# Patient Record
Sex: FEMALE | Race: WHITE | NOT HISPANIC OR LATINO | Employment: OTHER | URBAN - METROPOLITAN AREA
[De-identification: names, ages, dates, MRNs, and addresses within clinical notes are randomized per-mention and may not be internally consistent; named-entity substitution may affect disease eponyms.]

---

## 2017-05-02 DIAGNOSIS — I10 HTN (HYPERTENSION), BENIGN: ICD-10-CM

## 2017-05-02 NOTE — TELEPHONE ENCOUNTER
metoprolol (LOPRESSOR) 50 MG tablet 180 tablet 3 4/8/2016          Last Written Prescription Date: 04/08/2016  Last Fill Quantity: 180, # refills: 3    Last Office Visit with [facility], [facility] or [facility] prescribing provider:  08/17/2016   Future Office Visit:        BP Readings from Last 3 Encounters:   08/17/16 121/76   07/27/16 130/76   06/15/16 134/70

## 2017-05-03 RX ORDER — METOPROLOL TARTRATE 50 MG
TABLET ORAL
Qty: 180 TABLET | Refills: 0 | Status: SHIPPED | OUTPATIENT
Start: 2017-05-03 | End: 2017-11-09

## 2017-05-03 NOTE — TELEPHONE ENCOUNTER
Prescription approved per [facility] Refill Protocol.  Lidia GUEVARA RN

## 2017-11-09 ENCOUNTER — OFFICE VISIT (OUTPATIENT)
Dept: FAMILY MEDICINE | Facility: CLINIC | Age: 72
End: 2017-11-09
Payer: COMMERCIAL

## 2017-11-09 VITALS
WEIGHT: 139 LBS | HEART RATE: 63 BPM | OXYGEN SATURATION: 97 % | TEMPERATURE: 97.7 F | DIASTOLIC BLOOD PRESSURE: 68 MMHG | SYSTOLIC BLOOD PRESSURE: 113 MMHG | BODY MASS INDEX: 24.63 KG/M2 | HEIGHT: 63 IN

## 2017-11-09 DIAGNOSIS — I10 HTN (HYPERTENSION), BENIGN: ICD-10-CM

## 2017-11-09 DIAGNOSIS — R41.3 MEMORY LOSS: ICD-10-CM

## 2017-11-09 DIAGNOSIS — M79.10 MYALGIA: ICD-10-CM

## 2017-11-09 DIAGNOSIS — M15.9 GENERALIZED OA: ICD-10-CM

## 2017-11-09 DIAGNOSIS — R79.89 ELEVATED LFTS: ICD-10-CM

## 2017-11-09 DIAGNOSIS — R53.83 OTHER FATIGUE: ICD-10-CM

## 2017-11-09 DIAGNOSIS — Z12.11 COLON CANCER SCREENING: ICD-10-CM

## 2017-11-09 DIAGNOSIS — E78.5 HYPERLIPIDEMIA LDL GOAL <130: ICD-10-CM

## 2017-11-09 DIAGNOSIS — Z00.00 ENCOUNTER FOR ROUTINE ADULT HEALTH EXAMINATION WITHOUT ABNORMAL FINDINGS: Primary | ICD-10-CM

## 2017-11-09 LAB
CRP SERPL-MCNC: <2.9 MG/L (ref 0–8)
ERYTHROCYTE [DISTWIDTH] IN BLOOD BY AUTOMATED COUNT: 14.5 % (ref 10–15)
HCT VFR BLD AUTO: 43.4 % (ref 35–47)
HGB BLD-MCNC: 13.9 G/DL (ref 11.7–15.7)
MCH RBC QN AUTO: 31.2 PG (ref 26.5–33)
MCHC RBC AUTO-ENTMCNC: 32 G/DL (ref 31.5–36.5)
MCV RBC AUTO: 98 FL (ref 78–100)
PLATELET # BLD AUTO: 224 10E9/L (ref 150–450)
RBC # BLD AUTO: 4.45 10E12/L (ref 3.8–5.2)
WBC # BLD AUTO: 7.1 10E9/L (ref 4–11)

## 2017-11-09 PROCEDURE — 80061 LIPID PANEL: CPT | Performed by: PHYSICIAN ASSISTANT

## 2017-11-09 PROCEDURE — 82550 ASSAY OF CK (CPK): CPT | Performed by: PHYSICIAN ASSISTANT

## 2017-11-09 PROCEDURE — 86140 C-REACTIVE PROTEIN: CPT | Performed by: PHYSICIAN ASSISTANT

## 2017-11-09 PROCEDURE — 85027 COMPLETE CBC AUTOMATED: CPT | Performed by: PHYSICIAN ASSISTANT

## 2017-11-09 PROCEDURE — 99397 PER PM REEVAL EST PAT 65+ YR: CPT | Performed by: PHYSICIAN ASSISTANT

## 2017-11-09 PROCEDURE — 80053 COMPREHEN METABOLIC PANEL: CPT | Performed by: PHYSICIAN ASSISTANT

## 2017-11-09 PROCEDURE — 36415 COLL VENOUS BLD VENIPUNCTURE: CPT | Performed by: PHYSICIAN ASSISTANT

## 2017-11-09 RX ORDER — CHOLECALCIFEROL (VITAMIN D3) 50 MCG
2000 TABLET ORAL DAILY
Qty: 100 TABLET | Refills: 3 | COMMUNITY
Start: 2017-11-09 | End: 2017-11-12

## 2017-11-09 RX ORDER — METOPROLOL TARTRATE 50 MG
50 TABLET ORAL 2 TIMES DAILY
Qty: 180 TABLET | Refills: 3 | Status: ON HOLD | OUTPATIENT
Start: 2017-11-09 | End: 2017-11-15

## 2017-11-09 NOTE — MR AVS SNAPSHOT
After Visit Summary   11/9/2017    Elaina Winn    MRN: [id number]           Patient Information     Date Of Birth          1945        Visit Information        Provider Department      11/9/2017 11:30 AM Cady Marie PA-C [facility]        Today's Diagnoses     Encounter for routine adult health examination without abnormal findings    -  1    HTN (hypertension), benign        Memory loss        Myalgia        Other fatigue        Colon cancer screening        Generalized OA        Hyperlipidemia LDL goal <130          Care Instructions      Preventive Health Recommendations    Female Ages 65 +    Yearly exam:     See your health care provider every year in order to  o Review health changes.   o Discuss preventive care.    o Review your medicines if your doctor has prescribed any.      You no longer need a yearly Pap test unless you've had an abnormal Pap test in the past 10 years. If you have vaginal symptoms, such as bleeding or discharge, be sure to talk with your provider about a Pap test.      Every 1 to 2 years, have a mammogram.  If you are over 69, talk with your health care provider about whether or not you want to continue having screening mammograms.      Every 10 years, have a colonoscopy. Or, have a yearly FIT test (stool test). These exams will check for colon cancer.       Have a cholesterol test every 5 years, or more often if your doctor advises it.       Have a diabetes test (fasting glucose) every three years. If you are at risk for diabetes, you should have this test more often.       At age 65, have a bone density scan (DEXA) to check for osteoporosis (brittle bone disease).    Shots:    Get a flu shot each year.    Get a tetanus shot every 10 years.    Talk to your doctor about your pneumonia vaccines. There are now two you should receive - Pneumovax (PPSV 23) and Prevnar (PCV 13).    Talk to your doctor about the shingles vaccine.    Talk to your doctor  about the hepatitis B vaccine.    Nutrition:     Eat at least 5 servings of fruits and vegetables each day.      Eat whole-grain bread, whole-wheat pasta and brown rice instead of white grains and rice.      Talk to your provider about Calcium and Vitamin D.     Lifestyle    Exercise at least 150 minutes a week (30 minutes a day, 5 days a week). This will help you control your weight and prevent disease.      Limit alcohol to one drink per day.      No smoking.       Wear sunscreen to prevent skin cancer.       See your dentist twice a year for an exam and cleaning.      See your eye doctor every 1 to 2 years to screen for conditions such as glaucoma, macular degeneration and cataracts.    See Stella Delgado again regarding your memory.  Have your mammogram in [address]          Follow-ups after your visit        Future tests that were ordered for you today     Open Future Orders        Priority Expected Expires Ordered    Fecal colorectal cancer screen (FIT) Routine 11/30/2017 2/1/2018 11/9/2017            Who to contact     If you have questions or need follow up information about today's clinic visit or your schedule please contact [facility] directly at [phone].  Normal or non-critical lab and imaging results will be communicated to you by [employer], letter or phone within 4 business days after the clinic has received the results. If you do not hear from us within 7 days, please contact the clinic through Follicat or phone. If you have a critical or abnormal lab result, we will notify you by phone as soon as possible.  Submit refill requests through Nextpeer or call your pharmacy and they will forward the refill request to us. Please allow 3 business days for your refill to be completed.          Additional Information About Your Visit        Nextpeer Information     Nextpeer gives you secure access to your electronic health record. If you see a primary care provider, you can also send messages to your  "care team and make appointments. If you have questions, please call your primary care clinic.  If you do not have a primary care provider, please call [phone] and they will assist you.        Care EveryWhere ID     This is your Care EveryWhere ID. This could be used by other organizations to access your [facility] medical records  [id number]        Your Vitals Were     Pulse Temperature Height Pulse Oximetry BMI (Body Mass Index)       63 97.7  F (36.5  C) (Tympanic) 5' 3\" (1.6 m) 97% 24.62 kg/m2        Blood Pressure from Last 3 Encounters:   11/09/17 113/68   08/17/16 121/76   07/27/16 130/76    Weight from Last 3 Encounters:   11/09/17 139 lb (63 kg)   08/17/16 143 lb (64.9 kg)   07/27/16 144 lb (65.3 kg)              We Performed the Following     CBC with platelets     CK total     Comprehensive metabolic panel     CRP inflammation     Lipid Profile          Today's Medication Changes          These changes are accurate as of: 11/9/17 11:52 AM.  If you have any questions, ask your nurse or doctor.               These medicines have changed or have updated prescriptions.        Dose/Directions    metoprolol 50 MG tablet   Commonly known as:  LOPRESSOR   This may have changed:  See the new instructions.   Used for:  HTN (hypertension), benign   Changed by:  Cady Marie PA-C        Dose:  50 mg   Take 1 tablet (50 mg) by mouth 2 times daily   Quantity:  180 tablet   Refills:  3            Where to get your medicines      These medications were sent to [facility] [id number] [place] [address] [address] [facility] [address]  [address], [facility] [address]    Hours:  24-hours Phone:  [phone]     metoprolol 50 MG tablet                Primary Care Provider Office Phone # Fax #    Cady Marie PA-C [phone] [address]  [facility] [address]        Equal Access to Services     KAREN BABCOCK AH: randell Miller, " zoran lindsay ah. So [facility] [phone].    ATENCIÓN: Si kristi wallace, tiene a munoz disposición servicios gratuitos de asistencia lingüística. Casimiro al [phone].    We comply with applicable federal civil rights laws and [place] laws. We do not discriminate on the basis of race, color, national origin, age, disability, sex, sexual orientation, or gender identity.            Thank you!     Thank you for choosing [facility]  for your care. Our goal is always to provide you with excellent care. Hearing back from our patients is one way we can continue to improve our services. Please take a few minutes to complete the written survey that you may receive in the mail after your visit with us. Thank you!             Your Updated Medication List - Protect others around you: Learn how to safely use, store and throw away your medicines at www.disposemymeds.org.          This list is accurate as of: 11/9/17 11:52 AM.  Always use your most recent med list.                   Brand Name Dispense Instructions for use Diagnosis    ALPHAGAN P 0.1 % ophthalmic solution   Generic drug:  brimonidine      Place 1 drop into the right eye every 12 hours Both eyes        brimonidine-timolol 0.2-0.5 % ophthalmic solution    COMBIGAN     Place 1 drop Into the left eye 2 times daily        FISH OIL PO      Take  by mouth.        LUMIGAN 0.01 % Soln   Generic drug:  bimatoprost      Place 1 drop into the right eye At Bedtime Both eyes        metoprolol 50 MG tablet    LOPRESSOR    180 tablet    Take 1 tablet (50 mg) by mouth 2 times daily    HTN (hypertension), benign       Multi-vitamin Tabs tablet   Generic drug:  multivitamin, therapeutic with minerals      Take 1 tablet by mouth daily.        vitamin D 2000 UNITS tablet     100 tablet    Take 2,000 Units by mouth daily    Myalgia

## 2017-11-09 NOTE — NURSING NOTE
"Chief Complaint   Patient presents with     Wellness Visit       Initial /68 (BP Location: Right arm, Cuff Size: Adult Regular)  Pulse 63  Temp 97.7  F (36.5  C) (Tympanic)  Ht 5' 3\" (1.6 m)  Wt 139 lb (63 kg)  SpO2 97%  BMI 24.62 kg/m2 Estimated body mass index is 24.62 kg/(m^2) as calculated from the following:    Height as of this encounter: 5' 3\" (1.6 m).    Weight as of this encounter: 139 lb (63 kg).  Medication Reconciliation: complete     Allyssa Oreilly MA    "

## 2017-11-09 NOTE — PROGRESS NOTES
"  SUBJECTIVE:   Elaina Winn is a 72 year old female who presents for Preventive Visit.    She feels her memory is worse  She did see Dr. Delgado last year but hasn't f/u  She has pain in \"all of her muscles\" so feels tired/sleepy  BP has been normal when checked at home  She has cataracts and will have surgery coming up.  Already had flu shot at [facility]  She has 2 adopted children and son has mental health issues    Are you in the first 12 months of your Medicare Part B coverage?  No    Healthy Habits:    Do you get at least three servings of calcium containing foods daily (dairy, green leafy vegetables, etc.)? no, taking calcium and/or vitamin D supplement: yes - multivitaim    Amount of exercise or daily activities, outside of work: moderate    Problems taking medications regularly No    Medication side effects: No    Have you had an eye exam in the past two years? yes    Do you see a dentist twice per year? yes    Do you have sleep apnea, excessive snoring or daytime drowsiness?no    COGNITIVE SCREEN  1) Repeat 3 items (Banana, Sunrise, Chair)    2) Clock draw: NORMAL  3) 3 item recall: Recalls NO objects   Results: 0 items recalled: PROBABLE COGNITIVE IMPAIRMENT, **INFORM PROVIDER**    Mini-CogTM Copyright RENEE Perea. Licensed by the author for use in [facility]; reprinted with permission (funmi@.[facility]). All rights reserved.            Reviewed and updated as needed this visit by clinical staff  Tobacco  Allergies  Meds  Problems  Med Hx  Surg Hx  Fam Hx  Soc Hx          Reviewed and updated as needed this visit by Provider  Allergies        Social History   Substance Use Topics     Smoking status: Former Smoker     Quit date: 1/1/1990     Smokeless tobacco: Never Used      Comment: quit 1990     Alcohol use No       The patient does not drink >3 drinks per day nor >7 drinks per week.    Today's PHQ-2 Score:   PHQ-2 ( 1999 Pf[employer]) 11/9/2017 3/22/2016   Q1: Little interest or pleasure in " doing things 0 0   Q2: Feeling down, depressed or hopeless 0 0   PHQ-2 Score 0 0         Do you feel safe in your environment - Yes    Do you have a Health Care Directive?: No: Advance care planning reviewed with patient; information given to patient to review.      Current providers sharing in care for this patient include: Patient Care Team:  Cady Marie PA-C as PCP - General (Internal Medicine)      Hearing impairment: No    Ability to successfully perform activities of daily living: Yes, no assistance needed     Fall risk:  Fallen 2 or more times in the past year?: No  Any fall with injury in the past year?: No      Home safety:  none identified  Allyssa Oreilly MA      The following health maintenance items are reviewed in Epic and correct as of today:  Health Maintenance   Topic Date Due     FALL RISK ASSESSMENT  07/27/2017     FIT Q1 YR  08/18/2017     INFLUENZA VACCINE (SYSTEM ASSIGNED)  09/01/2017     ADVANCE DIRECTIVE PLANNING Q5 YRS  12/28/2017     MAMMO SCREEN Q2 YR (SYSTEM ASSIGNED)  03/22/2018     LIPID SCREEN Q5 YR FEMALE (SYSTEM ASSIGNED)  03/22/2021     TETANUS IMMUNIZATION (SYSTEM ASSIGNED)  02/08/2022     DEXA SCAN SCREENING (SYSTEM ASSIGNED)  Completed     PNEUMOCOCCAL  Completed     Past Medical History:   Diagnosis Date     Anxiety      Dry eyes, bilateral      Generalized OA      Glaucoma      HTN (hypertension), benign      Hyperlipidemia LDL goal < 130      Past Surgical History:   Procedure Laterality Date     DILATION AND CURETTAGE       HYSTERECTOMY      with USO, not for cancer     S/p partial vaginectomy       SEPTOPLASTY       TONSILLECTOMY & ADENOIDECTOMY       Current Outpatient Prescriptions   Medication Sig Dispense Refill     metoprolol (LOPRESSOR) 50 MG tablet Take 1 tablet (50 mg) by mouth 2 times daily 180 tablet 3     Cholecalciferol (VITAMIN D) 2000 UNITS tablet Take 2,000 Units by mouth daily 100 tablet 3     brimonidine (ALPHAGAN P) 0.1 % ophthalmic solution Place 1  "drop into the right eye every 12 hours Both eyes       Omega-3 Fatty Acids (FISH OIL PO) Take  by mouth.       Multiple Vitamin (MULTI-VITAMIN) per tablet Take 1 tablet by mouth daily.       [DISCONTINUED] metoprolol (LOPRESSOR) 50 MG tablet TAKE 1 TABLET TWICE DAILY 180 tablet 0     brimonidine-timolol (COMBIGAN) 0.2-0.5 % ophthalmic solution Place 1 drop Into the left eye 2 times daily       bimatoprost (LUMIGAN) 0.01 % SOLN Place 1 drop into the right eye At Bedtime Both eyes         ROS:  Constitutional, HEENT, cardiovascular, pulmonary, GI, , musculoskeletal, neuro, skin, endocrine and psych systems are negative, except as otherwise noted.      OBJECTIVE:   /68 (BP Location: Right arm, Cuff Size: Adult Regular)  Pulse 63  Temp 97.7  F (36.5  C) (Tympanic)  Ht 5' 3\" (1.6 m)  Wt 139 lb (63 kg)  SpO2 97%  BMI 24.62 kg/m2 Estimated body mass index is 24.62 kg/(m^2) as calculated from the following:    Height as of this encounter: 5' 3\" (1.6 m).    Weight as of this encounter: 139 lb (63 kg).  EXAM:   GENERAL APPEARANCE: healthy, alert and no distress  EYES: Eyes grossly normal to inspection, PERRL and conjunctivae and sclerae normal  HENT: ear canals and TM's normal, nose and mouth without ulcers or lesions, oropharynx clear and oral mucous membranes moist  NECK: no adenopathy, no asymmetry, masses, or scars and thyroid normal to palpation  RESP: lungs clear to auscultation - no rales, rhonchi or wheezes  BREAST: normal without masses, tenderness or nipple discharge and no palpable axillary masses or adenopathy  CV: regular rate and rhythm, normal S1 S2, no S3 or S4, no murmur, click or rub, no peripheral edema and peripheral pulses strong  ABDOMEN: soft, nontender, no hepatosplenomegaly, no masses and bowel sounds normal  MS: no musculoskeletal defects are noted and gait is age appropriate without ataxia  SKIN: no suspicious lesions or rashes  NEURO: Normal strength and tone, sensory exam grossly " "normal, mentation intact and speech normal  PSYCH: mentation appears normal and affect normal/bright    ASSESSMENT / PLAN:   Assessment and Plan:     (Z00.00) Encounter for routine adult health examination without abnormal findings  (primary encounter diagnosis)  Comment:   Plan: will check labs. Pt to go for her mammogram as this is overdue.  Immun up to date.    (I10) HTN (hypertension), benign  Comment: well controlled cont same  Plan: metoprolol (LOPRESSOR) 50 MG tablet,         Comprehensive metabolic panel            (R41.3) Memory loss  Comment:   Plan: f/u with Dr. Delgado in the memory clinic and bring  to that appt    (M79.1) Myalgia  Comment:   Plan: CK total, CRP inflammation, Cholecalciferol         (VITAMIN D) 2000 UNITS tablet            (R53.83) Other fatigue  Comment:   Plan: CBC with platelets            (M15.9) Generalized OA  Comment:   Plan: She may have generalized bone and muscle pain from OA. Will check labs.  Recd tylenol 1000mg tid.    (E78.5) Hyperlipidemia LDL goal <130  Comment:   Plan: Lipid Profile            (Z12.11) Colon cancer screening  Comment:   Plan: Fecal colorectal cancer screen (FIT)                  End of Life Planning:  Patient currently has an advanced directive: No.  I have verified the patient's ablity to prepare an advanced directive/make health care decisions.  Literature was provided to assist patient in preparing an advanced directive.    COUNSELING:  Reviewed preventive health counseling, as reflected in patient instructions        Estimated body mass index is 24.62 kg/(m^2) as calculated from the following:    Height as of this encounter: 5' 3\" (1.6 m).    Weight as of this encounter: 139 lb (63 kg).     reports that she quit smoking about 27 years ago. She has never used smokeless tobacco.        Appropriate preventive services were discussed with this patient, including applicable screening as appropriate for cardiovascular disease, diabetes, " osteopenia/osteoporosis, and glaucoma.  As appropriate for age/gender, discussed screening for colorectal cancer, prostate cancer, breast cancer, and cervical cancer. Checklist reviewing preventive services available has been given to the patient.    Reviewed patients plan of care and provided an AVS. The Basic Care Plan (routine screening as documented in Health Maintenance) for Elaina meets the Care Plan requirement. This Care Plan has been established and reviewed with the Patient.    Counseling Resources:  ATP IV Guidelines  Pooled Cohorts Equation Calculator  Breast Cancer Risk Calculator  FRAX Risk Assessment  ICSI Preventive Guidelines  Dietary Guidelines for Americans, 2010  USDA's MyPlate  ASA Prophylaxis  Lung CA Screening    Cady Marie PA-C  [facility]

## 2017-11-09 NOTE — PATIENT INSTRUCTIONS

## 2017-11-10 LAB
ALBUMIN SERPL-MCNC: 3.7 G/DL (ref 3.4–5)
ALP SERPL-CCNC: 228 U/L (ref 40–150)
ALT SERPL W P-5'-P-CCNC: 694 U/L (ref 0–50)
ANION GAP SERPL CALCULATED.3IONS-SCNC: 7 MMOL/L (ref 3–14)
AST SERPL W P-5'-P-CCNC: 591 U/L (ref 0–45)
BILIRUB SERPL-MCNC: 1 MG/DL (ref 0.2–1.3)
BUN SERPL-MCNC: 40 MG/DL (ref 7–30)
CALCIUM SERPL-MCNC: 9.8 MG/DL (ref 8.5–10.1)
CHLORIDE SERPL-SCNC: 106 MMOL/L (ref 94–109)
CHOLEST SERPL-MCNC: 252 MG/DL
CK SERPL-CCNC: 29 U/L (ref 30–225)
CO2 SERPL-SCNC: 27 MMOL/L (ref 20–32)
CREAT SERPL-MCNC: 1.39 MG/DL (ref 0.52–1.04)
GFR SERPL CREATININE-BSD FRML MDRD: 37 ML/MIN/1.7M2
GLUCOSE SERPL-MCNC: 101 MG/DL (ref 70–99)
HDLC SERPL-MCNC: 77 MG/DL
LDLC SERPL CALC-MCNC: 148 MG/DL
NONHDLC SERPL-MCNC: 175 MG/DL
POTASSIUM SERPL-SCNC: 5.1 MMOL/L (ref 3.4–5.3)
PROT SERPL-MCNC: 7.8 G/DL (ref 6.8–8.8)
SODIUM SERPL-SCNC: 140 MMOL/L (ref 133–144)
TRIGL SERPL-MCNC: 133 MG/DL

## 2017-11-10 NOTE — PROGRESS NOTES
Called and discussed results  Ordered liver us  She denies etoh or tylenol use  Not on a statin  F/u next week after us

## 2017-11-12 ENCOUNTER — APPOINTMENT (OUTPATIENT)
Dept: ULTRASOUND IMAGING | Facility: CLINIC | Age: 72
End: 2017-11-12
Attending: EMERGENCY MEDICINE
Payer: MEDICARE

## 2017-11-12 ENCOUNTER — HOSPITAL ENCOUNTER (OUTPATIENT)
Facility: CLINIC | Age: 72
Setting detail: OBSERVATION
Discharge: HOME | End: 2017-11-15
Attending: EMERGENCY MEDICINE | Admitting: INTERNAL MEDICINE
Payer: MEDICARE

## 2017-11-12 DIAGNOSIS — I10 HTN (HYPERTENSION), BENIGN: ICD-10-CM

## 2017-11-12 DIAGNOSIS — K75.9 HEPATITIS: ICD-10-CM

## 2017-11-12 DIAGNOSIS — I48.0 PAROXYSMAL ATRIAL FIBRILLATION (H): Primary | ICD-10-CM

## 2017-11-12 PROBLEM — R10.9 ABDOMINAL PAIN: Status: ACTIVE | Noted: 2017-11-12

## 2017-11-12 LAB
ALBUMIN SERPL-MCNC: 3.6 G/DL (ref 3.4–5)
ALP SERPL-CCNC: 194 U/L (ref 40–150)
ALT SERPL W P-5'-P-CCNC: 441 U/L (ref 0–50)
ANION GAP SERPL CALCULATED.3IONS-SCNC: 9 MMOL/L (ref 3–14)
AST SERPL W P-5'-P-CCNC: 281 U/L (ref 0–45)
BASOPHILS # BLD AUTO: 0 10E9/L (ref 0–0.2)
BASOPHILS NFR BLD AUTO: 0.5 %
BILIRUB SERPL-MCNC: 1.1 MG/DL (ref 0.2–1.3)
BUN SERPL-MCNC: 29 MG/DL (ref 7–30)
CALCIUM SERPL-MCNC: 9.9 MG/DL (ref 8.5–10.1)
CHLORIDE SERPL-SCNC: 103 MMOL/L (ref 94–109)
CO2 SERPL-SCNC: 26 MMOL/L (ref 20–32)
CREAT SERPL-MCNC: 1.11 MG/DL (ref 0.52–1.04)
DIFFERENTIAL METHOD BLD: NORMAL
EOSINOPHIL # BLD AUTO: 0.2 10E9/L (ref 0–0.7)
EOSINOPHIL NFR BLD AUTO: 2.5 %
ERYTHROCYTE [DISTWIDTH] IN BLOOD BY AUTOMATED COUNT: 14.1 % (ref 10–15)
GFR SERPL CREATININE-BSD FRML MDRD: 48 ML/MIN/1.7M2
GLUCOSE SERPL-MCNC: 105 MG/DL (ref 70–99)
HCT VFR BLD AUTO: 44.7 % (ref 35–47)
HGB BLD-MCNC: 14.9 G/DL (ref 11.7–15.7)
IMM GRANULOCYTES # BLD: 0 10E9/L (ref 0–0.4)
IMM GRANULOCYTES NFR BLD: 0.2 %
LIPASE SERPL-CCNC: 235 U/L (ref 73–393)
LYMPHOCYTES # BLD AUTO: 1.4 10E9/L (ref 0.8–5.3)
LYMPHOCYTES NFR BLD AUTO: 23.8 %
MCH RBC QN AUTO: 31.6 PG (ref 26.5–33)
MCHC RBC AUTO-ENTMCNC: 33.3 G/DL (ref 31.5–36.5)
MCV RBC AUTO: 95 FL (ref 78–100)
MONOCYTES # BLD AUTO: 0.7 10E9/L (ref 0–1.3)
MONOCYTES NFR BLD AUTO: 11.5 %
NEUTROPHILS # BLD AUTO: 3.6 10E9/L (ref 1.6–8.3)
NEUTROPHILS NFR BLD AUTO: 61.5 %
NRBC # BLD AUTO: 0 10*3/UL
NRBC BLD AUTO-RTO: 0 /100
PLATELET # BLD AUTO: 239 10E9/L (ref 150–450)
POTASSIUM SERPL-SCNC: 4.4 MMOL/L (ref 3.4–5.3)
PROT SERPL-MCNC: 8.2 G/DL (ref 6.8–8.8)
RBC # BLD AUTO: 4.72 10E12/L (ref 3.8–5.2)
SODIUM SERPL-SCNC: 138 MMOL/L (ref 133–144)
TSH SERPL DL<=0.005 MIU/L-ACNC: 1.59 MU/L (ref 0.4–4)
WBC # BLD AUTO: 5.9 10E9/L (ref 4–11)

## 2017-11-12 PROCEDURE — 99222 1ST HOSP IP/OBS MODERATE 55: CPT | Mod: AI | Performed by: HOSPITALIST

## 2017-11-12 PROCEDURE — 99285 EMERGENCY DEPT VISIT HI MDM: CPT | Mod: 25

## 2017-11-12 PROCEDURE — 86708 HEPATITIS A ANTIBODY: CPT | Performed by: EMERGENCY MEDICINE

## 2017-11-12 PROCEDURE — 99207 ZZC CDG-MDM COMPONENT: MEETS LOW - DOWN CODED: CPT | Performed by: HOSPITALIST

## 2017-11-12 PROCEDURE — 25000132 ZZH RX MED GY IP 250 OP 250 PS 637: Mod: GY | Performed by: HOSPITALIST

## 2017-11-12 PROCEDURE — 76700 US EXAM ABDOM COMPLETE: CPT

## 2017-11-12 PROCEDURE — 12000000 ZZH R&B MED SURG/OB

## 2017-11-12 PROCEDURE — 87340 HEPATITIS B SURFACE AG IA: CPT | Performed by: EMERGENCY MEDICINE

## 2017-11-12 PROCEDURE — 86803 HEPATITIS C AB TEST: CPT | Performed by: EMERGENCY MEDICINE

## 2017-11-12 PROCEDURE — 25800025 ZZH RX 258: Performed by: HOSPITALIST

## 2017-11-12 PROCEDURE — S5010 5% DEXTROSE AND 0.45% SALINE: HCPCS | Performed by: HOSPITALIST

## 2017-11-12 PROCEDURE — 84443 ASSAY THYROID STIM HORMONE: CPT | Performed by: EMERGENCY MEDICINE

## 2017-11-12 PROCEDURE — 80053 COMPREHEN METABOLIC PANEL: CPT | Performed by: EMERGENCY MEDICINE

## 2017-11-12 PROCEDURE — 85025 COMPLETE CBC W/AUTO DIFF WBC: CPT | Performed by: EMERGENCY MEDICINE

## 2017-11-12 PROCEDURE — 96361 HYDRATE IV INFUSION ADD-ON: CPT

## 2017-11-12 PROCEDURE — 25000128 H RX IP 250 OP 636: Performed by: EMERGENCY MEDICINE

## 2017-11-12 PROCEDURE — 83690 ASSAY OF LIPASE: CPT | Performed by: EMERGENCY MEDICINE

## 2017-11-12 PROCEDURE — 96374 THER/PROPH/DIAG INJ IV PUSH: CPT

## 2017-11-12 RX ORDER — AMOXICILLIN 250 MG
2 CAPSULE ORAL 2 TIMES DAILY PRN
Status: DISCONTINUED | OUTPATIENT
Start: 2017-11-12 | End: 2017-11-15 | Stop reason: HOSPADM

## 2017-11-12 RX ORDER — AMOXICILLIN 250 MG
1 CAPSULE ORAL 2 TIMES DAILY PRN
Status: DISCONTINUED | OUTPATIENT
Start: 2017-11-12 | End: 2017-11-15 | Stop reason: HOSPADM

## 2017-11-12 RX ORDER — BISACODYL 10 MG
10 SUPPOSITORY, RECTAL RECTAL DAILY PRN
Status: DISCONTINUED | OUTPATIENT
Start: 2017-11-12 | End: 2017-11-15 | Stop reason: HOSPADM

## 2017-11-12 RX ORDER — ACETAMINOPHEN 325 MG/1
650 TABLET ORAL EVERY 4 HOURS PRN
Status: DISCONTINUED | OUTPATIENT
Start: 2017-11-12 | End: 2017-11-12 | Stop reason: ALTCHOICE

## 2017-11-12 RX ORDER — POLYETHYLENE GLYCOL 3350 17 G/17G
17 POWDER, FOR SOLUTION ORAL DAILY PRN
Status: DISCONTINUED | OUTPATIENT
Start: 2017-11-12 | End: 2017-11-15 | Stop reason: HOSPADM

## 2017-11-12 RX ORDER — MULTIPLE VITAMINS W/ MINERALS TAB 9MG-400MCG
1 TAB ORAL AT BEDTIME
Status: DISCONTINUED | OUTPATIENT
Start: 2017-11-12 | End: 2017-11-15 | Stop reason: HOSPADM

## 2017-11-12 RX ORDER — HYDROMORPHONE HYDROCHLORIDE 1 MG/ML
0.2 INJECTION, SOLUTION INTRAMUSCULAR; INTRAVENOUS; SUBCUTANEOUS
Status: DISCONTINUED | OUTPATIENT
Start: 2017-11-12 | End: 2017-11-15 | Stop reason: HOSPADM

## 2017-11-12 RX ORDER — HYDROCODONE BITARTRATE AND ACETAMINOPHEN 5; 325 MG/1; MG/1
1-2 TABLET ORAL EVERY 4 HOURS PRN
Status: DISCONTINUED | OUTPATIENT
Start: 2017-11-12 | End: 2017-11-12 | Stop reason: ALTCHOICE

## 2017-11-12 RX ORDER — BRIMONIDINE TARTRATE AND TIMOLOL MALEATE 2; 5 MG/ML; MG/ML
1 SOLUTION OPHTHALMIC 2 TIMES DAILY
Status: DISCONTINUED | OUTPATIENT
Start: 2017-11-13 | End: 2017-11-15 | Stop reason: HOSPADM

## 2017-11-12 RX ORDER — OXYCODONE HYDROCHLORIDE 5 MG/1
5 TABLET ORAL EVERY 6 HOURS PRN
Status: DISCONTINUED | OUTPATIENT
Start: 2017-11-12 | End: 2017-11-15 | Stop reason: HOSPADM

## 2017-11-12 RX ORDER — CHOLECALCIFEROL (VITAMIN D3) 50 MCG
2000 TABLET ORAL DAILY
Status: DISCONTINUED | OUTPATIENT
Start: 2017-11-13 | End: 2017-11-12

## 2017-11-12 RX ORDER — ONDANSETRON 4 MG/1
4 TABLET, ORALLY DISINTEGRATING ORAL EVERY 6 HOURS PRN
Status: DISCONTINUED | OUTPATIENT
Start: 2017-11-12 | End: 2017-11-15 | Stop reason: HOSPADM

## 2017-11-12 RX ORDER — NALOXONE HYDROCHLORIDE 0.4 MG/ML
.1-.4 INJECTION, SOLUTION INTRAMUSCULAR; INTRAVENOUS; SUBCUTANEOUS
Status: DISCONTINUED | OUTPATIENT
Start: 2017-11-12 | End: 2017-11-15 | Stop reason: HOSPADM

## 2017-11-12 RX ORDER — BRIMONIDINE TARTRATE 1 MG/ML
1 SOLUTION/ DROPS OPHTHALMIC EVERY 12 HOURS
Status: DISCONTINUED | OUTPATIENT
Start: 2017-11-12 | End: 2017-11-12

## 2017-11-12 RX ORDER — ONDANSETRON 2 MG/ML
4 INJECTION INTRAMUSCULAR; INTRAVENOUS EVERY 6 HOURS PRN
Status: DISCONTINUED | OUTPATIENT
Start: 2017-11-12 | End: 2017-11-15 | Stop reason: HOSPADM

## 2017-11-12 RX ORDER — LIDOCAINE 40 MG/G
CREAM TOPICAL
Status: DISCONTINUED | OUTPATIENT
Start: 2017-11-12 | End: 2017-11-15 | Stop reason: HOSPADM

## 2017-11-12 RX ORDER — ONDANSETRON 2 MG/ML
4 INJECTION INTRAMUSCULAR; INTRAVENOUS ONCE
Status: COMPLETED | OUTPATIENT
Start: 2017-11-12 | End: 2017-11-12

## 2017-11-12 RX ORDER — ACETAMINOPHEN 650 MG/1
650 SUPPOSITORY RECTAL EVERY 4 HOURS PRN
Status: DISCONTINUED | OUTPATIENT
Start: 2017-11-12 | End: 2017-11-12 | Stop reason: ALTCHOICE

## 2017-11-12 RX ORDER — METOPROLOL TARTRATE 50 MG
50 TABLET ORAL 2 TIMES DAILY
Status: DISCONTINUED | OUTPATIENT
Start: 2017-11-12 | End: 2017-11-15

## 2017-11-12 RX ADMIN — ONDANSETRON 4 MG: 2 SOLUTION INTRAMUSCULAR; INTRAVENOUS at 17:59

## 2017-11-12 RX ADMIN — METOPROLOL TARTRATE 50 MG: 50 TABLET ORAL at 22:10

## 2017-11-12 RX ADMIN — DEXTROSE MONOHYDRATE AND SODIUM CHLORIDE: 5; .45 INJECTION, SOLUTION INTRAVENOUS at 22:18

## 2017-11-12 RX ADMIN — SODIUM CHLORIDE 1000 ML: 9 INJECTION, SOLUTION INTRAVENOUS at 17:55

## 2017-11-12 RX ADMIN — MULTIPLE VITAMINS W/ MINERALS TAB 1 TABLET: TAB at 22:10

## 2017-11-12 ASSESSMENT — ENCOUNTER SYMPTOMS
UNEXPECTED WEIGHT CHANGE: 1
DIARRHEA: 0
ABDOMINAL PAIN: 0
APPETITE CHANGE: 1
VOMITING: 0
CHILLS: 1
COUGH: 0
BACK PAIN: 0
NAUSEA: 1

## 2017-11-12 NOTE — LETTER
Transition Communication Hand-off for Care Transitions to Next Level of Care Provider    Name: Elaina Winn  MRN #: [id number]  Primary Care Provider: Cady Marie  Primary Care MD Name: Stella Delgado MD  Primary Clinic: [address]  [place] [address]  Primary Care Clinic Name: [facility]  Reason for Hospitalization:  Elevated LFT's CT negative new Afib requiring AC Confusion   Admit Date/Time: 11/12/2017  4:40 PM  Discharge Date: 11/15/2017    Plan of Care Goals/Milestone Events:   Patient Concerns: new medications for Afib   Patient Goals: continued work up for elevated LFT's patient is frustrated that providers have not been able to give her a cause of the LFT's    Reason for Communication Hand-off Referral: Difficulty understanding plan of care  Multiple providers/specialties    Discharge Plan:  -Follow up with cardiology clinic in 4 weeks Scheduled to see NP and Dr. Perez Holter Monitor 30 days  -Follow up with PCP in one week. Recommended lab: LFT, BMP, Hgb scheduled 11/20 FRANSISCA Delgado  -Follow up with [facility] in 2 weeks. Hepatology clinic   -CT chest in 12 months for follow up on lung nodule   *patient did have an episode of confusion this stay said she has early dementia earlier notes from 2016 have outpatient OT for cognitive eval not sure if patient would benefit from another evaluation in the outpatient setting.      Concern for non-adherence with plan of care:   Y/N n  Discharge Needs Assessment:  Needs       Most Recent Value    # of Referrals Placed by CTS External Care Coordination, Scheduled Follow-up appointments, [facility], Communication hand-offs to next level of Care Providers, Insurance Verification for medications        Follow-up specialty is recommended: Yes    Follow-up plan:  Future Appointments  Date Time Provider Department Center   11/22/2017 2:30 PM Stella Delgado,  [facility]    12/20/2017 11:00 AM Olga Andino PA-C [facility]       Any  outstanding tests or procedures:        Referrals     Future Labs/Procedures    Follow-Up with Cardiologist             Key Recommendations:      Shani Durbin    AVS/Discharge Summary is the source of truth; this is a helpful guide for improved communication of patient story

## 2017-11-12 NOTE — IP AVS SNAPSHOT
MRN:[id number]                      After Visit Summary   11/12/2017    Elaina Winn    MRN: [id number]           Thank you!     Thank you for choosing [facility] for your care. Our goal is always to provide you with excellent care. Hearing back from our patients is one way we can continue to improve our services. Please take a few minutes to complete the written survey that you may receive in the mail after you visit with us. Thank you!        Patient Information     Date Of Birth          1945        Designated Caregiver       Most Recent Value    Caregiver    Will someone help with your care after discharge? no      About your hospital stay     You were admitted on:  November 12, 2017 You last received care in the:  [place] [address] [facility]    You were discharged on:  November 15, 2017       Who to Call     For medical emergencies, please call 911.  For non-urgent questions about your medical care, please call your primary care provider or clinic, [phone]          Attending Provider     Provider Specialty    Christi Brady MD Emergency Medicine    Maribel, Antonio Gaines MD Internal Medicine    Clifton, Lauri Wallace, DO Internal Medicine    Dejah Montanez, DO Internal Medicine       Primary Care Provider Office Phone # Fax #    Cady Marie PA-C [phone]      After Care Instructions     Activity       Your activity upon discharge: activity as tolerated            Diet       Follow this diet upon discharge: Orders Placed This Encounter      Snacks/Supplements Adult: Other - Please comment; OK to order supplements as desired; With Meals     Low salt diet                  Follow-up Appointments     Follow-up and recommended labs and tests        You should receive a call from [facility] for a Hepatology follow up in the next two days.  If you do not hear from them please call  [phone]            Follow-up and recommended labs and tests        -Follow  up with cardiology clinic in 4 weeks  -Follow up with PCP in one week. Recommended lab: LFT, BMP, Hgb  -Follow up with [facility] in 2 weeks.  -CT chest in 12 months for follow up on lung nodule                  Your next 10 appointments already scheduled     Nov 22, 2017  2:30 PM CST   [facility]    [address] [facility] [address] [phone]           Bring a current list of meds and any records pertaining to this visit. For Physicals, please bring immunization records and any forms needing to be filled out. Please arrive 10 minutes early to complete paperwork.            Dec 20, 2017 11:00 AM CST   Return Discharge with CATY Marsh CNP   [facility] ([facility])    [address] [address] [phone]            Jan 17, 2018 11:45 AM CST   Return Visit with Juan C Perez MD   [facility] ([facility])    [address] [address] [phone]              Pending Results     Date and Time Order Name Status Description    11/14/2017 0425 Urine Culture Aerobic Bacterial Preliminary     11/14/2017 0300 EKG 12-lead, tracing only Preliminary     11/14/2017 0000 Tissue transglutaminase shruthi IgA and IgG In process             Statement of Approval     Ordered          11/15/17 1026  I have reviewed and agree with all the recommendations and orders detailed in this document.  EFFECTIVE NOW     Approved and electronically signed by:  Marko Cooley MD             Admission Information     Date & Time Provider Department Dept. Phone    11/12/2017 Dejah Montanez DO [place] [address] [facility] [phone]      Your Vitals Were     Blood Pressure Pulse Temperature Respirations Height Weight    164/74 (BP Location: Right arm) 110  "95.9  F (35.5  C) (Oral) 17 1.626 m (5' 4\") 59.9 kg (132 lb)    Pulse Oximetry BMI (Body Mass Index)                99% 22.66 kg/m2          24/7 Card Information     24/7 Card gives you secure access to your electronic health record. If you see a primary care provider, you can also send messages to your care team and make appointments. If you have questions, please call your primary care clinic.  If you do not have a primary care provider, please call [phone] and they will assist you.        Care EveryWhere ID     This is your Care EveryWhere ID. This could be used by other organizations to access your [facility] medical records  [id number]        Equal Access to Services     KAREN BABCOCK : Efrem Diamond, randell hartmann, ladarius argueta, zoran sanchez. So [facility] [phone].    ATENCIÓN: Si habla español, tiene a munoz disposición servicios gratuitos de asistencia lingüística. Llame al [phone].    We comply with applicable federal civil rights laws and [place] laws. We do not discriminate on the basis of race, color, national origin, age, disability, sex, sexual orientation, or gender identity.               Review of your medicines      START taking        Dose / Directions    apixaban ANTICOAGULANT 2.5 MG tablet   Commonly known as:  ELIQUIS   Used for:  Paroxysmal atrial fibrillation (H)        Dose:  2.5 mg   Take 1 tablet (2.5 mg) by mouth 2 times daily   Quantity:  60 tablet   Refills:  3         CONTINUE these medicines which may have CHANGED, or have new prescriptions. If we are uncertain of the size of tablets/capsules you have at home, strength may be listed as something that might have changed.        Dose / Directions    metoprolol 50 MG tablet   Commonly known as:  LOPRESSOR   This may have changed:  how much to take   Used for:  HTN (hypertension), benign        Dose:  75 mg   Take 1.5 tablets (75 mg) by mouth 2 times daily   Quantity:  180 " tablet   Refills:  3         CONTINUE these medicines which have NOT CHANGED        Dose / Directions    brimonidine-timolol 0.2-0.5 % ophthalmic solution   Commonly known as:  COMBIGAN        Dose:  1 drop   Place 1 drop into both eyes 2 times daily   Refills:  0       FISH OIL PO        Dose:  1000 mg   Take 1,000 mg by mouth daily   Refills:  0       Multi-vitamin Tabs tablet   Generic drug:  multivitamin, therapeutic with minerals        Dose:  1 tablet   Take 1 tablet by mouth daily.   Refills:  0         STOP taking     PREVAGEN PO                Where to get your medicines      These medications were sent to [facility] [address] [place] [address] - [address] AT [facility] [address]  [address], [facility] [address]    Hours:  24-hours Phone:  [phone]     apixaban ANTICOAGULANT 2.5 MG tablet    metoprolol 50 MG tablet                Protect others around you: Learn how to safely use, store and throw away your medicines at www.disposemymeds.org.             Medication List: This is a list of all your medications and when to take them. Check marks below indicate your daily home schedule. Keep this list as a reference.      Medications           Morning Afternoon Evening Bedtime As Needed    apixaban ANTICOAGULANT 2.5 MG tablet   Commonly known as:  ELIQUIS   Take 1 tablet (2.5 mg) by mouth 2 times daily   Next Dose Due:  11/15 joesph                                      brimonidine-timolol 0.2-0.5 % ophthalmic solution   Commonly known as:  COMBIGAN   Place 1 drop into both eyes 2 times daily   Last time this was given:  1 drop on 11/15/2017  8:30 AM   Next Dose Due:  11/15 joesph                                      FISH OIL PO   Take 1,000 mg by mouth daily                                metoprolol 50 MG tablet   Commonly known as:  LOPRESSOR   Take 1.5 tablets (75 mg) by mouth 2 times daily   Last time this was given:  25 mg on 11/15/2017  8:58 AM   Next Dose Due:  11/15 joesph                                       Multi-vitamin Tabs tablet   Take 1 tablet by mouth daily.   Generic drug:  multivitamin, therapeutic with minerals

## 2017-11-12 NOTE — IP AVS SNAPSHOT
[address], [address]    [facility] [address]    Phone:  [phone]                                       After Visit Summary   11/12/2017    Elaina Winn    MRN: [id number]           After Visit Summary Signature Page     I have received my discharge instructions, and my questions have been answered. I have discussed any challenges I see with this plan with the nurse or doctor.    ..........................................................................................................................................  Patient/Patient Representative Signature      ..........................................................................................................................................  Patient Representative Print Name and Relationship to Patient    ..................................................               ................................................  Date                                            Time    ..........................................................................................................................................  Reviewed by Signature/Title    ...................................................              ..............................................  Date                                                            Time

## 2017-11-12 NOTE — ED PROVIDER NOTES
History     Chief Complaint:  Nausea and headache    HPI   Elaina Winn is a 72 year old female with a history of hyperlipidemia, and HTN who presents to the emergency department with her  for evaluation of nausea and headache. For the past month, the patient reports the onset of hot and cold flashes, generalized body aches, and a general sense of not feeling well. She reports one episode of vomiting. She reports having a fly shot recently and was unsure if her symptoms were related. Then about a week ago, the patient reports increased fatigue and nausea. She reports a decreased appetite secondary to her extreme nausea and notes she has lost about 5 lbs in the past week. She states she tries to get up to do things but finds herself unable to do so because she lacks the energy and the motivation to follow through given how she has been feeling. She denies abdominal pain, recent vomiting, chest pain, shortness of breath, diarrhea, back pain. She denies a history of blood transfusion. She denies any medication changes recently.     Of note, the patient was seen in clinic on 11/9/17 and had blood work done, with the most notable results below:    Bilirubin total: 1.0 mg  Alkaline Phophatase 228 U/L   U/L   U/L    Allergies:  Zocor     Medications:    metoprolol   Cholecalciferol  brimonidine-timolol   bimatopros  brimonidine     Past Medical History:    Anxiety   Dry eyes   Generalized OA   Glaucoma   HTN    Hyperlipidemia   Memory loss    Past Surgical History:    Dilation and curettage  Hysterectomy  Partial vaginectomy  Septoplasty  Tonsillectomy   Adenoidectomy    Family History:    HTN  CHF  Alzheimer's Disease    Social History:  Former smoker: quit date 1/1/1990  Negative for alcohol use.  Marital Status:        Review of Systems   Constitutional: Positive for appetite change, chills and unexpected weight change.   Respiratory: Negative for cough.    Gastrointestinal: Positive for  "nausea. Negative for abdominal pain, diarrhea and vomiting.   Musculoskeletal: Negative for back pain.   All other systems reviewed and are negative.    Physical Exam   First Vitals:  BP: (!) 149/92  Pulse: 83  Temp: 97.7  F (36.5  C)  Resp: 16  Height: 162.6 cm (5' 4\")  Weight: 61.2 kg (135 lb)  SpO2: 97 %    Physical Exam  Nursing note and vitals reviewed.  Constitutional:  Appears well-developed and well-nourished.   HENT:   Head:    Atraumatic.   Mouth/Throat:   Oropharynx is clear and moist. No oropharyngeal exudate.   Eyes:    Pupils are equal, round, and reactive to light.   Neck:    Normal range of motion. Neck supple.      No tracheal deviation present. No thyromegaly present.   Cardiovascular:  Normal rate, regular rhythm, no murmur   Pulmonary/Chest: Breath sounds are clear and equal without wheezes or crackles.  Abdominal:   Soft. Bowel sounds are normal. Exhibits no distension and      no mass. There is no tenderness. No palpable liver and spleen enlargement.      There is no rebound and no guarding.   Musculoskeletal:  Exhibits no edema.   Lymphadenopathy:  No cervical adenopathy.   Neurological:   Alert and oriented to person, place, and time.   Skin:    Skin is warm and dry. No rash noted. No pallor. No Jaundice  Emergency Department Course   Imaging:  Radiographic findings were communicated with the patient who voiced understanding of the findings.    US Abdomen, complete:   Negative abdominal ultrasound. The pancreas is completely  obscured by gas.  As per radiology.     Laboratory:  Hepatitis A Antibody IgG: In process  Hepatitis B Surface antigen: In process  Hepatitis B surface shruthi immune: In Process  Hepatitis C antibody: In process  CBC: WBC: 5.9, HGB: 14.9, PLT: 239  CMP: Glucose 105 (H), Creatinine 1.11 (H), GFR 24 (L),  (H),  (H), ALkphose 194 (H), o/w WNL  Lipase: 235    Interventions:  1755 NS 1L IV  1759 Zofran, 4 mg, IV injection    Emergency Department " Course:  1700 Nursing notes and vitals reviewed.  I performed an exam of the patient as documented above.     IV inserted. Medicine administered as documented above. Blood drawn. This was sent to the lab for further testing, results above.    The patient was sent for a abdomen ultrasound while in the emergency department, findings above.     1950  I consulted with Dr. Lauri Lazo of the hospitalist services. They are in agreement to accept the patient for admission.    2000 I rechecked the patient and discussed the results of her workup thus far.     Findings and plan explained to the Patient and spouse who consents to admission. Discussed the patient with Dr. Lazo, who will admit the patient to a medical bed for further monitoring, evaluation, and treatment.  Impression & Plan    Medical Decision Making:  Elaina Winn is a 72 year old female who was found to have acute hepatitis which I feel is most likely due to infectious hepatitis considering the symptoms started with fevers and chill sensations. Abdominal ultrasound was performed and did not show any sign of cholelithiasis or biliary obstruction. She has a benign abdominal exam so I did not feel that CT imaging was indicated at this time. Her kidney function is abnormal here indicating significant dehydration so she was IV hydrated and admitted to the medical floor under the care of Dr. Lauri Lazo for further IV hydration and evaluation and treatment with GI consultation to evaluate the cause of her hepatitis.     Diagnosis:    ICD-10-CM    1. Hepatitis K75.9 TSH with free T4 reflex     TSH with free T4 reflex     CANCELED: TSH with free T4 reflex       Disposition:  discharged to home with Dr. Lazo    IStella, am serving as a scribe on 11/12/2017 at 4:54 PM to personally document services performed by Christi Brady MD based on my observations and the provider's statements to me.     Stella Guillaume  11/12/2017    EMERGENCY  DEPARTMENT       CorozalChristi valenzuela MD  11/13/17 0028

## 2017-11-13 ENCOUNTER — APPOINTMENT (OUTPATIENT)
Dept: CT IMAGING | Facility: CLINIC | Age: 72
End: 2017-11-13
Attending: INTERNAL MEDICINE
Payer: MEDICARE

## 2017-11-13 PROBLEM — R79.89 ABNORMAL LIVER FUNCTION TESTS: Status: ACTIVE | Noted: 2017-11-13

## 2017-11-13 LAB
ALBUMIN SERPL-MCNC: 3.1 G/DL (ref 3.4–5)
ALP SERPL-CCNC: 159 U/L (ref 40–150)
ALT SERPL W P-5'-P-CCNC: 377 U/L (ref 0–50)
ANION GAP SERPL CALCULATED.3IONS-SCNC: 5 MMOL/L (ref 3–14)
AST SERPL W P-5'-P-CCNC: 252 U/L (ref 0–45)
BILIRUB SERPL-MCNC: 0.8 MG/DL (ref 0.2–1.3)
BUN SERPL-MCNC: 19 MG/DL (ref 7–30)
CALCIUM SERPL-MCNC: 9.1 MG/DL (ref 8.5–10.1)
CHLORIDE SERPL-SCNC: 107 MMOL/L (ref 94–109)
CO2 SERPL-SCNC: 29 MMOL/L (ref 20–32)
CREAT SERPL-MCNC: 1.06 MG/DL (ref 0.52–1.04)
ERYTHROCYTE [DISTWIDTH] IN BLOOD BY AUTOMATED COUNT: 14.2 % (ref 10–15)
FERRITIN SERPL-MCNC: 259 NG/ML (ref 8–252)
GFR SERPL CREATININE-BSD FRML MDRD: 51 ML/MIN/1.7M2
GGT SERPL-CCNC: 182 U/L (ref 0–40)
GLUCOSE SERPL-MCNC: 101 MG/DL (ref 70–99)
HAV IGG SER QL IA: NONREACTIVE
HBV CORE IGM SERPL QL IA: NONREACTIVE
HBV SURFACE AG SERPL QL IA: NONREACTIVE
HCT VFR BLD AUTO: 40.7 % (ref 35–47)
HCV AB SERPL QL IA: NONREACTIVE
HGB BLD-MCNC: 13.4 G/DL (ref 11.7–15.7)
INR PPP: 1.01 (ref 0.86–1.14)
MCH RBC QN AUTO: 31.5 PG (ref 26.5–33)
MCHC RBC AUTO-ENTMCNC: 32.9 G/DL (ref 31.5–36.5)
MCV RBC AUTO: 96 FL (ref 78–100)
PLATELET # BLD AUTO: 203 10E9/L (ref 150–450)
POTASSIUM SERPL-SCNC: 4 MMOL/L (ref 3.4–5.3)
PROT SERPL-MCNC: 7.1 G/DL (ref 6.8–8.8)
RBC # BLD AUTO: 4.25 10E12/L (ref 3.8–5.2)
SODIUM SERPL-SCNC: 141 MMOL/L (ref 133–144)
WBC # BLD AUTO: 5.1 10E9/L (ref 4–11)

## 2017-11-13 PROCEDURE — 82085 ASSAY OF ALDOLASE: CPT | Performed by: HOSPITALIST

## 2017-11-13 PROCEDURE — 71260 CT THORAX DX C+: CPT

## 2017-11-13 PROCEDURE — 25000125 ZZHC RX 250: Performed by: INTERNAL MEDICINE

## 2017-11-13 PROCEDURE — 74177 CT ABD & PELVIS W/CONTRAST: CPT

## 2017-11-13 PROCEDURE — S5010 5% DEXTROSE AND 0.45% SALINE: HCPCS | Performed by: HOSPITALIST

## 2017-11-13 PROCEDURE — 25000128 H RX IP 250 OP 636: Performed by: INTERNAL MEDICINE

## 2017-11-13 PROCEDURE — 80053 COMPREHEN METABOLIC PANEL: CPT | Performed by: HOSPITALIST

## 2017-11-13 PROCEDURE — 82728 ASSAY OF FERRITIN: CPT | Performed by: HOSPITALIST

## 2017-11-13 PROCEDURE — G0378 HOSPITAL OBSERVATION PER HR: HCPCS

## 2017-11-13 PROCEDURE — 25800025 ZZH RX 258: Performed by: HOSPITALIST

## 2017-11-13 PROCEDURE — 25000132 ZZH RX MED GY IP 250 OP 250 PS 637: Mod: GY | Performed by: HOSPITALIST

## 2017-11-13 PROCEDURE — 83516 IMMUNOASSAY NONANTIBODY: CPT | Performed by: INTERNAL MEDICINE

## 2017-11-13 PROCEDURE — 36415 COLL VENOUS BLD VENIPUNCTURE: CPT | Performed by: INTERNAL MEDICINE

## 2017-11-13 PROCEDURE — 86705 HEP B CORE ANTIBODY IGM: CPT | Performed by: INTERNAL MEDICINE

## 2017-11-13 PROCEDURE — 99207 ZZC CDG-MDM COMPONENT: MEETS LOW - DOWN CODED: CPT | Performed by: INTERNAL MEDICINE

## 2017-11-13 PROCEDURE — 36415 COLL VENOUS BLD VENIPUNCTURE: CPT | Performed by: HOSPITALIST

## 2017-11-13 PROCEDURE — 85027 COMPLETE CBC AUTOMATED: CPT | Performed by: HOSPITALIST

## 2017-11-13 PROCEDURE — 25000132 ZZH RX MED GY IP 250 OP 250 PS 637: Mod: GY | Performed by: INTERNAL MEDICINE

## 2017-11-13 PROCEDURE — 99225 ZZC SUBSEQUENT OBSERVATION CARE,LEVEL II: CPT | Performed by: INTERNAL MEDICINE

## 2017-11-13 PROCEDURE — 82977 ASSAY OF GGT: CPT | Performed by: HOSPITALIST

## 2017-11-13 PROCEDURE — 85610 PROTHROMBIN TIME: CPT | Performed by: HOSPITALIST

## 2017-11-13 RX ORDER — IOPAMIDOL 755 MG/ML
68 INJECTION, SOLUTION INTRAVASCULAR ONCE
Status: COMPLETED | OUTPATIENT
Start: 2017-11-13 | End: 2017-11-13

## 2017-11-13 RX ADMIN — SODIUM CHLORIDE 60 ML: 9 INJECTION, SOLUTION INTRAVENOUS at 15:53

## 2017-11-13 RX ADMIN — MULTIPLE VITAMINS W/ MINERALS TAB 1 TABLET: TAB at 21:04

## 2017-11-13 RX ADMIN — DEXTROSE MONOHYDRATE AND SODIUM CHLORIDE: 5; .45 INJECTION, SOLUTION INTRAVENOUS at 12:47

## 2017-11-13 RX ADMIN — BRIMONIDINE TARTRATE AND TIMOLOL MALEATE 1 DROP: 2; 5 SOLUTION OPHTHALMIC at 20:45

## 2017-11-13 RX ADMIN — METOPROLOL TARTRATE 50 MG: 50 TABLET ORAL at 20:54

## 2017-11-13 RX ADMIN — METOPROLOL TARTRATE 50 MG: 50 TABLET ORAL at 10:08

## 2017-11-13 RX ADMIN — BRIMONIDINE TARTRATE AND TIMOLOL MALEATE 1 DROP: 2; 5 SOLUTION OPHTHALMIC at 14:09

## 2017-11-13 RX ADMIN — IOPAMIDOL 68 ML: 755 INJECTION, SOLUTION INTRAVENOUS at 15:53

## 2017-11-13 NOTE — PLAN OF CARE
Problem: Patient Care Overview  Goal: Plan of Care/Patient Progress Review  Outcome: No Change  A/Ox4. VSS on RA. Denies pain. Denies nausea. Clear liquid diet ordered, tolerating well. PIV infusing D5 + 1/2 NS at 75 mL/hr. Elevated alkaline phosphate, elevated LFT's. Up SBA, calls appropriately; not a falls risk. GI consult placed. No other complaints. Will continue to monitor.

## 2017-11-13 NOTE — PHARMACY-ADMISSION MEDICATION HISTORY
Admission medication history interview status for the 11/12/2017  admission is complete. See EPIC admission navigator for prior to admission medications     Medication history source reliability:Moderate    Actions taken by pharmacist (provider contacted, etc):Verified medication list with [facility].      Additional medication history information not noted on PTA med list :Patient couldn't remember if she is on Alphagan or Combigan eye drops- stated they order the eye drops through [facility]. Patients  will bring in eye drops tomorrow morning.     Meds Added: Prevagen     Medication reconciliation/reorder completed by provider prior to medication history? Yes    Time spent in this activity: 30 minutes    Prior to Admission medications    Medication Sig Last Dose Taking? Auth Provider   Apoaequorin (PREVAGEN PO) Take 1 tablet by mouth daily 11/12/2017 at AM Yes Unknown, Entered By History   metoprolol (LOPRESSOR) 50 MG tablet Take 1 tablet (50 mg) by mouth 2 times daily 11/12/2017 at AM Yes Cady Marie PA-C   Omega-3 Fatty Acids (FISH OIL PO) Take 1,000 mg by mouth daily  11/11/2017 at PM Yes Reported, Patient   Multiple Vitamin (MULTI-VITAMIN) per tablet Take 1 tablet by mouth daily. 11/11/2017 at PM Yes Reported, Patient   brimonidine-timolol (COMBIGAN) 0.2-0.5 % ophthalmic solution Place 1 drop Into the left eye 2 times daily   Reported, Patient   brimonidine (ALPHAGAN P) 0.1 % ophthalmic solution Place 1 drop into the right eye every 12 hours Both eyes   Reported, Patient

## 2017-11-13 NOTE — CONSULTS
GASTROENTEROLOGY CONSULTATION      Elaina Winn  [address] [address]  72 year old female     Admission Date/Time: 11/12/2017  Primary Care Provider: Cady Marie  Referring / Attending Physician:  Clifton     We were asked to see the patient in consultation by Dr. Lazo for evaluation of abnormal LFTs.    ASSESSMENT:    Hepatitis, mixed, but mostly hepatocellular, improving.  Weight loss  Anorexia  Fatigue    Reassured by normal INR, plts, and LFT trend, but etiology is uncertain (viral, toxin, paraneoplastic?).  No new medications, rare use of NSAIDs/tylenol, no ill contacts, but did have influenza vaccination about 2 wks ago, after which symptoms developed.     RECOMMENDATIONS:  ADAT  Follow LFTs, INR daily  Will order multiple liver serologies  Recommend UA and chest/abd CT with contrast given nausea, weight loss and anorexia    Jordan Sanz DO   [facility]  Cell [phone]  ________________________________________________________________________        CC: headache and nausea     HPI:  Elaina Winn is a 72 year old female who we are asked to see for elevated LFTs.  Currently, the patient is without GI complaints - no nausea, vomiting, diarrhea, bleeding etc.  She has, however, had a lack of appetite and weight loss over the last 1-2 months (lost about 10-12lbs).  Was found to have elevated LFTs on 11/9 after seeing PCP, which have since improved, but was admitted due to these tests.     Only drinks wine about 2-3 times a month.  Denies other new OTC or Rx meds, no supplements.  Denies any recent travel or ill contacts.  Liver US failed to demonstrate signs of cirrhosis or hepatosplenomegaly, CBD 0.3cm.       ROS: A comprehensive ten point review of systems was negative aside from those in mentioned in the HPI.      PAST MEDICAL HISTORY:  Patient Active Problem List    Diagnosis Date Noted     Abdominal pain 11/12/2017     Priority: Medium     Memory loss  2016     Priority: Medium     Elevated serum creatinine 2016     Priority: Medium     Anxiety      Priority: Medium     HTN (hypertension), benign      Priority: Medium     Hyperlipidemia with target LDL less than 130      Priority: Medium     Diagnosis updated by automated process. Provider to review and confirm.       Glaucoma      Priority: Medium     Generalized OA      Priority: Medium     Dry eyes, bilateral      Priority: Medium     Advanced directives, counseling/discussion 2012     Priority: Medium     Discussed advance care planning with patient; information given to patient to review. 2012          Heel pain 2012     Priority: Medium     SOCIAL HISTORY:  Social History   Substance Use Topics     Smoking status: Former Smoker     Quit date: 1990     Smokeless tobacco: Never Used      Comment: quit      Alcohol use No     FAMILY HISTORY:  Family History   Problem Relation Age of Onset     Hypertension Mother       age 95     Alzheimer Disease Father 75     also CHF     CANCER Brother       of lung ca age 69     Lipids Brother      Alzheimer Disease Paternal Uncle      Alzheimer Disease Paternal Uncle      ALLERGIES:   Allergies   Allergen Reactions     Zocor [Simvastatin - High Dose]      Elevated LFTs     MEDICATIONS:   Current Facility-Administered Medications   Medication     brimonidine-timolol (COMBIGAN) 0.2-0.5 % ophthalmic solution 1 drop     metoprolol (LOPRESSOR) tablet 50 mg     multivitamin, therapeutic with minerals (THERA-VIT-M) tablet 1 tablet     naloxone (NARCAN) injection 0.1-0.4 mg     lidocaine 1 % 1 mL     lidocaine (LMX4) cream     sodium chloride (PF) 0.9% PF flush 3 mL     sodium chloride (PF) 0.9% PF flush 3 mL     dextrose 5% and 0.45% NaCl 1,000 mL infusion     senna-docusate (SENOKOT-S;PERICOLACE) 8.6-50 MG per tablet 1 tablet    Or     senna-docusate (SENOKOT-S;PERICOLACE) 8.6-50 MG per tablet 2 tablet     polyethylene glycol  "(MIRALAX/GLYCOLAX) Packet 17 g     bisacodyl (DULCOLAX) Suppository 10 mg     ondansetron (ZOFRAN-ODT) ODT tab 4 mg    Or     ondansetron (ZOFRAN) injection 4 mg     HYDROmorphone (PF) (DILAUDID) injection 0.2 mg     oxyCODONE IR (ROXICODONE) tablet 5 mg     PHYSICAL EXAM:   /56 (BP Location: Right arm, Cuff Size: Adult Regular)  Pulse 60  Temp 97.5  F (36.4  C) (Oral)  Resp 16  Ht 1.626 m (5' 4\")  Wt 61.6 kg (135 lb 14.4 oz)  SpO2 97%  BMI 23.33 kg/m2   GEN: Alert, oriented x3, communicative and in NAD.  CALLUM: AT, anicteric, OP without erythema, exudate, or ulcers.    NECK: Supple.    LYMPH: No LAD noted.  HRT: RRR  LUNGS: CTA  ABD: ND, +BS, no guarding or pain to palpation, no rebound, no HSM.  SKIN: No rash, jaundice or spider angiomata  MSKL: LE free of edema, strength 5/5 all 4 extrems  NEURO: CN grossly intact, sensation intact to light touch, toes downgoing.       ADDITIONAL DATA:   I reviewed the patient's new clinical lab test results.   Recent Labs   Lab Test  11/13/17 0735 11/12/17 1702 11/09/17   1156   WBC  5.1  5.9  7.1   HGB  13.4  14.9  13.9   MCV  96  95  98   PLT  203  239  224   INR  1.01   --    --      Recent Labs   Lab Test  11/13/17 0735 11/12/17 1702 11/09/17   1156   POTASSIUM  4.0  4.4  5.1   CHLORIDE  107  103  106   CO2  29  26  27   BUN  19  29  40*   ANIONGAP  5  9  7     Recent Labs   Lab Test  11/13/17 0735 11/12/17 1702  11/09/17   1156  06/20/16   1029   ALBUMIN  3.1*  3.6  3.7   --    BILITOTAL  0.8  1.1  1.0   --    ALT  377*  441*  694*   --    AST  252*  281*  591*   --    PROTEIN   --    --    --   Negative   LIPASE   --   235   --    --         I reviewed the patient's new imaging results.               "

## 2017-11-13 NOTE — PLAN OF CARE
Problem: Patient Care Overview  Goal: Plan of Care/Patient Progress Review  Outcome: Improving  States she feels better today, tolerated full liquid diet, advanced to regular which can start after CT scan later this afternoon. Up independently, encouraged ambulation.  May d/c tomorrow if tolerates diet and stable.

## 2017-11-13 NOTE — UTILIZATION REVIEW
"Admission Status; Secondary Review Determination     Under the authority of the Utilization Management Committee, the utilization review process indicated a secondary review on the above patient.  The review outcome is based on review of the medical records, discussions with staff, and applying clinical experience noted on the date of the review.       (x) Observation Status Appropriate - This patient does not meet hospital inpatient criteria and is placed in observation status. If this patient's primary payer is Medicare and was admitted as an inpatient, Condition Code 44 should be used and patient status changed to \"observation\".     RATIONALE FOR DETERMINATION: 72 year old female with a history of hyperlipidemia, and HTN who presents to the emergency department with her  for evaluation of nausea and headache. For the past month, the patient reports the onset of hot and cold flashes, generalized body aches, and a general sense of not feeling well. She reports one episode of vomiting. Three days prior to admission was started have significant acute hepatitis. Laboratory assessment in ER reveals improved transaminases as well as stable creatinine level. Due to patients ongoing nausea and weight-loss,  patient appropriate for observation care to optimize diagnostic evaluation and ensure adequate oral intake.       The severity of illness, intensity of service provided, expected LOS and risk for adverse outcome make the care appropriate for further observation; however, doesn't meet criteria for hospital inpatient admission. This was discussed with attending physician who concurred with this determination.    The information on this document is developed by the utilization review team in order for the business office to ensure compliance.  This only denotes the appropriateness of proper admission status and does not reflect the quality of care rendered.         The definitions of Inpatient Status and " Observation Status used in making the determination above are those provided in the CMS Coverage Manual, Chapter 1 and Chapter 6, section 70.4.      Sincerely,     Freddy Vargas MD    Physician Advisor  Utilization Review/ Case Management  [facility].

## 2017-11-13 NOTE — PROGRESS NOTES
[facility]    Hospitalist Progress Note    Assessment & Plan   Elaina Winn is a 72 year old female with PMHx of hypertension, hyperlipidemia, glaucoma and anxiety who was admitted on 11/12/2017 for evaluation of generalized weakness and abnormal liver function tests.     Abnormal LFTs:  Abnormal labs were noted incidentally on 11/9 as part of evaluation for annual exam. Noted to have elevated transaminases and alk phos, t.bili nl. On 11/9, ,  and alk phos 228. PCP planned to initiate workup with US. Presented to ED on 11/12 with complaints of generalized weakness. LFTs still elevated but trending down from initial check on 11/9. Abd US in ED was unrevealing -- no signs of cirrhosis. Broad ddx. Denies EtOH use or excessive Tylenol use. Not on statin, no new meds. No known sick contacts. Additional workup initiated on admission.   -- no GI sx at present (denies nausea/vomting, changes in bowel habits/character)  -- viral hepatitis panel initially nonreactive though several hepatitis labs still pending  -- GI following, additional labs ordered  -- INR, platelets okay   -- will check CT chest/abd/pelvis given reports of constellation of sx including anorexia, fatigue and wt loss  -- LFTs otherwise appear to be trending down without specific intervention    Presumed stage III CKD:  Baseline Cr seems to be around 1.3 (per labs dating back to 4/2016). Cr 1.1 on admission, essentially at baseline.   No changes to treatment at this time. Monitor labs periodically    Hypertension:  Chronic and stable on metoprolol 50mg BID     Dyslipidemia:  Not on statin. FLP on 11/9 showed , HDL 77 and .    Glaucoma:  Chronic and stable on eye drops    FEN: will dc IVFs, lytes stable, advance to regular diet  DVT Prophylaxis: PCDs  Code Status: Full Code    Disposition: Anticipate discharge home tomorrow, pending findings on todays labs/CT and GI recs.    Dejah Romeo  History   Seen this afternoon. Says she's feeling well. Denies abdominal pain or nausea. Had some tomato soup for lunch which she says tasted good. No complaints today. Wondering how long she'll need to be here.     -Data reviewed today: I reviewed all new labs and imaging results over the last 24 hours. I personally reviewed no images or EKG's today.    Physical Exam   Temp: 97.8  F (36.6  C) Temp src: Oral BP: 118/57 Pulse: 70   Resp: 17 SpO2: 97 % O2 Device: None (Room air)    Vitals:    11/12/17 1639 11/13/17 0434   Weight: 61.2 kg (135 lb) 61.6 kg (135 lb 14.4 oz)     Vital Signs with Ranges  Temp:  [96.9  F (36.1  C)-97.8  F (36.6  C)] 97.8  F (36.6  C)  Pulse:  [60-83] 70  Resp:  [16-18] 17  BP: (118-167)/(56-92) 118/57  SpO2:  [96 %-99 %] 97 %       Constitutional: Resting comfortably, alert and conversing appropriately, NAD  Respiratory: CTAB, no wheeze/rales/rhonchi, no increased work of breathing  Cardiovascular: HRRR, no MGR, no LE edema  GI: S, NT, ND, +BS  Skin/Integumen: warm/dry, no jaundice  Other:      Medications     IV infusion builder WITH additives 75 mL/hr at 11/13/17 1247       brimonidine-timolol  1 drop Both Eyes BID     metoprolol  50 mg Oral BID     multivitamin, therapeutic with minerals  1 tablet Oral At Bedtime     sodium chloride (PF)  3 mL Intracatheter Q8H       Data     Recent Labs  Lab 11/13/17  0735 11/12/17  1702 11/09/17  1156   WBC 5.1 5.9 7.1   HGB 13.4 14.9 13.9   MCV 96 95 98    239 224   INR 1.01  --   --     138 140   POTASSIUM 4.0 4.4 5.1   CHLORIDE 107 103 106   CO2 29 26 27   BUN 19 29 40*   CR 1.06* 1.11* 1.39*   ANIONGAP 5 9 7   DANIELITO 9.1 9.9 9.8   * 105* 101*   ALBUMIN 3.1* 3.6 3.7   PROTTOTAL 7.1 8.2 7.8   BILITOTAL 0.8 1.1 1.0   ALKPHOS 159* 194* 228*   * 441* 694*   * 281* 591*   LIPASE  --  235  --        Recent Results (from the past 24 hour(s))   Abdomen US, complete    Narrative    US ABDOMEN COMPLETE 11/12/2017 6:20 PM      HISTORY: Check for hepatitis, biliary obstruction, cancer, pancreas  tumor, pancreatitis, elevated liver function tests, one month of not  feeling well, nausea.     TECHNIQUE: Abdominal ultrasound.    COMPARISON: Report from 08/15/2001.    FINDINGS: The gallbladder is normal in appearance. There is no  evidence for cholelithiasis or biliary obstruction. Common duct  measures 0.3 cm. The liver is normal in appearance measuring 14 cm in  length. Both kidneys are normal. The proximal aorta and inferior vena  cava are normal. The spleen is normal. The pancreas is completely  obscured by gas.      Impression    IMPRESSION: Negative abdominal ultrasound. The pancreas is completely  obscured by gas.     NENO BELLO MD

## 2017-11-13 NOTE — CONSULTS
"BRIEF NUTRITION ASSESSMENT      REASON FOR ASSESSMENT:  Nutrition Admission Screen - \"Reduced oral intake over the last month\"    NUTRITION HISTORY:  Visited with pt this morning  \"I am not a very good pt.  I feel fine and feel guilty for taking up a bed for a pt who is sicker than I am\"  Pt is usually a very good eater  Follows a regular diet  \"Have never had to follow a diet\"  Tolerates dairy foods  Dislikes Ensure-type drinks  Notes that over the past 3 weeks, she has had nausea and no appetite  Has been drinking 7-Up and water  Bites of food  \"Just no interest in eating and have had such weakness\"    CURRENT DIET AND INTAKE:  Diet:  Full liquids              Pt tells me that she is feeling hungry and just ordered some tomato soup  She might try a shake - \"don't want to get in the habit of eating high calorie foods\"    ANTHROPOMETRICS:  Height: 5'4\"  Weight: (11/13) 61.6 kg  BMI: 23.3 kg/m2  IBW:  54.5 k g  Weight Status: Normal BMI  %IBW: 113%  Weight History:   Wt Readings from Last 10 Encounters:   11/13/17 61.6 kg (135 lb 14.4 oz)   11/09/17 63 kg (139 lb)   08/17/16 64.9 kg (143 lb)   07/27/16 65.3 kg (144 lb)   06/15/16 66.2 kg (146 lb)   04/26/16 65.3 kg (144 lb)   03/22/16 67.6 kg (149 lb)   03/12/15 64.9 kg (143 lb)   01/27/14 65.9 kg (145 lb 4.8 oz)   01/15/13 63 kg (138 lb 14.4 oz)         LABS:  Labs noted    MALNUTRITION:  Patient does not meet two of the following criteria necessary for diagnosing malnutrition: significant weight loss, reduced intake, subcutaneous fat loss, muscle loss or fluid retention    NUTRITION INTERVENTION:  Nutrition Diagnosis:  No nutrition diagnosis at this time.    Implementation:  Nutrition Education ---> Per Provider order if indicated  Reviewed diet order, meal ordering process and available supplements    FOLLOW UP/MONITORING:   Will re-evaluate in 7 - 10 days, or sooner, if re-consulted.          "

## 2017-11-13 NOTE — PLAN OF CARE
Problem: Patient Care Overview  Goal: Plan of Care/Patient Progress Review  Outcome: Improving  A/O x4. Up SBA to BR voiding adequately. VSS on RA. BS active. Denies nausea and HA improved. PIV infusing. Awaiting GI consult. Progressing per POC. Will cont to monitor.

## 2017-11-13 NOTE — H&P
[facility]    History and Physical  Hospitalist       Date of Admission:  11/12/2017  Date of Service (when I saw the patient): 11/12/17    Assessment & Plan   Elaina Winn is a 72 year old female who presents with nausea and headache. Admitted for further evaluation and treatment.     Nausea, transaminitis, hepatocellular injury pattern: Differential is broad including viral hepatitis, autoimmune disorders, drugs and toxins, nonalcoholic fatty liver disease, vascular disease, and hereditary etiologies, among others.  Alkaline phosphatase is elevated at 194, ALT at 441, AST at 281, on admission.  Lipase is normal range at 235.  In the recent past the patient has had a normal CK level, as well as a normal CRP level.  Triglycerides are 133 in the preceding days.  An ultrasound of the abdomen was completed showing negative results, pancreas obscured by gas, normal gallbladder, no evidence for cholelithiasis or obstruction, liver reportedly normal in appearance.  - Gastroenterology consultation.  - Supportive management.  - IVF.   - Hepatitis serologies pending.  - Aldolase, GGT, TSH, INR, defer additional testing to GI.     Cardiac, hypertension, hyperlipidemia: Patient maintained on metoprolol prior to admission.  - PTA Metoprolol.     DVT Prophylaxis: Pneumatic Compression Devices  Code Status: Full Code    Disposition: Inpatient.    Dr. Lauri Lazo D.O.  [facility] Hospitalist  Pager [phone]    Primary Care Physician   Cady Marie    Chief Complaint   Nausea and headache.     History is obtained from the patient and medical records.     History of Present Illness   Elaina Winn is a 72 year old female who presents with nausea and headache. Admitted for further evaluation and treatment. Differential is broad including viral hepatitis, autoimmune disorders, drugs and toxins, nonalcoholic fatty liver disease, vascular disease, and hereditary etiologies, among others.  Alkaline  phosphatase is elevated at 194, ALT at 441, AST at 281, on admission.  Lipase is normal range at 235.  In the recent past the patient has had a normal CK level, as well as a normal CRP level.  Triglycerides are 133 in the preceding days.  An ultrasound of the abdomen was completed showing negative results, pancreas obscured by gas, normal gallbladder, no evidence for cholelithiasis or obstruction, liver reportedly normal in appearance. Patient reports poor memory, but denies chest pain, shortness of breath, vomiting, blood in stool or urine, recent sick contacts, family history of liver disease, recent medication changes. Endorses anorexia, fatigue.     Past Medical History    I have reviewed this patient's medical history and updated it with pertinent information if needed.   Past Medical History:   Diagnosis Date     Anxiety      Dry eyes, bilateral      Generalized OA      Glaucoma      HTN (hypertension), benign      Hyperlipidemia LDL goal < 130        Past Surgical History   I have reviewed this patient's surgical history and updated it with pertinent information if needed.  Past Surgical History:   Procedure Laterality Date     DILATION AND CURETTAGE       HYSTERECTOMY      with USO, not for cancer     S/p partial vaginectomy       SEPTOPLASTY       TONSILLECTOMY & ADENOIDECTOMY         Prior to Admission Medications   Prior to Admission Medications   Prescriptions Last Dose Informant Patient Reported? Taking?   Cholecalciferol (VITAMIN D) 2000 UNITS tablet   Yes No   Sig: Take 2,000 Units by mouth daily   Multiple Vitamin (MULTI-VITAMIN) per tablet   Yes No   Sig: Take 1 tablet by mouth daily.   Omega-3 Fatty Acids (FISH OIL PO)   Yes No   Sig: Take  by mouth.   bimatoprost (LUMIGAN) 0.01 % SOLN   Yes No   Sig: Place 1 drop into the right eye At Bedtime Both eyes   brimonidine (ALPHAGAN P) 0.1 % ophthalmic solution   Yes No   Sig: Place 1 drop into the right eye every 12 hours Both eyes   brimonidine-timolol  (COMBIGAN) 0.2-0.5 % ophthalmic solution   Yes No   Sig: Place 1 drop Into the left eye 2 times daily   metoprolol (LOPRESSOR) 50 MG tablet   No No   Sig: Take 1 tablet (50 mg) by mouth 2 times daily      Facility-Administered Medications: None     Allergies   Allergies   Allergen Reactions     Zocor [Simvastatin - High Dose]      Elevated LFTs       Social History   I have reviewed this patient's social history and updated it with pertinent information if needed. Elaina Winn  reports that she quit smoking about 27 years ago. She has never used smokeless tobacco. She reports that she does not drink alcohol or use illicit drugs.    Family History   I have reviewed this patient's family history and updated it with pertinent information if needed.   Family History   Problem Relation Age of Onset     Hypertension Mother       age 95     Alzheimer Disease Father 75     also CHF     CANCER Brother       of lung ca age 69     Lipids Brother      Alzheimer Disease Paternal Uncle      Alzheimer Disease Paternal Uncle        Review of Systems   The 10 point Review of Systems is negative other than noted in the HPI or here.     Physical Exam   Temp: 97.7  F (36.5  C) Temp src: Oral BP: 137/64 Pulse: 83   Resp: 16 SpO2: 96 % O2 Device: None (Room air)    Vital Signs with Ranges  Temp:  [97.7  F (36.5  C)] 97.7  F (36.5  C)  Pulse:  [83] 83  Resp:  [16] 16  BP: (137-149)/(64-92) 137/64  SpO2:  [96 %-97 %] 96 %  135 lbs 0 oz    GENERAL: Alert and oriented. NAD. Conversational, appropriate. Poor memory.   HEENT: Normocephalic. EOMI. No icterus or injection. Nares normal.   LUNGS: Clear to auscultation. No dyspnea at rest.   HEART: Regular rate. Extremities perfused.   ABDOMEN: Soft, nontender, and nondistended. Positive bowel sounds.   EXTREMITIES: No LE edema noted.   NEUROLOGIC: Moves extremities x4 on command. No acute focal neurologic abnormalities noted.     Data   Data reviewed today:  I personally reviewed both  laboratory and imaging data.     Recent Labs  Lab 11/12/17  1702 11/09/17  1156   WBC 5.9 7.1   HGB 14.9 13.9   MCV 95 98    224    140   POTASSIUM 4.4 5.1   CHLORIDE 103 106   CO2 26 27   BUN 29 40*   CR 1.11* 1.39*   ANIONGAP 9 7   DANIELITO 9.9 9.8   * 101*   ALBUMIN 3.6 3.7   PROTTOTAL 8.2 7.8   BILITOTAL 1.1 1.0   ALKPHOS 194* 228*   * 694*   * 591*   LIPASE 235  --        Recent Results (from the past 24 hour(s))   Abdomen US, complete    Narrative    US ABDOMEN COMPLETE 11/12/2017 6:20 PM     HISTORY: Check for hepatitis, biliary obstruction, cancer, pancreas  tumor, pancreatitis, elevated liver function tests, one month of not  feeling well, nausea.     TECHNIQUE: Abdominal ultrasound.    COMPARISON: Report from 08/15/2001.    FINDINGS: The gallbladder is normal in appearance. There is no  evidence for cholelithiasis or biliary obstruction. Common duct  measures 0.3 cm. The liver is normal in appearance measuring 14 cm in  length. Both kidneys are normal. The proximal aorta and inferior vena  cava are normal. The spleen is normal. The pancreas is completely  obscured by gas.      Impression    IMPRESSION: Negative abdominal ultrasound. The pancreas is completely  obscured by gas.     NENO BELLO MD

## 2017-11-13 NOTE — ED NOTES
[facility]  ED Nurse Handoff Report    ED Chief complaint: Nausea (Nausea without vomiting  Not eating. Transaminitis per hersband of levels >600) and Headache      ED Diagnosis:   Final diagnoses:   Hepatitis       Code Status: See H&P    Allergies:   Allergies   Allergen Reactions     Zocor [Simvastatin - High Dose]      Elevated LFTs       Activity level - Baseline/Home:  Independent    Activity Level - Current:   Independent     Needed?: No    Isolation: Yes  Infection: Not Applicable    Bariatric?: Yes    Vital Signs:   Vitals:    11/12/17 1845 11/12/17 1846 11/12/17 1915 11/12/17 1930   BP:    130/64   Pulse:       Resp:       Temp:       TempSrc:       SpO2: 96% 96% 98%    Weight:       Height:           Cardiac Rhythm: ,        Pain level: 0-10 Pain Scale: 4    Is this patient confused?: No    Patient Report: Initial Complaint: nausea, poor appetite  Focused Assessment: AOx3. AVSS. C/o of nausea and poor appetite, no abdominal pain. Voids without difficulty. NPO in ED  Tests Performed: labs, US  Abnormal Results: ALT/AST  Treatments provided: 1000cc bolus, IV zofran    Family Comments:  at bedside    OBS brochure/video discussed/provided to patient: Yes    ED Medications:   Medications   0.9% sodium chloride BOLUS (0 mLs Intravenous Stopped 11/12/17 1944)   ondansetron (ZOFRAN) injection 4 mg (4 mg Intravenous Given 11/12/17 1759)       Drips infusing?:  No      ED NURSE PHONE NUMBER: *[id number]

## 2017-11-14 ENCOUNTER — APPOINTMENT (OUTPATIENT)
Dept: CARDIOLOGY | Facility: CLINIC | Age: 72
End: 2017-11-14
Attending: INTERNAL MEDICINE
Payer: MEDICARE

## 2017-11-14 LAB
ALBUMIN SERPL-MCNC: 3.1 G/DL (ref 3.4–5)
ALBUMIN UR-MCNC: NEGATIVE MG/DL
ALDOLASE SERPL-CCNC: 9.4 U/L (ref 1.5–8.1)
ALP SERPL-CCNC: 168 U/L (ref 40–150)
ALT SERPL W P-5'-P-CCNC: 352 U/L (ref 0–50)
ANION GAP SERPL CALCULATED.3IONS-SCNC: 4 MMOL/L (ref 3–14)
APPEARANCE UR: CLEAR
AST SERPL W P-5'-P-CCNC: 247 U/L (ref 0–45)
BILIRUB SERPL-MCNC: 0.7 MG/DL (ref 0.2–1.3)
BILIRUB UR QL STRIP: NEGATIVE
BUN SERPL-MCNC: 24 MG/DL (ref 7–30)
CALCIUM SERPL-MCNC: 9.5 MG/DL (ref 8.5–10.1)
CHLORIDE SERPL-SCNC: 106 MMOL/L (ref 94–109)
CO2 SERPL-SCNC: 30 MMOL/L (ref 20–32)
COLOR UR AUTO: YELLOW
CREAT SERPL-MCNC: 1.37 MG/DL (ref 0.52–1.04)
GFR SERPL CREATININE-BSD FRML MDRD: 38 ML/MIN/1.7M2
GLUCOSE SERPL-MCNC: 99 MG/DL (ref 70–99)
GLUCOSE UR STRIP-MCNC: NEGATIVE MG/DL
HGB UR QL STRIP: NEGATIVE
KETONES UR STRIP-MCNC: NEGATIVE MG/DL
LEUKOCYTE ESTERASE UR QL STRIP: ABNORMAL
MUCOUS THREADS #/AREA URNS LPF: PRESENT /LPF
NITRATE UR QL: NEGATIVE
PH UR STRIP: 5.5 PH (ref 5–7)
POTASSIUM SERPL-SCNC: 4.7 MMOL/L (ref 3.4–5.3)
PROT SERPL-MCNC: 7.1 G/DL (ref 6.8–8.8)
RBC #/AREA URNS AUTO: 0 /HPF (ref 0–2)
SMA IGG SER-ACNC: 9 UNITS (ref 0–19)
SODIUM SERPL-SCNC: 140 MMOL/L (ref 133–144)
SOURCE: ABNORMAL
SP GR UR STRIP: 1.02 (ref 1–1.03)
SQUAMOUS #/AREA URNS AUTO: <1 /HPF (ref 0–1)
UROBILINOGEN UR STRIP-MCNC: NORMAL MG/DL (ref 0–2)
WBC #/AREA URNS AUTO: 2 /HPF (ref 0–2)
WBC CLUMPS #/AREA URNS HPF: PRESENT /HPF

## 2017-11-14 PROCEDURE — 83516 IMMUNOASSAY NONANTIBODY: CPT | Performed by: INTERNAL MEDICINE

## 2017-11-14 PROCEDURE — 40000275 ZZH STATISTIC RCP TIME EA 10 MIN

## 2017-11-14 PROCEDURE — 93005 ELECTROCARDIOGRAM TRACING: CPT

## 2017-11-14 PROCEDURE — 99226 ZZC SUBSEQUENT OBSERVATION CARE,LEVEL III: CPT | Performed by: INTERNAL MEDICINE

## 2017-11-14 PROCEDURE — 93010 ELECTROCARDIOGRAM REPORT: CPT | Performed by: INTERNAL MEDICINE

## 2017-11-14 PROCEDURE — 25000132 ZZH RX MED GY IP 250 OP 250 PS 637: Mod: GY | Performed by: HOSPITALIST

## 2017-11-14 PROCEDURE — 36415 COLL VENOUS BLD VENIPUNCTURE: CPT | Performed by: INTERNAL MEDICINE

## 2017-11-14 PROCEDURE — 83516 IMMUNOASSAY NONANTIBODY: CPT | Mod: 91 | Performed by: INTERNAL MEDICINE

## 2017-11-14 PROCEDURE — 87086 URINE CULTURE/COLONY COUNT: CPT | Performed by: INTERNAL MEDICINE

## 2017-11-14 PROCEDURE — A9270 NON-COVERED ITEM OR SERVICE: HCPCS | Mod: GY | Performed by: PHYSICIAN ASSISTANT

## 2017-11-14 PROCEDURE — 96375 TX/PRO/DX INJ NEW DRUG ADDON: CPT

## 2017-11-14 PROCEDURE — 99204 OFFICE O/P NEW MOD 45 MIN: CPT | Performed by: INTERNAL MEDICINE

## 2017-11-14 PROCEDURE — 81001 URINALYSIS AUTO W/SCOPE: CPT | Performed by: INTERNAL MEDICINE

## 2017-11-14 PROCEDURE — G0378 HOSPITAL OBSERVATION PER HR: HCPCS

## 2017-11-14 PROCEDURE — 93306 TTE W/DOPPLER COMPLETE: CPT

## 2017-11-14 PROCEDURE — 25000132 ZZH RX MED GY IP 250 OP 250 PS 637: Mod: GY | Performed by: PHYSICIAN ASSISTANT

## 2017-11-14 PROCEDURE — 80053 COMPREHEN METABOLIC PANEL: CPT | Performed by: INTERNAL MEDICINE

## 2017-11-14 PROCEDURE — 25000132 ZZH RX MED GY IP 250 OP 250 PS 637: Mod: GY | Performed by: INTERNAL MEDICINE

## 2017-11-14 PROCEDURE — 93306 TTE W/DOPPLER COMPLETE: CPT | Mod: 26 | Performed by: INTERNAL MEDICINE

## 2017-11-14 PROCEDURE — 25000125 ZZHC RX 250: Performed by: INTERNAL MEDICINE

## 2017-11-14 RX ORDER — METOPROLOL TARTRATE 1 MG/ML
5 INJECTION, SOLUTION INTRAVENOUS EVERY 6 HOURS PRN
Status: DISCONTINUED | OUTPATIENT
Start: 2017-11-14 | End: 2017-11-15 | Stop reason: HOSPADM

## 2017-11-14 RX ADMIN — METOPROLOL TARTRATE 50 MG: 50 TABLET ORAL at 21:27

## 2017-11-14 RX ADMIN — BRIMONIDINE TARTRATE AND TIMOLOL MALEATE 1 DROP: 2; 5 SOLUTION OPHTHALMIC at 09:27

## 2017-11-14 RX ADMIN — RIVAROXABAN 15 MG: 15 TABLET, FILM COATED ORAL at 18:10

## 2017-11-14 RX ADMIN — MULTIPLE VITAMINS W/ MINERALS TAB 1 TABLET: TAB at 21:27

## 2017-11-14 RX ADMIN — METOPROLOL TARTRATE 50 MG: 50 TABLET ORAL at 09:26

## 2017-11-14 RX ADMIN — BRIMONIDINE TARTRATE AND TIMOLOL MALEATE 1 DROP: 2; 5 SOLUTION OPHTHALMIC at 21:27

## 2017-11-14 RX ADMIN — METOPROLOL TARTRATE 5 MG: 5 INJECTION INTRAVENOUS at 04:22

## 2017-11-14 NOTE — PROVIDER NOTIFICATION
MD Notification    Notified Person:  MD    Notified Persons Name: Dr. Clark    Notification Date/Time: 0300 11/14    Notification Interaction:  Talked with Physician    Purpose of Notification: Acute onset of confusion and new onset irregular HR    Orders Received: tele, EKG, ammonia level in AM and collect UA    Comments:

## 2017-11-14 NOTE — PLAN OF CARE
Problem: Patient Care Overview  Goal: Plan of Care/Patient Progress Review  Outcome: Improving  Has been in NSR with occasional PAC since this AM. Mild forgetfulness noted today, patient remembers some things about her confusion last night. MD's further assessment found that patient has had some cognitive issues and will f/u OP. Tolerated regular diet well today. Awaiting  ECHO and cards consult, may d/c tomorrow.

## 2017-11-14 NOTE — PROGRESS NOTES
[facility]    Hospitalist Progress Note    Assessment & Plan   Elaina Winn is a 72 year old female with PMHx of hypertension, hyperlipidemia, glaucoma and anxiety who was admitted on 11/12/2017 for evaluation of generalized weakness and abnormal liver function tests.     Abnormal LFTs:  Abnormal labs were noted incidentally on 11/9 as part of evaluation for annual exam. Noted to have elevated transaminases and alk phos, t.bili nl. On 11/9, ,  and alk phos 228. PCP planned to initiate workup with US. Presented to ED on 11/12 with complaints of generalized weakness. LFTs still elevated but trending down from initial check on 11/9. Abd US in ED was unrevealing -- no signs of cirrhosis. Broad ddx. Denies EtOH use or excessive Tylenol use. Not on statin, no new meds. No known sick contacts. Additional workup initiated on admission.   -- no GI sx at present (denies nausea/vomting, changes in bowel habits/character) though appetite remains poor  -- viral hepatitis panel nonreactive   -- GI following, additional labs still pending (autoimmune hepatitis, celiac serology)  -- INR, platelets okay   -- CT chest/abd/pelvis obtained given constellation of sx included fatigue, anorexia and wt loss -- negative for any pathology that would explain abnl labs  -- LFTs stable/slightly improved -- no additional workup this stay per GI, will need to follow up in hepatology clinic    Paroxysmal Afib with RVR:  Noted to have acute onset confusion and irregular HR on 11/4. EKG confirmed afib with RVR. HRs 130-150s. No prior hx of arrhythmia. Given 5mg IV Lopressor. Rates improved. Converted back to NSR later that morning.   -- precipitating cause of afib not completely clear  -- cont telemetry  -- echocardiogram ordered  -- will increase metoprolol to 75mg BID  -- CHADS2 VASc score is at least 3 and would benefit from anticoagulation, consider warfarin given underlying CKD though  favors NOAC as he is  on one  -- cardiology consult --  anticipate discharge with outpatient cardiac monitor    Acute Toxic Encephalopathy vs Delirium: Improved.  Underlying cognitive impairment:  Patient assessed for memory issues back in 6/2016 and suspected to have some underlying cognitive impairment. OT testing at that time was c/w mild cognitive impairment. Patient endorsed an episode of confusion while driving to her dentist not too long ago, which she attributed to unfamiliar landmarks. That episode resolved after a short period of time and she has since remained at her baseline. A&Ox3 on admission. Became confused overnight (11/13 - 11/14). Was up wandering hallways, hallucinating. Thought she was walking around a construction site. Afebrile. UA obtained and was bland. Has not received any pain medications this stay. Mentation improved the following morning, patient said she knew she was confused and didn't recognize where she was.  -- precipitating cause of delirium unclear -- no s/sx of infection (afebrile, WBC nl, no O2 needs, UA bland), LFTs stable, TSH okay  -- ammonia ordered but never collected  -- ?related to afib, ?underlying cog impairment  -- mentation seems back to baseline today -- cont monitoring  -- should have outpatient follow up for further evaluation (PCP, ?OT, ?neuro)    Presumed stage III CKD:  Baseline Cr seems to be around 1.3 (per labs dating back to 4/2016). Cr 1.1 on admission  -- Cr trended up overnight but still at baseline   --no changes to treatment at this time, monitor labs periodically    Hypertension:  Chronic and stable on metoprolol -- dose increased from 50mg BID to 75mg BID this stay given episode of afib with RVR    Dyslipidemia:  Not on statin. FLP on 11/9 showed , HDL 77 and .    Glaucoma:  Chronic and stable on eye drops    FEN: off IVFs, lytes stable, regular diet  DVT Prophylaxis: PCDs  Code Status: Full Code    Disposition: Will remain hospitalized today to undergo  "cardiac evaluation and ensure she has no recurrent episodes of confusion. Anticipate discharge home tomorrow.    Dejah Montanez    Interval History   Seen this afternoon. Had noted episode of confusion overnight, was up wandering hallways and thinking she was at a construction site. Also went into afib with RVR overnight. Given dose of Lopressor and rates improved, ultimately converted back to NSR a few hours later. Patient presently appears comfortable. Reiterates that she kne she was confused overnight and relays an episode of confusion not too long ago when she drove to the dentists office. Appetite poor today but denies abd pain/n/v. No chest pain/palpitations. Endorses ongoing right \"arm pain\" but says this is chronic and with further questions localizes it to her shoulder joint.  at bedside and concerned about multiple tests, the results and who they will follow up with.     -Data reviewed today: I reviewed all new labs and imaging results over the last 24 hours. I personally reviewed no images or EKG's today.    Physical Exam   Temp: 97.3  F (36.3  C) Temp src: Oral BP: 112/63 Pulse: 110 Heart Rate: 71 Resp: 20 SpO2: 97 % O2 Device: None (Room air)    Vitals:    11/12/17 1639 11/13/17 0434 11/14/17 0534   Weight: 61.2 kg (135 lb) 61.6 kg (135 lb 14.4 oz) 59.9 kg (132 lb)     Vital Signs with Ranges  Temp:  [95.6  F (35.3  C)-97.3  F (36.3  C)] 97.3  F (36.3  C)  Pulse:  [] 110  Heart Rate:  [71-89] 71  Resp:  [16-20] 20  BP: ()/(55-87) 112/63  SpO2:  [95 %-97 %] 97 %  I/O last 3 completed shifts:  In: 780 [P.O.:780]  Out: -     Constitutional: Resting comfortably, alert and conversing appropriately, oriented x3, NAD  Respiratory: CTAB, no wheeze/rales/rhonchi, no increased work of breathing  Cardiovascular: HRRR, no MGR, no LE edema  GI: S, NT, ND, +BS  Skin/Integumen: warm/dry, no jaundice  Other:      Medications        brimonidine-timolol  1 drop Both Eyes BID     metoprolol  50 " mg Oral BID     multivitamin, therapeutic with minerals  1 tablet Oral At Bedtime     sodium chloride (PF)  3 mL Intracatheter Q8H       Data     Recent Labs  Lab 11/14/17  0731 11/13/17  0735 11/12/17  1702 11/09/17  1156   WBC  --  5.1 5.9 7.1   HGB  --  13.4 14.9 13.9   MCV  --  96 95 98   PLT  --  203 239 224   INR  --  1.01  --   --     141 138 140   POTASSIUM 4.7 4.0 4.4 5.1   CHLORIDE 106 107 103 106   CO2 30 29 26 27   BUN 24 19 29 40*   CR 1.37* 1.06* 1.11* 1.39*   ANIONGAP 4 5 9 7   DANIELITO 9.5 9.1 9.9 9.8   GLC 99 101* 105* 101*   ALBUMIN 3.1* 3.1* 3.6 3.7   PROTTOTAL 7.1 7.1 8.2 7.8   BILITOTAL 0.7 0.8 1.1 1.0   ALKPHOS 168* 159* 194* 228*   * 377* 441* 694*   * 252* 281* 591*   LIPASE  --   --  235  --        Recent Results (from the past 24 hour(s))   CT Chest/Abdomen/Pelvis w Contrast    Narrative    CT CHEST/ABDOMEN/PELVIS WITH CONTRAST  11/13/2017  3:54 PM    HISTORY: 72-year-old patient with elevated LFTs of unclear etiology.  Patient has reported weight loss, fatigue, and anorexia.    COMPARISON: None.    TECHNIQUE: Axial and coronal CT images obtained from the lung apices  through the chest, abdomen, and pelvis after the uneventful  administration of Isovue-370 intravenous contrast given for a total of  68 mL. Radiation dose for this scan was reduced using automated  exposure control, adjustment of the mA and/or kV according to patient  size, or iterative reconstruction technique.    The thyroid gland is unremarkable. No mediastinal lymphadenopathy.  Heart size is normal without pericardial effusion. No pleural  effusion. The central pulmonary arteries are patent. Thoracic aorta is  normal in size and appearance with minimal scattered atherosclerotic  plaque. No axillary lymphadenopathy. 4 mm nodule in the right lower  lobe, subpleural in location. No pleural effusion, pneumothorax, or  abnormal area of consolidation. Minimal bibasilar atelectasis.  Degenerative changes throughout  the lumbar spine, with intervertebral  disc space narrowing in the lower lumbar spine.    The liver, gallbladder, spleen, adrenal glands, and pancreas are  unremarkable. Both kidneys are normally perfused. No hydronephrosis.  Suspect patient has undergone hysterectomy. Small hypodensity noted in  the right hemipelvis measuring 8 Hounsfield units and up to 1.6 cm.  Presumably, this is a cystic ovarian collection, assuming patient  still has ovaries. No inguinal lymphadenopathy. The appendix is normal  in size and appearance. No dilated loops of bowel. Mild to moderate  atherosclerotic plaque in the abdominal aorta, though no aneurysmal  dilatation or stenosis. Small hiatal hernia.      Impression    IMPRESSION:  1. Cause for patient's elevated LFTs and weight loss not definitively  identified.  2. 4 mm nodule in the right lower pulmonary lobe, recommend follow-up  CT examination in 12 months.  3. Right pelvic hypodensity measures up to 1.6 cm, may be small  ovarian cystic collection; however, clinically correlate with surgical  history as it appears there has been hysterectomy, and therefore  correlate with remaining ovaries.    KAI PIMENTEL MD

## 2017-11-14 NOTE — PROVIDER NOTIFICATION
MD Notification    Notified Person:  MD    Notified Persons Name: PAOLA montanez    Notification Date/Time: 11/14/17 12:08     Notification Interaction:  Talked with Physician    Purpose of Notification: patients spouse wanting to talk to MD informed them that ECHO was going to be ordered and MD might want the results of the ECHO first.  Asking MD about ECHO order.     Orders Received: Dr. Montanez to order Echo informed patient/spouse that she will be speaking with them today.    Comments:

## 2017-11-14 NOTE — PLAN OF CARE
Problem: Patient Care Overview  Goal: Plan of Care/Patient Progress Review  Outcome: Improving  A/O x4. Up SBA to BR voiding adequately. VSS on RA. BS active. Denies nausea and HA improved. Regular diet, tolerating food well.  Awaiting GI consult. Progressing per POC. CAT scan this evening: results in after 1800, Md not notified since no significant findings. Will cont to monitor.

## 2017-11-14 NOTE — PROGRESS NOTES
Met with the patient and spouse regarding continued observation [place].  Answered all questions.  Patient is pending an ECHO and results with recommendations. Discussed follow up as recommended by [facility].  And Hepatology clinic.  Also PCP follow up.  Patient and spouse are agreeable to have CC call for [facility] information     Addendum:  Scheduled PCP f/up hand off submitted  Scheduled [facility] f/up with PA and Dr. Reynoso in [place]  [facility] to call back with Hepatology follow up   RX Liason did prescription check for xarelto however Spouse favors Italo Tapia for benefit check.

## 2017-11-14 NOTE — PROGRESS NOTES
"GASTROENTEROLOGY PROGRESS NOTE        SUBJECTIVE:  No abdominal pain, nausea, vomiting, diarrhea or itching. Afebrile.       OBJECTIVE:    /87 (BP Location: Right arm)  Pulse 110  Temp 97.1  F (36.2  C) (Oral)  Resp 20  Ht 1.626 m (5' 4\")  Wt 59.9 kg (132 lb)  SpO2 97%  BMI 22.66 kg/m2  Temp (24hrs), Av.9  F (36.1  C), Min:95.6  F (35.3  C), Max:97.8  F (36.6  C)    Patient Vitals for the past 72 hrs:   Weight   17 0534 59.9 kg (132 lb)   17 0434 61.6 kg (135 lb 14.4 oz)   17 1639 61.2 kg (135 lb)       Intake/Output Summary (Last 24 hours) at 17 0952  Last data filed at 17 0700   Gross per 24 hour   Intake              780 ml   Output              200 ml   Net              580 ml        PHYSICAL EXAM     Constitutional: NAD  Cardiovascular: RRR, normal S1, S2, no murmur appreciated   Respiratory: good transmission, CTAB  Abdomen: + BS, soft, NTTP  SKIN: No jaundice         Additional Comments:  ROS, FH, SH: See initial GI consult for details.     I have reviewed the patient's new clinical lab results:     Recent Labs   Lab Test  17   17017   1156   WBC  5.1  5.9  7.1   HGB  13.4  14.9  13.9   MCV  96  95  98   PLT  203  239  224   INR  1.01   --    --      Recent Labs   Lab Test  17   0735  17   1702   POTASSIUM  4.7  4.0  4.4   CHLORIDE  106  107  103   CO2  30  29  26   BUN  24  19  29   ANIONGAP  4  5  9     Recent Labs   Lab Test  1731  17   0425  17   0735  17   1702   16   1029   ALBUMIN  3.1*   --   3.1*  3.6   < >   --    BILITOTAL  0.7   --   0.8  1.1   < >   --    ALT  352*   --   377*  441*   < >   --    AST  247*   --   252*  281*   < >   --    PROTEIN   --   Negative   --    --    --   Negative   LIPASE   --    --    --   235   --    --     < > = values in this interval not displayed.     IMAGING (per radiology)     US ABDOMEN COMPLETE 2017 6:20 PM "      FINDINGS: The gallbladder is normal in appearance. There is no  evidence for cholelithiasis or biliary obstruction. Common duct  measures 0.3 cm. The liver is normal in appearance measuring 14 cm in  length. Both kidneys are normal. The proximal aorta and inferior vena  cava are normal. The spleen is normal. The pancreas is completely  obscured by gas.     IMPRESSION: Negative abdominal ultrasound. The pancreas is completely  obscured by gas.     CT CHEST/ABDOMEN/PELVIS WITH CONTRAST  11/13/2017  3:54 PM  IMPRESSION:  1. Cause for patient's elevated LFTs and weight loss not definitively  identified.  2. 4 mm nodule in the right lower pulmonary lobe, recommend follow-up  CT examination in 12 months.  3. Right pelvic hypodensity measures up to 1.6 cm, may be small  ovarian cystic collection; however, clinically correlate with surgical  history as it appears there has been hysterectomy, and therefore  correlate with remaining ovaries.     ASSESSMENT/ PLAN  Elaina Winn is a 72-year-old female with a medical history of hypertension and hyperlipidemia who presented at the prompting of her primary care provider for elevated LFTs and generalized weakness.    1. Elevated LFTs: Outpatient lab work indicating AST is 694, , alkaline phosphatase 228 by a normal total bilirubin on 11/9.  Workup has included an abdominal ultrasound without any evidence of cirrhosis, fatty liver, or biliary source.  CT of the abdomen and pelvis with normal-appearing liver, gallbladder, pancreas.  No reported small bowel or colonic findings.  No history of alcohol, Tylenol, new medications, or herbal remedies.    -- Viral hepatitis panel is negative ( hepatitis C, hepatitis BsAG, and hepatitis A IgG).   -- Awaiting autoimmune liver markers and celiac serology.   -- The patient's LFTs continued to improve.  Would suggest outpatient hepatology follow-up.        Discussed with Dr. Sanz.  MICHELE Epstein  Gastroenterology      GI ATTENDING:  Would like to see tomorrow's LFTs before signing off given the lack of a specific diagnosis.  Synthetic function intact so it seems this is not a primary liver process.     Jordan Sanz DO   [facility]  Cell [phone]

## 2017-11-14 NOTE — PROVIDER NOTIFICATION
MD Notification    Notified Person:  MD    Notified Persons Name: Dr. Clark    Notification Date/Time: 0345 11/14    Notification Interaction:  Talked with Physician    Purpose of Notification: EKG showing new onset Afib with RVR. HR in the 130's-150's    Orders Received: PRN IV Metoprolol for HR >120    Comments:

## 2017-11-14 NOTE — PLAN OF CARE
Problem: Patient Care Overview  Goal: Plan of Care/Patient Progress Review  Outcome: No Change  Patient disoriented x3-4 on shift. Writer saw patient walking in the hallway by self looking confused and not knowing where she was. Got patient back into bed and patient was very confused on who she was, where she was and why she was here. Had to re-orient patient to person, place, time and situation. MD was called on this and ordered ammonia level to be drawn in AM and to obtain a UA/UC which was collected on shift. Also, updated MD on irregular HR when auscultating. MD ordered EKG and telemetry. EKG showed Afib with RVR and tachycardia. MD paged and received new order for PRN IV Metoprolol if HR>120. Patient asymptomatic. VSS besides HR. IV metoprolol was given. HR did come down into the low 100's. Confusion has gotten a bit better t/o the shift. Patient able to state name, , where she is, but is still confused on why she is here. Patient does not recognize room. Will continue to monitor. Ambulated to bathroom on shift with SBA. Bed alarm in place d/t new onset of confusion. PIV SL in right hand.

## 2017-11-14 NOTE — CONSULTS
[facility]    Cardiology Consultation    Date of Admission:  11/12/2017    Assessment & Plan   Elaina Winn is a 72 year old female with a history of HTN, HLD,  who was admitted on 11/12/2017 with weakness and abnormal LFTs. I was asked to see the patient for newly diagnosed atrial fibrillation.    Paroxysmal atrial fibrillation. No prior history of atrial fibrillation. Confirmed on EKG with rates in the 140s. Now back in NSR. Echocardiogram shows normal left and right ventricular function without wall motion abnormalities.  There is only mild left atrial enlargement and normal right atrial size.  No hemodynamically significant valvular disease.    Recommendation  -Continue metoprolol 75 mg BID (on 50 mg BID prior to admission). Titrate as tolerated to 100 mg b.i.d.   -Her [id number] score is at least 3 (age, female, hx HTN) therefore anticoagulation would be indicated for CVA prophylaxis. She prefers to avoid Warfarin as she does not want to deal with the frequent monitoring. Her  is on Xarelto. Will plan to start this and will investigate cost tomorrow prior to discharge.  -Would recommend she discharge home with a 30 day event monitor to assess AF burden and rates. She should f/u in Cardiology clinic after to review results. We will arrange for this prior to discharge.     Abnormal LFTs. Noted incidentally. No GI symptoms. Work up thus far including labs, CT/US have overall been unrevealing. GI has been following. Autoimmune liver markers and celiac serology are still pending.   -LFTs continue to improve. Plan to discharge tomorrow if LFTs improving and f/u with Hepatology.     HTN. On metoprolol 50 mg BID prior to admission.   -Continue increased dose of metoprolol. BP has been fairly well controlled here.    Delirium. Brief episode of delirium and confusion night of 11/13. Per notes, suspected to have some underlying cognitive impairment and memory loss. Mentation currently at baseline.        Reason for Consult   Reason for consult: I was asked by Dr. Montanez to evaluate this patient for new atrial fibrillation.    Primary Care Physician   Cady Marie    Chief Complaint   weakness    History is obtained from the patient .    History of Present Illness   Elaina Winn is a 72 year old female with a history of HTN, HLD, OA who was admitted on 11/12 with nausea, malaise, and generalized weakness. She had recent outpatient laboratory work done as part of a routine physical on 11/9 which were notable for an alk phos of 228, ALT of 694, and AST of 591.  She is complaining of increasing fatigue and nausea and poor appetite and weight loss and weakness so she presented to the emergency room for further evaluation.  LFTs continued significantly elevated.  She was admitted for further work up and management. GI was consulted. Her laboratory work up and imaging including abdominal US and CT of the chest/abdomen/pelvis were unremarkable. Her LFts have been trending down. The results of the autoimmune liver markers and celiac serologies are still pending. She is hopefully planning to discharge tonight pending continued in her improvement of her LFTs tomorrow.    Last night, she had some confusion. She reports she was sleeping and the next thing she knew she in the hallway. She remembers being very scared and knowing she was in an unfamiliar place. Per notes, nursing staff found her wandering the halls looking confused. She tells me her confusion quickly resolved once she got back to bed. She was also noted at that time to be tachycardic with an irregular rhythm. An EKG was ordered which showed AF with RVR with rates in the 130s. She was asymptomatic with these rapid rates. She received a dose of IV metoprolol with improvement. She has since converted to normal sinus rhythm. Her metoprolol dose was increased from 50 mg BID to 75 mg BID. Cardiology is consulted for further recommendations regarding the atrial  fibrillation. She does not remember feeling any palpitations, chest pain or shortness of breath. She tells me she has had palpitations in the past and that is why she is on the metoprolol. She has never worn a holter monitor.  She has not been having orthopnea, no edema, syncope or near syncope, unusual dyspnea.  No history of coronary artery disease.    Chads VASC score is 3    She denies any history of bleeding. No  falls.    Past Medical History    Past Medical History:   Diagnosis Date     Anxiety      Dry eyes, bilateral      Generalized OA      Glaucoma      HTN (hypertension), benign      Hyperlipidemia LDL goal < 130        Past Surgical History   Past Surgical History:   Procedure Laterality Date     DILATION AND CURETTAGE       HYSTERECTOMY      with USO, not for cancer     S/p partial vaginectomy       SEPTOPLASTY       TONSILLECTOMY & ADENOIDECTOMY         Prior to Admission Medications   Prior to Admission Medications   Prescriptions Last Dose Informant Patient Reported? Taking?   Apoaequorin (PREVAGEN PO) 11/12/2017 at AM Self Yes Yes   Sig: Take 1 tablet by mouth daily   Multiple Vitamin (MULTI-VITAMIN) per tablet 11/11/2017 at PM Self Yes Yes   Sig: Take 1 tablet by mouth daily.   Omega-3 Fatty Acids (FISH OIL PO) 11/11/2017 at PM Self Yes Yes   Sig: Take 1,000 mg by mouth daily    brimonidine-timolol (COMBIGAN) 0.2-0.5 % ophthalmic solution   Yes Yes   Sig: Place 1 drop into both eyes 2 times daily    metoprolol (LOPRESSOR) 50 MG tablet 11/12/2017 at AM Self No Yes   Sig: Take 1 tablet (50 mg) by mouth 2 times daily      Facility-Administered Medications: None     Allergies   Allergies   Allergen Reactions     Zocor [Simvastatin - High Dose]      Elevated LFTs       Social History   . Lives with .   Social History   Substance Use Topics     Smoking status: Former Smoker     Quit date: 1/1/1990     Smokeless tobacco: Never Used      Comment: quit 1990     Alcohol use No     Family  History   Family History   Problem Relation Age of Onset     Hypertension Mother       age 95     Alzheimer Disease Father 75     also CHF     CANCER Brother       of lung ca age 69     Lipids Brother      Alzheimer Disease Paternal Uncle      Alzheimer Disease Paternal Uncle        Review of Systems   The 10 point Review of Systems is negative other than noted in the HPI or here.    Physical Exam   Temp: 97.3  F (36.3  C) Temp src: Oral BP: 112/63 Pulse: 110 Heart Rate: 71 Resp: 20 SpO2: 97 % O2 Device: None (Room air)    Vital Signs with Ranges  Temp:  [95.6  F (35.3  C)-97.3  F (36.3  C)] 97.3  F (36.3  C)  Pulse:  [] 110  Heart Rate:  [71-89] 71  Resp:  [16-20] 20  BP: ()/(55-87) 112/63  SpO2:  [95 %-97 %] 97 %  132 lbs 0 oz    Constitutional: Alert, no acute distress.  Eyes: sclera anicteric  Respiratory: No respiratory distress. Breath sounds are CTAB. No wheezes, rhonchi, or rales  Cardiovascular: Regular rate and rhythm. Normal S1, S2. No murmurs, rubs, or gallops.  GI: abdomen soft.  Skin: warm and dry.   Musculoskeletal: No LE edema bilaterally. Emy  Neurologic: alert and oriented x 3.  Psychiatric: affect appropriate.     Data   -Data reviewed today: All pertinent laboratory and imaging results from this encounter were reviewed. I personally reviewed the EKG tracing showing atrial fibrillation with RVR, rate 132.    Recent Labs  Lab 17  0731 17  0735 17  1702 17  1156   WBC  --  5.1 5.9 7.1   HGB  --  13.4 14.9 13.9   MCV  --  96 95 98   PLT  --  203 239 224   INR  --  1.01  --   --     141 138 140   POTASSIUM 4.7 4.0 4.4 5.1   CHLORIDE 106 107 103 106   CO2 30 29 26 27   BUN 24 19 29 40*   CR 1.37* 1.06* 1.11* 1.39*   ANIONGAP 4 5 9 7   DANIELITO 9.5 9.1 9.9 9.8   GLC 99 101* 105* 101*   ALBUMIN 3.1* 3.1* 3.6 3.7   PROTTOTAL 7.1 7.1 8.2 7.8   BILITOTAL 0.7 0.8 1.1 1.0   ALKPHOS 168* 159* 194* 228*   * 377* 441* 694*   * 252* 281* 591*   LIPASE   --   --  235  --        Echocardiogram 11/14:     Interpretation Summary     Left ventricular systolic function is normal.  The visual ejection fraction is estimated at 55-60%.  There is mild (1+) mitral regurgitation.  Sinus rhythm was noted.  The study was technically adequate. There is no comparison study available.  _____________________________________________________________________________  __        Left Ventricle  The left ventricle is normal in size. There is normal left ventricular wall  thickness. Left ventricular systolic function is normal. The visual ejection  fraction is estimated at 55-60%. E by E prime ratio is between 8 and 15, which  is indeterminate for assessment of left ventricular filling pressures. No  regional wall motion abnormalities noted.     Right Ventricle  The right ventricle is normal size. The right ventricular systolic function is  normal.     Atria  The left atrium is mildly dilated. Right atrial size is normal. There is no  color Doppler evidence of an atrial shunt.     Mitral Valve  There is mild (1+) mitral regurgitation.        Tricuspid Valve  There is mild (1+) tricuspid regurgitation. The right ventricular systolic  pressure is approximated at 24.0 mmHg plus the right atrial pressure. Normal  IVC (1.5-2.5cm) with >50% respiratory collapse; right atrial pressure is  estimated at 5-10mmHg.     Aortic Valve  The aortic valve is trileaflet. There is trace aortic regurgitation. No aortic  stenosis is present.     Pulmonic Valve  The pulmonic valve is not well visualized.     Vessels  The aortic root is normal size.     Pericardium  There is no pericardial effusion.        Rhythm  Sinus rhythm was noted.  _____________________________________________________________________________  __  MMode/2D Measurements & Calculations  IVSd: 1.1 cm     LVIDd: 3.9 cm  LVIDs: 2.7 cm  LVPWd: 0.84 cm  FS: 30.7 %  EDV(Teich): 64.1 ml  ESV(Teich): 26.3 ml  LV mass(C)d: 110.7 grams  LV mass(C)dI:  67.5 grams/m2  Ao root diam: 3.0 cm  LA dimension: 4.3 cm  asc Aorta Diam: 3.2 cm  LA/Ao: 1.4  LA Volume (BP): 56.8 ml  LA Volume Index (BP): 34.6 ml/m2  RWT: 0.44           Doppler Measurements & Calculations  MV E max mendoza: 88.4 cm/sec  MV A max mendoza: 58.6 cm/sec  MV E/A: 1.5  MV dec time: 0.18 sec  TR max mendoza: 245.1 cm/sec  TR max P.0 mmHg  E/E' av.3  Lateral E/e': 13.5  Medial E/e': 11.2           _____________________________________________________________________________  __           Report approved by: Varinder Richter 2017 04:02 PM    Telemetry NSR, episode of AF with RVR around 0300    Imaging:  Recent Results (from the past 24 hour(s))   CT Chest/Abdomen/Pelvis w Contrast    Narrative    CT CHEST/ABDOMEN/PELVIS WITH CONTRAST  2017  3:54 PM    HISTORY: 72-year-old patient with elevated LFTs of unclear etiology.  Patient has reported weight loss, fatigue, and anorexia.    COMPARISON: None.    TECHNIQUE: Axial and coronal CT images obtained from the lung apices  through the chest, abdomen, and pelvis after the uneventful  administration of Isovue-370 intravenous contrast given for a total of  68 mL. Radiation dose for this scan was reduced using automated  exposure control, adjustment of the mA and/or kV according to patient  size, or iterative reconstruction technique.    The thyroid gland is unremarkable. No mediastinal lymphadenopathy.  Heart size is normal without pericardial effusion. No pleural  effusion. The central pulmonary arteries are patent. Thoracic aorta is  normal in size and appearance with minimal scattered atherosclerotic  plaque. No axillary lymphadenopathy. 4 mm nodule in the right lower  lobe, subpleural in location. No pleural effusion, pneumothorax, or  abnormal area of consolidation. Minimal bibasilar atelectasis.  Degenerative changes throughout the lumbar spine, with intervertebral  disc space narrowing in the lower lumbar spine.    The liver, gallbladder,  spleen, adrenal glands, and pancreas are  unremarkable. Both kidneys are normally perfused. No hydronephrosis.  Suspect patient has undergone hysterectomy. Small hypodensity noted in  the right hemipelvis measuring 8 Hounsfield units and up to 1.6 cm.  Presumably, this is a cystic ovarian collection, assuming patient  still has ovaries. No inguinal lymphadenopathy. The appendix is normal  in size and appearance. No dilated loops of bowel. Mild to moderate  atherosclerotic plaque in the abdominal aorta, though no aneurysmal  dilatation or stenosis. Small hiatal hernia.      Impression    IMPRESSION:  1. Cause for patient's elevated LFTs and weight loss not definitively  identified.  2. 4 mm nodule in the right lower pulmonary lobe, recommend follow-up  CT examination in 12 months.  3. Right pelvic hypodensity measures up to 1.6 cm, may be small  ovarian cystic collection; however, clinically correlate with surgical  history as it appears there has been hysterectomy, and therefore  correlate with remaining ovaries.    KAI PIMENTEL MD

## 2017-11-15 VITALS
BODY MASS INDEX: 22.53 KG/M2 | OXYGEN SATURATION: 99 % | HEART RATE: 110 BPM | DIASTOLIC BLOOD PRESSURE: 74 MMHG | HEIGHT: 64 IN | TEMPERATURE: 95.9 F | SYSTOLIC BLOOD PRESSURE: 164 MMHG | WEIGHT: 132 LBS | RESPIRATION RATE: 17 BRPM

## 2017-11-15 LAB
ALBUMIN SERPL-MCNC: 3 G/DL (ref 3.4–5)
ALP SERPL-CCNC: 159 U/L (ref 40–150)
ALT SERPL W P-5'-P-CCNC: 294 U/L (ref 0–50)
AMMONIA PLAS-SCNC: <10 UMOL/L (ref 10–50)
ANION GAP SERPL CALCULATED.3IONS-SCNC: 2 MMOL/L (ref 3–14)
AST SERPL W P-5'-P-CCNC: 200 U/L (ref 0–45)
BACTERIA SPEC CULT: NORMAL
BILIRUB SERPL-MCNC: 0.8 MG/DL (ref 0.2–1.3)
BUN SERPL-MCNC: 30 MG/DL (ref 7–30)
CALCIUM SERPL-MCNC: 9.2 MG/DL (ref 8.5–10.1)
CHLORIDE SERPL-SCNC: 106 MMOL/L (ref 94–109)
CO2 SERPL-SCNC: 31 MMOL/L (ref 20–32)
CREAT SERPL-MCNC: 1.28 MG/DL (ref 0.52–1.04)
ERYTHROCYTE [DISTWIDTH] IN BLOOD BY AUTOMATED COUNT: 14 % (ref 10–15)
GFR SERPL CREATININE-BSD FRML MDRD: 41 ML/MIN/1.7M2
GLUCOSE SERPL-MCNC: 86 MG/DL (ref 70–99)
HCT VFR BLD AUTO: 39.4 % (ref 35–47)
HGB BLD-MCNC: 13.2 G/DL (ref 11.7–15.7)
Lab: NORMAL
MCH RBC QN AUTO: 31.6 PG (ref 26.5–33)
MCHC RBC AUTO-ENTMCNC: 33.5 G/DL (ref 31.5–36.5)
MCV RBC AUTO: 94 FL (ref 78–100)
PLATELET # BLD AUTO: 200 10E9/L (ref 150–450)
POTASSIUM SERPL-SCNC: 4.5 MMOL/L (ref 3.4–5.3)
PROT SERPL-MCNC: 6.8 G/DL (ref 6.8–8.8)
RBC # BLD AUTO: 4.18 10E12/L (ref 3.8–5.2)
SODIUM SERPL-SCNC: 139 MMOL/L (ref 133–144)
SPECIMEN SOURCE: NORMAL
TTG IGA SER-ACNC: 1 U/ML
TTG IGG SER-ACNC: <1 U/ML
WBC # BLD AUTO: 5.1 10E9/L (ref 4–11)

## 2017-11-15 PROCEDURE — 25000132 ZZH RX MED GY IP 250 OP 250 PS 637: Mod: GY | Performed by: INTERNAL MEDICINE

## 2017-11-15 PROCEDURE — 25000132 ZZH RX MED GY IP 250 OP 250 PS 637: Mod: GY | Performed by: PHYSICIAN ASSISTANT

## 2017-11-15 PROCEDURE — G0378 HOSPITAL OBSERVATION PER HR: HCPCS

## 2017-11-15 PROCEDURE — 85027 COMPLETE CBC AUTOMATED: CPT | Performed by: INTERNAL MEDICINE

## 2017-11-15 PROCEDURE — 36415 COLL VENOUS BLD VENIPUNCTURE: CPT | Performed by: INTERNAL MEDICINE

## 2017-11-15 PROCEDURE — 80053 COMPREHEN METABOLIC PANEL: CPT | Performed by: INTERNAL MEDICINE

## 2017-11-15 PROCEDURE — 93272 ECG/REVIEW INTERPRET ONLY: CPT | Performed by: INTERNAL MEDICINE

## 2017-11-15 PROCEDURE — A9270 NON-COVERED ITEM OR SERVICE: HCPCS | Mod: GY | Performed by: PHYSICIAN ASSISTANT

## 2017-11-15 PROCEDURE — 99217 ZZC OBSERVATION CARE DISCHARGE: CPT | Performed by: INTERNAL MEDICINE

## 2017-11-15 PROCEDURE — 82140 ASSAY OF AMMONIA: CPT | Performed by: INTERNAL MEDICINE

## 2017-11-15 PROCEDURE — 93270 REMOTE 30 DAY ECG REV/REPORT: CPT | Performed by: PHYSICIAN ASSISTANT

## 2017-11-15 RX ORDER — METOPROLOL TARTRATE 50 MG
75 TABLET ORAL 2 TIMES DAILY
Qty: 180 TABLET | Refills: 3 | Status: SHIPPED | OUTPATIENT
Start: 2017-11-15 | End: 2017-12-11 | Stop reason: DRUGHIGH

## 2017-11-15 RX ORDER — METOPROLOL TARTRATE 25 MG/1
25 TABLET, FILM COATED ORAL ONCE
Status: COMPLETED | OUTPATIENT
Start: 2017-11-15 | End: 2017-11-15

## 2017-11-15 RX ADMIN — BRIMONIDINE TARTRATE AND TIMOLOL MALEATE 1 DROP: 2; 5 SOLUTION OPHTHALMIC at 08:30

## 2017-11-15 RX ADMIN — METOPROLOL TARTRATE 25 MG: 25 TABLET ORAL at 08:58

## 2017-11-15 RX ADMIN — METOPROLOL TARTRATE 50 MG: 50 TABLET ORAL at 08:29

## 2017-11-15 NOTE — PROVIDER NOTIFICATION
Patient discharged at 11:20 am to home.  IV was discontinued. Pain at time of discharge was 0. Belongings returned to patient.  Discharge instructions and medications reviewed with patient and spouse.  Patient and spouse verbalized understanding and all questions were answered. At time of discharge, patient condition was stable and left the unit 88 escorted by w/c.

## 2017-11-15 NOTE — PROGRESS NOTES
[facility]  Hospitalist Progress Note  Marko Cooley MD    Assessment & Plan   Mrs. Elaina Winn is a 72 year old female with PMHx of hypertension, hyperlipidemia, glaucoma and anxiety who was admitted on 11/12/2017 for evaluation of generalized weakness and abnormal liver function tests.      Abnormal LFTs:  Abnormal labs were noted incidentally on 11/9 as part of evaluation for annual exam. Noted to have elevated transaminases and alk phos, t.bili nl. On 11/9, ,  and alk phos 228. PCP planned to initiate workup with US. Presented to ED on 11/12 with complaints of generalized weakness. LFTs still elevated but trending down from initial check on 11/9. Abd US in ED was unrevealing -- no signs of cirrhosis. Broad ddx. Denies EtOH use or excessive Tylenol use. Not on statin, no new meds. No known sick contacts. Additional workup initiated on admission.   Recent Labs   Lab Test  11/15/17   0726  11/14/17   0731  11/13/17   0735  11/12/17   1702  11/09/17   1156  04/21/16   1021   BILITOTAL  0.8  0.7  0.8  1.1  1.0  0.6   ALT  294*  352*  377*  441*  694*  48   AST  200*  247*  252*  281*  591*  30   ALKPHOS  159*  168*  159*  194*  228*  137     -- no GI sx at present (denies nausea/vomting, changes in bowel habits/character)  -- Hepatitis B surface antigen, Hepatitis B Core Ig M, Hepatitis A antibody, Hepatitis C antibody all non-reactive. Aldolase 9.4 and mildly elevated.  and elevated.Ferritin 259. F-Actin antibody IgG 9 and normal. Tissue transglutaminase IgA and IgG are pending.  -- CT chest/abdomen/pelvis showed a 4 mm nodule in the right lower pulmonary lobe (recommend follow up in 12 months), and right pelvic hypodensity measuring up to 1.6 cm, likely a small ovarian cystic collection.  -- GI following and recommended outpatient hepatology follow-up  -- Cont to monitor LFT    Paroxysmal Afib with RVR:  Noted to have acute onset confusion and irregular HR on 11/4. EKG  confirmed afib with RVR. HRs 130-150s. No prior hx of arrhythmia. Given 5mg IV Lopressor. Rates improved. Converted back to NSR later that morning. Echo showed normal LV systolic function with EF of 55-60%, mild mitral regurgitation, and mildly dilated left atrium. TSH 1.59.  -- precipitating cause of afib not completely clear  -- cont telemetry  -- increased metoprolol to 75mg BID from prior 50 mg po bid  -- CHADS2 VASc score is at least 3. Started on Xarelto 15 mg po daily.  -- cardiology consulted and plan for 30 day outpatient cardiac monitor     Acute Toxic Encephalopathy vs Delirium: Improved.  Underlying cognitive impairment:  Patient assessed for memory issues back in 6/2016 and suspected to have some underlying cognitive impairment. OT testing at that time was c/w mild cognitive impairment. Patient endorsed an episode of confusion while driving to her dentist not too long ago, which she attributed to unfamiliar landmarks. That episode resolved after a short period of time and she has since remained at her baseline. A&Ox3 on admission. Became confused overnight (11/13 - 11/14). Was up wandering hallways, hallucinating. Thought she was walking around a construction site. Afebrile. UA obtained and was bland. Has not received any pain medications this stay. Mentation improved the following morning, patient said she knew she was confused and didn't recognize where she was.  -- should have outpatient follow up with PCP     Presumed stage III CKD:  Baseline Cr seems to be around 1.3 (per labs dating back to 4/2016). Cr 1.1 on admission, essentially at baseline.   No changes to treatment at this time. Monitor labs periodically    Recent Labs  Lab 11/15/17  0726 11/14/17  0731 11/13/17  0735 11/12/17  1702 11/09/17  1156   CR 1.28* 1.37* 1.06* 1.11* 1.39*     Hypertension:  Chronic and stable on metoprolol 50mg BID      Dyslipidemia:  Not on statin. FLP on 11/9 showed , HDL 77 and TG  133.     Glaucoma:  Chronic and stable on eye drops     FEN: will dc IVFs, lytes stable, advance to regular diet  DVT Prophylaxis: PCDs  Code Status: Full Code     Disposition: Home today    D/W RN.     Interval History   Patient is doing well. NSR is maintained. Patient reports no complaint.    Data reviewed today: I reviewed all new labs and imaging over the last 24 hours. I personally reviewed the telemetry monitor showing NSR..    Physical Exam   Temp: 95.9  F (35.5  C) Temp src: Oral BP: 164/74   Heart Rate: 68 Resp: 17 SpO2: 99 % O2 Device: None (Room air)    Vitals:    11/12/17 1639 11/13/17 0434 11/14/17 0534   Weight: 61.2 kg (135 lb) 61.6 kg (135 lb 14.4 oz) 59.9 kg (132 lb)     Vital Signs with Ranges  Temp:  [95.9  F (35.5  C)-97.3  F (36.3  C)] 95.9  F (35.5  C)  Heart Rate:  [64-71] 68  Resp:  [14-17] 17  BP: (110-164)/(51-74) 164/74  SpO2:  [97 %-99 %] 99 %  I/O's Last 24 hours  I/O last 3 completed shifts:  In: 700 [P.O.:700]  Out: 250 [Urine:250]    Constitutional: Comfortable, no acute distress  HEENT: No conjunctival pallor.  Neurologic: Awake, alert  Neck: Supple, no elevated JVP  Respiratory: Clear to auscultation  Cardiovascular: Normal S1 and S2. Regular rhythm and rate  GI: Abdomen soft, not tender, not distended  Extremities: No calf tenderness, no edema  Skin/integument: No acute rash, no cyanosis    Medications   All medications were reviewed.       metoprolol  75 mg Oral BID     rivaroxaban ANTICOAGULANT  15 mg Oral Daily with supper     brimonidine-timolol  1 drop Both Eyes BID     multivitamin, therapeutic with minerals  1 tablet Oral At Bedtime     sodium chloride (PF)  3 mL Intracatheter Q8H        Data     Recent Labs  Lab 11/15/17  0726 11/14/17  0731 11/13/17  0735 11/12/17  1702   WBC 5.1  --  5.1 5.9   HGB 13.2  --  13.4 14.9   MCV 94  --  96 95     --  203 239   INR  --   --  1.01  --     140 141 138   POTASSIUM 4.5 4.7 4.0 4.4   CHLORIDE 106 106 107 103   CO2 31 30  29 26   BUN 30 24 19 29   CR 1.28* 1.37* 1.06* 1.11*   ANIONGAP 2* 4 5 9   DANIELITO 9.2 9.5 9.1 9.9   GLC 86 99 101* 105*   ALBUMIN 3.0* 3.1* 3.1* 3.6   PROTTOTAL 6.8 7.1 7.1 8.2   BILITOTAL 0.8 0.7 0.8 1.1   ALKPHOS 159* 168* 159* 194*   * 352* 377* 441*   * 247* 252* 281*   LIPASE  --   --   --  235       No results found for this or any previous visit (from the past 24 hour(s)).

## 2017-11-15 NOTE — DISCHARGE SUMMARY
[facility]  Discharge Summary        Elaina Winn MRN# [id number]   YOB: 1945 Age: 72 year old     Date of Admission:  11/12/2017  Date of Discharge:  11/15/2017  Admitting Physician:  Dejah Montanez,   Discharge Physician: Marko Cooley MD  Discharging Service: Hospitalist     Primary Provider:  Cady Marie PA-C  Primary Care Physician Phone Number: [phone]     Discharge Diagnoses:     Abnormal liver function test/acute hepatitis, etiology unclear, improving  Paroxysmal atrial fibrillation  Incidental RLL 4 mm pulmonary nodule  Cognitive impairment with delirium  CKD stage  HTN  DLP  Glaucoma  Anxiety    Problem Oriented Hospital Course   Mrs. Elaina Winn is a 72 year old female with PMHx of hypertension, hyperlipidemia, glaucoma, cognitive impairment, and anxiety who was admitted on 11/12/2017 for evaluation of generalized weakness and abnormal liver function tests.       Abnormal LFTs:  Abnormal labs were noted incidentally on 11/9 as part of evaluation for annual exam. Noted to have elevated transaminases and alk phos, t.bili nl. On 11/9, ,  and alk phos 228. PCP planned to initiate workup with US. Presented to ED on 11/12 with complaints of generalized weakness.  Abdominal US in ED was unrevealing. She denied EtOH use or excessive Tylenol use. Not on statin, no new meds. No known sick contacts. Hepatitis B surface antigen, Hepatitis B Core Ig M, Hepatitis A antibody, Hepatitis C antibody were all non-reactive. Aldolase 9.4 and mildly elevated.  and slightly elevated.Ferritin 259. F-Actin antibody IgG 9 and normal. Tissue transglutaminase IgA and IgG are pending. CT chest/abdomen/pelvis showed a 4 mm nodule in the right lower pulmonary lobe (recommend follow up in 12 months), and right pelvic hypodensity measuring up to 1.6 cm, likely a small ovarian cystic collection. LFTs are trending down from initial check on 11/9. She was  arranged to follow up with PCP and liver clinic for monitoring of LFT. Her prior to admission supplement for memory (Prevagen) was discontinued.      Recent Labs   Lab Test  11/15/17   0726  11/14/17   0731  11/13/17   0735  11/12/17   1702  11/09/17   1156  04/21/16   1021   BILITOTAL  0.8  0.7  0.8  1.1  1.0  0.6   ALT  294*  352*  377*  441*  694*  48   AST  200*  247*  252*  281*  591*  30   ALKPHOS  159*  168*  159*  194*  228*  137        Paroxysmal Afib with RVR:  Noted to have acute onset confusion and irregular HR on 11/4. EKG confirmed afib with RVR. HRs 130-150s. No prior hx of arrhythmia. Given 5mg IV Lopressor. Rates improved. Converted back to NSR later that morning. Echo showed normal LV systolic function with EF of 55-60%, mild mitral regurgitation, and mildly dilated left atrium. TSH 1.59. PTA metoprolol was increased to 75mg BID from prior 50 mg po bid and given CHADS2 VASc score of 3 started on Eliquis 2.5 mg po bid. She was arranged for 30 day outpatient cardiac monitor. Management was assisted by cardiology.    Delirium: Improved.  Underlying cognitive impairment:  Patient assessed for memory issues back in 6/2016 and suspected to have some underlying cognitive impairment. OT testing at that time was c/w mild cognitive impairment. Patient endorsed an episode of confusion while driving to her dentist not too long ago, which she attributed to unfamiliar landmarks. That episode resolved after a short period of time and she has since remained at her baseline. A&Ox3 on admission. Became confused overnight (11/13 - 11/14). Was up wandering hallways, hallucinating. Thought she was walking around a construction site. Afebrile. UA obtained and was bland. Has not received any pain medications this stay. Mentation improved the following morning. Patient said she knew she was confused and didn't recognize where she was. She is to follow up with PCP after discharge.      Presumed stage III CKD:  Baseline Cr  seems to be around 1.3 (per labs dating back to 4/2016). Cr 1.1 on admission, essentially at baseline.   No changes to treatment at this time. Monitor labs periodically     Recent Labs  Lab 11/15/17  0726 11/14/17  0731 11/13/17  0735 11/12/17  1702 11/09/17  1156   CR 1.28* 1.37* 1.06* 1.11* 1.39*      Hypertension:  Chronic and stable. PTA metoprolol increased to 75 mg BID as above.      Dyslipidemia:  Not on statin. FLP on 11/9 showed , HDL 77 and .      Glaucoma:  Chronic and stable on eye drops          Code Status:      Full Code        Important Results:      Cr 1.28, Hgb 13.2, WBC 5.1, PLT 200K, , , TBil 0.8, ALKP 159.        Pending Results:        Unresulted Labs Ordered in the Past 30 Days of this Admission     Date and Time Order Name Status Description    11/14/2017 0425 Urine Culture Aerobic Bacterial Preliminary     11/14/2017 0000 Tissue transglutaminase shruthi IgA and IgG In process               Discharge Instructions and Follow-Up:      Follow-up Appointments     Follow-up and recommended labs and tests        You should receive a call from [facility] for a Hepatology follow up in the   next two days.  If you do not hear from them please call  [phone]            Follow-up and recommended labs and tests        -Follow up with cardiology clinic in 4 weeks  -Follow up with PCP in one week. Recommended lab: LFT, BMP, Hgb  -Follow up with [facility] in 2 weeks.  -CT chest in 12 months for follow up on lung nodule                        Discharge Disposition:      Discharged to home        Discharge Medications:        Current Discharge Medication List      START taking these medications    Details   apixaban ANTICOAGULANT (ELIQUIS) 2.5 MG tablet Take 1 tablet (2.5 mg) by mouth 2 times daily  Qty: 60 tablet, Refills: 3    Associated Diagnoses: Paroxysmal atrial fibrillation (H)         CONTINUE these medications which have CHANGED    Details   metoprolol (LOPRESSOR) 50 MG  "tablet Take 1.5 tablets (75 mg) by mouth 2 times daily  Qty: 180 tablet, Refills: 3    Associated Diagnoses: HTN (hypertension), benign         CONTINUE these medications which have NOT CHANGED    Details   brimonidine-timolol (COMBIGAN) 0.2-0.5 % ophthalmic solution Place 1 drop into both eyes 2 times daily       Omega-3 Fatty Acids (FISH OIL PO) Take 1,000 mg by mouth daily       Multiple Vitamin (MULTI-VITAMIN) per tablet Take 1 tablet by mouth daily.         STOP taking these medications       Apoaequorin (PREVAGEN PO) Comments:   Reason for Stopping:                   Allergies:         Allergies   Allergen Reactions     Zocor [Simvastatin - High Dose]      Elevated LFTs           Consultations This Hospital Stay:      GI   Cardiology  Nutrition        Condition and Physical on Discharge:      Discharge condition: Stable   Vitals: Blood pressure 164/74, pulse 110, temperature 95.9  F (35.5  C), temperature source Oral, resp. rate 17, height 1.626 m (5' 4\"), weight 59.9 kg (132 lb), SpO2 99 %, not currently breastfeeding.           Diet and Activity:     After Care Instructions     Activity       Your activity upon discharge: activity as tolerated            Diet       Follow this diet upon discharge: Orders Placed This Encounter      Snacks/Supplements Adult: Other - Please comment; OK to order supplements as desired; With Meals     Low salt diet                            Discharge Time:      Greater than 30 minutes.        Image Results From This Hospital Stay (For Non-EPIC Providers):        Results for orders placed or performed during the hospital encounter of 11/12/17   Abdomen US, complete    Narrative    US ABDOMEN COMPLETE 11/12/2017 6:20 PM     HISTORY: Check for hepatitis, biliary obstruction, cancer, pancreas  tumor, pancreatitis, elevated liver function tests, one month of not  feeling well, nausea.     TECHNIQUE: Abdominal ultrasound.    COMPARISON: Report from 08/15/2001.    FINDINGS: The " gallbladder is normal in appearance. There is no  evidence for cholelithiasis or biliary obstruction. Common duct  measures 0.3 cm. The liver is normal in appearance measuring 14 cm in  length. Both kidneys are normal. The proximal aorta and inferior vena  cava are normal. The spleen is normal. The pancreas is completely  obscured by gas.      Impression    IMPRESSION: Negative abdominal ultrasound. The pancreas is completely  obscured by gas.     NENO BELLO MD   CT Chest/Abdomen/Pelvis w Contrast    Narrative    CT CHEST/ABDOMEN/PELVIS WITH CONTRAST  11/13/2017  3:54 PM    HISTORY: 72-year-old patient with elevated LFTs of unclear etiology.  Patient has reported weight loss, fatigue, and anorexia.    COMPARISON: None.    TECHNIQUE: Axial and coronal CT images obtained from the lung apices  through the chest, abdomen, and pelvis after the uneventful  administration of Isovue-370 intravenous contrast given for a total of  68 mL. Radiation dose for this scan was reduced using automated  exposure control, adjustment of the mA and/or kV according to patient  size, or iterative reconstruction technique.    The thyroid gland is unremarkable. No mediastinal lymphadenopathy.  Heart size is normal without pericardial effusion. No pleural  effusion. The central pulmonary arteries are patent. Thoracic aorta is  normal in size and appearance with minimal scattered atherosclerotic  plaque. No axillary lymphadenopathy. 4 mm nodule in the right lower  lobe, subpleural in location. No pleural effusion, pneumothorax, or  abnormal area of consolidation. Minimal bibasilar atelectasis.  Degenerative changes throughout the lumbar spine, with intervertebral  disc space narrowing in the lower lumbar spine.    The liver, gallbladder, spleen, adrenal glands, and pancreas are  unremarkable. Both kidneys are normally perfused. No hydronephrosis.  Suspect patient has undergone hysterectomy. Small hypodensity noted in  the right hemipelvis  measuring 8 Hounsfield units and up to 1.6 cm.  Presumably, this is a cystic ovarian collection, assuming patient  still has ovaries. No inguinal lymphadenopathy. The appendix is normal  in size and appearance. No dilated loops of bowel. Mild to moderate  atherosclerotic plaque in the abdominal aorta, though no aneurysmal  dilatation or stenosis. Small hiatal hernia.      Impression    IMPRESSION:  1. Cause for patient's elevated LFTs and weight loss not definitively  identified.  2. 4 mm nodule in the right lower pulmonary lobe, recommend follow-up  CT examination in 12 months.  3. Right pelvic hypodensity measures up to 1.6 cm, may be small  ovarian cystic collection; however, clinically correlate with surgical  history as it appears there has been hysterectomy, and therefore  correlate with remaining ovaries.    KAI PIMENTEL MD     Recent Results (from the past 4320 hour(s))   Echocardiogram Complete    Narrative    [id number]  [id number]  [id number]  [id number]^KATTY^ZAINA^[facility]  Echocardiography Laboratory  [address] [place] [address]        Name: ABDULLAHI BLACKWOOD  MRN: [id number]  : 1945  Study Date: 2017 03:12 PM  Age: 72 yrs  Gender: Female  Patient Location: [facility]  Reason For Study: Afib  Ordering Physician: ZAINA ALANIZ  Referring Physician: Cady Marie  Performed By: Santiago Monzon RDCS     BSA: 1.6 m2  Height: 64 in  Weight: 132 lb  HR: 65  BP: 112/63 mmHg  _____________________________________________________________________________  __        Procedure  Complete Portable Echo Adult.  _____________________________________________________________________________  __        Interpretation Summary     Left ventricular systolic function is normal.  The visual ejection fraction is estimated at 55-60%.  There is mild (1+) mitral regurgitation.  Sinus rhythm was noted.  The study was technically adequate. There is no comparison study  available.  _____________________________________________________________________________  __        Left Ventricle  The left ventricle is normal in size. There is normal left ventricular wall  thickness. Left ventricular systolic function is normal. The visual ejection  fraction is estimated at 55-60%. E by E prime ratio is between 8 and 15, which  is indeterminate for assessment of left ventricular filling pressures. No  regional wall motion abnormalities noted.     Right Ventricle  The right ventricle is normal size. The right ventricular systolic function is  normal.     Atria  The left atrium is mildly dilated. Right atrial size is normal. There is no  color Doppler evidence of an atrial shunt.     Mitral Valve  There is mild (1+) mitral regurgitation.        Tricuspid Valve  There is mild (1+) tricuspid regurgitation. The right ventricular systolic  pressure is approximated at 24.0 mmHg plus the right atrial pressure. Normal  IVC (1.5-2.5cm) with >50% respiratory collapse; right atrial pressure is  estimated at 5-10mmHg.     Aortic Valve  The aortic valve is trileaflet. There is trace aortic regurgitation. No aortic  stenosis is present.     Pulmonic Valve  The pulmonic valve is not well visualized.     Vessels  The aortic root is normal size.     Pericardium  There is no pericardial effusion.        Rhythm  Sinus rhythm was noted.  _____________________________________________________________________________  __  MMode/2D Measurements & Calculations  IVSd: 1.1 cm     LVIDd: 3.9 cm  LVIDs: 2.7 cm  LVPWd: 0.84 cm  FS: 30.7 %  EDV(Teich): 64.1 ml  ESV(Teich): 26.3 ml  LV mass(C)d: 110.7 grams  LV mass(C)dI: 67.5 grams/m2  Ao root diam: 3.0 cm  LA dimension: 4.3 cm  asc Aorta Diam: 3.2 cm  LA/Ao: 1.4  LA Volume (BP): 56.8 ml  LA Volume Index (BP): 34.6 ml/m2  RWT: 0.44           Doppler Measurements & Calculations  MV E max mendoza: 88.4 cm/sec  MV A max mendoza: 58.6 cm/sec  MV E/A: 1.5  MV dec time: 0.18 sec  TR max  mendoza: 245.1 cm/sec  TR max P.0 mmHg  E/E' av.3  Lateral E/e': 13.5  Medial E/e': 11.2           _____________________________________________________________________________  __           Report approved by: Varinder Richter 2017 04:02 PM              Most Recent Lab Results In EPIC (For Non-EPIC Providers):    Most Recent 3 CBC's:  Recent Labs   Lab Test  11/15/17   0726  11/13/17   0735  17   1702   WBC  5.1  5.1  5.9   HGB  13.2  13.4  14.9   MCV  94  96  95   PLT  200  203  239      Most Recent 3 BMP's:  Recent Labs   Lab Test  11/15/17   0726  11/14/17   0731  1735   NA  139  140  141   POTASSIUM  4.5  4.7  4.0   CHLORIDE  106  106  107   CO2  31  30  29   BUN  30  24  19   CR  1.28*  1.37*  1.06*   ANIONGAP  2*  4  5   DANIELITO  9.2  9.5  9.1   GLC  86  99  101*     Most Recent 3 Troponin's:  Recent Labs   Lab Test  16   1125   TROPI  <0.015  The 99th percentile for upper reference range is 0.045 ug/L.  Troponin values in   the range of 0.045 - 0.120 ug/L may be associated with risks of adverse   clinical events.       Most Recent 3 INR's:  Recent Labs   Lab Test  17   0735   INR  1.01     Most Recent 2 LFT's:  Recent Labs   Lab Test  11/15/17   0726  11/14/17   0731   AST  200*  247*   ALT  294*  352*   ALKPHOS  159*  168*   BILITOTAL  0.8  0.7     Most Recent Cholesterol Panel:  Recent Labs   Lab Test  17   1156   CHOL  252*   LDL  148*   HDL  77   TRIG  133     Most Recent 6 Bacteria Isolates From Any Culture (See EPIC Reports for Culture Details):  Recent Labs   Lab Test  17   0425   CULT  PENDING     Most Recent TSH, T4 and HgbA1c:   Recent Labs   Lab Test  17   1702   TSH  1.59

## 2017-11-15 NOTE — PLAN OF CARE
Problem: Patient Care Overview  Goal: Plan of Care/Patient Progress Review  Outcome: Improving  A and O X 4 this shift. No confusion noted.   Pt on telemetry: Normal sinus rhythm this shift. VSS.  Denies pain or nausea. Tolerated food well. Good urine output. BM x 1.   Ambulated to bathroom on shift with SBA. Bed alarm in place d/t new onset of confusion last shift. PIV SL in right hand. Will continue to monitor.

## 2017-11-15 NOTE — PLAN OF CARE
Problem: Patient Care Overview  Goal: Plan of Care/Patient Progress Review  Outcome: No Change  A&O x3 on shift, disoriented to time. Thought it was February. Otherwise orientation intact. VSS on RA. Denies pain. On tele, bradycardic at times, sinus rhythm. Voiding adequately per BR. PIV right hand SL. Up SBA, bed alarm on d/t confusion. Ambulated to bathroom on shift. Continue to monitor.

## 2017-11-15 NOTE — PROGRESS NOTES
[facility]  Cardiology Progress Note    Date of Service (when I saw the patient): 11/15/2017    Summary: Elaina Winn is a 72 year old female with history of HTN, HLD who was admitted on 11/12/2017 with weakness, abnormal LFTs.      Interval History   Elaina reports she feels well this morning. She had no further episodes of atrial fibrillation on telemetry. She has no complaints of palpitations, chest pain, or shortness of breath today. No dizziness or lightheadedness.  Assessment & Plan   Paroxysmal atrial fibrillation. No prior history of atrial fibrillation. Confirmed on EKG with rates in the 140s. Now back in NSR. Echocardiogram shows normal left and right ventricular function without wall motion abnormalities.  There is only mild left atrial enlargement and normal right atrial size.  No hemodynamically significant valvular disease.     Recommendation  -Continue metoprolol, currently on 50 mg BID which was her prior to arrival dose. Will increase to 75 mg BID today which she should go home on.   -Her [id number] score is at least 3 (age, female, hx HTN) therefore anticoagulation indicated for CVA prophylaxis. She prefers to avoid Warfarin as she does not want to deal with the frequent monitoring. Her  is on Xarelto. Discussed further with  and patient today. They would prefer Eliquis. Would send home on 2.5 mg BID due to renal function.   -She should discharge home with a 30 day event monitor to assess AF burden and rates. This has been ordered.   -Follow up has been arranged in one month on December 20th, 2017 with Olga Andino PA-C and she will follow up with Dr. Reynoso in 2 - 3 months.     Abnormal LFTs. Noted incidentally. No GI symptoms. Work up thus far including labs, CT/US have overall been unrevealing. GI has been following. Autoimmune liver markers and celiac serology are still pending.   -LFTs continue to improve today. Plan to discharge tomorrow if LFTs improving and  f/u with Hepatology.      HTN. On metoprolol 50 mg BID prior to admission. Continue increased dose at 75 mg BID.    Delirium. Brief episode of delirium and confusion night of 11/13. Per notes, suspected to have some underlying cognitive impairment and memory loss. Mentation currently at baseline.     Olga Andino PA-C      Patient Active Problem List   Diagnosis     Heel pain     Advanced directives, counseling/discussion     Hyperlipidemia with target LDL less than 130     Glaucoma     Generalized OA     Dry eyes, bilateral     HTN (hypertension), benign     Anxiety     Memory loss     Elevated serum creatinine     Abdominal pain     Abnormal liver function tests       Physical Exam   Temp: 95.9  F (35.5  C) Temp src: Oral BP: 164/74   Heart Rate: 68 Resp: 17 SpO2: 99 % O2 Device: None (Room air)    Vitals:    11/12/17 1639 11/13/17 0434 11/14/17 0534   Weight: 61.2 kg (135 lb) 61.6 kg (135 lb 14.4 oz) 59.9 kg (132 lb)     Vital Signs with Ranges  Temp:  [95.9  F (35.5  C)-97.3  F (36.3  C)] 95.9  F (35.5  C)  Heart Rate:  [64-71] 68  Resp:  [14-17] 17  BP: (110-164)/(51-74) 164/74  SpO2:  [97 %-99 %] 99 %  I/O last 3 completed shifts:  In: 700 [P.O.:700]  Out: 250 [Urine:250]    Constitutional: NAD.   Respiratory: CTAB. No wheezes, rhonchi  Cardiovascular: RRR, s1s2, no murmur  GI: soft, BS+  Skin: warm, no rashes  Musculoskeletal: Moving all extremities  Neurologic: Alert, oriented x 3.  Neuropsychiatric: Normal affect       Data     Recent Labs  Lab 11/15/17  0726 11/14/17  0731 11/13/17  0735 11/12/17  1702   WBC 5.1  --  5.1 5.9   HGB 13.2  --  13.4 14.9   MCV 94  --  96 95     --  203 239   INR  --   --  1.01  --     140 141 138   POTASSIUM 4.5 4.7 4.0 4.4   CHLORIDE 106 106 107 103   CO2 31 30 29 26   BUN 30 24 19 29   CR 1.28* 1.37* 1.06* 1.11*   ANIONGAP 2* 4 5 9   DANIELITO 9.2 9.5 9.1 9.9   GLC 86 99 101* 105*   ALBUMIN 3.0* 3.1* 3.1* 3.6   PROTTOTAL 6.8 7.1 7.1 8.2   BILITOTAL 0.8 0.7 0.8  1.1   ALKPHOS 159* 168* 159* 194*   * 352* 377* 441*   * 247* 252* 281*       Echocardiogram 11/14:  Interpretation Summary  Left ventricular systolic function is normal.  The visual ejection fraction is estimated at 55-60%.  There is mild (1+) mitral regurgitation.  Sinus rhythm was noted.  The study was technically adequate. There is no comparison study available     Tele NSR, no further atrial fibrillation overnight. HRs into the mid 50s during the night.    Medications        rivaroxaban ANTICOAGULANT  15 mg Oral Daily with supper     brimonidine-timolol  1 drop Both Eyes BID     metoprolol  50 mg Oral BID     multivitamin, therapeutic with minerals  1 tablet Oral At Bedtime     sodium chloride (PF)  3 mL Intracatheter Q8H

## 2017-11-15 NOTE — PROGRESS NOTES
GI Chart Check    Chart reviewed, events noted  Pt leaving the floor when I arrived  LFTs trending down  Our office will contact for follow up in our Hepatology Clinic  Please call with questions.    Anderson Tovar PA-C  [facility]  [phone] Cell ([phone])  [phone] Office (after 1300)

## 2017-11-16 ENCOUNTER — TELEPHONE (OUTPATIENT)
Dept: FAMILY MEDICINE | Facility: CLINIC | Age: 72
End: 2017-11-16

## 2017-11-16 ENCOUNTER — OFFICE VISIT (OUTPATIENT)
Dept: FAMILY MEDICINE | Facility: CLINIC | Age: 72
End: 2017-11-16
Payer: COMMERCIAL

## 2017-11-16 ENCOUNTER — CARE COORDINATION (OUTPATIENT)
Dept: CARE COORDINATION | Facility: CLINIC | Age: 72
End: 2017-11-16

## 2017-11-16 ENCOUNTER — CARE COORDINATION (OUTPATIENT)
Dept: CARDIOLOGY | Facility: CLINIC | Age: 72
End: 2017-11-16

## 2017-11-16 VITALS
HEIGHT: 64 IN | WEIGHT: 139 LBS | TEMPERATURE: 97.2 F | OXYGEN SATURATION: 100 % | DIASTOLIC BLOOD PRESSURE: 78 MMHG | BODY MASS INDEX: 23.73 KG/M2 | SYSTOLIC BLOOD PRESSURE: 134 MMHG | HEART RATE: 62 BPM

## 2017-11-16 DIAGNOSIS — R79.89 ELEVATED SERUM CREATININE: ICD-10-CM

## 2017-11-16 DIAGNOSIS — I48.0 PAF (PAROXYSMAL ATRIAL FIBRILLATION) (H): ICD-10-CM

## 2017-11-16 DIAGNOSIS — I10 HTN (HYPERTENSION), BENIGN: ICD-10-CM

## 2017-11-16 DIAGNOSIS — R41.3 MEMORY LOSS: ICD-10-CM

## 2017-11-16 DIAGNOSIS — R79.89 ELEVATED LFTS: Primary | ICD-10-CM

## 2017-11-16 LAB
HGB BLD-MCNC: 13.5 G/DL (ref 11.7–15.7)
INTERPRETATION ECG - MUSE: NORMAL

## 2017-11-16 PROCEDURE — 80076 HEPATIC FUNCTION PANEL: CPT | Performed by: PHYSICIAN ASSISTANT

## 2017-11-16 PROCEDURE — 80048 BASIC METABOLIC PNL TOTAL CA: CPT | Performed by: PHYSICIAN ASSISTANT

## 2017-11-16 PROCEDURE — 99495 TRANSJ CARE MGMT MOD F2F 14D: CPT | Performed by: PHYSICIAN ASSISTANT

## 2017-11-16 PROCEDURE — 85018 HEMOGLOBIN: CPT | Performed by: PHYSICIAN ASSISTANT

## 2017-11-16 PROCEDURE — 36415 COLL VENOUS BLD VENIPUNCTURE: CPT | Performed by: PHYSICIAN ASSISTANT

## 2017-11-16 NOTE — PROGRESS NOTES
Called patient's  Yo to discuss any post hospital d/c questions he may have, review medication changes, and confirm f/u appts. Patient was evaluated by cardiology for HTN and paroxysmal afib. Patient's  denied any questions regarding new medications or changes to some of patient's current medications that patient was taking prior to admission. Patient's  denied that patient had any SOB, chest pain, or light headedness. RN confirmed with patient's  that kosta has an apt scheduled on 12/20/17 with RONALDO Olga Andino. Patient's  advised to call clinic with any cardiac related questions or concerns prior to patient's apt on 12/20/17. Patient's  verbalized understanding and agreed with plan.

## 2017-11-16 NOTE — LETTER
Health Care Home - Access Care Plan    About Me  Patient Name:  Elaina Winn    YOB: 1945  Age:                            72 year old   [facility] MRN:         [id number] Telephone Information:     Home Phone [phone]   Mobile none       Address:    [address] [place] [address] Email address:  [email or web address]      Emergency Contact(s)  Name Relationship Lgl Grd Work Phone Home Phone Mobile Phone   TRAVIS LALA Spouse  none [phone] none             Health Maintenance:      My Access Plan  Medical Emergency 911   Questions or concerns during clinic hours Primary Clinic Line, I will call the clinic directly:  [phone]   24 Hour Appointment Line [phone] or  [phone]  (toll free)   24 Hour Nurse Line [phone] (toll free)   Questions or concerns outside clinic hours 24 Hour Appointment Line, I will call the after-hours on-call line:   [facility] [phone] or [phone]) (toll-free)   Preferred Urgent Care     Preferred Hospital     Preferred Pharmacy [facility] [address] [place] [address] [address] AT [facility] [address]     Behavioral Health Crisis Line The National Suicide Prevention Lifeline at 1-258.854.5818 or 913     My Care Team Members  Patient Care Team       Relationship Specialty Notifications Start End    Cady Marie PA-C PCP - General Internal Medicine  2/6/12     Phone: [phone] Fax: [phone] [address] [place] [address]    Izzy Lynn, RN Clinic Care Coordinator Internal Medicine Admissions 11/16/17     Comment:  PH: [phone]        My Medical and Care Information  Problem List   Patient Active Problem List   Diagnosis     Heel pain     Advanced directives, counseling/discussion     Hyperlipidemia with target LDL less than 130     Glaucoma     Generalized OA     Dry eyes, bilateral     HTN (hypertension), benign     Anxiety     Memory loss      Elevated serum creatinine     Abdominal pain     Abnormal liver function tests     PAF (paroxysmal atrial fibrillation) (H)      Current Medications and Allergies:  See printed Medication Report

## 2017-11-16 NOTE — TELEPHONE ENCOUNTER
Chief Complaint: Paroxysmal Atrial Fibrillation (H), Hepatitis,11/15/17,ED/IP 0/0  [phone] (home) none (work)

## 2017-11-16 NOTE — LETTER
[facility]  Complex Care Plan  About Me  Patient Name:  Elaina Winn    YOB: 1945  Age:   72 year old   [place] MRN: [id number] Telephone Information:     Home Phone [phone]   Mobile none       Address:    [address] [place] [address] Email address:  [email or web address]      Emergency Contact(s)  Name Relationship Lgl Grd Work Phone Home Phone Mobile Phone   TRAVIS LALA Spouse  none [phone] none           Primary language:  English     needed? No   [facility]:  [phone] op. 1  Other communication barriers: Yes, as documented (memory concerns)  Preferred Method of Communication:  Phone  Current living arrangement: I live in a private home with spouse  Mobility Status/ Medical Equipment: Independent  Other information to know about me:    Health Maintenance  Health Maintenance Reviewed: Not assessed    My Access Plan  Medical Emergency 911   Primary Clinic Line [facility]- [phone]   24 Hour Appointment Line [phone] or  [phone]) (toll-free)   24 Hour Nurse Line [phone] (toll-free)   Preferred Urgent Care [facility], [phone]   Preferred Hospital [facility]  [phone]   Preferred Pharmacy [facility] [address] - [facility] [address] AT [facility] [address]     Behavioral Health Crisis Line The National Suicide Prevention Lifeline at 1-646.243.7243 or 560     My Care Team Members  Patient Care Team       Relationship Specialty Notifications Start End    Cady Marie PA-C PCP - General Internal Medicine  2/6/12     Phone: [phone] Fax: [phone] [address] [facility] [address]    Izzy Lynn, RN Clinic Care Coordinator Internal Medicine Admissions 11/16/17     Comment:  PH: [phone]         My Care Plans  Self Management and Treatment Plan  Goals and (Comments)  Goal #1: To  regain my strength and energy      10% of goal reached    Goal #2:          of goal reached    Action Plans on File: None  Advance Care Plans/Directives Type:        My Medical and Care Information  Problem List   Patient Active Problem List   Diagnosis     Heel pain     Advanced directives, counseling/discussion     Hyperlipidemia with target LDL less than 130     Glaucoma     Generalized OA     Dry eyes, bilateral     HTN (hypertension), benign     Anxiety     Memory loss     Elevated serum creatinine     Abdominal pain     Abnormal liver function tests     PAF (paroxysmal atrial fibrillation) (H)      Current Medications and Allergies:  See printed Medication Report.    Care Coordination Start Date: 11/20/17   Frequency of Care Coordination: prn   Form Last Updated: 11/20/2017

## 2017-11-16 NOTE — NURSING NOTE
"Chief Complaint   Patient presents with     Hospital F/U       Initial /78 (BP Location: Right arm, Cuff Size: Adult Regular)  Pulse 62  Temp 97.2  F (36.2  C) (Tympanic)  Ht 5' 4\" (1.626 m)  Wt 139 lb (63 kg)  SpO2 100%  Breastfeeding? No  BMI 23.86 kg/m2 Estimated body mass index is 23.86 kg/(m^2) as calculated from the following:    Height as of this encounter: 5' 4\" (1.626 m).    Weight as of this encounter: 139 lb (63 kg).  Medication Reconciliation: complete  Lillie Sommer MA      "

## 2017-11-16 NOTE — PROGRESS NOTES
SUBJECTIVE:   Elaina Winn is a 72 year old female who presents to clinic today for the following health issues:    Pt went in with vague sxs of HA and feeling unwell  She had known elevated LFTs from routine labs done a few days prior for her px  She became confused during hospitalization and found to be in Afib with RVR  She was d/c'd on Eliquis and her dose of metoprolol was increased to from 50mg bid to 75mg bid  She is still tired.   Has never had any pain but today her R arm feels heavy/achy  Denies any fever but has had some chills.  She is wearing a heart monitor x 30 days.    Hospital Follow-up Visit:    Hospital/Nursing Home/IP Rehab Facility: [facility]  Date of Admission: 11/12/2017  Date of Discharge: 11/15/2017  Reason(s) for Admission:    Abnormal liver function test/acute hepatitis, etiology unclear, improving  Paroxysmal atrial fibrillation  Incidental RLL 4 mm pulmonary nodule  Cognitive impairment with delirium  CKD stage  HTN  DLP  Glaucoma  Anxiety+            Problems taking medications regularly:  None       Medication changes since discharge: None       Problems adhering to non-medication therapy:  None    Summary of hospitalization:  [facility] discharge summary reviewed  Diagnostic Tests/Treatments reviewed.  Follow up needed: Hepatology clinic and cardiology  Other Healthcare Providers Involved in Patient s Care:         Specialist appointment - as above  Update since discharge: improved.     Post Discharge Medication Reconciliation: discharge medications reconciled and changed, per note/orders (see AVS).  Plan of care communicated with patient     Coding guidelines for this visit:  Type of Medical   Decision Making Face-to-Face Visit       within 7 Days of discharge Face-to-Face Visit        within 14 days of discharge   Moderate Complexity [address]   High Complexity 29811 15051          Past Medical History:   Diagnosis Date     Anxiety      Dry eyes,  "bilateral      Generalized OA      Glaucoma      HTN (hypertension), benign      Hyperlipidemia LDL goal < 130      Past Surgical History:   Procedure Laterality Date     DILATION AND CURETTAGE       HYSTERECTOMY      with USO, not for cancer     S/p partial vaginectomy       SEPTOPLASTY       TONSILLECTOMY & ADENOIDECTOMY       Social History   Substance Use Topics     Smoking status: Former Smoker     Quit date: 1/1/1990     Smokeless tobacco: Never Used      Comment: quit 1990     Alcohol use No     Current Outpatient Prescriptions   Medication Sig Dispense Refill     metoprolol (LOPRESSOR) 50 MG tablet Take 1.5 tablets (75 mg) by mouth 2 times daily 180 tablet 3     apixaban ANTICOAGULANT (ELIQUIS) 2.5 MG tablet Take 1 tablet (2.5 mg) by mouth 2 times daily 60 tablet 3     brimonidine-timolol (COMBIGAN) 0.2-0.5 % ophthalmic solution Place 1 drop into both eyes 2 times daily        Omega-3 Fatty Acids (FISH OIL PO) Take 1,000 mg by mouth daily        Multiple Vitamin (MULTI-VITAMIN) per tablet Take 1 tablet by mouth daily.       Allergies   Allergen Reactions     Zocor [Simvastatin - High Dose]      Elevated LFTs     FAMILY HISTORY NOTED AND REVIEWED    PHYSICAL EXAM:    /78 (BP Location: Right arm, Cuff Size: Adult Regular)  Pulse 62  Temp 97.2  F (36.2  C) (Tympanic)  Ht 5' 4\" (1.626 m)  Wt 139 lb (63 kg)  SpO2 100%  Breastfeeding? No  BMI 23.86 kg/m2    Patient appears non toxic  Heart: RRR without m/r/g.  Lungs: CTA bilat  Heart monitor in place  Abd: HSM, masses. No jaundice. NT/ND.  Extr: no edema.  Psych: alert, answers questions approp    Assessment and Plan:     (R79.89) Elevated LFTs  (primary encounter diagnosis)  Comment: she has been trending down. Will recheck today. F/u with hepatology clinic. They have the number to [facility] and should call since hasn't heard yet. US was neg.  Plan: Hepatic panel            (I10) HTN (hypertension), benign  Comment: well controlled  Plan: cont " same    (R41.3) Memory loss  Comment:   Plan: has appt with Dr. Delgado in the memory clinic next week    (R76.89) Elevated serum creatinine  Comment:   Plan: Basic metabolic panel, Hemoglobin            (I48.0) PAF (paroxysmal atrial fibrillation) (H)  Comment: She is taking Eliquis now given CHADsvas score of 3.  Plan: newly dx. Resolved. Wearing heart monitor x 30d.  Has f/u with cards scheduled.      Cady Marie PA-C

## 2017-11-16 NOTE — MR AVS SNAPSHOT
After Visit Summary   11/16/2017    Elaina Winn    MRN: [id number]           Patient Information     Date Of Birth          1945        Visit Information        Provider Department      11/16/2017 2:30 PM Cady Marie PA-C [facility]        Today's Diagnoses     Elevated LFTs    -  1    HTN (hypertension), benign        Memory loss        Elevated serum creatinine        PAF (paroxysmal atrial fibrillation) (H)           Follow-ups after your visit        Your next 10 appointments already scheduled     Nov 22, 2017  2:30 PM CST   Office Visit with Stella Delgado,    [facility]    [address] [facility] [id number] [phone]           Bring a current list of meds and any records pertaining to this visit. For Physicals, please bring immunization records and any forms needing to be filled out. Please arrive 10 minutes early to complete paperwork.            Dec 20, 2017 11:00 AM CST   Return Discharge with Olga Andino PA-C   [facility] ([facility]    [address]  [facility] [id number] [phone]              Who to contact     If you have questions or need follow up information about today's clinic visit or your schedule please contact [facility] directly at [phone].  Normal or non-critical lab and imaging results will be communicated to you by MyChart, letter or phone within 4 business days after the clinic has received the results. If you do not hear from us within 7 days, please contact the clinic through MyChart or phone. If you have a critical or abnormal lab result, we will notify you by phone as soon as possible.  Submit refill requests through [employer] or call your pharmacy and they will forward the refill request to us. Please allow 3 business days for your refill to be completed.          Additional Information About Your  "Visit        MyChart Information     [employer] gives you secure access to your electronic health record. If you see a primary care provider, you can also send messages to your care team and make appointments. If you have questions, please call your primary care clinic.  If you do not have a primary care provider, please call [phone] and they will assist you.        Care EveryWhere ID     This is your Care EveryWhere ID. This could be used by other organizations to access your [facility] medical records  [id number]        Your Vitals Were     Pulse Temperature Height Pulse Oximetry Breastfeeding? BMI (Body Mass Index)    62 97.2  F (36.2  C) (Tympanic) 5' 4\" (1.626 m) 100% No 23.86 kg/m2       Blood Pressure from Last 3 Encounters:   11/16/17 134/78   11/15/17 164/74   11/09/17 113/68    Weight from Last 3 Encounters:   11/16/17 139 lb (63 kg)   11/14/17 132 lb (59.9 kg)   11/09/17 139 lb (63 kg)              We Performed the Following     Basic metabolic panel     Hemoglobin     Hepatic panel        Primary Care Provider Office Phone # Fax #    Cady Marie PA-C [phone] [address] [address]        Equal Access to Services     KAREN BABCOCK : Hadii aad ku hadasho Soomaali, waaxda luqadaha, qaybta kaalmada adeegyada, waxay idiin haymgn jasmyn hernandez . So [facility] [phone].    ATENCIÓN: Si habla español, tiene a munoz disposición servicios gratuitos de asistencia lingüística. Llame al [phone].    We comply with applicable federal civil rights laws and [place] laws. We do not discriminate on the basis of race, color, national origin, age, disability, sex, sexual orientation, or gender identity.            Thank you!     Thank you for choosing [facility]  for your care. Our goal is always to provide you with excellent care. Hearing back from our patients is one way we can continue to improve our services. Please take a few minutes to complete the " written survey that you may receive in the mail after your visit with us. Thank you!             Your Updated Medication List - Protect others around you: Learn how to safely use, store and throw away your medicines at www.disposemymeds.org.          This list is accurate as of: 11/16/17  4:33 PM.  Always use your most recent med list.                   Brand Name Dispense Instructions for use Diagnosis    apixaban ANTICOAGULANT 2.5 MG tablet    ELIQUIS    60 tablet    Take 1 tablet (2.5 mg) by mouth 2 times daily    Paroxysmal atrial fibrillation (H)       brimonidine-timolol 0.2-0.5 % ophthalmic solution    COMBIGAN     Place 1 drop into both eyes 2 times daily        FISH OIL PO      Take 1,000 mg by mouth daily        metoprolol 50 MG tablet    LOPRESSOR    180 tablet    Take 1.5 tablets (75 mg) by mouth 2 times daily    HTN (hypertension), benign       Multi-vitamin Tabs tablet   Generic drug:  multivitamin, therapeutic with minerals      Take 1 tablet by mouth daily.

## 2017-11-16 NOTE — LETTER
[place] CARE COORDINATION  [address]  [place] [address]  Phone: [phone]        November 16, 2017      Elaina Winn  [address] [address]    Dear Elaina,  I am the Clinic Care Coordinator that works with your primary care provider's clinic. I wanted to introduce myself and provide you with my contact information for you to be able to call me with any questions or concerns. Below is a description of what Clinic Care Coordination is and how I can further assist you.     The Clinic Care Coordinator role is a Registered Nurse and/or  who understands the health care system. The goal of Clinic Care Coordination is to help you manage your health and improve access to the [facility] system in the most efficient manner.  The Registered Nurse can assist you in meeting your health care goals by providing education, coordinating services, and strengthening the communication among your providers. The  can assist you with financial, behavioral, psychosocial, and chemical dependency and counseling/psychiatric resources.    Please feel free to keep this letter and contact information to contact me at [phone] with any further questions or concerns that may arise. We at [facility] are focused on providing you with the highest-quality healthcare experience possible and that all starts with you.       Sincerely,     Izzy Lynn    Enclosed: I have enclosed a copy of a 24 Hour Access Plan. This has helpful phone numbers for you to call when needed. Please keep this in an easy to access place to use as needed.

## 2017-11-16 NOTE — TELEPHONE ENCOUNTER
"TO PCP:   Pt coming in today for f/u hospital visit   No questions at this time about medications at this time (2 med changes) - did not enter MTM referral at this time as pt coming in     Lianna AMYA RN      ED / Discharge Outreach Protocol    Patient Contact    Attempt # 1    Was call answered?  Yes.  \"May I please speak with <Elaina>\"  Is patient available?   Yes    ED/Discharge Protocol    \"Hi, my name is Lianna Richey, a registered nurse, and I am calling on behalf of Cady Marie's office at [place].  I am calling to follow up and see how things are going for you after your recent visit.\"    \"I see that you were in the (ER/UC/IP) on 11/12/17 - 11/15/17   How are you doing now that you are home?\" Doing okay, back at home, no SOB or chest pain, has been having memory loss - routine is important. Cannot retain lots of information. Diet is okay - not very big appetite- nothing sounds good, making herself eat     Is patient experiencing symptoms that may require a hospital visit?  No     Discharge Instructions    \"Let's review your discharge instructions.  What is/are the follow-up recommendations?  Pt. Response:     Follow-up Appointments     Follow-up and recommended labs and tests        You should receive a call from [facility] for a Hepatology follow up in the   next two days.  If you do not hear from them please call  [phone]          Follow-up and recommended labs and tests        -Follow up with cardiology clinic in 4 weeks  -Follow up with PCP in one week. Recommended lab: LFT, BMP, Hgb  -Follow up with [facility] in 2 weeks.  -CT chest in 12 months for follow up on lung nodule           \"Were you instructed to make a follow-up appointment?\"  Pt. Response: Yes.  Has appointment been made?   Yes      \"When you see the provider, I would recommend that you bring your discharge instructions with you.    Medications    \"How many new medications are you on since your hospitalization/ED visit?\"    2 or more - " "Epic MTM referral needed  \"How many of your current medicines changed (dose, timing, name, etc.) while you were in the hospital/ED visit?\"   2 or more - Epic MTM referral needed  \"Do you have questions about your medications?\"   No  \"Were you newly diagnosed with heart failure, COPD, diabetes or did you have a heart attack?\"   No  For patients on insulin: \"Did you start on insulin in the hospital or did you have your insulin dose changed?\"   No    Medication reconciliation completed? Yes    Was MTM referral placed (*Make sure to put transitions as reason for referral)?   No    Call Summary    \"Do you have any questions or concerns about your condition or care plan at the moment?\"    No  Triage nurse advice given: n/a    Patient was in ER once in the past year (assess appropriateness of ER visits.)      \"If you have questions or things don't continue to improve, we encourage you contact us through the main clinic number, ([phone])   Even if the clinic is not open, triage nurses are available 24/7 to help you.     We would like you to know that our clinic has extended hours (provide information).  We also have urgent care (provide details on closest location and hours/contact info)\"      \"Thank you for your time and take care!\"    Lianna MAYA RN    [facility]  Discharge Summary      Elaina Winn MRN# [id number]   YOB: 1945 Age: 72 year old      Date of Admission:                                 11/12/2017  Date of Discharge:                                  11/15/2017  Admitting Physician:                                       Dejah Montanez DO  Discharge Physician:                 Marko Cooley MD  Discharging Service:                    Hospitalist      Primary Provider:  Cady Marie PA-C  Primary Care Physician Phone Number: [phone]      Discharge Diagnoses:     Abnormal liver function test/acute hepatitis, etiology unclear, improving  Paroxysmal atrial " fibrillation  Incidental RLL 4 mm pulmonary nodule  Cognitive impairment with delirium  CKD stage  HTN  DLP  Glaucoma  Anxiety     Discharge Instructions and Follow-Up:      Follow-up Appointments     Follow-up and recommended labs and tests        You should receive a call from [facility] for a Hepatology follow up in the   next two days.  If you do not hear from them please call  [phone]              Follow-up and recommended labs and tests        -Follow up with cardiology clinic in 4 weeks  -Follow up with PCP in one week. Recommended lab: LFT, BMP, Hgb  -Follow up with [facility] in 2 weeks.  -CT chest in 12 months for follow up on lung nodule                   Discharge Disposition:      Discharged to home         Discharge Medications:               Current Discharge Medication List             START taking these medications     Details   apixaban ANTICOAGULANT (ELIQUIS) 2.5 MG tablet Take 1 tablet (2.5 mg) by mouth 2 times daily  Qty: 60 tablet, Refills: 3     Associated Diagnoses: Paroxysmal atrial fibrillation (H)                 CONTINUE these medications which have CHANGED     Details   metoprolol (LOPRESSOR) 50 MG tablet Take 1.5 tablets (75 mg) by mouth 2 times daily  Qty: 180 tablet, Refills: 3     Associated Diagnoses: HTN (hypertension), benign                 CONTINUE these medications which have NOT CHANGED     Details   brimonidine-timolol (COMBIGAN) 0.2-0.5 % ophthalmic solution Place 1 drop into both eyes 2 times daily        Omega-3 Fatty Acids (FISH OIL PO) Take 1,000 mg by mouth daily        Multiple Vitamin (MULTI-VITAMIN) per tablet Take 1 tablet by mouth daily.                STOP taking these medications         Apoaequorin (PREVAGEN PO) Comments:   Reason for Stopping:

## 2017-11-16 NOTE — PROGRESS NOTES
Clinic Care Coordination Contact  Care Team Conversations    Received CTS notifying of patient d/c to home with recomendation to f/u with PCP and liver clinic.   Clinic RN and Cardiology CC have outreached to patient today already. Patient has OV today with PCP.   RN CCC to review OV notes from today will mail introductory letter to patient and outreach in 3-5 business days.   MAGDALENE LopezN, RN, PHN  Clinic Care Coordinator  [facility]  [phone]

## 2017-11-17 LAB
ALBUMIN SERPL-MCNC: 3.6 G/DL (ref 3.4–5)
ALP SERPL-CCNC: 175 U/L (ref 40–150)
ALT SERPL W P-5'-P-CCNC: 257 U/L (ref 0–50)
ANION GAP SERPL CALCULATED.3IONS-SCNC: 6 MMOL/L (ref 3–14)
AST SERPL W P-5'-P-CCNC: 156 U/L (ref 0–45)
BILIRUB DIRECT SERPL-MCNC: 0.3 MG/DL (ref 0–0.2)
BILIRUB SERPL-MCNC: 0.7 MG/DL (ref 0.2–1.3)
BUN SERPL-MCNC: 35 MG/DL (ref 7–30)
CALCIUM SERPL-MCNC: 9.5 MG/DL (ref 8.5–10.1)
CHLORIDE SERPL-SCNC: 107 MMOL/L (ref 94–109)
CO2 SERPL-SCNC: 27 MMOL/L (ref 20–32)
CREAT SERPL-MCNC: 1.23 MG/DL (ref 0.52–1.04)
GFR SERPL CREATININE-BSD FRML MDRD: 43 ML/MIN/1.7M2
GLUCOSE SERPL-MCNC: 91 MG/DL (ref 70–99)
POTASSIUM SERPL-SCNC: 4.6 MMOL/L (ref 3.4–5.3)
PROT SERPL-MCNC: 7.6 G/DL (ref 6.8–8.8)
SODIUM SERPL-SCNC: 140 MMOL/L (ref 133–144)

## 2017-11-17 NOTE — PROGRESS NOTES
Elaina,    Your liver functions continue to improve so that is great to see.  Make that appointment at [facility] as we discussed.  Your kidney function is stable.  Your hemoglobin is normal at 13.5.    Let me know if you have any questions.    Cady Marie PA-C

## 2017-11-20 NOTE — PROGRESS NOTES
Clinic Care Coordination Contact  OUTREACH    Referral Information:  Referral Source: CTS  Reason for Contact: Initial   FSH 11/12-11/15/17  Care Conference: No     Universal Utilization:   ED Visits in last year: 0  Hospital visits in last year: 1  Last PCP appointment: 11/16/17             Clinical Concerns:  Current Medical Concerns: Elevated LFTs (trending down).  Requires f/u with [facility] (scheduled)    Feeling she has more energy, was able to do some chores around her home today.  Eating better with her husbands encouragement. Regular bms No abd pain, n/v, b/b stool.     Current Behavioral Concerns: none  Education Provided to patient: Role of RN CCC, 24 hour nurse line.    Clinical Pathway Name: None  Medication Management:  Eliquis added at last admission. Reports no issues taking     Functional Status:  Mobility Status: Independent  Equipment Currently Used at Home: none  Transportation: Provided by spouse.     Psychosocial:  Current living arrangement:: Living in a private home with spouse  Financial/Insurance: BCBS/Medicare   Resources and Interventions:  Current Resources:  Referred to [facility] (appt scheduled)        Advanced Care Plans/Directives on file:: No        Goals:   Goal 1 Statement: To regain my strength and energy  Goal 1 Supportive Steps: set small goals for activity each week  Goal 1 Progression Percent: 10%  Goal 1 Progression Date: 11/20/17              Barriers: none  Strengths:  is involved and supportive. Patient is determined  Patient/Caregiver understanding: Patient reports understanding of follow up plan, although she doesn't prefer to see the GI specialist as she feels her labs were improving at last visit.  Discussed importance of appropriate f/u  Frequency of Care Coordination: prn  Upcoming appointment: 11/22/17 (Dr. Delgado)     Plan:  Appt has been scheduled with [facility] for next week.   Dr. Delgado on Wednesday in Memory Clinic.     RN CCC to f/u with patient in 3-4  soheila.    Izzy Lynn, MAGDALENEN, RN, PHN  Clinic Care Coordinator  [facility]  [phone]

## 2017-11-21 ENCOUNTER — DOCUMENTATION ONLY (OUTPATIENT)
Dept: CARDIOLOGY | Facility: CLINIC | Age: 72
End: 2017-11-21

## 2017-11-22 ENCOUNTER — OFFICE VISIT (OUTPATIENT)
Dept: FAMILY MEDICINE | Facility: CLINIC | Age: 72
End: 2017-11-22
Payer: COMMERCIAL

## 2017-11-22 ENCOUNTER — TELEPHONE (OUTPATIENT)
Dept: FAMILY MEDICINE | Facility: CLINIC | Age: 72
End: 2017-11-22

## 2017-11-22 VITALS
SYSTOLIC BLOOD PRESSURE: 130 MMHG | BODY MASS INDEX: 23.39 KG/M2 | WEIGHT: 137 LBS | HEIGHT: 64 IN | TEMPERATURE: 97.8 F | OXYGEN SATURATION: 99 % | HEART RATE: 63 BPM | DIASTOLIC BLOOD PRESSURE: 75 MMHG

## 2017-11-22 DIAGNOSIS — R41.3 MEMORY LOSS: Primary | ICD-10-CM

## 2017-11-22 PROCEDURE — 99215 OFFICE O/P EST HI 40 MIN: CPT | Performed by: INTERNAL MEDICINE

## 2017-11-22 NOTE — PROGRESS NOTES
"MEMORY EVALUATION CLINIC - FOLLOWUP    INTERVAL HISTORY: Elaina is here today with , Nino, in follow up of memory concerns. 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 I last saw her in June 2016 for initial assessment but she came by herself to that visit. She has strong family h/o dementia on her father's side of the family and she also has h/o anxiety and remote alcohol use. MoCA was 16/30 and she gave up easily on tasks at that visit. I called and spoke with her and her  after that visit and recommended occupational therapy evaluation. She did go on to have occupational therapy CPT of 5.28/5.6 which was quite good though she was noted to be anxious then as well and tried to speed through things she was unfamiliar with such as the medication box task.    Since then she says regular routine is good but when strays from her routine then its harder for her. An example from today that Nino points out is that she forgot about the door to the lab in the clinic from when she was here seeing my colleague just last week and they had discussed it at that time. Was recently hospitalized for weakness, elevated LFTs of unknown etiology and new onset a.fib with RVR and did have delirium in the hospital. Has now d/c Prevagen in case that led to elevated LFTs. Has [facility] apt coming up. Also she is wearing heart monitor now to look for more evidence of afib. See note from d/c summary regarding her delirium: \"Became confused overnight (11/13 - 11/14). Was up wandering hallways, hallucinating. Thought she was walking around a construction site. Afebrile. UA obtained and was bland. Has not received any pain medications this stay. Mentation improved the following morning. Patient said she knew she was confused and didn't recognize where she was.\"    Her daily routine is that she gets up when she feels like it, watches TV or reads, says she can fall asleep easily when sitting down. Has cataracts and glaucoma and dry eyes " "so she says it feels good to close her eyes. She and Nino share the same bed - he says she doesn't snore or talk in her sleep. She really isn't able to expand upon other activities in her routine.     ADLs: Independent  Driving: Drives to familiar places and says that is ok but did have a dental appointment in [place] recently and found herself out in the wilderness so she prayed and kept driving and eventually showed up at dentist an hour later (Nion says there is a lot of rebuilding around there); Nino doesn't ride along with her ever b/c when they are together he drives them  Finances: Nino has managed the finances long term  Shopping/housekeeping/meal prep: Elaina does most of this on her own  Medications: Nino sets up pill box for her  Home situation: Home with   Support:   Behaviors/Hallucinations/Delusions: While in the hospital recently she was delirious       REVIEW OF SYSTEMS: Detailed as above       OBJECTIVE: /75 (BP Location: Right arm, Cuff Size: Adult Regular)  Pulse 63  Temp 97.8  F (36.6  C) (Tympanic)  Ht 5' 4\" (1.626 m)  Wt 137 lb (62.1 kg)  SpO2 99%  Breastfeeding? No  BMI 23.52 kg/m2  Alert, pleasant, casually dressed  No tremor  MoCA 21/30 (5, 3, 1, 1, 2, 1, 1, 2, 0, 5). Did very well with Visuospatial, naming, abstraction tasks. Slight error on one sentence repetition and was able to name 12 \"F\" words in a minute. 4/6 attention and tends to give up easily - ex: immediately after explaining the serial 7s task she said without even thinking, \"I can't - its just not there!\" but with some reassurance she was able to say the first two before stopping. 0/5 delayed recall and 5/6 orientation.    INVESTIGATIONS: Normal TSH and B12 within the past two years. No head imaging on file.        ASSESSMENT/PLAN: Elaina Rice is a pleasant 72yoF here today in f/u of slowly progressive cognitive impairment and now with recent hospitalization including delirium. Impaired MoCA as noted " above though actually better than a year ago. Does have family h/o of dementia in her father, paternal grandfather and two paternal uncles. She has a pattern with her visit a year ago and now that she tends to rush through and easily give up on tasks that she is unfamiliar with. Could have some cooccurring anxiety and/or depression as well. Given impaired cognitive function over the past few years and functional impairment with recent delirium in the hospital, I told them my concerns for dementia and suggested repeat occupational therapy eval to compare to prior. Imaging could also be considered as has not had head imaging in the past.    Patient Instructions   Schedule occupational therapy therapy evaluation and then a follow up visit with me      MDM: >40 minutes spent with patient/, over 50% time counseling, coordinating care and explaining about nature of the patient's conditions and completing MoCA and follow up plans.    Stella Delgado, DO  Internal Medicine and Geriatrics  [facility]

## 2017-11-22 NOTE — MR AVS SNAPSHOT
"              After Visit Summary   11/22/2017    Elaina Winn    MRN: [id number]           Patient Information     Date Of Birth          1945        Visit Information        Provider Department      11/22/2017 2:30 PM Stella Delgado,  [facility]        Today's Diagnoses     Memory loss    -  1      Care Instructions    Schedule occupational therapy therapy evaluation and then a follow up visit with me          Follow-ups after your visit        Additional Services     OCCUPATIONAL THERAPY REFERRAL       *This therapy referral will be filtered to a centralized scheduling office at [facility] and the patient will receive a call to schedule an appointment at a [place] location most convenient for them. *     [facility] provides Occupational Therapy evaluation and treatment and many specialty services across the [place] system.  If requesting a specialty program, please choose from the list below.    If you have not heard from the scheduling office within 2 business days, please call [phone] for all locations, with the exception of [place], please call [phone].     Treatment: Evaluation & Treatment  Special Instructions/Modalities: CPT  Special Programs: Cognition Assessment     Please be aware that coverage of these services is subject to the terms and limitations of your health insurance plan.  Call member services at your health plan with any benefit or coverage questions.      **Note to Provider:  If you are referring outside of [place] for the therapy appointment, please list the name of the location in the \"special instructions\" above, print the referral and give to the patient to schedule the appointment.                  Your next 10 appointments already scheduled     Dec 20, 2017 11:00 AM CST   Return Discharge with Olga Andino PA-C   [facility]    [address] " "[facility] [address]  [place] [id number] [phone]              Who to contact     If you have questions or need follow up information about today's clinic visit or your schedule please contact [facility] directly at [phone].  Normal or non-critical lab and imaging results will be communicated to you by MyChart, letter or phone within 4 business days after the clinic has received the results. If you do not hear from us within 7 days, please contact the clinic through [employer] or phone. If you have a critical or abnormal lab result, we will notify you by phone as soon as possible.  Submit refill requests through "[employer] or call your pharmacy and they will forward the refill request to us. Please allow 3 business days for your refill to be completed.          Additional Information About Your Visit        MyChart Information     "[employer] gives you secure access to your electronic health record. If you see a primary care provider, you can also send messages to your care team and make appointments. If you have questions, please call your primary care clinic.  If you do not have a primary care provider, please call [phone] and they will assist you.        Care EveryWhere ID     This is your Care EveryWhere ID. This could be used by other organizations to access your [place] medical records  [id number]        Your Vitals Were     Pulse Temperature Height Pulse Oximetry Breastfeeding? BMI (Body Mass Index)    63 97.8  F (36.6  C) (Tympanic) 5' 4\" (1.626 m) 99% No 23.52 kg/m2       Blood Pressure from Last 3 Encounters:   11/22/17 130/75   11/16/17 134/78   11/15/17 164/74    Weight from Last 3 Encounters:   11/22/17 137 lb (62.1 kg)   11/16/17 139 lb (63 kg)   11/14/17 132 lb (59.9 kg)              We Performed the Following     OCCUPATIONAL THERAPY REFERRAL        Primary Care Provider Office Phone # Fax #    Cady Marie PA-C [phone] [address]  [facility] [address]        Equal Access to Services     [facility] : Hadii mark chamberlain griffin Diamond, waaxda luqadaha, qaybta karadhazoran garciachemaluke sanchez. So [facility] [phone].    ATENCIÓN: Si habla español, tiene a munoz disposición servicios gratuitos de asistencia lingüística. Delmerame al [phone].    We comply with applicable federal civil rights laws and [place] laws. We do not discriminate on the basis of race, color, national origin, age, disability, sex, sexual orientation, or gender identity.            Thank you!     Thank you for choosing [facility]  for your care. Our goal is always to provide you with excellent care. Hearing back from our patients is one way we can continue to improve our services. Please take a few minutes to complete the written survey that you may receive in the mail after your visit with us. Thank you!             Your Updated Medication List - Protect others around you: Learn how to safely use, store and throw away your medicines at www.disposemymeds.org.          This list is accurate as of: 11/22/17  3:43 PM.  Always use your most recent med list.                   Brand Name Dispense Instructions for use Diagnosis    apixaban ANTICOAGULANT 2.5 MG tablet    ELIQUIS    60 tablet    Take 1 tablet (2.5 mg) by mouth 2 times daily    Paroxysmal atrial fibrillation (H)       brimonidine-timolol 0.2-0.5 % ophthalmic solution    COMBIGAN     Place 1 drop into both eyes 2 times daily        FISH OIL PO      Take 1,000 mg by mouth daily        metoprolol 50 MG tablet    LOPRESSOR    180 tablet    Take 1.5 tablets (75 mg) by mouth 2 times daily    HTN (hypertension), benign       Multi-vitamin Tabs tablet   Generic drug:  multivitamin, therapeutic with minerals      Take 1 tablet by mouth daily.

## 2017-11-22 NOTE — TELEPHONE ENCOUNTER
How do I change pharmacies? Call the pharmacy and they will be able to do the switch themselves since the medication is active with refills.   Brittny Graf RN, [facility]

## 2017-11-22 NOTE — NURSING NOTE
"Chief Complaint   Patient presents with     Memory Loss       Initial /75 (BP Location: Right arm, Cuff Size: Adult Regular)  Pulse 63  Temp 97.8  F (36.6  C) (Tympanic)  Ht 5' 4\" (1.626 m)  Wt 137 lb (62.1 kg)  SpO2 99%  Breastfeeding? No  BMI 23.52 kg/m2 Estimated body mass index is 23.52 kg/(m^2) as calculated from the following:    Height as of this encounter: 5' 4\" (1.626 m).    Weight as of this encounter: 137 lb (62.1 kg).  Medication Reconciliation: complete  Lillie Sommer MA      "

## 2017-12-03 PROBLEM — R91.1 PULMONARY NODULE, RIGHT: Status: ACTIVE | Noted: 2017-12-03

## 2017-12-04 ENCOUNTER — HOSPITAL ENCOUNTER (OUTPATIENT)
Dept: OCCUPATIONAL THERAPY | Facility: CLINIC | Age: 72
Setting detail: THERAPIES SERIES
End: 2017-12-04
Attending: INTERNAL MEDICINE
Payer: MEDICARE

## 2017-12-04 PROCEDURE — G9170 MEMORY D/C STATUS: HCPCS | Mod: GO,CI

## 2017-12-04 PROCEDURE — 97535 SELF CARE MNGMENT TRAINING: CPT | Mod: GO

## 2017-12-04 PROCEDURE — 97165 OT EVAL LOW COMPLEX 30 MIN: CPT | Mod: GO

## 2017-12-04 PROCEDURE — G9169 MEMORY GOAL STATUS: HCPCS | Mod: GO,CI

## 2017-12-04 PROCEDURE — 40000125 ZZHC STATISTIC OT OUTPT VISIT

## 2017-12-04 PROCEDURE — G9168 MEMORY CURRENT STATUS: HCPCS | Mod: GO,CI

## 2017-12-04 NOTE — PROGRESS NOTES
[facility]          OUTPATIENT OCCUPATIONAL THERAPY  EVALUATION  PLAN OF TREATMENT FOR OUTPATIENT REHABILITATION  (COMPLETE FOR INITIAL CLAIMS ONLY)  Patient's Last Name, First Name, M.I.  YOB: 1945  Elaina Winn                        Provider's Name  [facility] Medical Record No.  [id number]                               Onset Date:     11/22/17 (date of order)   Start of Care Date:     12/04/17   Type:     ___PT   _X_OT   ___SLP Medical Diagnosis:     Memory Loss                          OT Diagnosis:     decreased safety and cognition for ADL and IADL completion. Visits from SOC:  1   _________________________________________________________________________________  Plan of Treatment/Functional Goals:  Cognitive performance testing, Self care/Home management                    Goals  Goal Identifier: CPT  Goal Description: Patient and family to verbalize understanding of Cognitive Performance Test results and identify 3 strategies to increase patient's safety and independence in the home setting.  Target Date: 12/04/17  Date Met: 12/04/17  Goal Identifier: Memory  Goal Description: Patient will verbalize understanding of memory compensation and organizational strategies to increase cognitive function for improved safety and recall with ADL/IADL independence.  Target Date: 12/04/17  Date Met: 12/04/17              Therapy Frequency: 1x     Predicted Duration of Therapy Intervention (days/wks): 1x  Mony Patel OT          I CERTIFY THE NEED FOR THESE SERVICES FURNISHED UNDER        THIS PLAN OF TREATMENT AND WHILE UNDER MY CARE     (Physician co-signature of this document indicates review and certification of the therapy plan).                   ,    Certification date from: 12/04/17, Certification date to: 12/04/17               Referring Physician: Stella Delgado,  DO      Initial Assessment        See Epic Evaluation      Start Of Care Date: 12/04/17

## 2017-12-04 NOTE — PROGRESS NOTES
Cardionet event monitor results from 17 showin: symptomatic event, sinus rhythm, HR 77 bpm, no activities or symptoms indicated

## 2017-12-04 NOTE — PROGRESS NOTES
Outpatient Occupational Therapy Evaluation  [facility]  [facility]  [address] [place] [address] [address]  [place] [address]  Tel. [phone]     12/04/17 1000   Quick Adds   Quick Adds Certification   Type of Visit Initial Outpatient Occupational Therapy Evaluation   General Information   Start Of Care Date 12/04/17   Referring Physician Stella Delgado,     Orders Evaluate and treat as indicated   Orders Date 11/22/17   Medical Diagnosis Memory Loss   Onset of Illness/Injury or Date of Surgery 11/22/17  (date of order)   Precautions/Limitations No known precautions/limitations   Special Instructions CPT   Surgical/Medical History Reviewed Yes   Additional Occupational Profile Info/Pertinent History of Current Problem Pt is a 71 y/o female referred to OP OT for a CPT test by Dr. Stella Delgado. Pt completed previous CPT testing 07/16, receiving score of 5.28. Pt expressing that she has noted some changes in her memory such as forgetting what other have said or where she puts items. States writing things down helps improve functioning d/t memory impairments. Pt reports she continues to drive but has significantly decreased her driving to local destinations. Reports her  helps her around the home, and is responsible for managing the household finances, and setting up her medications.   Comments/Observations Pt's  drive her to appointment today, staying in waiting area during evaluation.   Role/Living Environment   Current Community Support Family/friend caregiver   Patient role/Employment history Retired   Community/Avocational Activities Volunteering at [employer], decorating her house   Prior Level - Transfers Independent   Prior Level - Ambulation Independent   Prior Level - ADLS Independent   Prior Responsibilities - IADL Meal Preparation;Housekeeping;Laundry;Shopping;Driving   Prior Level Comments Independent with basic ADL's   Role/Living Environment Comments   jinny medications,    Patient/family Goals Statement to determine changes in memory from last CPT.   Pain   Patient currently in pain No   Fall Risk Screen   Have you fallen 2 or more times in the past year? No   Cognitive Status Examination   Orientation Orientation to person, place and time   Level of Consciousness Alert   Follows Commands and Answers Questions 100% of the time   Personal Safety and Judgment Intact   Memory Impaired   Cognitive Comment Hx of 16/30 MOCA score completed by Dr Stella Delgado. CPT completed. Score 5.0. See progress note dated 12/4/17 for details.   Visual Perception   Visual Perception Wears glasses   Visual Perception Comments Glaucoma and hx of cataracts. Currently states cataracts have become more bothersome limited ability to complete close up work.   Sensation   Upper Extremity Sensory Examination No deficits were identified   Bathing   Level of [place] - Bathing independent   Upper Body Dressing   Level of [place]: Dress Upper Body independent   Lower Body Dressing   Level of [place]: Dress Lower Body independent   Toileting   Level of [place]: Toilet independent   Grooming   Level of [place]: Grooming independent   Eating/Self-Feeding   Level of [place]: Eating independent   Planned Therapy Interventions   Planned Therapy Interventions Cognitive performance testing;Self care/Home management   Adult OT Eval Goals   OT Eval Goals (Adult) 2   OT Goal 1   Goal Identifier CPT   Goal Description Patient and family to verbalize understanding of Cognitive Performance Test results and identify 3 strategies to increase patient's safety and independence in the home setting.   Target Date 12/04/17   Date Met 12/04/17   OT Goal 2   Goal Identifier Memory   Goal Description Patient will verbalize understanding of memory compensation and organizational strategies to increase cognitive function for improved safety and recall with ADL/IADL independence.   Target  Date 12/04/17   Date Met 12/04/17   Clinical Impression   Criteria for Skilled Therapeutic Interventions Met Yes, treatment indicated   OT Diagnosis decreased safety and cognition for ADL and IADL completion.   Influenced by the following impairments visual functions, higher level cognition, memory   Assessment of Occupational Performance 3-5 Performance Deficits   Identified Performance Deficits driving and community mobility, health management and maintenance, financial management, meal preparation, home management   Clinical Decision Making (Complexity) Low complexity   Rehab Potential good, to achieve stated therapy goals   Therapy Frequency 1x   Predicted Duration of Therapy Intervention (days/wks) 1x   Risks and Benefits of Treatment have been explained. Yes   Patient, Family & other staff in agreement with plan of care Yes   Clinical Impression Comments Evaluation, cognitive performance testing, treatment, and discharge.   Therapy Certification   Certification date from 12/04/17   Certification date to 12/04/17   Certification I certify the need for these services furnished under this plan of treatment and while under my care.  (Physician co-signature of this document indicates review and certification of the therapy plan)     It was a pleasure meeting Elaina Winn. Please don't hesitate to contact me with questions regarding this evaluation report.    Mony Patel OTR/L

## 2017-12-05 NOTE — PROGRESS NOTES
I called pt around 1345 to get update on how she is doing and have her send in another strip to see what her rates are at. Pt said she did take her second dose of metoprolol tartrate 75 mg at around 1100. She will be due for another dose tonight around 2100. Pt checked her bp and it was 109/70. She said she felt fine at that time.     Received more strips this afternoon at around 1550. Pt still in afib, rates 140-160s. No EP openings until 12/29 so pt schedule with .  updated. Will wait for tonight to see if metoprolol kicks in further. If pt becomes symptomatic she needs to go to ED. Otherwise, reassess tomorrow morning (pt will need to send in recording). Since no afib openings, tentatively scheduled pt with TERESA Robbins RONALDO for discharge f/u.  was ok with this plan but said if she remains in afib at high rates tomorrow am she may need to come in tomorrow. I spoke with EP and confirmed that no upcoming openings. I tried to call pt to review plan but she did not  and no voicemail.

## 2017-12-05 NOTE — PROGRESS NOTES
Received urgent cardionet notification. Pt in atrial fibrillation at 0923 this morning,  bpm. Pt diagnosed with afib during her recent hospitalization. I called pt to find out how she is feeling. Pt said she feels fine and denied any symptoms such as shortness of breath or lightheadedness. Pt currently taking metoprolol 75 mg BID, but she has not taken her metoprolol yet this morning. I told pt she should take her metoprolol right now. I asked her press button to record another strip and she said the monitor said it was recording. I called cardionet and requested most recent strip be faxed over so I can see if she is still in afib w/RVR. Will wait for these strips and then send update to .    Called cardionet and got update that pt had afib rate of 180 at 1009 and most recently around 1115 a rate of 160 bpm. I updated  and he said if pt is asymptomatic she should take an additional 75 mg of metoprolol now and her 75 mg tonight. Tomorrow she should start taking 100 mg BID. Pt should schedule afib clinic consult.  I called pt to review. She took her bp while on the phone with me and it was 139/90, . Pt said she has been putting up steffany decorations and was a little winded going up and down the stairs but feels ok now. She is aware to go to ED if she develops ongoing shortness of breath or lightheadedness.

## 2017-12-05 NOTE — PROGRESS NOTES
Cognitive Performance Test    SUMMARY OF TEST:    The Cognitive Performance Test (CPT) is a standardized performance-based assessment to measure working memory/executive function processing capacities that underlie functional performance. Subtasks include common basic and instrumental activities of daily living (ADL/IADL) which are rated based on the manner in which patients respond to task demands of varying complexity. The total CPT score describes a level of functioning that indicates how information is processed, implications for functional activities, potential safety risks and a recommended level of supervision or assist based on cognitive function. The highest total score on this test is in the range of 5.6 to 5.8.    DATE OF TESTIN17    RESULTS OF TESTING:                                                                                         CPT Subtest Results    MEDBOX:  SHOP/GLOVES:  PHONE:    WASH:   TRAVEL:  TOAST:    DRESS:    TOTAL CPT SCORE:  35/39     Average CPT Score  5.0/5.6    INTERPRETATION OF TEST RESULTS:    Based on the Cognitive Performance Test, this patient scored at CPT Level 5.0.  See CPT Levels reference below.    Summary of functional cognitive status:   Pt is a 73 y/o female referred to OP OT for a CPT test by Dr. Stella Delgado. Pt completed previous CPT testing , receiving score of 5.28. Pt expressing that she has noted some changes in her memory such as forgetting what others have said or where she puts items. States writing things down helps improve functioning due to memory impairments. Pt reports she continues to drive but has significantly decreased her driving to local destinations. Reports her  helps her around the home, is responsible for managing the household finances, and setting up  Providing medication reminders.    Factors affecting performance:  Impaired vision    Recommendations:    Assist for ADL/IADL:  Finances and Medication  management  Supervision for ADL/IADL:  Meal preparation, Cleaning, Laundry, Shopping and Driving  Supervision in living setting:  Weekly checks                Educated Elaina on recommendation of weekly check in's for improved safety based on her Guy s Cognitive Level of 5.0, however, patient currently lives with her  Nino. Educated pt on importance of assistance with more complex tasks such as money management and medication management. Also educated pt on importance of supervision for  home management tasks (laundry, complex meal planning and preparation, cleaning, and shopping). Recommended a behind the wheel driving assessment to to determine driving safety, despite patient scoring above the recommended driving Guy's Cognitive Score 4.75/5.6. Pt also educated on strategies to improve and aid memory such as using calendars and timers, a balanced diet, quality sleep, exercising mind, etc. Elaina has many safety suggestions such as assistance with finances and medication management, however may benefit from additional supervision with more complex problem solving. Elaina reported agreement and understanding to recommendations.     TIME ADMINISTERING TEST: 50 minutes    TIME FOR INTERPRETATION AND PREPARATION OF REPORT: 10minutes    TOTAL TIME: 60 minutes      CPT Levels Reference:    Patient's Average CPT Score:  5.0                                                                                                                                                  Individual scores range along a continuum as outlined below.  In addition to cognitive status, other factors may affect safety in a home environment.  Please refer to specific recommendations for this patient.    ___5.6-5.8  Normal functioning (absence of cognitive-functional disability).  Independent in managing personal affairs, monitors and directs own behavior.  Uses complex information to carry out daily activities with safety and accuracy.   "  Proficient with instrumental activities of daily living (IADL) and learning new activity.  Problems are anticipated, errors are avoided, and consequences of actions are considered.      __X_5.0   Mild cognitive-functional disability; deficits in working memory and executive thought processes. Difficulty using complex information. Problems may be observed with recent memory, judgment, reasoning and planning ahead. May be impulsive or have difficulty anticipating consequences.  Safety:  May require assistance to plan ahead; or to manage complex medication schedules, appointments or finances.  Hazardous activities may need to be monitored or limited.  ADL:  Mild functional decline.  Able to complete basic self-care and routine household tasks.  May have difficulty with complex daily tasks such as reading, writing, meal preparation, shopping or driving.   Learns through hands on teaching. Self-centered behavior or difficulty considering the needs of others may be seen related to trouble seeing the  whole picture\". Can appear disorganized or uninhibited.    ___4.5  Mild to moderate cognitive-functional disability. Significant deficits in working memory and executive thought processes. Judgment, reasoning and planning show obvious impairment.  Distractible with inability to shift attention/actions given competing stimuli.  Difficulty with problem solving and managing details. Complex daily tasks performed with inconsistency, difficulty, or error.     Safety:  Medications should be monitored, stove use may require supervision, and driving ability may be affected.  Impaired safety awareness with inability to anticipate potential problems.  May not recognize or respond to emergent situations. Requires frequent check-in support.   ADL:  Mild difficulty with simple everyday self-care tasks. Benefits from structured, routine activity.  Will likely need reminders to complete tasks outside of the routine. Requires assistance " with planning and IADL tasks like shopping and finances. Learns concrete tasks through repetition, but performance may not generalize. Tends to be impulsive with poor insight. Self centered behavior or inability to consider the needs of others is common.    ___4.0  Moderate cognitive-functional disability; abstract to concrete thought processes. Working memory and executive function impairments are obvious. Difficulty with planning and problem solving.  Behavior is goal-directed, but unable to follow multi-step directions, is easily distracted, and may not recognize mistakes.  Inability to anticipate hazards or understand precautions.  Safety:  Recommend 24-hour supervision for safety. Supervision needed for medication management and for hazardous activities. May not be able to follow a restricted diet. Can get lost in unfamiliar surroundings. Generally, persons functioning at level 4 should not be driving.   ADL:  Some decline in quality or frequency of ADL.  [place] enhanced by use of a routine, simple concrete directions, and caregiver set-up of needed items. Complex tasks such as money or home management typically requires assistance.  Relies heavily on vision to guide behavior; will ignore objects/hazards not in plain sight and can be distracted by irrelevant objects. Often has poor insight.  Able to carry out social conversation and may verbally  cover  for deficits leading caregivers to believe they are capable of functioning independently.       ___3.5  Moderate cognitive-functional disability; increased cues needed for task completion. Aware of concrete task steps but needs prompting or cues to initiate and complete simple tasks. Attention span is limited, simple directions may need to be repeated, and re-focus to a topic or task may be required.  Safety:  24-hour supervision required for safety and for assistance with daily tasks. Assistance required with medications, and access to medication should  be limited. Meals, nutrition and dietary restrictions need to be monitored.  All hazardous activities should be restricted or supervised. Should not drive. Prone to wandering and can become lost.  ADL:  Moderate functional decline. Familiar tasks usually requires set-up of supplies and directions to complete steps. May need objects handed to them for task initiation. Function best with a set schedule in familiar surroundings with familiar people. All complex tasks must be done by others. Vocabulary is diminished and speech often unfocused.     Mony Patel, OTR/L

## 2017-12-06 ENCOUNTER — TELEPHONE (OUTPATIENT)
Dept: CARDIOLOGY | Facility: CLINIC | Age: 72
End: 2017-12-06

## 2017-12-06 NOTE — TELEPHONE ENCOUNTER
I called [employer] to get update on most recent strip from this morning. I was told at 1050 this morning pt was still afib,  bpm. Pt scheduled for [facility] OV d/c f/u with Linsey Lauren on 12/8. Pt notified. She would like for me to discuss with her  later as pt is overwhelmed with all of her follow up visits. She thinks she has an appt at the [facility] gastroenterology clinic on Friday 12/8. Pt currently taking metoprolol tartrate 100 mg BID. Will send update to .

## 2017-12-06 NOTE — TELEPHONE ENCOUNTER
Returned call to patient's  Nino. Reviewed that patient is still in Afib HR about 100 bpm. He states patient still feels fine. He denies that she has any c/o. I reviewed the OV with EP NP on Friday 12/8 @ 1:30pm and our clinic location,  confirmed this information with me. Reviewed that if patient becomes SOB, dyspneic, lightheaded/dizzy or develops chest pain she should go to ED, otherwise if she continues to feel stable can wait for the Friday appt. Nino verbalized understanding.

## 2017-12-06 NOTE — TELEPHONE ENCOUNTER
Call placed to patient to f/u on [employer] monitor shopwing Afib with high rates yesterday. Patient states she feels a little winded going up and down stairs, but that isn't new. She hasn't noticed an increase in SOB with going up/down stairs. She also notices some aching in her right shoulder, but states she has arthritis and that her left shoulder also aches if she lifts it and that she's been putting up her [place] decorations. Denies lightheadedness or dizziness, chest pain or dyspnea. I instructed her to push the send button on her monitor so that we can get a strip sent to us, which she did while we were on the phone. Plan is I will review the strip once received, either try to get her in with EP today if needed and then call her with an appt. Or, if she begins to feel poorly before I call back with symptoms such as increase SOB/[place], chest pain, dizziness etc to go to the ED.    Patient took BP & P at home today.   0745 prior to am meds: /100,   0850 after am meds: /80, HR 80      Patient did take the metoprolol 100 mg (2 tabs) this morning. Patient verbalized understanding and in agreement with POC.

## 2017-12-07 ENCOUNTER — APPOINTMENT (OUTPATIENT)
Dept: GENERAL RADIOLOGY | Facility: CLINIC | Age: 72
End: 2017-12-07
Attending: EMERGENCY MEDICINE
Payer: MEDICARE

## 2017-12-07 ENCOUNTER — HOSPITAL ENCOUNTER (EMERGENCY)
Facility: CLINIC | Age: 72
Discharge: HOME | End: 2017-12-07
Attending: EMERGENCY MEDICINE | Admitting: EMERGENCY MEDICINE
Payer: MEDICARE

## 2017-12-07 ENCOUNTER — CARE COORDINATION (OUTPATIENT)
Dept: CARE COORDINATION | Facility: CLINIC | Age: 72
End: 2017-12-07

## 2017-12-07 VITALS
RESPIRATION RATE: 20 BRPM | BODY MASS INDEX: 23.05 KG/M2 | HEIGHT: 64 IN | TEMPERATURE: 97.4 F | OXYGEN SATURATION: 98 % | SYSTOLIC BLOOD PRESSURE: 118 MMHG | HEART RATE: 63 BPM | DIASTOLIC BLOOD PRESSURE: 63 MMHG | WEIGHT: 135 LBS

## 2017-12-07 DIAGNOSIS — E86.0 DEHYDRATION: ICD-10-CM

## 2017-12-07 LAB
ALBUMIN SERPL-MCNC: 3.5 G/DL (ref 3.4–5)
ALBUMIN UR-MCNC: NEGATIVE MG/DL
ALP SERPL-CCNC: 138 U/L (ref 40–150)
ALT SERPL W P-5'-P-CCNC: 231 U/L (ref 0–50)
ANION GAP SERPL CALCULATED.3IONS-SCNC: 7 MMOL/L (ref 3–14)
APPEARANCE UR: CLEAR
AST SERPL W P-5'-P-CCNC: 183 U/L (ref 0–45)
BASOPHILS # BLD AUTO: 0 10E9/L (ref 0–0.2)
BASOPHILS NFR BLD AUTO: 0.6 %
BILIRUB SERPL-MCNC: 0.9 MG/DL (ref 0.2–1.3)
BILIRUB UR QL STRIP: NEGATIVE
BUN SERPL-MCNC: 32 MG/DL (ref 7–30)
CALCIUM SERPL-MCNC: 9.6 MG/DL (ref 8.5–10.1)
CHLORIDE SERPL-SCNC: 104 MMOL/L (ref 94–109)
CO2 SERPL-SCNC: 28 MMOL/L (ref 20–32)
COLOR UR AUTO: YELLOW
CREAT SERPL-MCNC: 1.23 MG/DL (ref 0.52–1.04)
DIFFERENTIAL METHOD BLD: NORMAL
EOSINOPHIL # BLD AUTO: 0.2 10E9/L (ref 0–0.7)
EOSINOPHIL NFR BLD AUTO: 3.1 %
ERYTHROCYTE [DISTWIDTH] IN BLOOD BY AUTOMATED COUNT: 13.7 % (ref 10–15)
GFR SERPL CREATININE-BSD FRML MDRD: 43 ML/MIN/1.7M2
GLUCOSE SERPL-MCNC: 105 MG/DL (ref 70–99)
GLUCOSE UR STRIP-MCNC: NEGATIVE MG/DL
HCT VFR BLD AUTO: 41.5 % (ref 35–47)
HGB BLD-MCNC: 14 G/DL (ref 11.7–15.7)
HGB UR QL STRIP: NEGATIVE
HYALINE CASTS #/AREA URNS LPF: 1 /LPF (ref 0–2)
IMM GRANULOCYTES # BLD: 0 10E9/L (ref 0–0.4)
IMM GRANULOCYTES NFR BLD: 0.1 %
INTERPRETATION ECG - MUSE: NORMAL
KETONES UR STRIP-MCNC: NEGATIVE MG/DL
LEUKOCYTE ESTERASE UR QL STRIP: ABNORMAL
LIPASE SERPL-CCNC: 233 U/L (ref 73–393)
LYMPHOCYTES # BLD AUTO: 1.4 10E9/L (ref 0.8–5.3)
LYMPHOCYTES NFR BLD AUTO: 19.7 %
MCH RBC QN AUTO: 31.7 PG (ref 26.5–33)
MCHC RBC AUTO-ENTMCNC: 33.7 G/DL (ref 31.5–36.5)
MCV RBC AUTO: 94 FL (ref 78–100)
MONOCYTES # BLD AUTO: 0.5 10E9/L (ref 0–1.3)
MONOCYTES NFR BLD AUTO: 7 %
MUCOUS THREADS #/AREA URNS LPF: PRESENT /LPF
NEUTROPHILS # BLD AUTO: 4.9 10E9/L (ref 1.6–8.3)
NEUTROPHILS NFR BLD AUTO: 69.5 %
NITRATE UR QL: NEGATIVE
NRBC # BLD AUTO: 0 10*3/UL
NRBC BLD AUTO-RTO: 0 /100
PH UR STRIP: 5 PH (ref 5–7)
PLATELET # BLD AUTO: 202 10E9/L (ref 150–450)
POTASSIUM SERPL-SCNC: 4.4 MMOL/L (ref 3.4–5.3)
PROT SERPL-MCNC: 7.7 G/DL (ref 6.8–8.8)
RBC # BLD AUTO: 4.41 10E12/L (ref 3.8–5.2)
RBC #/AREA URNS AUTO: 1 /HPF (ref 0–2)
SODIUM SERPL-SCNC: 139 MMOL/L (ref 133–144)
SOURCE: ABNORMAL
SP GR UR STRIP: 1.01 (ref 1–1.03)
SQUAMOUS #/AREA URNS AUTO: <1 /HPF (ref 0–1)
TROPONIN I SERPL-MCNC: <0.015 UG/L (ref 0–0.04)
UROBILINOGEN UR STRIP-MCNC: NORMAL MG/DL (ref 0–2)
WBC # BLD AUTO: 7 10E9/L (ref 4–11)
WBC #/AREA URNS AUTO: 4 /HPF (ref 0–2)

## 2017-12-07 PROCEDURE — 25000128 H RX IP 250 OP 636: Performed by: EMERGENCY MEDICINE

## 2017-12-07 PROCEDURE — 83690 ASSAY OF LIPASE: CPT | Performed by: EMERGENCY MEDICINE

## 2017-12-07 PROCEDURE — 80053 COMPREHEN METABOLIC PANEL: CPT | Performed by: EMERGENCY MEDICINE

## 2017-12-07 PROCEDURE — 71020 XR CHEST 2 VW: CPT

## 2017-12-07 PROCEDURE — 85025 COMPLETE CBC W/AUTO DIFF WBC: CPT | Performed by: EMERGENCY MEDICINE

## 2017-12-07 PROCEDURE — 96360 HYDRATION IV INFUSION INIT: CPT

## 2017-12-07 PROCEDURE — 99285 EMERGENCY DEPT VISIT HI MDM: CPT | Mod: 25

## 2017-12-07 PROCEDURE — 93005 ELECTROCARDIOGRAM TRACING: CPT

## 2017-12-07 PROCEDURE — 81001 URINALYSIS AUTO W/SCOPE: CPT | Performed by: EMERGENCY MEDICINE

## 2017-12-07 PROCEDURE — 84484 ASSAY OF TROPONIN QUANT: CPT | Performed by: EMERGENCY MEDICINE

## 2017-12-07 RX ADMIN — SODIUM CHLORIDE 1000 ML: 9 INJECTION, SOLUTION INTRAVENOUS at 11:01

## 2017-12-07 ASSESSMENT — ENCOUNTER SYMPTOMS
LIGHT-HEADEDNESS: 1
SHORTNESS OF BREATH: 0
DIZZINESS: 0
NAUSEA: 0
UNEXPECTED WEIGHT CHANGE: 1
DIARRHEA: 0
PALPITATIONS: 1
APPETITE CHANGE: 1
VOMITING: 0
ABDOMINAL PAIN: 0

## 2017-12-07 NOTE — PROGRESS NOTES
"Clinic Care Coordination Contact    Situation: Patient chart reviewed by care coordinator.    Background: Patient presented to [facility] ED this morning reporting that she has been feeling \"not right\" with light headedness since 0200 this morning. She describes experiencing diaphoresis and \"odd sensation in her chest\" located near her right shoulder. She has been recording her blood pressure since last evening and reports this to be normal. She has additionally reported having decreased appetite recently and having lost approximately 10 pounds.    Is wearing holter monitor. Has f/u scheduled with TERESA NP on 12/08/17.      Assessment: Patient is still admitted to ED.     Plan/Recommendations: RN CCC to continue to follow for d/c planning.     MAGDALENE LopezN, RN, PHN  Clinic Care Coordinator  [facility]  [phone]        "

## 2017-12-07 NOTE — ED AVS SNAPSHOT
Emergency Department    [address] [place] [id number]    Phone:  [phone]    Fax:  [phone]                                       Elaina Winn   MRN: [id number]    Department:   Emergency Department   Date of Visit:  12/7/2017           After Visit Summary Signature Page     I have received my discharge instructions, and my questions have been answered. I have discussed any challenges I see with this plan with the nurse or doctor.    ..........................................................................................................................................  Patient/Patient Representative Signature      ..........................................................................................................................................  Patient Representative Print Name and Relationship to Patient    ..................................................               ................................................  Date                                            Time    ..........................................................................................................................................  Reviewed by Signature/Title    ...................................................              ..............................................  Date                                                            Time

## 2017-12-07 NOTE — ED AVS SNAPSHOT
Emergency Department    [address] [facility] [address]    Phone:  [phone]    Fax:  [phone]                                       Elaina Winn   MRN: [id number]    Department:   Emergency Department   Date of Visit:  12/7/2017           Patient Information     Date Of Birth          1945        Your diagnoses for this visit were:     Dehydration        You were seen by Robert Lopez MD.      Follow-up Information     Follow up with Cady Marie PA-C.    Specialty:  Internal Medicine    Contact information:    [address] [facility] [place]  [facility] [address]  [phone]          Follow up with your GI specialist.        Discharge Instructions         Dehydration    The human body is comprised largely of water. If you lose more fluids than you take in, you can become dehydrated. This means there are not enough fluids in your body for it to function right. Mild dehydration can cause weakness, confusion, or muscle cramps. In extreme cases, it can lead to brain damage and even death. That's why prompt treatment is crucial.  Risk factors  Anyone can become dehydrated. But infants, children, and older adults are at greatest risk. You are most likely to lose fluids with severe vomiting, diarrhea, or a fever. Exercising or working hard--especially in hot weather--can also cause excess fluid loss.  What to do  Drinking liquids is the best way to prevent dehydration. Water is best, but juice or frozen pops can also help. For adults, don't use liquids that contain caffeine or alcohol to rehydrate. Your doctor may suggest electrolyte solutions for sick infants and young children.  When to go to the emergency room (ER)  Go to an ER right away for these symptoms:  Adults    Very dark urine and little urine output    Dizziness, weakness, confusion, fainting  Children    Sunken eyes    Little or no urine output (for infants, no wet diaper in 8 hours)    Very dark urine    Skin that doesn't  bounce back quickly when pinched    Crying without tears    Lethargy, decreased activity, or increased sleepiness  What to expect in the emergency room  Your blood pressure, temperature, and heart rate will be checked. You may have blood or urine tests. The main treatment for dehydration is fluids. You may be given these to drink. Or, you may receive them through a vein in your arm. You also may be treated for diarrhea, vomiting, or a high fever.   Date Last Reviewed: 7/1/2017 2000-2017 [employer]. [address] [place] [address]. All rights reserved. This information is not intended as a substitute for professional medical care. Always follow your healthcare professional's instructions.          Future Appointments        Provider Department Dept Phone Center    12/8/2017 1:30 PM Linsey Lauren NP, APRN CNP [facility] [phone] [facility] CLIN    12/29/2017 3:15 PM Yamil Erwin MD [facility] [phone] [facility] CLIN    1/5/2018 4:00 PM Stella Delgado DO [facility] [phone]       24 Hour Appointment Hotline       To make an appointment at any [facility], call [phone] ([phone]). If you don't have a family doctor or clinic, we will help you find one. [facility] are conveniently located to serve the needs of you and your family.             Review of your medicines      Our records show that you are taking the medicines listed below. If these are incorrect, please call your family doctor or clinic.        Dose / Directions Last dose taken    apixaban ANTICOAGULANT 2.5 MG tablet   Commonly known as:  ELIQUIS   Dose:  2.5 mg   Quantity:  60 tablet        Take 1 tablet (2.5 mg) by mouth 2 times daily   Refills:  3        brimonidine-timolol 0.2-0.5 % ophthalmic solution   Commonly known as:  COMBIGAN   Dose:  1 drop        Place 1 drop into both eyes 2 times  daily   Refills:  0        FISH OIL PO   Dose:  1000 mg        Take 1,000 mg by mouth daily   Refills:  0        metoprolol 50 MG tablet   Commonly known as:  LOPRESSOR   Dose:  75 mg   Quantity:  180 tablet        Take 1.5 tablets (75 mg) by mouth 2 times daily   Refills:  3        Multi-vitamin Tabs tablet   Dose:  1 tablet   Generic drug:  multivitamin, therapeutic with minerals        Take 1 tablet by mouth daily.   Refills:  0                Procedures and tests performed during your visit     CBC with platelets differential    Cardiac Continuous Monitoring    Comprehensive metabolic panel    EKG 12-lead, tracing only    Lipase    Pulse oximetry nursing    Troponin I    UA with Microscopic    XR Chest 2 Views      Orders Needing Specimen Collection     None      Pending Results     No orders found from 12/5/2017 to 12/8/2017.            Pending Culture Results     No orders found from 12/5/2017 to 12/8/2017.            Pending Results Instructions     If you had any lab results that were not finalized at the time of your Discharge, you can call the ED Lab Result RN at [phone]. You will be contacted by this team for any positive Lab results or changes in treatment. The nurses are available 7 days a week from 10A to 6:30P.  You can leave a message 24 hours per day and they will return your call.        Test Results From Your Hospital Stay        12/7/2017 10:08 AM      Component Results     Component Value Ref Range & Units Status    WBC 7.0 4.0 - 11.0 10e9/L Final    RBC Count 4.41 3.8 - 5.2 10e12/L Final    Hemoglobin 14.0 11.7 - 15.7 g/dL Final    Hematocrit 41.5 35.0 - 47.0 % Final    MCV 94 78 - 100 fl Final    MCH 31.7 26.5 - 33.0 pg Final    MCHC 33.7 31.5 - 36.5 g/dL Final    RDW 13.7 10.0 - 15.0 % Final    Platelet Count 202 150 - 450 10e9/L Final    Diff Method Automated Method  Final    % Neutrophils 69.5 % Final    % Lymphocytes 19.7 % Final    % Monocytes 7.0 % Final    % Eosinophils 3.1 % Final     % Basophils 0.6 % Final    % Immature Granulocytes 0.1 % Final    Nucleated RBCs 0 0 /100 Final    Absolute Neutrophil 4.9 1.6 - 8.3 10e9/L Final    Absolute Lymphocytes 1.4 0.8 - 5.3 10e9/L Final    Absolute Monocytes 0.5 0.0 - 1.3 10e9/L Final    Absolute Eosinophils 0.2 0.0 - 0.7 10e9/L Final    Absolute Basophils 0.0 0.0 - 0.2 10e9/L Final    Abs Immature Granulocytes 0.0 0 - 0.4 10e9/L Final    Absolute Nucleated RBC 0.0  Final         12/7/2017 10:27 AM      Component Results     Component Value Ref Range & Units Status    Troponin I ES <0.015 0.000 - 0.045 ug/L Final    The 99th percentile for upper reference range is 0.045 ug/L.  Troponin values   in the range of 0.045 - 0.120 ug/L may be associated with risks of adverse   clinical events.           12/7/2017 10:27 AM      Component Results     Component Value Ref Range & Units Status    Sodium 139 133 - 144 mmol/L Final    Potassium 4.4 3.4 - 5.3 mmol/L Final    Chloride 104 94 - 109 mmol/L Final    Carbon Dioxide 28 20 - 32 mmol/L Final    Anion Gap 7 3 - 14 mmol/L Final    Glucose 105 (H) 70 - 99 mg/dL Final    Urea Nitrogen 32 (H) 7 - 30 mg/dL Final    Creatinine 1.23 (H) 0.52 - 1.04 mg/dL Final    GFR Estimate 43 (L) >60 mL/min/1.7m2 Final    Non  GFR Calc    GFR Estimate If Black 52 (L) >60 mL/min/1.7m2 Final    African American GFR Calc    Calcium 9.6 8.5 - 10.1 mg/dL Final    Bilirubin Total 0.9 0.2 - 1.3 mg/dL Final    Albumin 3.5 3.4 - 5.0 g/dL Final    Protein Total 7.7 6.8 - 8.8 g/dL Final    Alkaline Phosphatase 138 40 - 150 U/L Final     (H) 0 - 50 U/L Final     (H) 0 - 45 U/L Final         12/7/2017 10:21 AM      Component Results     Component Value Ref Range & Units Status    Lipase 233 73 - 393 U/L Final         12/7/2017 10:07 AM      Narrative     XR CHEST 2 VW 12/7/2017 10:01 AM    HISTORY: Chest pain.    COMPARISON: None.    FINDINGS: No airspace consolidation, pleural effusion or pneumothorax.  Normal  heart size.        Impression     IMPRESSION: No acute cardiopulmonary abnormality.    BALAJI CLARK MD         12/7/2017 11:13 AM      Component Results     Component Value Ref Range & Units Status    Color Urine Yellow  Final    Appearance Urine Clear  Final    Glucose Urine Negative NEG^Negative mg/dL Final    Bilirubin Urine Negative NEG^Negative Final    Ketones Urine Negative NEG^Negative mg/dL Final    Specific Gravity Urine 1.011 1.003 - 1.035 Final    Blood Urine Negative NEG^Negative Final    pH Urine 5.0 5.0 - 7.0 pH Final    Protein Albumin Urine Negative NEG^Negative mg/dL Final    Urobilinogen mg/dL Normal 0.0 - 2.0 mg/dL Final    Nitrite Urine Negative NEG^Negative Final    Leukocyte Esterase Urine Moderate (A) NEG^Negative Final    Source Midstream Urine  Final    WBC Urine 4 (H) 0 - 2 /HPF Final    RBC Urine 1 0 - 2 /HPF Final    Squamous Epithelial /HPF Urine <1 0 - 1 /HPF Final    Mucous Urine Present (A) NEG^Negative /LPF Final    Hyaline Casts 1 0 - 2 /LPF Final                Clinical Quality Measure: Blood Pressure Screening     Your blood pressure was checked while you were in the emergency department today. The last reading we obtained was  BP: 118/63 . Please read the guidelines below about what these numbers mean and what you should do about them.  If your systolic blood pressure (the top number) is less than 120 and your diastolic blood pressure (the bottom number) is less than 80, then your blood pressure is normal. There is nothing more that you need to do about it.  If your systolic blood pressure (the top number) is 120-139 or your diastolic blood pressure (the bottom number) is 80-89, your blood pressure may be higher than it should be. You should have your blood pressure rechecked within a year by a primary care provider.  If your systolic blood pressure (the top number) is 140 or greater or your diastolic blood pressure (the bottom number) is 90 or greater, you may have high blood  pressure. High blood pressure is treatable, but if left untreated over time it can put you at risk for heart attack, stroke, or kidney failure. You should have your blood pressure rechecked by a primary care provider within the next 4 weeks.  If your provider in the emergency department today gave you specific instructions to follow-up with your doctor or provider even sooner than that, you should follow that instruction and not wait for up to 4 weeks for your follow-up visit.        Thank you for choosing [facility]       Thank you for choosing [facility] for your care. Our goal is always to provide you with excellent care. Hearing back from our patients is one way we can continue to improve our services. Please take a few minutes to complete the written survey that you may receive in the mail after you visit with us. Thank you!        [email or web address] Search Information     [employer] gives you secure access to your electronic health record. If you see a primary care provider, you can also send messages to your care team and make appointments. If you have questions, please call your primary care clinic.  If you do not have a primary care provider, please call [phone] and they will assist you.        Care EveryWhere ID     This is your Care EveryWhere ID. This could be used by other organizations to access your [facility] medical records  [id number]        Equal Access to Services     KAREN BABCOCK : Efrem Diamond, randell hartmann, ladarius argueta, zoran sanchez. So [facility] [phone].    ATENCIÓN: Si habla español, tiene a munoz disposición servicios gratuitos de asistencia lingüística. Llame al [phone].    We comply with applicable federal civil rights laws and [place] laws. We do not discriminate on the basis of race, color, national origin, age, disability, sex, sexual orientation, or gender identity.            After Visit Summary       This is your record. Keep this with  you and show to your community pharmacist(s) and doctor(s) at your next visit.

## 2017-12-07 NOTE — ED PROVIDER NOTES
"  History     Chief Complaint:  Palpitations    HPI   Elaina Winn is a 72 year old female who presents with palpitations. The patient has a recent history of hospitalization for elevated LFT's on 11/12 and since that time has also had new onset of Atrial Fibrillation. The patient reports that she has been feeling \"not right\" with light headedness since 0200 this morning. She describes experiencing diaphoresis and \"odd sensation in her chest\" located near her right shoulder. She has been recording her blood pressure since last evening and reports this to be normal. She has additionally reported having decreased appetite recently and having lost approximately 10 pounds. She denies experiencing any vertigo, chest pain, shortness of breath, abdominal pain, nausea, vomiting, or diarrhea. She denies having any history of cancer or alcohol abuse. She denies having had any abdominal surgery other than a hysterectomy. Of note since the onset of atrial fibrillation the patient has adjusted her dosage of metoprolol, having it most recently increased to 100 mg daily yesterday.    Allergies:  Simvastatin    Medications:    Metoprolol  Eliquis  Combigan    Past Medical History:    Anxiety  Dry eyes  Generalized osteoarthritis  Glaucoma  Hypertension  Hyperlipidemia    Past Surgical History:    D&C  Hysterectomy  S/P partial vaginectomy  Septoplasty  T&A    Family History:    Hypertension  Alzheimer disease  Lipids  Cancer    Social History:  The patient was accompanied to the ED by her .  Smoking Status: Former  Smokeless Tobacco: No  Alcohol Use: No   Marital Status:   [2]    Review of Systems   Constitutional: Positive for appetite change and unexpected weight change.   Respiratory: Negative for shortness of breath.    Cardiovascular: Positive for palpitations. Negative for chest pain.   Gastrointestinal: Negative for abdominal pain, diarrhea, nausea and vomiting.   Neurological: Positive for light-headedness. " "Negative for dizziness.   All other systems reviewed and are negative.    Physical Exam   Vitals:  Patient Vitals for the past 24 hrs:   BP Temp Temp src Pulse Heart Rate Resp SpO2 Height Weight   12/07/17 1200 118/63 - - - 59 20 98 % - -   12/07/17 1140 108/60 - - - 57 25 99 % - -   12/07/17 1100 115/58 - - - 60 23 98 % - -   12/07/17 1040 108/59 - - - 60 13 98 % - -   12/07/17 1030 - - - - - - 96 % - -   12/07/17 1020 100/62 - - - - - 97 % - -   12/07/17 1015 - - - - - - 96 % - -   12/07/17 1004 115/60 - - - - - 96 % - -   12/07/17 0937 153/68 97.4  F (36.3  C) Temporal 63 - 16 99 % 1.626 m (5' 4\") 61.2 kg (135 lb)      Physical Exam  GENERAL: well developed, pleasant  HEAD: atraumatic  EYES: pupils reactive, extraocular muscles intact, conjunctivae normal  ENT:  mucus membranes moist  NECK:  trachea midline, normal range of motion  RESPIRATORY: no tachypnea, breath sounds clear to auscultation   CVS: normal S1/S2, no murmurs, intact distal pulses  ABDOMEN: soft, nontender, nondistention  MUSCULOSKELETAL: no deformities  SKIN: warm and dry, no acute rashes or ulceration. Birth suraj to the right upper back  NEURO: GCS 15, cranial nerves intact, alert and oriented x3  PSYCH:  Mood/affect normal     Emergency Department Course     ECG:  ECG taken at 0933, ECG read at 0940  Normal sinus rhythm. Normal ECG  Rate 60 bpm. CA interval 126. QRS duration 86. QT/QTc 420/420. P-R-T axes 60, 57, 59.     Imaging:  Radiology findings were communicated with the patient who voiced understanding of the findings.  XR chest 2 views:  IMPRESSION: No acute cardiopulmonary abnormality.  Reading per radiology.     Laboratory:  Laboratory findings were communicated with the patient who voiced understanding of the findings.  CBC: AWNL (WBC 7.0, HGB 14.0, )  Troponin (Collected 1027): <0.015   CMP: Glucose: 105(H), BUN: 32(H), Creatinine: 1.23(H), GFR: 43(L), ALT:231(H), AST: 193(H) WNL   Lipase: 233  UA: Leukocyte esterase: " Moderate, WBC:M 4(H), Mucous: present    Interventions:  1215 Normal Saline 1000 mL IV     Emergency Department Course:  Nursing notes and vitals reviewed.  I performed an exam of the patient as documented above.     1035 Orthostatics were tested  Laying- blood pressure: 115/60, HR-66  Standing- blood pressure: 101/74, HR: 69    1053 I rechecked with the patient    I discussed the treatment plan with the patient. They expressed understanding of this plan and consented to discharge. They will be discharged home with instructions for care and follow up. In addition, the patient will return to the emergency department if their symptoms persist, worsen, if new symptoms arise or if there is any concern.  All questions were answered.     I personally reviewed the laboratory results with the patient and answered all related questions prior to discharge.    Impression & Plan      Medical Decision Making:  Elaina Winn is a 72 year old female who presents to the emergency department today with palpations and light headedness. Certainly broad differential ranging from cardiac etiology, medication, dehydration, electrolytes are all considered. Labs looked to be fairly stable. ECG showed her in sinus rhythm,we did do orthostatics on her and she did have a mild drop in blood pressure. She notes that she has not had a significant appetite. Potentially  could have a component of dehydration verses the increase in Metoprolol. I encouraged her to push fluids. She does have an upcoming apoppointment to see a GI specialist tomorrow regarding her liver function test. LFT's looked to be stable from prior discharge. Patient had an ultrasound and CT chest abdomen pelvis at that time and I not feel we need to do repeat imaging. We will have to continue to monitor and may have to cute down on the Metoprolol.    Diagnosis:    ICD-10-CM    1. Dehydration E86.0       Disposition:   Discharged    CMS Diagnoses: None     Scribe Disclosure:  I,  Bradley Tyler, am serving as a scribe at 9:41 AM on 12/7/2017 to document services personally performed by Robert Lopez MD, based on my observations and the provider's statements to me.    EMERGENCY DEPARTMENT       Robert Lopez MD  12/14/17 1950

## 2017-12-07 NOTE — DISCHARGE INSTRUCTIONS
Dehydration    The human body is comprised largely of water. If you lose more fluids than you take in, you can become dehydrated. This means there are not enough fluids in your body for it to function right. Mild dehydration can cause weakness, confusion, or muscle cramps. In extreme cases, it can lead to brain damage and even death. That's why prompt treatment is crucial.  Risk factors  Anyone can become dehydrated. But infants, children, and older adults are at greatest risk. You are most likely to lose fluids with severe vomiting, diarrhea, or a fever. Exercising or working hard--especially in hot weather--can also cause excess fluid loss.  What to do  Drinking liquids is the best way to prevent dehydration. Water is best, but juice or frozen pops can also help. For adults, don't use liquids that contain caffeine or alcohol to rehydrate. Your doctor may suggest electrolyte solutions for sick infants and young children.  When to go to the emergency room (ER)  Go to an ER right away for these symptoms:  Adults    Very dark urine and little urine output    Dizziness, weakness, confusion, fainting  Children    Sunken eyes    Little or no urine output (for infants, no wet diaper in 8 hours)    Very dark urine    Skin that doesn't bounce back quickly when pinched    Crying without tears    Lethargy, decreased activity, or increased sleepiness  What to expect in the emergency room  Your blood pressure, temperature, and heart rate will be checked. You may have blood or urine tests. The main treatment for dehydration is fluids. You may be given these to drink. Or, you may receive them through a vein in your arm. You also may be treated for diarrhea, vomiting, or a high fever.   Date Last Reviewed: 7/1/2017 2000-2017 [employer]. [address] [place] [address]. All rights reserved. This information is not intended as a substitute for professional medical care. Always follow your healthcare  professional's instructions.

## 2017-12-08 ENCOUNTER — OFFICE VISIT (OUTPATIENT)
Dept: CARDIOLOGY | Facility: CLINIC | Age: 72
End: 2017-12-08
Payer: COMMERCIAL

## 2017-12-08 ENCOUNTER — TRANSFERRED RECORDS (OUTPATIENT)
Dept: HEALTH INFORMATION MANAGEMENT | Facility: CLINIC | Age: 72
End: 2017-12-08

## 2017-12-08 VITALS
HEART RATE: 64 BPM | WEIGHT: 137 LBS | SYSTOLIC BLOOD PRESSURE: 112 MMHG | DIASTOLIC BLOOD PRESSURE: 62 MMHG | HEIGHT: 64 IN | BODY MASS INDEX: 23.39 KG/M2

## 2017-12-08 DIAGNOSIS — I48.0 PAF (PAROXYSMAL ATRIAL FIBRILLATION) (H): Primary | ICD-10-CM

## 2017-12-08 PROCEDURE — 93000 ELECTROCARDIOGRAM COMPLETE: CPT | Performed by: NURSE PRACTITIONER

## 2017-12-08 PROCEDURE — 99214 OFFICE O/P EST MOD 30 MIN: CPT | Mod: 25 | Performed by: NURSE PRACTITIONER

## 2017-12-08 RX ORDER — DILTIAZEM HYDROCHLORIDE 120 MG/1
120 CAPSULE, COATED, EXTENDED RELEASE ORAL DAILY
Qty: 30 CAPSULE | Refills: 11 | Status: SHIPPED | OUTPATIENT
Start: 2017-12-08 | End: 2017-12-11

## 2017-12-08 NOTE — PROGRESS NOTES
"Clinic Care Coordination Contact  OUTREACH    Referral Information:  Referral Source: CTS  Reason for Contact: ED follow up  Care Conference: No     Universal Utilization:   ED Visits in last year: 1  Hospital visits in last year: 1  Last PCP appointment: 11/16/17        Multiple Providers or Specialists: LATA, Cardiology    Clinical Concerns:  Current Medical Concerns: Patient presented to [facility] ED on 12/07/17 with report of light headedness and \"odd sensation in her chest\" near her right shoulder. No acute findings. Patient was d/c to home.  Has f/u with EP NP today.  Seen this morning for f/u with [facility].   Ordered testing, pending those results considering a possible liver biopsy. F/u with [facility] after testing        Patient elieves symptoms may have been attributed to her recent dose increase of metoprolol. Palpitations and lightheadedness have resolved. Trying to push fluids.  She is just tired.  Able to ambulate around the house and do laundry without getting fatigued.     BP 120s/80  HR 70s    Current Behavioral Concerns: none    Education Provided to patient: Advised pt to call clinic with symptoms.  Continue to check BP, bring readings to next clinic appt.     Patient/Caregiver understanding: Patient to f/u with EP NP today. Will call clinic for any new/worsening symptoms.   Frequency of Care Coordination: prn  Upcoming appointment: 11/22/17 (Dr. Delgado)     Plan:   Will route to PCP for advice on when pt should f/u in clinic next.    No hosp f/u appt scheduled with PCP as pt seen by GI and cardiology today.    Per NP Cady Marie, pt advised to f/u in the clinic. Appt scheduled for 12/14/17.    Izzy Lynn, MAGDALENEN, RN, PHN  Clinic Care Coordinator  [facility]  [phone]        "

## 2017-12-08 NOTE — PROGRESS NOTES
HPI and Plan:   See dictation    Orders Placed This Encounter   Procedures     EKG 12-lead complete w/read - Clinics (performed today)       Orders Placed This Encounter   Medications     diltiazem (CARDIZEM CD/CARTIA XT) 120 MG 24 hr capsule     Sig: Take 1 capsule (120 mg) by mouth daily     Dispense:  30 capsule     Refill:  11       There are no discontinued medications.      Encounter Diagnosis   Name Primary?     PAF (paroxysmal atrial fibrillation) (H) Yes       CURRENT MEDICATIONS:  Current Outpatient Prescriptions   Medication Sig Dispense Refill     diltiazem (CARDIZEM CD/CARTIA XT) 120 MG 24 hr capsule Take 1 capsule (120 mg) by mouth daily 30 capsule 11     metoprolol (LOPRESSOR) 50 MG tablet Take 1.5 tablets (75 mg) by mouth 2 times daily (Patient taking differently: Take 100 mg by mouth 2 times daily ) 180 tablet 3     apixaban ANTICOAGULANT (ELIQUIS) 2.5 MG tablet Take 1 tablet (2.5 mg) by mouth 2 times daily 60 tablet 3     brimonidine-timolol (COMBIGAN) 0.2-0.5 % ophthalmic solution Place 1 drop into both eyes 2 times daily        Omega-3 Fatty Acids (FISH OIL PO) Take 1,000 mg by mouth daily        Multiple Vitamin (MULTI-VITAMIN) per tablet Take 1 tablet by mouth daily.         ALLERGIES     Allergies   Allergen Reactions     Zocor [Simvastatin - High Dose]      Elevated LFTs       PAST MEDICAL HISTORY:  Past Medical History:   Diagnosis Date     Anxiety      Dry eyes, bilateral      Generalized OA      Glaucoma      HTN (hypertension), benign      Hyperlipidemia LDL goal < 130        PAST SURGICAL HISTORY:  Past Surgical History:   Procedure Laterality Date     DILATION AND CURETTAGE       HYSTERECTOMY      with USO, not for cancer     S/p partial vaginectomy       SEPTOPLASTY       TONSILLECTOMY & ADENOIDECTOMY         FAMILY HISTORY:  Family History   Problem Relation Age of Onset     Hypertension Mother       age 95     Alzheimer Disease Father 75     also CHF     CANCER Brother        "of lung ca age 69     Lipids Brother      Alzheimer Disease Paternal Uncle      Alzheimer Disease Paternal Uncle        SOCIAL HISTORY:  Social History     Social History     Marital status:      Spouse name: N/A     Number of children: N/A     Years of education: N/A     Social History Main Topics     Smoking status: Former Smoker     Quit date: 1/1/1990     Smokeless tobacco: Never Used      Comment: quit 1990     Alcohol use No     Drug use: No     Sexual activity: Not Currently     Partners: Male     Other Topics Concern     None     Social History Narrative    , 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  Walks 3miles per day        dtr (33) from [place] and lives in [facility]    Son from [place] and has mental health issues       Review of Systems:  Skin:  Negative       Eyes:  Positive for glasses    ENT:  Negative      Respiratory:  Positive for dyspnea on exertion     Cardiovascular:    Positive for;fatigue;palpitations    Gastroenterology: Negative      Genitourinary:  Negative      Musculoskeletal:  Positive for arthritis    Neurologic:  Positive for headaches occ  Psychiatric:  Positive for anxiety;depression    Heme/Lymph/Imm:  Negative      Endocrine:  Negative        Physical Exam:  Vitals: /62  Pulse 64  Ht 1.626 m (5' 4\")  Wt 62.1 kg (137 lb)  BMI 23.52 kg/m2    Constitutional:           Skin:             Head:           Eyes:           Lymph:      ENT:           Neck:           Respiratory:            Cardiac:                                                           GI:           Extremities and Muscular Skeletal:                 Neurological:           Psych:           CC  No referring provider defined for this encounter.              "

## 2017-12-08 NOTE — MR AVS SNAPSHOT
After Visit Summary   12/8/2017    Elaina Winn    MRN: [id number]           Patient Information     Date Of Birth          1945        Visit Information        Provider Department      12/8/2017 1:30 PM Linsey Lauren APRN CNP [facility]        Today's Diagnoses     PAF (paroxysmal atrial fibrillation) (H)    -  1      Care Instructions    -Add diltiazem 120 mg daily  -Continue to wear the monitor  -Keep visit with Dr. Erwin (heart rhythm doctor)  -Call if you feel poorly  [phone]          Follow-ups after your visit        Your next 10 appointments already scheduled     Dec 14, 2017 10:30 AM CST   Office Visit with Cady Marie PA-C   [facility]    [address] [address] [phone]           Bring a current list of meds and any records pertaining to this visit. For Physicals, please bring immunization records and any forms needing to be filled out. Please arrive 10 minutes early to complete paperwork.            Dec 29, 2017  3:15 PM CST   AFIB New with Yamil Erwin MD   [facility] ([facility])    [address]  [facility] [address] [phone]            Jan 05, 2018  4:00 PM CST   SHORT with Stella Delgado DO   [facility]    [address] [address] [phone]              Who to contact     If you have questions or need follow up information about today's clinic visit or your schedule please contact [facility] directly at [phone].  Normal or non-critical lab and imaging results will be communicated to you by MyChart, letter or phone within 4 business days after the clinic has received the results. If you do not hear from us within 7 days, please contact the clinic through MyChart or phone. If you have a  "critical or abnormal lab result, we will notify you by phone as soon as possible.  Submit refill requests through TEVIZZ or call your pharmacy and they will forward the refill request to us. Please allow 3 business days for your refill to be completed.          Additional Information About Your Visit        Vocalcomhart Information     TEVIZZ gives you secure access to your electronic health record. If you see a primary care provider, you can also send messages to your care team and make appointments. If you have questions, please call your primary care clinic.  If you do not have a primary care provider, please call [phone] and they will assist you.        Care EveryWhere ID     This is your Care EveryWhere ID. This could be used by other organizations to access your [place] medical records  [id number]        Your Vitals Were     Pulse Height BMI (Body Mass Index)             64 1.626 m (5' 4\") 23.52 kg/m2          Blood Pressure from Last 3 Encounters:   12/08/17 112/62   12/07/17 118/63   11/22/17 130/75    Weight from Last 3 Encounters:   12/08/17 62.1 kg (137 lb)   12/07/17 61.2 kg (135 lb)   11/22/17 62.1 kg (137 lb)              We Performed the Following     EKG 12-lead complete w/read - Clinics (performed today)          Today's Medication Changes          These changes are accurate as of: 12/8/17  2:05 PM.  If you have any questions, ask your nurse or doctor.               Start taking these medicines.        Dose/Directions    diltiazem 120 MG 24 hr capsule   Commonly known as:  CARDIZEM CD/CARTIA XT   Used for:  PAF (paroxysmal atrial fibrillation) (H)   Started by:  Linsey Lauren APRN CNP        Dose:  120 mg   Take 1 capsule (120 mg) by mouth daily   Quantity:  30 capsule   Refills:  11         These medicines have changed or have updated prescriptions.        Dose/Directions    metoprolol 50 MG tablet   Commonly known as:  LOPRESSOR   This may have changed:  how much to take   Used " for:  HTN (hypertension), benign        Dose:  75 mg   Take 1.5 tablets (75 mg) by mouth 2 times daily   Quantity:  180 tablet   Refills:  3            Where to get your medicines      These medications were sent to [id number] - [place], [place] - [address] AT [facility] [address], [facility] [address]    Hours:  24-hours Phone:  [phone]     diltiazem 120 MG 24 hr capsule                Primary Care Provider Office Phone # Fax #    Cday Marie PA-C [phone] [address]  [facility] [address]        Equal Access to Services     KAREN BABCOCK : Hadii aad ku hadasho Soomaali, waaxda luqadaha, qaybta kaalmada adeegyada, waxay idiin hayaan jasmyn hernandez . So [facility] [phone].    ATENCIÓN: Si habla español, tiene a munoz disposición servicios gratuitos de asistencia lingüística. [facility] [phone].    We comply with applicable federal civil rights laws and [place] laws. We do not discriminate on the basis of race, color, national origin, age, disability, sex, sexual orientation, or gender identity.            Thank you!     Thank you for choosing [facility]  for your care. Our goal is always to provide you with excellent care. Hearing back from our patients is one way we can continue to improve our services. Please take a few minutes to complete the written survey that you may receive in the mail after your visit with us. Thank you!             Your Updated Medication List - Protect others around you: Learn how to safely use, store and throw away your medicines at www.disposemymeds.org.          This list is accurate as of: 12/8/17  2:05 PM.  Always use your most recent med list.                   Brand Name Dispense Instructions for use Diagnosis    apixaban ANTICOAGULANT 2.5 MG tablet    ELIQUIS    60 tablet    Take 1 tablet (2.5 mg) by mouth 2 times daily    Paroxysmal atrial  fibrillation (H)       brimonidine-timolol 0.2-0.5 % ophthalmic solution    COMBIGAN     Place 1 drop into both eyes 2 times daily        diltiazem 120 MG 24 hr capsule    CARDIZEM CD/CARTIA XT    30 capsule    Take 1 capsule (120 mg) by mouth daily    PAF (paroxysmal atrial fibrillation) (H)       FISH OIL PO      Take 1,000 mg by mouth daily        metoprolol 50 MG tablet    LOPRESSOR    180 tablet    Take 1.5 tablets (75 mg) by mouth 2 times daily    HTN (hypertension), benign       Multi-vitamin Tabs tablet   Generic drug:  multivitamin, therapeutic with minerals      Take 1 tablet by mouth daily.

## 2017-12-08 NOTE — PROGRESS NOTES
HISTORY OF PRESENT ILLNESS:  Elaina is a 72-year-old female percent presenting in clinic today for a post-hospital followup visit.      PAST MEDICAL HISTORY:  Significant for hypertension, dyslipidemia, dementia and recently diagnosed liver dysfunction.  She was hospitalized earlier this month for weakness and abnormal liver function studies.  While she was in the hospital, she was found to have paroxysmal atrial fibrillation.  Heart rate was in the 140s.  Echocardiogram showed normal left and right ventricular function without wall motion abnormalities.  There was mild left atrial enlargement with normal right atrial size and no hemodynamically significant valvular disease.  Dr. Perez saw her in Cardiology consultation and increased her PTA metoprolol to 75 mg twice daily.  In addition, he added anticoagulation, so CHADS-VASc score was at least 3.  She was started on Xarelto.  She was discharged with a 30-day event monitor.      Office was contacted from [facility] with recordings of atrial fibrillation with rapid ventricular response with heart rates up to 160.  Dr. Perez was contacted and her metoprolol was increased to 100 mg twice daily and she was asked to be seen in clinic.  An appointment was scheduled me today and additionally a new patient appointment with Dr. Erwin was scheduled the end of this month.      In clinic today, Elaina is here with her .  She tells me that yesterday she was not feeling well and actually sought care in the ER.  She was in sinus rhythm at that time.  There were no significant abnormalities found.  She does have some problems with worsening dementia.  She has chronic fatigue and arthritis.  She does not feel like her fatigue has worsened at all since starting the metoprolol or being diagnosed with atrial fibrillation.  She could not recall any symptoms of palpitations, lightheadedness, dizziness, shortness of breath at times of the atrial fibrillation.      Review of her  CardioNet monitor shows episodes have continued past the 5th, as she had episodes of atrial fibrillation on the 6th and the 7th.  These appeared to be paroxysmal and heart rates were around 100-120 while in atrial fibrillation.      PHYSICAL EXAMINATION:    In clinic today, EKG shows sinus rhythm with a heart rate of 67 beats per minute.   GENERAL:  Elderly female in no acute distress.   HEENT:  Normocephalic, atraumatic.   NECK:  Supple without jugular venous distension or carotid bruits.   LUNGS:  Clear.   HEART:  There was an S1, S2, regular rate and rhythm, no murmurs, rubs or gallops.   ABDOMEN:  Soft.   EXTREMITIES:  Show no edema.      IMPRESSION/PLAN:  Elaina Winn is a 72-year-old female who presents in clinic today for a hospital followup visit.  She has a history of hypertension, hyperlipidemia and mild dementia.  She was hospitalized recently for weakness and found to have significantly abnormal liver function studies.  While she was in the hospital being worked up for this, she went into atrial fibrillation with rapid ventricular response.  Echocardiogram was essentially within normal limits.  She was started on a combination of Xarelto and her metoprolol was increased to 75 mg twice daily.  She was discharged with a CardioNet monitor.  She has continued to have paroxysms of atrial fibrillation, which have not been symptomatic, with heart rates up to 160.  Her metoprolol was recently increased to 100 mg twice daily.  Subsequently, she still had some paroxysms of atrial fibrillation with heart rates of 100-120.      Today, she is in sinus rhythm, with a heart rate of 67.  I am contemplated adding Cardizem or digoxin.  She has room in her blood pressure so we will cautiously start diltiazem 120 mg daily to see if we can get better rate control when she is in AFib.  We may walk a fine line with bradycardia when she is in sinus rhythm.  Her liver dysfunction precludes her from using some of the  antiarrhythmic therapies and therefore rate control, if possible, may be a better option for her.  Additionally, she is not clearly symptomatic when she has the atrial fibrillation.  She will continue to wear the monitor.  She will contact me if she has any symptoms, questions or concerns.  She will otherwise follow up with Dr. Erwin at the end of this month.         CATY GARCIA CNP          D: 2017 14:47   T: 2017 17:11   MT: al      Name:     ABDULLAHI BLACKWOOD   MRN:      [id number]        Account:      [id number]   :      1945           Service Date: 2017      Document: [id number]

## 2017-12-08 NOTE — LETTER
12/8/2017    Cady Marie PA-C  [address]  [facility] [place] [facility] [address]    RE: Elaina Winn       Dear Colleague,    I had the pleasure of seeing Elaina Winn in the [facility].    HISTORY OF PRESENT ILLNESS:  Elaina is a 72-year-old female percent presenting in clinic today for a post-hospital followup visit.      PAST MEDICAL HISTORY:  Significant for hypertension, dyslipidemia, dementia and recently diagnosed liver dysfunction.  She was hospitalized earlier this month for weakness and abnormal liver function studies.  While she was in the hospital, she was found to have paroxysmal atrial fibrillation.  Heart rate was in the 140s.  Echocardiogram showed normal left and right ventricular function without wall motion abnormalities.  There was mild left atrial enlargement with normal right atrial size and no hemodynamically significant valvular disease.  Dr. Perez saw her in Cardiology consultation and increased her PTA metoprolol to 75 mg twice daily.  In addition, he added anticoagulation, so CHADS-VASc score was at least 3.  She was started on Xarelto.  She was discharged with a 30-day event monitor.      Office was contacted from [facility] with recordings of atrial fibrillation with rapid ventricular response with heart rates up to 160.  Dr. Perez was contacted and her metoprolol was increased to 100 mg twice daily and she was asked to be seen in clinic.  An appointment was scheduled me today and additionally a new patient appointment with Dr. Erwin was scheduled the end of this month.      In clinic today, Elaina is here with her .  She tells me that yesterday she was not feeling well and actually sought care in the ER.  She was in sinus rhythm at that time.  There were no significant abnormalities found.  She does have some problems with worsening dementia.  She has chronic fatigue and arthritis.  She does not feel like her fatigue has worsened at all since starting  the metoprolol or being diagnosed with atrial fibrillation.  She could not recall any symptoms of palpitations, lightheadedness, dizziness, shortness of breath at times of the atrial fibrillation.      Review of her CardioNet monitor shows episodes have continued past the 5th, as she had episodes of atrial fibrillation on the 6th and the 7th.  These appeared to be paroxysmal and heart rates were around 100-120 while in atrial fibrillation.      PHYSICAL EXAMINATION:    In clinic today, EKG shows sinus rhythm with a heart rate of 67 beats per minute.   GENERAL:  Elderly female in no acute distress.   HEENT:  Normocephalic, atraumatic.   NECK:  Supple without jugular venous distension or carotid bruits.   LUNGS:  Clear.   HEART:  There was an S1, S2, regular rate and rhythm, no murmurs, rubs or gallops.   ABDOMEN:  Soft.   EXTREMITIES:  Show no edema.      Outpatient Encounter Prescriptions as of 12/8/2017   Medication Sig Dispense Refill     [DISCONTINUED] diltiazem (CARDIZEM CD/CARTIA XT) 120 MG 24 hr capsule Take 1 capsule (120 mg) by mouth daily 30 capsule 11     apixaban ANTICOAGULANT (ELIQUIS) 2.5 MG tablet Take 1 tablet (2.5 mg) by mouth 2 times daily 60 tablet 3     [DISCONTINUED] metoprolol (LOPRESSOR) 50 MG tablet Take 1.5 tablets (75 mg) by mouth 2 times daily (Patient taking differently: Take 100 mg by mouth 2 times daily ) 180 tablet 3     brimonidine-timolol (COMBIGAN) 0.2-0.5 % ophthalmic solution Place 1 drop into both eyes 2 times daily        Omega-3 Fatty Acids (FISH OIL PO) Take 1,000 mg by mouth daily        Multiple Vitamin (MULTI-VITAMIN) per tablet Take 1 tablet by mouth daily.       No facility-administered encounter medications on file as of 12/8/2017.      IMPRESSION/PLAN:  Elaina Winn is a 72-year-old female who presents in clinic today for a hospital followup visit.  She has a history of hypertension, hyperlipidemia and mild dementia.  She was hospitalized recently for weakness and found  to have significantly abnormal liver function studies.  While she was in the hospital being worked up for this, she went into atrial fibrillation with rapid ventricular response.  Echocardiogram was essentially within normal limits.  She was started on a combination of Xarelto and her metoprolol was increased to 75 mg twice daily.  She was discharged with a CardioNet monitor.  She has continued to have paroxysms of atrial fibrillation, which have not been symptomatic, with heart rates up to 160.  Her metoprolol was recently increased to 100 mg twice daily.  Subsequently, she still had some paroxysms of atrial fibrillation with heart rates of 100-120.      Today, she is in sinus rhythm, with a heart rate of 67.  I am contemplated adding Cardizem or digoxin.  She has room in her blood pressure so we will cautiously start diltiazem 120 mg daily to see if we can get better rate control when she is in AFib.  We may walk a fine line with bradycardia when she is in sinus rhythm.  Her liver dysfunction precludes her from using some of the antiarrhythmic therapies and therefore rate control, if possible, may be a better option for her.  Additionally, she is not clearly symptomatic when she has the atrial fibrillation.  She will continue to wear the monitor.  She will contact me if she has any symptoms, questions or concerns.  She will otherwise follow up with Dr. Erwin at the end of this month.     Again, thank you for allowing me to participate in the care of your patient.      Sincerely,    Linsey Lauren, HENRY, APRN [facility]

## 2017-12-08 NOTE — PATIENT INSTRUCTIONS
-Add diltiazem 120 mg daily  -Continue to wear the monitor  -Keep visit with Dr. Erwin (heart rhythm doctor)  -Call if you feel poorly  [phone]

## 2017-12-10 ENCOUNTER — TELEPHONE (OUTPATIENT)
Dept: CARDIOLOGY | Facility: CLINIC | Age: 72
End: 2017-12-10

## 2017-12-10 NOTE — TELEPHONE ENCOUNTER
Telephone Call    Paged on consult pager to call patients  Yo regarding his wife Elaina.  She was seen in cardiology clinic last Friday for atrial fibrillation.  Started on diltiazem 120mg daily extended release.    Elaina took diltiazem for first time today.  They have noted lower than average blood pressures (systolics / 57-63) with relative bradycardia (HR 57-63). Typical BP is 120s systolic per . Patient denies chest pain or dyspnea.  Having occasional lightheaded upon standing but is aware of it and tries to get up slowly.  She has been able to perform her ADLs today (laundry and what not).    Advised patient and  to stop taking diltiazem and to hold this evening dose of metoprolol (typically takes 100mg BID).  She should take it easy the rest of the evening, drink fluids, and can check blood pressures occasionally.  I did tell them at any point she feels worse or starts to experience more symptoms they should go to emergency room or call an ambulance.       was concerned about holding evening dose of metoprolol and risk of atrial fibrillation with fast rates.  I mentioned it is possible but more likely her heart rate will remain in normal range because of the diltiazem.  I also stated that any concern at all they should seek the ER.    Dickson Malhotra MD PGY4  Cardiology Fellow  [phone]

## 2017-12-11 ENCOUNTER — TELEPHONE (OUTPATIENT)
Dept: CARDIOLOGY | Facility: CLINIC | Age: 72
End: 2017-12-11

## 2017-12-11 ENCOUNTER — CARE COORDINATION (OUTPATIENT)
Dept: CARE COORDINATION | Facility: CLINIC | Age: 72
End: 2017-12-11

## 2017-12-11 DIAGNOSIS — I48.0 PAF (PAROXYSMAL ATRIAL FIBRILLATION) (H): ICD-10-CM

## 2017-12-11 DIAGNOSIS — I10 HTN (HYPERTENSION), BENIGN: ICD-10-CM

## 2017-12-11 DIAGNOSIS — I48.0 PAROXYSMAL ATRIAL FIBRILLATION (H): Primary | ICD-10-CM

## 2017-12-11 RX ORDER — DILTIAZEM HYDROCHLORIDE 120 MG/1
120 CAPSULE, COATED, EXTENDED RELEASE ORAL DAILY
Qty: 90 CAPSULE | Refills: 3 | Status: ON HOLD | OUTPATIENT
Start: 2017-12-11 | End: 2018-01-04

## 2017-12-11 RX ORDER — METOPROLOL TARTRATE 50 MG
50 TABLET ORAL 2 TIMES DAILY
Qty: 60 TABLET | Refills: 0 | COMMUNITY
Start: 2017-12-11 | End: 2017-12-29

## 2017-12-11 NOTE — PROGRESS NOTES
"Clinic Care Coordination Contact    Chart reviewed, patient's  had called cardiology to report low BP readings yesterday 12/10/17 after first dose of diltiazem. Patient was advised to hold diltiazem and metoprolol at that time, however patient's  was concerned patient's afib would become symptomatic.    Follow up call today to patient and spouse revealed she has continued to take metoprolol and her BP readings have normalized.   12/10/17:  8am 99/64 HR 66 then took Diltiazem. 10am 86/58, 2pm 126/57 HR 63 & 99/54, 4pm 116/66 HR 57, 115/64 HR 67 took 50mg Metoprolol in PM (half usual dose).   12/11/17: 8am 125/69 HR 63, held diltiazem and took 100mg Metoprolol, 10am 100/58 HR 66    Reports she is feeling \"a little better\" today. Denies lightheadedness, dizziness.     Reviewed upcoming appts with patient's , who inquired why 12/20 appt with cardiology NP RONALDO was cancelled. Appears it was rescheduled and then canceled on 12/07/17.     Patient not wishing to see PCP for follow up. Would like to confirm when she should see cardiology next.     Will route to PCP and cardiology for review.    MAGDALENE LopezN, RN, PHN  Clinic Care Coordinator  [facility]  [phone];        "

## 2017-12-11 NOTE — TELEPHONE ENCOUNTER
Please call patient's .  Let's try decreasing the metoprolol to 50 mg twice daily and continue diltiazem 120 mg daily.  Can we see if there are any strips from her Cardionet from over the weekend?  Thanks! -Linsey

## 2017-12-11 NOTE — TELEPHONE ENCOUNTER
Spoke with  regarding recommended changes to medication.  Patent to continue with diltiazem 120mg po qd.  Decrease metoprolol (lopressor) to 50mg po bid.  Patient has been taking her BP and HR several times daily.  Recommended that patient monitor BP and :HR readings twice daily approximately 45 minutes after taking medication and if symptomatic.  Patient has follow up with Dr Erwin on 12/29.  Instructed to call if questions or concerns.      12/10/17  10am   BP 99/64  HR 66               BP 86/58  HR 88  12pm   BP 99/63  HR 61  2pm    /51 HR 63               BP 99/55  4pm   /66 HR 57  6pm     /64 HR 67  6:30p   /65 HR 61  10p      /67 HR 58    12/11/17  8:45a   /69 HR 63  10a /58 HR 66  10:40a BP 96/48  HR 58    Currently patient reporting no symptoms.  Medication list updated in epic.  SANYA Serra

## 2017-12-11 NOTE — TELEPHONE ENCOUNTER
----- Message from Dickson Malhotra MD sent at 12/10/2017  5:25 PM CST -----  Robert Stiles, this is Lamar Malhotra, one of the first year cardiology fellows. I was called by Elaina's  over the weekend regarding her heart rates and blood pressures. I put a note in on her.  She just started diltiazem and she felt more lightheaded than usual.  Vitals were ok, HR mainly 50s-60s, BP usually high 90s to low 100s, but one was 86.  No chest pain or dyspnea.  I mentioned to take it easy, drink fluids, stop the diltiazem, and hold the evening metoprolol dose.  If concerned or worsening, go to ER.  It sounded like she was fine and overall improving.  Just thought I would let you know.  She may just need a lower maintenance dose of the metoprolol (75 or 50 BID) if she is going to take the diltiazem as well.    Thanks, Lamar

## 2017-12-12 NOTE — TELEPHONE ENCOUNTER
Cardionet event monitor results 12/7-12/8 noted.   12/7-NSR HR 62 bpm  12/8-NSR, HR 60-89 with afib run less than 10 seconds.

## 2017-12-18 ENCOUNTER — TELEPHONE (OUTPATIENT)
Dept: INTERVENTIONAL RADIOLOGY/VASCULAR | Facility: CLINIC | Age: 72
End: 2017-12-18

## 2017-12-18 NOTE — TELEPHONE ENCOUNTER
Interventional Radiology   Interventional Radiology at [facility] has been requested to perform a Random liver biopsy from JOSE Fry.  Elaina Winn is a 72 year old woman with a PMH of hypertesion, hyperlipidemia, newly diagnosed atrial fibrillation on anticoagulation, recent malaise, weight loss and nausea noted to have elevated liver enzymes.  She was seen in consult with GI and it was felt she needed a liver biopsy for further evaluation.   Elaina is on Eliquis and will need to hold it 2 days prior to the biopsy. She was called to review the blood thinner hold.    Central scheduling will contact the patient to schedule the biopsy.     Thanks Camelia [facility] Interventional Radiology CNP ([phone])

## 2017-12-20 ENCOUNTER — TELEPHONE (OUTPATIENT)
Dept: CARDIOLOGY | Facility: CLINIC | Age: 72
End: 2017-12-20

## 2017-12-20 NOTE — PROGRESS NOTES
Cardionet summary report from 11/15-12/14/17 signed by  and sent to be scanned into EPIC. Pt has OV with  on 12/29 to review.

## 2017-12-20 NOTE — TELEPHONE ENCOUNTER
Received call from  reporting wife hasn't been feeling well.  Complaints of fatigue and nausea that comes and goes.  Denies CP, palpitations, dizziness and lightheadedness.  Recently seen on 12/8 with RONALDO for follow up to hospital stay 11/12-11/15 patient was newly diagnosed with paroxysmal afib.  Reports her BP has been running low.  Readings from last 5 days ranging  from  systolic and 54-73 diastolic  HR ranging 57-69.   At last visit diltiazem 120mg po qd was added to regimen for better rate control.  Will send to Linsey for review.  SANYA Serra

## 2017-12-21 ENCOUNTER — HOSPITAL ENCOUNTER (OUTPATIENT)
Facility: CLINIC | Age: 72
Discharge: HOME | End: 2017-12-21
Attending: RADIOLOGY | Admitting: RADIOLOGY
Payer: MEDICARE

## 2017-12-21 ENCOUNTER — HOSPITAL ENCOUNTER (OUTPATIENT)
Dept: ULTRASOUND IMAGING | Facility: CLINIC | Age: 72
End: 2017-12-21
Attending: PHYSICIAN ASSISTANT
Payer: MEDICARE

## 2017-12-21 ENCOUNTER — TRANSFERRED RECORDS (OUTPATIENT)
Dept: HEALTH INFORMATION MANAGEMENT | Facility: CLINIC | Age: 72
End: 2017-12-21

## 2017-12-21 VITALS
DIASTOLIC BLOOD PRESSURE: 54 MMHG | OXYGEN SATURATION: 99 % | RESPIRATION RATE: 16 BRPM | HEART RATE: 67 BPM | TEMPERATURE: 96 F | SYSTOLIC BLOOD PRESSURE: 111 MMHG

## 2017-12-21 DIAGNOSIS — R79.89 ELEVATED LFTS: ICD-10-CM

## 2017-12-21 LAB — INR PPP: 1.02 (ref 0.86–1.14)

## 2017-12-21 PROCEDURE — 25000128 H RX IP 250 OP 636: Performed by: RADIOLOGY

## 2017-12-21 PROCEDURE — 88307 TISSUE EXAM BY PATHOLOGIST: CPT | Performed by: RADIOLOGY

## 2017-12-21 PROCEDURE — 88313 SPECIAL STAINS GROUP 2: CPT | Mod: 26 | Performed by: RADIOLOGY

## 2017-12-21 PROCEDURE — 36415 COLL VENOUS BLD VENIPUNCTURE: CPT | Performed by: RADIOLOGY

## 2017-12-21 PROCEDURE — 88313 SPECIAL STAINS GROUP 2: CPT | Performed by: RADIOLOGY

## 2017-12-21 PROCEDURE — 88307 TISSUE EXAM BY PATHOLOGIST: CPT | Mod: 26 | Performed by: RADIOLOGY

## 2017-12-21 PROCEDURE — 40000863 ZZH STATISTIC RADIOLOGY XRAY, US, CT, MAR, NM

## 2017-12-21 PROCEDURE — 25000125 ZZHC RX 250: Performed by: RADIOLOGY

## 2017-12-21 PROCEDURE — 85610 PROTHROMBIN TIME: CPT | Performed by: RADIOLOGY

## 2017-12-21 PROCEDURE — 76942 ECHO GUIDE FOR BIOPSY: CPT

## 2017-12-21 RX ORDER — FLUMAZENIL 0.1 MG/ML
0.2 INJECTION, SOLUTION INTRAVENOUS
Status: DISCONTINUED | OUTPATIENT
Start: 2017-12-21 | End: 2017-12-21 | Stop reason: HOSPADM

## 2017-12-21 RX ORDER — FENTANYL CITRATE 50 UG/ML
25-50 INJECTION, SOLUTION INTRAMUSCULAR; INTRAVENOUS EVERY 5 MIN PRN
Status: DISCONTINUED | OUTPATIENT
Start: 2017-12-21 | End: 2017-12-21 | Stop reason: HOSPADM

## 2017-12-21 RX ORDER — LIDOCAINE 40 MG/G
CREAM TOPICAL
Status: DISCONTINUED | OUTPATIENT
Start: 2017-12-21 | End: 2017-12-21 | Stop reason: HOSPADM

## 2017-12-21 RX ORDER — NALOXONE HYDROCHLORIDE 0.4 MG/ML
.1-.4 INJECTION, SOLUTION INTRAMUSCULAR; INTRAVENOUS; SUBCUTANEOUS
Status: DISCONTINUED | OUTPATIENT
Start: 2017-12-21 | End: 2017-12-21 | Stop reason: HOSPADM

## 2017-12-21 RX ADMIN — FENTANYL CITRATE 50 MCG: 50 INJECTION, SOLUTION INTRAMUSCULAR; INTRAVENOUS at 09:10

## 2017-12-21 RX ADMIN — FENTANYL CITRATE: 50 INJECTION, SOLUTION INTRAMUSCULAR; INTRAVENOUS at 09:01

## 2017-12-21 RX ADMIN — LIDOCAINE HYDROCHLORIDE 6 ML: 10 INJECTION, SOLUTION EPIDURAL; INFILTRATION; INTRACAUDAL; PERINEURAL at 09:11

## 2017-12-21 RX ADMIN — MIDAZOLAM HYDROCHLORIDE 1 MG: 1 INJECTION, SOLUTION INTRAMUSCULAR; INTRAVENOUS at 09:10

## 2017-12-21 NOTE — PROGRESS NOTES
Denies c/o post biopsy.  See flow sheet, d/c instructions were given by Vi RN, no further questions.

## 2017-12-21 NOTE — IP AVS SNAPSHOT
[place] [address]    [place] [address]    Phone:  [phone]                                       After Visit Summary   12/21/2017    Elaina Winn    MRN: [id number]           After Visit Summary Signature Page     I have received my discharge instructions, and my questions have been answered. I have discussed any challenges I see with this plan with the nurse or doctor.    ..........................................................................................................................................  Patient/Patient Representative Signature      ..........................................................................................................................................  Patient Representative Print Name and Relationship to Patient    ..................................................               ................................................  Date                                            Time    ..........................................................................................................................................  Reviewed by Signature/Title    ...................................................              ..............................................  Date                                                            Time

## 2017-12-21 NOTE — IP AVS SNAPSHOT
MRN:[id number]                      After Visit Summary   12/21/2017    Elaina Winn    MRN: [id number]           Visit Information        Department      12/21/2017  7:03 AM [facility]          Review of your medicines      UNREVIEWED medicines. Ask your doctor about these medicines        Dose / Directions    apixaban ANTICOAGULANT 2.5 MG tablet   Commonly known as:  ELIQUIS   Used for:  Paroxysmal atrial fibrillation (H)        Dose:  2.5 mg   Take 1 tablet (2.5 mg) by mouth 2 times daily   Quantity:  60 tablet   Refills:  3       brimonidine-timolol 0.2-0.5 % ophthalmic solution   Commonly known as:  COMBIGAN        Dose:  1 drop   Place 1 drop into both eyes 2 times daily   Refills:  0       diltiazem 120 MG 24 hr capsule   Commonly known as:  CARDIZEM CD/CARTIA XT   Used for:  PAF (paroxysmal atrial fibrillation) (H)        Dose:  120 mg   Take 1 capsule (120 mg) by mouth daily   Quantity:  90 capsule   Refills:  3       FISH OIL PO        Dose:  1000 mg   Take 1,000 mg by mouth daily   Refills:  0       metoprolol 50 MG tablet   Commonly known as:  LOPRESSOR   Used for:  HTN (hypertension), benign        Dose:  50 mg   Take 1 tablet (50 mg) by mouth 2 times daily   Quantity:  60 tablet   Refills:  0       Multi-vitamin Tabs tablet   Generic drug:  multivitamin, therapeutic with minerals        Dose:  1 tablet   Take 1 tablet by mouth daily.   Refills:  0                Protect others around you: Learn how to safely use, store and throw away your medicines at www.disposemymeds.org.         Follow-ups after your visit        Your next 10 appointments already scheduled     Dec 21, 2017  8:30 AM CST   (Arrive by 8:15 AM)   US BIOPSY LIVER with SHUS4   [facility]ound ([facility])    [address] [facility] [address]   [phone]           Tell us in advance if there s any chance you may be pregnant.  Bring a list of your medicines  to the exam. Include vitamins, minerals and over-the-counter drugs.  No eating or drinking for 4 hours prior to exam.  If you take blood thinners, you may need to stop taking them a few days before treatment. Talk to your doctor before stopping these medicines. You will need a blood test the morning of your exam.   Stop taking Coumadin (warfarin) 3 days before your exam. Restart the day after your exam.   If you take aspirin, you may need to stop taking it 3 days before your scan.   If you take Plavix, Ticlid, Pletal or Persantine, you may need to stop taking them 5 days before your scan. Please talk to your doctor before stopping these medicines.  If you will receive sedation for this test (medicine to help you relax):   See your family doctor for an exam within 30 days of treatment.   Plan for an adult to drive you home and stay with you for at least 6 hours.   Follow the eating and drinking guidelines checked below:   No eating or drinking for 4 hours before your test. You may take medicine with small sips of water.   If you have diabetes:If you take insulin, call your diabetes care team. Do not take diabetes pills on the morning of your test. If you take metformin (Avandamet, Glucophage, Glucovance, Metaglip) and received contrast, wait 48 hours before re-starting this medicine.  Please call the Imaging Department at your exam site with any questions.            Dec 29, 2017  3:15 PM CST   EP NEW with Yamil Erwin MD   [facility] ([email or web address])    [address] [place] [address] [address]  [facility] [address]   [phone]            Jan 05, 2018  4:00 PM CST   [facility] Stella [facility]gado DO   [facility]    [address] [facility] [address]   [phone]            Feb 14, 2018 11:15 AM CST   Return Visit with Juan C Perez MD   [facility] ([email or web address])    [address] [place] [address]  [address]  [facility] [id number] [phone]               Care Instructions        Further instructions from your care team       Liver Biopsy Discharge Instructions     After you go home:      You may resume your normal diet    Have an adult stay with you for 6 hours if you received sedation       For 24 hours - due to the sedation you received:    Relax and take it easy    Do NOT make any important or legal decisions    Do NOT drive or operate machines at home or at work    Do NOT drink alcohol    Care of Puncture Site:      For the first 48 hrs, check your puncture site every couple hours while you are awake     You may remove/change the bandaid tomorrow    You may shower tomorrow    No tub baths, whirlpools or swimming until your puncture site has fully healed    Activity       You may go back to normal activity in 24 hours     Wait 48 hours before lifting, straining, exercise or other strenuous activity    Medicines:      You may resume all medications    Resume your Warfarin/Coumadin at your regular dose today. Follow up with your provider to have your INR rechecked    Resume your Platelet Inhibitors and Aspirin tomorrow at your regular dose    For minor pain, you may take Acetaminophen (Tylenol) or Ibuprofen (Advil)            Call the provider who ordered this test if:      Increased pain or a large or growing hard lump around the site    Blood or fluid is draining from the site    The site is red, swollen, hot or tender    Chills or a fever greater than 101 F (38 C)    Pain that is getting worse    Any questions or concerns    Call  911 or go to the Emergency Room if:      Severe pain or trouble breathing    Bleeding that you cannot control        If you have questions call:          [facility] Radiology Dept @ [phone]      The provider who performed your procedure was __Dr. Wetzel_______________.             Additional Information About Your Visit        [email or web address] Information     [email or web address] gives  you secure access to your electronic health record. If you see a primary care provider, you can also send messages to your care team and make appointments. If you have questions, please call your primary care clinic.  If you do not have a primary care provider, please call [phone] and they will assist you.        Care EveryWhere ID     This is your Care EveryWhere ID. This could be used by other organizations to access your [facility] medical records  [id number]        Your Vitals Were     Blood Pressure Pulse Temperature Respirations Pulse Oximetry       124/63 (BP Location: Right arm) 62 96  F (35.6  C) (Oral) 18 98%        Primary Care Provider Office Phone # Fax #    Cady Marie PA-C [phone]      Equal Access to Services     KAREN BABCOCK : Hadii mark chamberlain hadaleenao Soduyen, waaxda luqadaha, qaybta kaalmada adeegyada, zoran hernandez . So [facility] [phone].    ATENCIÓN: Si habla español, tiene a munoz disposición servicios gratuitos de asistencia lingüística. Llame al [phone].    We comply with applicable federal civil rights laws and [place] laws. We do not discriminate on the basis of race, color, national origin, age, disability, sex, sexual orientation, or gender identity.            Thank you!     Thank you for choosing [facility] for your care. Our goal is always to provide you with excellent care. Hearing back from our patients is one way we can continue to improve our services. Please take a few minutes to complete the written survey that you may receive in the mail after you visit with us. Thank you!             Medication List: This is a list of all your medications and when to take them. Check marks below indicate your daily home schedule. Keep this list as a reference.      Medications           Morning Afternoon Evening Bedtime As Needed    apixaban ANTICOAGULANT 2.5 MG tablet   Commonly known as:  ELIQUIS   Take 1 tablet (2.5 mg) by mouth 2 times  daily                                brimonidine-timolol 0.2-0.5 % ophthalmic solution   Commonly known as:  COMBIGAN   Place 1 drop into both eyes 2 times daily                                diltiazem 120 MG 24 hr capsule   Commonly known as:  CARDIZEM CD/CARTIA XT   Take 1 capsule (120 mg) by mouth daily                                FISH OIL PO   Take 1,000 mg by mouth daily                                metoprolol 50 MG tablet   Commonly known as:  LOPRESSOR   Take 1 tablet (50 mg) by mouth 2 times daily                                Multi-vitamin Tabs tablet   Take 1 tablet by mouth daily.   Generic drug:  multivitamin, therapeutic with minerals

## 2017-12-21 NOTE — DISCHARGE INSTRUCTIONS
Liver Biopsy Discharge Instructions     After you go home:      You may resume your normal diet    Have an adult stay with you for 6 hours if you received sedation       For 24 hours - due to the sedation you received:    Relax and take it easy    Do NOT make any important or legal decisions    Do NOT drive or operate machines at home or at work    Do NOT drink alcohol    Care of Puncture Site:      For the first 48 hrs, check your puncture site every couple hours while you are awake     You may remove/change the bandaid tomorrow    You may shower tomorrow    No tub baths, whirlpools or swimming until your puncture site has fully healed    Activity       You may go back to normal activity in 24 hours     Wait 48 hours before lifting, straining, exercise or other strenuous activity    Medicines:      You may resume all medications    Resume your Warfarin/Coumadin at your regular dose today. Follow up with your provider to have your INR rechecked    Resume your Platelet Inhibitors and Aspirin tomorrow at your regular dose    For minor pain, you may take Acetaminophen (Tylenol) or Ibuprofen (Advil)            Call the provider who ordered this test if:      Increased pain or a large or growing hard lump around the site    Blood or fluid is draining from the site    The site is red, swollen, hot or tender    Chills or a fever greater than 101 F (38 C)    Pain that is getting worse    Any questions or concerns    Call  911 or go to the Emergency Room if:      Severe pain or trouble breathing    Bleeding that you cannot control        If you have questions call:          [facility] Radiology Dept @ [phone]      The provider who performed your procedure was __Dr. Wetzel_______________.

## 2017-12-22 LAB — COPATH REPORT: NORMAL

## 2017-12-27 ENCOUNTER — MEDICAL CORRESPONDENCE (OUTPATIENT)
Dept: HEALTH INFORMATION MANAGEMENT | Facility: CLINIC | Age: 72
End: 2017-12-27

## 2017-12-29 ENCOUNTER — OFFICE VISIT (OUTPATIENT)
Dept: CARDIOLOGY | Facility: CLINIC | Age: 72
End: 2017-12-29
Attending: PHYSICIAN ASSISTANT
Payer: COMMERCIAL

## 2017-12-29 VITALS
HEIGHT: 64 IN | DIASTOLIC BLOOD PRESSURE: 68 MMHG | HEART RATE: 60 BPM | SYSTOLIC BLOOD PRESSURE: 128 MMHG | WEIGHT: 137 LBS | BODY MASS INDEX: 23.39 KG/M2

## 2017-12-29 DIAGNOSIS — I48.0 PAROXYSMAL ATRIAL FIBRILLATION (H): ICD-10-CM

## 2017-12-29 PROCEDURE — 99205 OFFICE O/P NEW HI 60 MIN: CPT | Performed by: INTERNAL MEDICINE

## 2017-12-29 PROCEDURE — 93000 ELECTROCARDIOGRAM COMPLETE: CPT | Performed by: INTERNAL MEDICINE

## 2017-12-29 RX ORDER — PREDNISONE 20 MG/1
40 TABLET ORAL DAILY
COMMUNITY
End: 2018-03-13

## 2017-12-29 NOTE — MR AVS SNAPSHOT
After Visit Summary   12/29/2017    Elaina Winn    MRN: [id number]           Patient Information     Date Of Birth          1945        Visit Information        Provider Department      12/29/2017 3:15 PM Yamil Erwin MD [facility]        Today's Diagnoses     Paroxysmal atrial fibrillation (H)           Follow-ups after your visit        Additional Services     Follow-Up with Electrophysiologist                 Your next 10 appointments already scheduled     Jan 05, 2018  4:00 PM CST   SHORT [facility]ella [facility] DO   [facility]    [address] [facility] [address]   [phone]            Feb 14, 2018 11:15 AM CST   Return Visit with Juan C Perez MD   [facility] ([facility])    [id number]  [facility] [address] [phone]              Future tests that were ordered for you today     Open Future Orders        Priority Expected Expires Ordered    Zio Patch Monitor Routine 1/5/2018 12/29/2018 12/29/2017    Follow-Up with Electrophysiologist Routine 1/27/2018 12/29/2018 12/29/2017            Who to contact     If you have questions or need follow up information about today's clinic visit or your schedule please contact [facility] directly at [phone].  Normal or non-critical lab and imaging results will be communicated to you by MyChart, letter or phone within 4 business days after the clinic has received the results. If you do not hear from us within 7 days, please contact the clinic through MyChart or phone. If you have a critical or abnormal lab result, we will notify you by phone as soon as possible.  Submit refill requests through [employer] or call your pharmacy and they will forward the refill request to us. Please allow 3 business days for your refill to be completed.          Additional  "Information About Your Visit        MyChart Information     [employer] gives you secure access to your electronic health record. If you see a primary care provider, you can also send messages to your care team and make appointments. If you have questions, please call your primary care clinic.  If you do not have a primary care provider, please call [phone] and they will assist you.        Care EveryWhere ID     This is your Care EveryWhere ID. This could be used by other organizations to access your [facility] medical records  [id number]        Your Vitals Were     Pulse Height BMI (Body Mass Index)             60 1.626 m (5' 4\") 23.52 kg/m2          Blood Pressure from Last 3 Encounters:   12/29/17 128/68   12/21/17 111/54   12/08/17 112/62    Weight from Last 3 Encounters:   12/29/17 62.1 kg (137 lb)   12/08/17 62.1 kg (137 lb)   12/07/17 61.2 kg (135 lb)              We Performed the Following     EKG 12-lead complete w/read - Clinics (performed today)     Follow-Up with Cardiologist          Today's Medication Changes          These changes are accurate as of: 12/29/17  3:48 PM.  If you have any questions, ask your nurse or doctor.               Stop taking these medicines if you haven't already. Please contact your care team if you have questions.     apixaban ANTICOAGULANT 2.5 MG tablet   Commonly known as:  ELIQUIS   Stopped by:  Yamil Erwin MD           metoprolol 50 MG tablet   Commonly known as:  LOPRESSOR   Stopped by:  Yamil Erwin MD                    Primary Care Provider Office Phone # Fax #    Cady Marie PA-C [phone] [address] [facility] [address]  [place] [address]        Equal Access to Services     [facility]: Hadii aad bulmaro moya Soduyen, waaxda luqadaha, qaybta kaalmada adeegyada, zoran sanchez. So [facility] [phone].    ATENCIÓN: Si habla español, tiene a munoz disposición servicios gratuitos de asistencia lingüística. Llame al " [phone].    We comply with applicable federal civil rights laws and [place] laws. We do not discriminate on the basis of race, color, national origin, age, disability, sex, sexual orientation, or gender identity.            Thank you!     Thank you for choosing [facility]  for your care. Our goal is always to provide you with excellent care. Hearing back from our patients is one way we can continue to improve our services. Please take a few minutes to complete the written survey that you may receive in the mail after your visit with us. Thank you!             Your Updated Medication List - Protect others around you: Learn how to safely use, store and throw away your medicines at www.disposemymeds.org.          This list is accurate as of: 12/29/17  3:48 PM.  Always use your most recent med list.                   Brand Name Dispense Instructions for use Diagnosis    brimonidine-timolol 0.2-0.5 % ophthalmic solution    COMBIGAN     Place 1 drop into both eyes 2 times daily        diltiazem 120 MG 24 hr capsule    CARDIZEM CD/CARTIA XT    90 capsule    Take 1 capsule (120 mg) by mouth daily    PAF (paroxysmal atrial fibrillation) (H)       FISH OIL PO      Take 1,000 mg by mouth daily        Multi-vitamin Tabs tablet   Generic drug:  multivitamin, therapeutic with minerals      Take 1 tablet by mouth daily.        predniSONE 20 MG tablet    DELTASONE     Take 40 mg by mouth daily As directed

## 2017-12-29 NOTE — PROGRESS NOTES
HISTORY OF PRESENT ILLNESS:  I saw Ms. Winn for evaluation of atrial fibrillation.  She is a 72-year-old white female who was admitted to the hospital for severe liver dysfunction in mid-November.  During that hospitalization, she was found to have paroxysmal atrial fibrillation with rapid ventricular rate.  She was put on Eliquis for anticoagulation.  Her echocardiography showed normal LV ejection fraction.  After the discharge from the hospital, she was on metoprolol and diltiazem.  She recently had an ER visit for severe weakness and the dose of metoprolol was reduced.        At the present time, she continues to feel severe fatigue.  She has episodes of orthostatic hypotension with systolic blood pressure down to the 80s.  The patient was put on a CardioNet event monitor which detected atrial fibrillation with rapid ventricular rate on 12/05.  Interestingly, the patient did not have palpitations or shortness of breath during atrial fibrillation.  That was the only episode of atrial fibrillation detected during the 1 month of EKG monitoring.      It has been suspected that the patient is having some acute hepatitis of unknown etiology.  That was based on recent biopsy of the liver.      Her other medical conditions include dementia, hypertension, hyperlipidemia and chronic renal insufficiency.      PHYSICAL EXAMINATION:   VITAL SIGNS:  Blood pressure was 128/60, heart rate 60 beats per minute, body weight 137 pounds.   HEENT:  Eyes and ENT were unremarkable.   LUNGS:  Clear.   CARDIAC:  Rhythm was regular and heart sounds were normal with no murmur.   ABDOMEN:  Examination showed no hepatomegaly.   EXTREMITIES:  There was no pedal edema.      EKG today showed normal sinus rhythm.      ASSESSMENT AND RECOMMENDATIONS:  Ms. Winn came to the clinic with her  today.  She is complaining of fatigue.  She is in sinus rhythm.  She has contraindication for anticoagulation and I therefore stopped her Eliquis.  She  will continue further evaluation for her liver dysfunction.  She had 1 episode of atrial fibrillation with rapid ventricular rate after the discharge from the hospital.  At the present time, she has no symptoms of atrial fibrillation.  It is unknown if she has continued occurrence of asymptomatic atrial fibrillation.  She will wear a 2-week ZIO Patch monitor in the next few weeks.  I plan to see her for followup at the end of January.      The patient is not a candidate for antiarrhythmic drug use at the present time because of severe liver dysfunction.  Sotalol it is also a problem because of kidney dysfunction.      If she continues to have recurrent atrial fibrillation with rapid ventricular rate, we may have to discuss AV node ablation and pacemaker implant.      cc:      JOSE Salomon   [facility] [place]   [address]   [place] [address]         NORM HERNANDES MD             D: 2017 15:54   T: 2017 16:15   MT: MELISSA      Name:     ABDULLAHI BLACKWOOD   MRN:      [id number]        Account:      [id number]   :      1945           Service Date: 2017      Document: [id number]

## 2017-12-29 NOTE — LETTER
12/29/2017      Cady Marie PA-C  [address] [facility] [address]  [facility] [address]      RE: Elaina Winn       Dear Colleague,    I had the pleasure of seeing Elaina Winn in the [facility].    HISTORY OF PRESENT ILLNESS:  I saw Ms. Winn for evaluation of atrial fibrillation.  She is a 72-year-old white female who was admitted to the hospital for severe liver dysfunction in mid-November.  During that hospitalization, she was found to have paroxysmal atrial fibrillation with rapid ventricular rate.  She was put on Eliquis for anticoagulation.  Her echocardiography showed normal LV ejection fraction.  After the discharge from the hospital, she was on metoprolol and diltiazem.  She recently had an ER visit for severe weakness and the dose of metoprolol was reduced.        At the present time, she continues to feel severe fatigue.  She has episodes of orthostatic hypotension with systolic blood pressure down to the 80s.  The patient was put on a CardioNet event monitor which detected atrial fibrillation with rapid ventricular rate on 12/05.  Interestingly, the patient did not have palpitations or shortness of breath during atrial fibrillation.  That was the only episode of atrial fibrillation detected during the 1 month of EKG monitoring.      It has been suspected that the patient is having some acute hepatitis of unknown etiology.  That was based on recent biopsy of the liver.      Her other medical conditions include dementia, hypertension, hyperlipidemia and chronic renal insufficiency.      PHYSICAL EXAMINATION:   VITAL SIGNS:  Blood pressure was 128/60, heart rate 60 beats per minute, body weight 137 pounds.   HEENT:  Eyes and ENT were unremarkable.   LUNGS:  Clear.   CARDIAC:  Rhythm was regular and heart sounds were normal with no murmur.   ABDOMEN:  Examination showed no hepatomegaly.   EXTREMITIES:  There was no pedal edema.      EKG today showed normal sinus rhythm.       ASSESSMENT AND RECOMMENDATIONS:  Ms. Winn came to the clinic with her  today.  She is complaining of fatigue.  She is in sinus rhythm.  She has contraindication for anticoagulation and I therefore stopped her Eliquis.  She will continue further evaluation for her liver dysfunction.  She had 1 episode of atrial fibrillation with rapid ventricular rate after the discharge from the hospital.  At the present time, she has no symptoms of atrial fibrillation.  It is unknown if she has continued occurrence of asymptomatic atrial fibrillation.  She will wear a 2-week ZIO Patch monitor in the next few weeks.  I plan to see her for followup at the end of January.      The patient is not a candidate for antiarrhythmic drug use at the present time because of severe liver dysfunction.  Sotalol it is also a problem because of kidney dysfunction.      If she continues to have recurrent atrial fibrillation with rapid ventricular rate, we may have to discuss AV node ablation and pacemaker implant.      No facility-administered encounter medications on file as of 12/29/2017.      Outpatient Encounter Prescriptions as of 12/29/2017   Medication Sig Dispense Refill     predniSONE (DELTASONE) 20 MG tablet Take 40 mg by mouth daily As directed       diltiazem (CARDIZEM CD/CARTIA XT) 120 MG 24 hr capsule Take 1 capsule (120 mg) by mouth daily 90 capsule 3     brimonidine-timolol (COMBIGAN) 0.2-0.5 % ophthalmic solution Place 1 drop into both eyes 2 times daily        Omega-3 Fatty Acids (FISH OIL PO) Take 1,000 mg by mouth daily        Multiple Vitamin (MULTI-VITAMIN) per tablet Take 1 tablet by mouth daily.       [DISCONTINUED] metoprolol (LOPRESSOR) 50 MG tablet Take 1 tablet (50 mg) by mouth 2 times daily 60 tablet 0     [DISCONTINUED] apixaban ANTICOAGULANT (ELIQUIS) 2.5 MG tablet Take 1 tablet (2.5 mg) by mouth 2 times daily 60 tablet 3     Again, thank you for allowing me to participate in the care of your patient.       Sincerely,    Yamil Erwin MD     [facility]

## 2017-12-31 ENCOUNTER — HOSPITAL ENCOUNTER (EMERGENCY)
Facility: CLINIC | Age: 72
Discharge: HOME | DRG: 310 | End: 2017-12-31
Attending: EMERGENCY MEDICINE | Admitting: EMERGENCY MEDICINE
Payer: MEDICARE

## 2017-12-31 VITALS
TEMPERATURE: 97.6 F | OXYGEN SATURATION: 99 % | WEIGHT: 135 LBS | BODY MASS INDEX: 23.17 KG/M2 | HEART RATE: 82 BPM | DIASTOLIC BLOOD PRESSURE: 78 MMHG | RESPIRATION RATE: 10 BRPM | SYSTOLIC BLOOD PRESSURE: 114 MMHG

## 2017-12-31 DIAGNOSIS — I48.91 ATRIAL FIBRILLATION WITH RAPID VENTRICULAR RESPONSE (H): ICD-10-CM

## 2017-12-31 LAB — INTERPRETATION ECG - MUSE: NORMAL

## 2017-12-31 RX ORDER — METOPROLOL TARTRATE 1 MG/ML
5 INJECTION, SOLUTION INTRAVENOUS ONCE
Status: DISCONTINUED | OUTPATIENT
Start: 2017-12-31 | End: 2017-12-31 | Stop reason: HOSPADM

## 2017-12-31 RX ORDER — PROPOFOL 10 MG/ML
40 INJECTION, EMULSION INTRAVENOUS ONCE
Status: COMPLETED | OUTPATIENT
Start: 2017-12-31 | End: 2017-12-31

## 2017-12-31 RX ADMIN — SODIUM CHLORIDE 1000 ML: 9 INJECTION, SOLUTION INTRAVENOUS at 08:45

## 2017-12-31 RX ADMIN — PROPOFOL 40 MG: 10 INJECTION, EMULSION INTRAVENOUS at 09:36

## 2017-12-31 ASSESSMENT — ENCOUNTER SYMPTOMS
NAUSEA: 1
PALPITATIONS: 1
SHORTNESS OF BREATH: 1

## 2017-12-31 NOTE — ED NOTES
Bed: ST03  Expected date:   Expected time:   Means of arrival:   Comments:  Hold for cardioversion/24

## 2017-12-31 NOTE — ED AVS SNAPSHOT
Emergency Department    [address] [place] [id number]    Phone:  [phone]    Fax:  [phone]                                       Elaina Winn   MRN: [id number]    Department:   Emergency Department   Date of Visit:  12/31/2017           After Visit Summary Signature Page     I have received my discharge instructions, and my questions have been answered. I have discussed any challenges I see with this plan with the nurse or doctor.    ..........................................................................................................................................  Patient/Patient Representative Signature      ..........................................................................................................................................  Patient Representative Print Name and Relationship to Patient    ..................................................               ................................................  Date                                            Time    ..........................................................................................................................................  Reviewed by Signature/Title    ...................................................              ..............................................  Date                                                            Time

## 2017-12-31 NOTE — ED NOTES
Assisted with cardioversion.    0936 40mg propofol  0937 20mg propofol  0937 20mg propofol    0937  Cardioversion sync defib. 200j - NSR for 30 seconds and converted to a fib with RVR  0938  cardioversion sync defib. 200j - Pt remains in a fib with RVR  0939  cardioversion sync defib 200j - NSR HR 65-75bpm    0945 Pt arouses from sedation, responds to questions.  Opens eyes to voice. HR NSR 66  0950 Pt opens eyes spontaneously, a/o x4.  SBP 92/58. HR 65 NSR. O2 99% 3lpm oxymask  0955 Transfer back to room 25

## 2017-12-31 NOTE — ED PROVIDER NOTES
History     Chief Complaint:  Palpitations    The history is provided by the patient.      Elaina Winn is a 72 year old female with history of paroxysmal atrial fibrillation and liver dysfunction who presents with palpitations. The patient was started on Eliquis this past November after being found with a-fib with RVR and she is being followed by Dr. Erwin of Cardiology. She was discharged from her hospital stay on metoprolol and diltiazem. She was also placed on a CardioNet event monitor which detected a-fib with RVR on 12/05 despite the patient being asymptomatic. She had increased weakness and episodes of orthostatic hypotension and was taken off Eliquis and Metoprolol in the past few weeks. The patient is also not a candidate for antiarrhythmic medication due to her liver dysfunction for which she recently had a liver biopsy.  The patient comes to the ED today due to an onset of palpitations last night at around 0000. She had associated nausea and mild shortness of breath, and when she checked her heart rate it was around 105. She called her doctor who instructed her to take her regular diltiazem dose and come in for evaluation if her symptoms persist. Here in the ED she states that her arthritis makes it difficult for her to differentiate her right shoulder pain with chest pain. She has no other medical concerns.    Allergies:  Zocor [Simvastatin - High Dose]      Medications:    Deltasone  Diltiazem  Combigan opthalmic    Past Medical History:    Anxiety   Chronic renal insufficiency   Dementia   Dry eyes, bilateral   Generalized OA   Glaucoma   HTN (hypertension), benign   Hyperlipidemia LDL goal < 130   Liver dysfunction   Paroxysmal a-fib (H)       Past Surgical History:    Dilation and curettage  Hysterectomy  Septoplasty  Partial vaginectomy  Tonsillectomy and Adenoidectomy    Family History:    HTN  CHF  Alzheimer disease  Lung cancer  Lipids    Social History:  Presents with spouse   Tobacco use:  Former smoker. 1/1/1990  Alcohol use: No  PCP: Cady Marie    Marital Status:       Review of Systems   Respiratory: Positive for shortness of breath.    Cardiovascular: Positive for palpitations.   Gastrointestinal: Positive for nausea.   All other systems reviewed and are negative.    Physical Exam     Patient Vitals for the past 24 hrs:   BP Temp Temp src Pulse Heart Rate Resp SpO2 Weight   12/31/17 1100 114/78 - - - 65 10 99 % -   12/31/17 1045 105/62 - - - 62 - 99 % -   12/31/17 1030 96/63 - - - - - - -   12/31/17 1015 99/60 - - - 63 - 100 % -   12/31/17 0954 92/58 - - - - - - -   12/31/17 0950 91/57 - - - 67 15 100 % -   12/31/17 0945 99/55 - - - 68 23 100 % -   12/31/17 0943 102/64 - - - 66 12 100 % -   12/31/17 0940 95/64 - - - 74 14 100 % -   12/31/17 0935 128/85 - - - 123 15 100 % -   12/31/17 0812 122/77 97.6  F (36.4  C) Oral 82 - 16 99 % 61.2 kg (135 lb)      Physical Exam  General/Appearance: appears stated age, well-groomed, appears comfortable  Eyes: EOMI, no scleral injection, no icterus  ENT: MMM  Neck: supple, nl ROM, no stiffness  Cardiovascular: Tachycardic, irregularly irregular rhythm, nl S1S2, no m/r/g, 2+ pulses in all 4 extremities, cap refill <2sec  Respiratory: CTAB, good air movement throughout, no wheezes/rhonchi/rales, no increased WOB, no retractions  Back: no lesions  GI: abd soft, non-distended, nttp,  no HSM, no rebound, no guarding, nl BS  MSK: HUMPHRIES, good tone, no bony abnormality  Skin: warm and well-perfused, no rash, no edema, no ecchymosis, nl turgor  Neuro: GCS 15, alert and oriented, no gross focal neuro deficits  Psych: interacts appropriately  Heme: no petechia, no purpura, no active bleeding    Emergency Department Course   ECG (8:21:20):  Rate 121 bpm. MS interval *. QRS duration 76. QT/QTc 302/428. P-R-T axes * 35 34. Atrial fibrillation with rapid ventricular response. Abnormal ECG. Agree with computer interpretation. Interpreted at 0824 by Nubia Zamora  MD Jesus Alberto.     ECG (9:40:54):  Rate 68 bpm. NC interval 126. QRS duration 74. QT/QTc 414/440. P-R-T axes 57 44 38. Normal sinus rhythm. Possible left atrial enlargement. Borderline ECG. Now sinus rhythm. Agree with computer interpretation. Change noted. Interpreted at 0941 by Nubia Zamora MD.     Procedures:    Sedation:      PERFORMED BY: Nubia Zamora MD    Pre-Procedure Assessment done at 0930.    EXPECTED LEVEL:  Deep Sedation    INDICATION:  Sedation is required to allow for Cardioversion    Consent obtained from patient after discussing the risks, benefits and alternatives.    PO INTAKE: Appropriately NPO for procedure    ASA CLASS: Class 2 - MILD SYSTEMIC DISEASE, NO ACUTE PROBLEMS, NO FUNCTIONAL LIMITATIONS.    MALLAMPATI: Grade 1:  Soft palate, uvula, tonsillar pillars, and posterior pharyngeal wall visible    LUNGS: Lungs Clear with good breath sounds bilaterally.     HEART: Irregularly irregular rate, tachycardic    History and physical reviewed and no updates needed. I have reviewed the lab findings, diagnostic data, medications, and the plan for sedation. I have determined this patient to be an appropriate candidate for the planned sedation and procedure and have reassessed the patient IMMEDIATELY PRIOR to sedation and procedure.    Sedation Post Procedure Summary:    Prior to the start of the procedure and with procedural staff participation, I verbally confirmed the patient s identity using two indicators, relevant allergies, that the procedure was appropriate and matched the consent or emergent situation, and that the correct equipment/implants were available. Immediately prior to starting the procedure I conducted the Time Out with the procedural staff and re-confirmed the patient s name, procedure, and site/side. (The Joint Commission universal protocol was followed.)  Yes      SEDATIVES: Propofol    Vital signs, airway, End Tidal CO2 and pulse oximetry were monitored  "and remained stable throughout the procedure and sedation was maintained until the procedure was complete.  The patient was monitored by staff until sedation discharge criteria were met.    PATIENT TOLERANCE: Patient tolerated the procedure well with no immediate complications.    TIME OF SEDATION IN MINUTES:  15 minutes from beginning to end of physician one to one monitoring.         Cardioversion/Defibrillation:      Performed by Nubia Zamora MD     Pre Procedure Rhythm:  Atrial Fibrillation  Consent: Consent given by: Patient who states understanding of the procedure being performed after discussing the  risks, benefits and alternatives.  Time out: Immediately prior to procedure a \"time out\" was called to verify the correct patient, procedure, equipment, support staff and site/side marked as required.    Sedation:  Patient sedated with Propofol  Vital signs, airway and pulse oximetry were monitored and remained stable throughout the procedure and sedation was maintained until the procedure was complete.  The patient was monitored with staff at the bedside until she fully regained consciousness.  Procedure: The patient was placed in the supine position and the chest area was exposed. The cardioversion pads were applied in the standard manner and configuration.     The Biphasic defibrillator was set on the Synchronized mode and the patient was shocked 3 times at 200 Joules, the final shock resulting in Sinus Rhythm.     The Patient tolerated the procedure well with no immediate complications.           ECG Post Procedure:      EKG Number: 2.  Done at 9:40 AM.  Interpreted by Nubia Zamora MD  Symptoms at time of EKG: None   Rhythm: Normal sinus   Rate: Normal  Axis: Normal  Ectopy: None  Conduction: Normal  ST Segments/ T Waves: No ST-T wave changes and No acute ischemic changes  Q Waves: None  Comparison to prior: Now in sinus rhythm.    Clinical Impression: normal " EKG    Interventions:  0845: NS 1L IV Bolus   0936: Diprivan 40 mg IV    Emergency Department Course:  Past medical records, nursing notes, and vitals reviewed.  0823: I performed an exam of the patient and obtained history, as documented above.    0929: I performed a sedation and cardioversion as noted above.    1052: I rechecked the patient. Patient remains in sinus rhythm. Findings and plan explained to the Patient and spouse. Patient discharged home with instructions regarding supportive care, medications, and reasons to return. The importance of close follow-up was reviewed.    Impression & Plan    Medical Decision Making:  This patient is a 72-year-old female with known paroxysmal A. fib who presents with A. fib with RVR.  She is currently being followed by cardiology as an outpatient for this and unfortunately they are struggling to find good antiarrhythmics as she is already on diltiazem but had to have metoprolol discontinued due to orthostatic hypotension.  Here I was unable to give her any metoprolol as well to help her maintain her normal sinus rhythm as her blood pressure and heart rate are already borderline.  We have discussed, per their notes, more intensive medications however she has elevated LFTs that are currently being worked up.  I reemphasized to both her and her  why these medications have not been started and why the metoprolol was anticoagulation as she has contraindications.  She is unable to identify what these are but, per cardiology notes, she is not on them on purpose.  I felt it was appropriate to cardiovert her today as she had an appointment with her cardiologist less than 48 hours ago and it was noted that she was in normal sinus rhythm.  It did require 3 electrical attempts to successfully cardiovert her and for her to stay in normal sinus rhythm.  She has been observed for 1 hour following the procedure and has maintained normal sinus rhythm.  She is at high risk of  returning to A. fib in the understand that she may need to return to the ED where she to redevelop palpitations/other symptoms.  That being said at this point she is medically stable and clear and appropriate for discharge.    Diagnosis:    ICD-10-CM   1. Atrial fibrillation with rapid ventricular response (H) I48.91       Disposition:  Discharged to home with plan as outlined.        Anderson Rodriguez  12/31/2017    EMERGENCY DEPARTMENT  I, Anderson Rodriguez, am serving as a scribe at 8:23 AM on 12/31/2017 to document services personally performed by Nubia Zamora MD based on my observations and the provider's statements to me.       Nubia Zamora MD  12/31/17 1201       Nubia Zamora MD  12/31/17 1202

## 2017-12-31 NOTE — ED AVS SNAPSHOT
Emergency Department    [address] [facility] [address]    Phone:  [phone]    Fax:  [phone]                                       Elaina Winn   MRN: [id number]    Department:   Emergency Department   Date of Visit:  12/31/2017           Patient Information     Date Of Birth          1945        Your diagnoses for this visit were:     Atrial fibrillation with rapid ventricular response (H)        You were seen by Nubia Zamora MD.      Follow-up Information     Follow up with Follow-up with your Cardiologist.        Follow up with  Emergency Department.    Specialty:  EMERGENCY MEDICINE    Why:  As needed, If symptoms worsen    Contact information:    [address] [facility] [address] [phone]        Discharge Instructions         Discharge Instructions for Atrial Fibrillation  You have been diagnosed with an abnormal heart rhythm called atrial fibrillation. With this condition, your heart s 2 upper chambers quiver rather than squeeze the blood out in a normal pattern. This leads to an irregular and sometimes rapid heartbeat. Some people will develop associated symptoms such as a flip-flopping heartbeat, chest pain, lightheadedness, or shortness of breath. Other people may have no symptoms at all. Atrial fibrillation is serious because it affects the heart s ability to fill with blood as it should. Blood clots may form. This increases the risk for stroke. Untreated atrial fibrillation can also lead to heart failure. Atrial fibrillation can be controlled. With treatment, most people with atrial fibrillation lead normal lives.  Treatment options  Recommended treatment for atrial fibrillation depends on your age, symptoms, how long you have had atrial fibrillation, and other factors. You will have a complete evaluation to find out if you have any abnormalities that caused your heart to go into atrial fibrillation. This might be blocked heart  arteries or a thyroid problem. Your doctor will assess your particular case and discuss choices with you.  Treatment choices may include:    Treating an underlying disorder that puts you at risk for atrial fibrillation. For example, correcting an abnormal thyroid or electrolyte problem, or treating a blocked heart artery.    Restoring a normal heart rhythm with an electrical shock (cardioversion) or with an antiarrhythmic medicine (chemical cardioversion)    Using medicine to control your heart rate in atrial fibrillation.    Preventing the risk for blood clot and stroke using blood-thinning medicines. Your doctor will tell you what he or she recommends. Choices may include aspirin, clopidogrel, warfarin, dabigatran, rivaroxaban, apixaban, and edoxaban.    Doing catheter ablation or a surgical maze procedure. These use different methods to destroy certain areas of heart tissue. This interrupts the electrical signals causing atrial fibrillation. One of these procedures may be a choice when medicines do not work, or as an alternative to long-term medicine.    Other treatment choices may be recommended for you by your doctor.  Managing risk factors for stroke and preventing heart failure are important parts of any treatment plan for atrial fibrillation.  Home care    Take your medicines exactly as directed. Don t skip doses.    Work with your doctor to find the right medicines and doses for you.    Learn to take your own pulse. Keep a record of your results. Ask your doctor which pulse rates mean that you need medical attention. Slowing your pulse is often the goal of treatment. Ask your doctor if it s OK for you to use an automatic machine to check your pulse at home. Sometimes these machines don t count the pulse correctly when you have atrial fibrillation.    Limit your intake of coffee, tea, cola, and other beverages with caffeine. Talk with your doctor about whether you should eliminate caffeine.    Avoid  over-the-counter medicines that have caffeine in them.    Let your doctor know what medicines you take, including prescription and over-the-counter medicines, as well as any supplements. They interfere with some medicines given for atrial fibrillation.    Ask your doctor about whether you can drink alcohol. Some people need to avoid alcohol to better treat atrial fibrillation. If you are taking blood-thinner medicines, alcohol may interfere with them by increasing their effect.    Never take stimulants such as amphetamines or cocaine. These drugs can speed up your heart rate and trigger atrial fibrillation.  Follow-up care  Follow up with your doctor, or as advised.     When should I call my healthcare provider  Call your healthcare provider right away if you have any of the following:    Weakness    Dizziness    Fainting    Fatigue    Shortness of breath    Chest pain with increased activity    A change in the usual regularity of your heartbeat, or an unusually fast heartbeat   Date Last Reviewed: 4/23/2016 2000-2017 [employer]. [address] [place] [address]. All rights reserved. This information is not intended as a substitute for professional medical care. Always follow your healthcare professional's instructions.          Future Appointments        Provider Department Dept Phone Center    1/2/2018 2:00 PM [facility] HEART CLINIC OUTSIDE EXAMS [facility] Radiology - [facility] Heart Imaging [phone] [place] JEANETTE    1/5/2018 4:00 PM Stella Delgado,  [facility] [phone]     1/30/2018 2:45 PM Yamil Erwin MD [facility] [phone] [facility] PSA CLIN    2/14/2018 11:15 AM Juan C Perez MD [facility] [phone] [facility] PSA CLIN      24 Hour Appointment Hotline       To make an appointment at any [facility], call [phone] ([phone]). If you don't have a family doctor or  clinic, we will help you find one. [facility] are conveniently located to serve the needs of you and your family.             Review of your medicines      Our records show that you are taking the medicines listed below. If these are incorrect, please call your family doctor or clinic.        Dose / Directions Last dose taken    brimonidine-timolol 0.2-0.5 % ophthalmic solution   Commonly known as:  COMBIGAN   Dose:  1 drop        Place 1 drop into both eyes 2 times daily   Refills:  0        diltiazem 120 MG 24 hr capsule   Commonly known as:  CARDIZEM CD/CARTIA XT   Dose:  120 mg   Quantity:  90 capsule        Take 1 capsule (120 mg) by mouth daily   Refills:  3        FISH OIL PO   Dose:  1000 mg        Take 1,000 mg by mouth daily   Refills:  0        Multi-vitamin Tabs tablet   Dose:  1 tablet   Generic drug:  multivitamin, therapeutic with minerals        Take 1 tablet by mouth daily.   Refills:  0        predniSONE 20 MG tablet   Commonly known as:  DELTASONE   Dose:  40 mg        Take 40 mg by mouth daily As directed   Refills:  0                Procedures and tests performed during your visit     EKG 12 lead    EKG 12-lead, tracing only      Orders Needing Specimen Collection     None      Pending Results     No orders found from 12/29/2017 to 1/1/2018.            Pending Culture Results     No orders found from 12/29/2017 to 1/1/2018.            Pending Results Instructions     If you had any lab results that were not finalized at the time of your Discharge, you can call the ED Lab Result RN at [phone]. You will be contacted by this team for any positive Lab results or changes in treatment. The nurses are available 7 days a week from 10A to 6:30P.  You can leave a message 24 hours per day and they will return your call.        Test Results From Your Hospital Stay               Clinical Quality Measure: Blood Pressure Screening     Your blood pressure was checked while you were in the emergency  department today. The last reading we obtained was  BP: 105/62 . Please read the guidelines below about what these numbers mean and what you should do about them.  If your systolic blood pressure (the top number) is less than 120 and your diastolic blood pressure (the bottom number) is less than 80, then your blood pressure is normal. There is nothing more that you need to do about it.  If your systolic blood pressure (the top number) is 120-139 or your diastolic blood pressure (the bottom number) is 80-89, your blood pressure may be higher than it should be. You should have your blood pressure rechecked within a year by a primary care provider.  If your systolic blood pressure (the top number) is 140 or greater or your diastolic blood pressure (the bottom number) is 90 or greater, you may have high blood pressure. High blood pressure is treatable, but if left untreated over time it can put you at risk for heart attack, stroke, or kidney failure. You should have your blood pressure rechecked by a primary care provider within the next 4 weeks.  If your provider in the emergency department today gave you specific instructions to follow-up with your doctor or provider even sooner than that, you should follow that instruction and not wait for up to 4 weeks for your follow-up visit.        Thank you for choosing [facility]       Thank you for choosing [facility] for your care. Our goal is always to provide you with excellent care. Hearing back from our patients is one way we can continue to improve our services. Please take a few minutes to complete the written survey that you may receive in the mail after you visit with us. Thank you!        [employer]: [employer] Information     [employer] gives you secure access to your electronic health record. If you see a primary care provider, you can also send messages to your care team and make appointments. If you have questions, please call your primary care clinic.  If you do not have a primary  care provider, please call [phone] and they will assist you.        Care EveryWhere ID     This is your Care EveryWhere ID. This could be used by other organizations to access your [place] medical records  [id number]        Equal Access to Services     KAREN BABCOCK : Efrem Diamond, wakathy hartmann, qagaye kaalmasylvester argueta, zoran sanchez. So [facility] [phone].    ATENCIÓN: Si habla español, tiene a munoz disposición servicios gratuitos de asistencia lingüística. Llame al [phone].    We comply with applicable federal civil rights laws and [place] laws. We do not discriminate on the basis of race, color, national origin, age, disability, sex, sexual orientation, or gender identity.            After Visit Summary       This is your record. Keep this with you and show to your community pharmacist(s) and doctor(s) at your next visit.

## 2017-12-31 NOTE — DISCHARGE INSTRUCTIONS
Discharge Instructions for Atrial Fibrillation  You have been diagnosed with an abnormal heart rhythm called atrial fibrillation. With this condition, your heart s 2 upper chambers quiver rather than squeeze the blood out in a normal pattern. This leads to an irregular and sometimes rapid heartbeat. Some people will develop associated symptoms such as a flip-flopping heartbeat, chest pain, lightheadedness, or shortness of breath. Other people may have no symptoms at all. Atrial fibrillation is serious because it affects the heart s ability to fill with blood as it should. Blood clots may form. This increases the risk for stroke. Untreated atrial fibrillation can also lead to heart failure. Atrial fibrillation can be controlled. With treatment, most people with atrial fibrillation lead normal lives.  Treatment options  Recommended treatment for atrial fibrillation depends on your age, symptoms, how long you have had atrial fibrillation, and other factors. You will have a complete evaluation to find out if you have any abnormalities that caused your heart to go into atrial fibrillation. This might be blocked heart arteries or a thyroid problem. Your doctor will assess your particular case and discuss choices with you.  Treatment choices may include:    Treating an underlying disorder that puts you at risk for atrial fibrillation. For example, correcting an abnormal thyroid or electrolyte problem, or treating a blocked heart artery.    Restoring a normal heart rhythm with an electrical shock (cardioversion) or with an antiarrhythmic medicine (chemical cardioversion)    Using medicine to control your heart rate in atrial fibrillation.    Preventing the risk for blood clot and stroke using blood-thinning medicines. Your doctor will tell you what he or she recommends. Choices may include aspirin, clopidogrel, warfarin, dabigatran, rivaroxaban, apixaban, and edoxaban.    Doing catheter ablation or a surgical maze  procedure. These use different methods to destroy certain areas of heart tissue. This interrupts the electrical signals causing atrial fibrillation. One of these procedures may be a choice when medicines do not work, or as an alternative to long-term medicine.    Other treatment choices may be recommended for you by your doctor.  Managing risk factors for stroke and preventing heart failure are important parts of any treatment plan for atrial fibrillation.  Home care    Take your medicines exactly as directed. Don t skip doses.    Work with your doctor to find the right medicines and doses for you.    Learn to take your own pulse. Keep a record of your results. Ask your doctor which pulse rates mean that you need medical attention. Slowing your pulse is often the goal of treatment. Ask your doctor if it s OK for you to use an automatic machine to check your pulse at home. Sometimes these machines don t count the pulse correctly when you have atrial fibrillation.    Limit your intake of coffee, tea, cola, and other beverages with caffeine. Talk with your doctor about whether you should eliminate caffeine.    Avoid over-the-counter medicines that have caffeine in them.    Let your doctor know what medicines you take, including prescription and over-the-counter medicines, as well as any supplements. They interfere with some medicines given for atrial fibrillation.    Ask your doctor about whether you can drink alcohol. Some people need to avoid alcohol to better treat atrial fibrillation. If you are taking blood-thinner medicines, alcohol may interfere with them by increasing their effect.    Never take stimulants such as amphetamines or cocaine. These drugs can speed up your heart rate and trigger atrial fibrillation.  Follow-up care  Follow up with your doctor, or as advised.     When should I call my healthcare provider  Call your healthcare provider right away if you have any of the  following:    Weakness    Dizziness    Fainting    Fatigue    Shortness of breath    Chest pain with increased activity    A change in the usual regularity of your heartbeat, or an unusually fast heartbeat   Date Last Reviewed: 4/23/2016 2000-2017 [employer]. [address] [place] [address], [place] [address]. All rights reserved. This information is not intended as a substitute for professional medical care. Always follow your healthcare professional's instructions.

## 2018-01-01 ENCOUNTER — HOSPITAL ENCOUNTER (INPATIENT)
Facility: CLINIC | Age: 73
LOS: 2 days | Discharge: HOME | DRG: 310 | End: 2018-01-04
Attending: EMERGENCY MEDICINE | Admitting: INTERNAL MEDICINE
Payer: MEDICARE

## 2018-01-01 DIAGNOSIS — I48.0 PAF (PAROXYSMAL ATRIAL FIBRILLATION) (H): ICD-10-CM

## 2018-01-01 DIAGNOSIS — I48.91 ATRIAL FIBRILLATION WITH RAPID VENTRICULAR RESPONSE (H): ICD-10-CM

## 2018-01-01 DIAGNOSIS — R00.2 PALPITATIONS: ICD-10-CM

## 2018-01-01 LAB
ALBUMIN SERPL-MCNC: 2.9 G/DL (ref 3.4–5)
ALBUMIN SERPL-MCNC: NORMAL G/DL (ref 3.4–5)
ALP SERPL-CCNC: 153 U/L (ref 40–150)
ALP SERPL-CCNC: NORMAL U/L (ref 40–150)
ALT SERPL W P-5'-P-CCNC: 113 U/L (ref 0–50)
ALT SERPL W P-5'-P-CCNC: NORMAL U/L (ref 0–50)
ANION GAP SERPL CALCULATED.3IONS-SCNC: 7 MMOL/L (ref 3–14)
ANION GAP SERPL CALCULATED.3IONS-SCNC: NORMAL MMOL/L (ref 6–17)
AST SERPL W P-5'-P-CCNC: 77 U/L (ref 0–45)
AST SERPL W P-5'-P-CCNC: NORMAL U/L (ref 0–45)
BASOPHILS # BLD AUTO: 0 10E9/L (ref 0–0.2)
BASOPHILS NFR BLD AUTO: 0 %
BILIRUB DIRECT SERPL-MCNC: 0.1 MG/DL (ref 0–0.2)
BILIRUB DIRECT SERPL-MCNC: NORMAL MG/DL (ref 0–0.2)
BILIRUB SERPL-MCNC: 0.6 MG/DL (ref 0.2–1.3)
BILIRUB SERPL-MCNC: NORMAL MG/DL (ref 0.2–1.3)
BUN SERPL-MCNC: 36 MG/DL (ref 7–30)
BUN SERPL-MCNC: NORMAL MG/DL (ref 7–30)
CALCIUM SERPL-MCNC: 8.5 MG/DL (ref 8.5–10.1)
CALCIUM SERPL-MCNC: NORMAL MG/DL (ref 8.5–10.1)
CHLORIDE SERPL-SCNC: 109 MMOL/L (ref 94–109)
CHLORIDE SERPL-SCNC: NORMAL MMOL/L (ref 94–109)
CO2 SERPL-SCNC: 26 MMOL/L (ref 20–32)
CO2 SERPL-SCNC: NORMAL MMOL/L (ref 20–32)
CREAT SERPL-MCNC: 1.19 MG/DL (ref 0.52–1.04)
CREAT SERPL-MCNC: NORMAL MG/DL (ref 0.52–1.04)
DIFFERENTIAL METHOD BLD: NORMAL
EOSINOPHIL # BLD AUTO: 0 10E9/L (ref 0–0.7)
EOSINOPHIL NFR BLD AUTO: 0 %
ERYTHROCYTE [DISTWIDTH] IN BLOOD BY AUTOMATED COUNT: 13.9 % (ref 10–15)
GFR SERPL CREATININE-BSD FRML MDRD: 44 ML/MIN/1.7M2
GFR SERPL CREATININE-BSD FRML MDRD: NORMAL ML/MIN/1.7M2
GLUCOSE SERPL-MCNC: 142 MG/DL (ref 70–99)
GLUCOSE SERPL-MCNC: NORMAL MG/DL (ref 70–99)
HCT VFR BLD AUTO: 43.6 % (ref 35–47)
HGB BLD-MCNC: 14.7 G/DL (ref 11.7–15.7)
IMM GRANULOCYTES # BLD: 0 10E9/L (ref 0–0.4)
IMM GRANULOCYTES NFR BLD: 0.3 %
INTERPRETATION ECG - MUSE: NORMAL
LYMPHOCYTES # BLD AUTO: 1.4 10E9/L (ref 0.8–5.3)
LYMPHOCYTES NFR BLD AUTO: 16.6 %
MAGNESIUM SERPL-MCNC: 2.1 MG/DL (ref 1.6–2.3)
MCH RBC QN AUTO: 31.3 PG (ref 26.5–33)
MCHC RBC AUTO-ENTMCNC: 33.7 G/DL (ref 31.5–36.5)
MCV RBC AUTO: 93 FL (ref 78–100)
MONOCYTES # BLD AUTO: 0.3 10E9/L (ref 0–1.3)
MONOCYTES NFR BLD AUTO: 3.4 %
NEUTROPHILS # BLD AUTO: 6.9 10E9/L (ref 1.6–8.3)
NEUTROPHILS NFR BLD AUTO: 79.7 %
NRBC # BLD AUTO: 0 10*3/UL
NRBC BLD AUTO-RTO: 0 /100
PHOSPHATE SERPL-MCNC: 3.2 MG/DL (ref 2.5–4.5)
PLATELET # BLD AUTO: 266 10E9/L (ref 150–450)
POTASSIUM SERPL-SCNC: 4.7 MMOL/L (ref 3.4–5.3)
POTASSIUM SERPL-SCNC: NORMAL MMOL/L (ref 3.4–5.3)
PROT SERPL-MCNC: 6.9 G/DL (ref 6.8–8.8)
PROT SERPL-MCNC: NORMAL G/DL (ref 6.8–8.8)
RBC # BLD AUTO: 4.7 10E12/L (ref 3.8–5.2)
SODIUM SERPL-SCNC: 142 MMOL/L (ref 133–144)
SODIUM SERPL-SCNC: NORMAL MMOL/L (ref 133–144)
WBC # BLD AUTO: 8.7 10E9/L (ref 4–11)

## 2018-01-01 PROCEDURE — 96376 TX/PRO/DX INJ SAME DRUG ADON: CPT

## 2018-01-01 PROCEDURE — 25000128 H RX IP 250 OP 636

## 2018-01-01 PROCEDURE — 25000128 H RX IP 250 OP 636: Performed by: EMERGENCY MEDICINE

## 2018-01-01 PROCEDURE — 92960 CARDIOVERSION ELECTRIC EXT: CPT

## 2018-01-01 PROCEDURE — 40000065 ZZH STATISTIC EKG NON-CHARGEABLE

## 2018-01-01 PROCEDURE — 40000275 ZZH STATISTIC RCP TIME EA 10 MIN

## 2018-01-01 PROCEDURE — 99152 MOD SED SAME PHYS/QHP 5/>YRS: CPT

## 2018-01-01 PROCEDURE — 96365 THER/PROPH/DIAG IV INF INIT: CPT

## 2018-01-01 PROCEDURE — 96366 THER/PROPH/DIAG IV INF ADDON: CPT

## 2018-01-01 PROCEDURE — 84100 ASSAY OF PHOSPHORUS: CPT | Performed by: EMERGENCY MEDICINE

## 2018-01-01 PROCEDURE — 93005 ELECTROCARDIOGRAM TRACING: CPT | Mod: 76

## 2018-01-01 PROCEDURE — 99285 EMERGENCY DEPT VISIT HI MDM: CPT | Mod: 25

## 2018-01-01 PROCEDURE — 40000965 ZZH STATISTIC END TITIAL CO2 MONITORING

## 2018-01-01 PROCEDURE — 83735 ASSAY OF MAGNESIUM: CPT | Performed by: EMERGENCY MEDICINE

## 2018-01-01 PROCEDURE — 80048 BASIC METABOLIC PNL TOTAL CA: CPT | Performed by: EMERGENCY MEDICINE

## 2018-01-01 PROCEDURE — 93005 ELECTROCARDIOGRAM TRACING: CPT

## 2018-01-01 PROCEDURE — 85025 COMPLETE CBC W/AUTO DIFF WBC: CPT | Performed by: EMERGENCY MEDICINE

## 2018-01-01 PROCEDURE — 5A2204Z RESTORATION OF CARDIAC RHYTHM, SINGLE: ICD-10-PCS | Performed by: EMERGENCY MEDICINE

## 2018-01-01 PROCEDURE — 99220 ZZC INITIAL OBSERVATION CARE,LEVL III: CPT | Performed by: INTERNAL MEDICINE

## 2018-01-01 PROCEDURE — G0378 HOSPITAL OBSERVATION PER HR: HCPCS

## 2018-01-01 PROCEDURE — 99153 MOD SED SAME PHYS/QHP EA: CPT

## 2018-01-01 PROCEDURE — 80076 HEPATIC FUNCTION PANEL: CPT | Performed by: EMERGENCY MEDICINE

## 2018-01-01 PROCEDURE — 25000125 ZZHC RX 250

## 2018-01-01 PROCEDURE — 27211117 ZZH CARDIOVERT/DEFIB/PACER SUPP

## 2018-01-01 PROCEDURE — 25000125 ZZHC RX 250: Performed by: EMERGENCY MEDICINE

## 2018-01-01 RX ORDER — DILTIAZEM HYDROCHLORIDE 5 MG/ML
10 INJECTION INTRAVENOUS ONCE
Status: COMPLETED | OUTPATIENT
Start: 2018-01-01 | End: 2018-01-01

## 2018-01-01 RX ORDER — ONDANSETRON 2 MG/ML
4 INJECTION INTRAMUSCULAR; INTRAVENOUS EVERY 6 HOURS PRN
Status: DISCONTINUED | OUTPATIENT
Start: 2018-01-01 | End: 2018-01-04 | Stop reason: HOSPADM

## 2018-01-01 RX ORDER — DILTIAZEM HYDROCHLORIDE 5 MG/ML
INJECTION INTRAVENOUS
Status: COMPLETED
Start: 2018-01-01 | End: 2018-01-01

## 2018-01-01 RX ORDER — ACETAMINOPHEN 650 MG/1
650 SUPPOSITORY RECTAL EVERY 4 HOURS PRN
Status: DISCONTINUED | OUTPATIENT
Start: 2018-01-01 | End: 2018-01-04 | Stop reason: HOSPADM

## 2018-01-01 RX ORDER — PROPOFOL 10 MG/ML
INJECTION, EMULSION INTRAVENOUS
Status: COMPLETED
Start: 2018-01-01 | End: 2018-01-01

## 2018-01-01 RX ORDER — MULTIPLE VITAMINS W/ MINERALS TAB 9MG-400MCG
1 TAB ORAL DAILY
Status: DISCONTINUED | OUTPATIENT
Start: 2018-01-02 | End: 2018-01-04 | Stop reason: HOSPADM

## 2018-01-01 RX ORDER — PROPOFOL 10 MG/ML
20 INJECTION, EMULSION INTRAVENOUS
Status: DISCONTINUED | OUTPATIENT
Start: 2018-01-01 | End: 2018-01-04 | Stop reason: HOSPADM

## 2018-01-01 RX ORDER — CHLORAL HYDRATE 500 MG
1000 CAPSULE ORAL DAILY
Status: DISCONTINUED | OUTPATIENT
Start: 2018-01-02 | End: 2018-01-04 | Stop reason: HOSPADM

## 2018-01-01 RX ORDER — ACETAMINOPHEN 325 MG/1
650 TABLET ORAL EVERY 4 HOURS PRN
Status: DISCONTINUED | OUTPATIENT
Start: 2018-01-01 | End: 2018-01-04 | Stop reason: HOSPADM

## 2018-01-01 RX ORDER — BRIMONIDINE TARTRATE AND TIMOLOL MALEATE 2; 5 MG/ML; MG/ML
1 SOLUTION OPHTHALMIC 2 TIMES DAILY
Status: DISCONTINUED | OUTPATIENT
Start: 2018-01-02 | End: 2018-01-02

## 2018-01-01 RX ORDER — AMOXICILLIN 250 MG
1 CAPSULE ORAL 2 TIMES DAILY PRN
Status: DISCONTINUED | OUTPATIENT
Start: 2018-01-01 | End: 2018-01-04 | Stop reason: HOSPADM

## 2018-01-01 RX ORDER — PREDNISONE 20 MG/1
40 TABLET ORAL DAILY
Status: DISCONTINUED | OUTPATIENT
Start: 2018-01-02 | End: 2018-01-04 | Stop reason: HOSPADM

## 2018-01-01 RX ORDER — ONDANSETRON 4 MG/1
4 TABLET, ORALLY DISINTEGRATING ORAL EVERY 6 HOURS PRN
Status: DISCONTINUED | OUTPATIENT
Start: 2018-01-01 | End: 2018-01-04 | Stop reason: HOSPADM

## 2018-01-01 RX ORDER — AMOXICILLIN 250 MG
2 CAPSULE ORAL 2 TIMES DAILY PRN
Status: DISCONTINUED | OUTPATIENT
Start: 2018-01-01 | End: 2018-01-04 | Stop reason: HOSPADM

## 2018-01-01 RX ORDER — NALOXONE HYDROCHLORIDE 0.4 MG/ML
.1-.4 INJECTION, SOLUTION INTRAMUSCULAR; INTRAVENOUS; SUBCUTANEOUS
Status: DISCONTINUED | OUTPATIENT
Start: 2018-01-01 | End: 2018-01-04 | Stop reason: HOSPADM

## 2018-01-01 RX ADMIN — DILTIAZEM HYDROCHLORIDE 10 MG: 5 INJECTION INTRAVENOUS at 20:53

## 2018-01-01 RX ADMIN — DILTIAZEM HYDROCHLORIDE 12 MG/HR: 5 INJECTION INTRAVENOUS at 22:32

## 2018-01-01 RX ADMIN — PROPOFOL 110 MG: 10 INJECTION, EMULSION INTRAVENOUS at 21:11

## 2018-01-01 RX ADMIN — DILTIAZEM HYDROCHLORIDE 10 MG: 5 INJECTION INTRAVENOUS at 22:25

## 2018-01-01 RX ADMIN — DILTIAZEM HYDROCHLORIDE 5 MG/HR: 5 INJECTION INTRAVENOUS at 21:28

## 2018-01-01 ASSESSMENT — ENCOUNTER SYMPTOMS
PALPITATIONS: 1
SHORTNESS OF BREATH: 1

## 2018-01-01 NOTE — IP AVS SNAPSHOT
MRN:[id number]                      After Visit Summary   1/1/2018    Elaina Winn    MRN: [id number]           Thank you!     Thank you for choosing [facility] for your care. Our goal is always to provide you with excellent care. Hearing back from our patients is one way we can continue to improve our services. Please take a few minutes to complete the written survey that you may receive in the mail after you visit with us. Thank you!        Patient Information     Date Of Birth          1945        About your hospital stay     You were admitted on:  January 1, 2018 You last received care in the:  [facility]    You were discharged on:  January 4, 2018        Reason for your hospital stay       A fib with rapid ventricular rate                  Who to Call     For medical emergencies, please call 911.  For non-urgent questions about your medical care, please call your primary care provider or clinic, [phone]          Attending Provider     Provider Specialty    Michael Espinoza MD --    Ronan Freitas MD Internal Medicine    [facility], Gail Rai MD Internal Medicine       Primary Care Provider Office Phone # Fax #    Cady Mary Marie PA-C [phone]       When to contact your care team       Call your primary doctor or return to ER if you have any of the following: temperature greater than 100.5 or less than 96, increased shortness of breath, chest pain, palpitations persisting>2 hours, dizziness, loss of consciousness, severe headache, numbness/weakness, abnormal bleeding, bloody stools, black stools.                  After Care Instructions     Activity       Your activity upon discharge: activity as tolerated and no driving for today            Diet       Follow this diet upon discharge: Orders Placed This Encounter      Regular Diet Adult                  Follow-up Appointments     Follow-up and recommended labs and tests         Follow up with primary care provider, Cady Marie, within 7 days for hospital follow- up.  No follow up labs or test are needed. Monitor blood pressure and heart rate.  Follow up with Cardiology/EP as scheduled  Follow up with GI                  Your next 10 appointments already scheduled     Jan 11, 2018  1:30 PM CST   Office Visit with Cady Marie PA-C   [facility] ([facility])    [address] [address]   [phone]           Bring a current list of meds and any records pertaining to this visit. For Physicals, please bring immunization records and any forms needing to be filled out. Please arrive 10 minutes early to complete paperwork.            Jan 18, 2018  3:00 PM CST   Return Discharge with CATY Marsh CNP   [facility] ([facility])    [address] [id number] [phone]            Feb 14, 2018 11:15 AM CST   Return Visit with Juan C Perez MD   [facility] ([facility])    [address] [address] [phone]              Further instructions from your care team       Metoprolol was too soon to be filled. Please contact your local pharmacy for refill dates.     Pending Results     Date and Time Order Name Status Description    1/3/2018 0848 EKG 12-lead, tracing only Preliminary             Statement of Approval     Ordered          01/04/18 1531  I have reviewed and agree with all the recommendations and orders detailed in this document.  EFFECTIVE NOW     Approved and electronically signed by:  Gail Smith MD             Admission Information     Date & Time Provider Department Dept. Phone    1/1/2018 Gail Smith MD [facility] [phone]      Your Vitals Were     Blood Pressure Pulse Temperature Respirations Height  "Weight    105/62 (BP Location: Left arm) 168 98.2  F (36.8  C) (Oral) 16 1.626 m (5' 4\") 61.5 kg (135 lb 9.6 oz)    Pulse Oximetry BMI (Body Mass Index)                93% 23.28 kg/m2          [facility] Information     [facility] gives you secure access to your electronic health record. If you see a primary care provider, you can also send messages to your care team and make appointments. If you have questions, please call your primary care clinic.  If you do not have a primary care provider, please call [phone] and they will assist you.        Care EveryWhere ID     This is your Care EveryWhere ID. This could be used by other organizations to access your [facility] medical records  [id number]        Equal Access to Services     KAREN BABCOCK : Efrem Diamond, randell hartmann, ladarius argueta, zoran sanchez. So [facility] [phone].    ATENCIÓN: Si habla español, tiene a munoz disposición servicios gratuitos de asistencia lingüística. Llame al [phone].    We comply with applicable federal civil rights laws and [place] laws. We do not discriminate on the basis of race, color, national origin, age, disability, sex, sexual orientation, or gender identity.               Review of your medicines      START taking        Dose / Directions    apixaban ANTICOAGULANT 5 MG tablet   Commonly known as:  ELIQUIS        Dose:  5 mg   Take 1 tablet (5 mg) by mouth 2 times daily   Refills:  0       metoprolol 50 MG tablet   Commonly known as:  LOPRESSOR        Dose:  50 mg   Take 1 tablet (50 mg) by mouth 2 times daily   Quantity:  60 tablet   Refills:  0         CONTINUE these medicines which may have CHANGED, or have new prescriptions. If we are uncertain of the size of tablets/capsules you have at home, strength may be listed as something that might have changed.        Dose / Directions    diltiazem 120 MG 24 hr capsule   Commonly known as:  CARDIZEM CD/CARTIA XT   This may " have changed:  how much to take   Used for:  PAF (paroxysmal atrial fibrillation) (H)        Dose:  240 mg   Take 2 capsules (240 mg) by mouth daily   Quantity:  90 capsule   Refills:  3         CONTINUE these medicines which have NOT CHANGED        Dose / Directions    brimonidine-timolol 0.2-0.5 % ophthalmic solution   Commonly known as:  COMBIGAN        Dose:  1 drop   Place 1 drop into both eyes 2 times daily   Refills:  0       FISH OIL PO        Dose:  1000 mg   Take 1,000 mg by mouth daily   Refills:  0       Multi-vitamin Tabs tablet   Generic drug:  multivitamin, therapeutic with minerals        Dose:  1 tablet   Take 1 tablet by mouth daily.   Refills:  0       predniSONE 20 MG tablet   Commonly known as:  DELTASONE        Dose:  40 mg   Take 40 mg by mouth daily As directed   Refills:  0            Where to get your medicines      These medications were sent to [facility] [place], [place] - [address]  [address], [place] [address]     Phone:  [phone]     metoprolol 50 MG tablet                Protect others around you: Learn how to safely use, store and throw away your medicines at www.disposemymeds.org.             Medication List: This is a list of all your medications and when to take them. Check marks below indicate your daily home schedule. Keep this list as a reference.      Medications           Morning Afternoon Evening Bedtime As Needed    apixaban ANTICOAGULANT 5 MG tablet   Commonly known as:  ELIQUIS   Take 1 tablet (5 mg) by mouth 2 times daily   Last time this was given:  5 mg on 1/4/2018  8:44 AM   Next Dose Due:   Tonight 1/04/2018                                      brimonidine-timolol 0.2-0.5 % ophthalmic solution   Commonly known as:  COMBIGAN   Place 1 drop into both eyes 2 times daily   Next Dose Due:  01/04                                   diltiazem 120 MG 24 hr capsule   Commonly known as:  CARDIZEM CD/CARTIA XT   Take 2 capsules (240 mg) by  mouth daily   Next Dose Due:   Friday 1/05/2018                                   FISH OIL PO   Take 1,000 mg by mouth daily   Last time this was given:  1,000 mg on 1/4/2018  8:44 AM   Next Dose Due:  01/05                                   metoprolol 50 MG tablet   Commonly known as:  LOPRESSOR   Take 1 tablet (50 mg) by mouth 2 times daily   Last time this was given:  50 mg on 1/4/2018  8:44 AM   Next Dose Due:  Tonight 1/04/2018                                      Multi-vitamin Tabs tablet   Take 1 tablet by mouth daily.   Generic drug:  multivitamin, therapeutic with minerals   Next Dose Due:  01/05                                   predniSONE 20 MG tablet   Commonly known as:  DELTASONE   Take 40 mg by mouth daily As directed   Last time this was given:  40 mg on 1/4/2018  8:44 AM   Next Dose Due:  01/05                                             More Information        Understanding Atrial Fibrillation    An arrhythmia is any problem with the speed or pattern of the heartbeat. Atrial fibrillation (AFib) is a common type of arrhythmia. It causes fast, chaotic electrical signals in the atria. This leads to poor functioning of the heart. It also affects how much blood your heart can pump out to the body.  Afib may occur once in a while and go away on its own. Or it may continue for longer periods and need treatment.  AFib can lead to serious problems, such as stroke. Your healthcare provider will need to monitor and manage it.  What happens during atrial fibrillation?   The heart has an electrical system that sends signals to control the heartbeat. As signals move through the heart, they tell the heart s upper chambers (atria) and lower chambers (ventricles) when to squeeze (contract) and relax. This lets blood move through the heart and out to the body and lungs.  With AFib, the atria receive abnormal signals. This causes them to contract in a fast and irregular way, and out of sync with the ventricles. When  this happens, the atria also have a harder time moving blood into the ventricles. Blood may then pool in the atria, which increases the risk for blood clots and stroke. The ventricles also may contract too quickly and irregularly. As a result, they may not pump blood to the body and lungs as well as they should. This can weaken the heart muscle over time and cause heart failure.  What causes atrial fibrillation?  AFib is more common in older adults. It has many possible causes including:    Coronary artery disease    Heart valve disease    Heart attack    Heart surgery    High blood pressure    Thyroid disease    Diabetes    Lung disease    Sleep apnea    Heavy alcohol use  In some cases of AFib, doctors do not know the cause.  What are the symptoms of atrial fibrillation?  AFib may or may not cause symptoms. If symptoms do occur, they may include:    A fast, pounding, irregular heartbeat    Shortness of breath    Tiredness    Dizziness or fainting    Chest pain  How is atrial fibrillation treated?  Treatments for AFib can include any of the options below.    Medicines. You may be prescribed:    Heart rate medicines to help slow down the heartbeat    Heart rhythm medicines to help the heart beat more regularly    Anti-clotting medicines to help reduce the risk for blood clots and stroke    Electrical cardioversion. Your healthcare provider uses special pads or paddles to send one or more brief electrical shocks to the heart. This can help reset the heartbeat to normal.    Ablation. Long, thin tubes called catheters are threaded through a blood vessel to the heart. There, the catheters send out hot or cold energy to the areas causing the abnormal signals. This energy destroys the problem tissue or cells. This improves the chances that your heart will stay in normal rhythm without using medicines. If your heart rate and rhythm can t be controlled, you may need ablation and a pacemaker. These will help control the  heart rate and regularity of the heartbeat.    Surgery. During surgery, your healthcare provider may use different methods to create scar tissue in the areas of the heart causing the abnormal signals. The scar tissue disrupts the abnormal signals and may stop AFib from occurring.  What are the complications of atrial fibrillation?  These can include:    Blood clots    Stroke    Heart failure. This problem occurs when the heart muscle weakens so much that it can no longer pump blood well.  When should I call my healthcare provider?  Call your healthcare provider right away if you have any of these:    Symptoms that don t get better with treatment, or get worse    New symptoms   Date Last Reviewed: 5/1/2016 2000-2017 [employer]. [address] [place] [address], [place] [address]. All rights reserved. This information is not intended as a substitute for professional medical care. Always follow your healthcare professional's instructions.

## 2018-01-01 NOTE — IP AVS SNAPSHOT
[facility]    [address] [place] [address], [address]    [place] [address]    Phone:  [phone]                                       After Visit Summary   1/1/2018    Elaina Winn    MRN: [id number]           After Visit Summary Signature Page     I have received my discharge instructions, and my questions have been answered. I have discussed any challenges I see with this plan with the nurse or doctor.    ..........................................................................................................................................  Patient/Patient Representative Signature      ..........................................................................................................................................  Patient Representative Print Name and Relationship to Patient    ..................................................               ................................................  Date                                            Time    ..........................................................................................................................................  Reviewed by Signature/Title    ...................................................              ..............................................  Date                                                            Time

## 2018-01-02 PROBLEM — I48.91 ATRIAL FIBRILLATION WITH RVR (H): Status: ACTIVE | Noted: 2018-01-02

## 2018-01-02 LAB
ALBUMIN SERPL-MCNC: 2.8 G/DL (ref 3.4–5)
ALP SERPL-CCNC: 121 U/L (ref 40–150)
ALT SERPL W P-5'-P-CCNC: 98 U/L (ref 0–50)
ANION GAP SERPL CALCULATED.3IONS-SCNC: 5 MMOL/L (ref 3–14)
AST SERPL W P-5'-P-CCNC: 58 U/L (ref 0–45)
BILIRUB DIRECT SERPL-MCNC: 0.2 MG/DL (ref 0–0.2)
BILIRUB SERPL-MCNC: 0.5 MG/DL (ref 0.2–1.3)
BUN SERPL-MCNC: 31 MG/DL (ref 7–30)
CALCIUM SERPL-MCNC: 8.7 MG/DL (ref 8.5–10.1)
CHLORIDE SERPL-SCNC: 112 MMOL/L (ref 94–109)
CO2 SERPL-SCNC: 26 MMOL/L (ref 20–32)
CREAT SERPL-MCNC: 0.98 MG/DL (ref 0.52–1.04)
GFR SERPL CREATININE-BSD FRML MDRD: 55 ML/MIN/1.7M2
GLUCOSE BLDC GLUCOMTR-MCNC: 130 MG/DL (ref 70–99)
GLUCOSE BLDC GLUCOMTR-MCNC: 89 MG/DL (ref 70–99)
GLUCOSE SERPL-MCNC: 88 MG/DL (ref 70–99)
INTERPRETATION ECG - MUSE: NORMAL
POTASSIUM SERPL-SCNC: 3.7 MMOL/L (ref 3.4–5.3)
PROT SERPL-MCNC: 6.1 G/DL (ref 6.8–8.8)
SODIUM SERPL-SCNC: 143 MMOL/L (ref 133–144)

## 2018-01-02 PROCEDURE — 80048 BASIC METABOLIC PNL TOTAL CA: CPT | Performed by: INTERNAL MEDICINE

## 2018-01-02 PROCEDURE — 99222 1ST HOSP IP/OBS MODERATE 55: CPT | Performed by: INTERNAL MEDICINE

## 2018-01-02 PROCEDURE — 93005 ELECTROCARDIOGRAM TRACING: CPT

## 2018-01-02 PROCEDURE — 93010 ELECTROCARDIOGRAM REPORT: CPT | Performed by: INTERNAL MEDICINE

## 2018-01-02 PROCEDURE — 99207 ZZC CDG-MDM COMPONENT: MEETS MODERATE - UP CODED: CPT | Performed by: INTERNAL MEDICINE

## 2018-01-02 PROCEDURE — A9270 NON-COVERED ITEM OR SERVICE: HCPCS | Mod: GY | Performed by: INTERNAL MEDICINE

## 2018-01-02 PROCEDURE — 96366 THER/PROPH/DIAG IV INF ADDON: CPT

## 2018-01-02 PROCEDURE — 80076 HEPATIC FUNCTION PANEL: CPT | Performed by: INTERNAL MEDICINE

## 2018-01-02 PROCEDURE — 25000125 ZZHC RX 250: Performed by: EMERGENCY MEDICINE

## 2018-01-02 PROCEDURE — 25000128 H RX IP 250 OP 636: Performed by: EMERGENCY MEDICINE

## 2018-01-02 PROCEDURE — 25000132 ZZH RX MED GY IP 250 OP 250 PS 637: Mod: GY | Performed by: INTERNAL MEDICINE

## 2018-01-02 PROCEDURE — 00000146 ZZHCL STATISTIC GLUCOSE BY METER IP

## 2018-01-02 PROCEDURE — 99233 SBSQ HOSP IP/OBS HIGH 50: CPT | Performed by: INTERNAL MEDICINE

## 2018-01-02 PROCEDURE — G0378 HOSPITAL OBSERVATION PER HR: HCPCS

## 2018-01-02 PROCEDURE — 25000125 ZZHC RX 250: Performed by: INTERNAL MEDICINE

## 2018-01-02 PROCEDURE — 36415 COLL VENOUS BLD VENIPUNCTURE: CPT | Performed by: INTERNAL MEDICINE

## 2018-01-02 PROCEDURE — 21000001 ZZH R&B HEART CARE

## 2018-01-02 RX ORDER — BENZOCAINE/MENTHOL 6 MG-10 MG
LOZENGE MUCOUS MEMBRANE 2 TIMES DAILY
Status: DISCONTINUED | OUTPATIENT
Start: 2018-01-02 | End: 2018-01-04 | Stop reason: HOSPADM

## 2018-01-02 RX ORDER — DILTIAZEM HYDROCHLORIDE 180 MG/1
180 CAPSULE, EXTENDED RELEASE ORAL DAILY
Status: DISCONTINUED | OUTPATIENT
Start: 2018-01-02 | End: 2018-01-04

## 2018-01-02 RX ADMIN — HYDROCORTISONE: 1 CREAM TOPICAL at 19:58

## 2018-01-02 RX ADMIN — DILTIAZEM HYDROCHLORIDE 5 MG/HR: 5 INJECTION INTRAVENOUS at 10:52

## 2018-01-02 RX ADMIN — HYDROCORTISONE: 1 CREAM TOPICAL at 10:53

## 2018-01-02 RX ADMIN — PREDNISONE 40 MG: 20 TABLET ORAL at 10:52

## 2018-01-02 RX ADMIN — DILTIAZEM HYDROCHLORIDE 180 MG: 180 CAPSULE, EXTENDED RELEASE ORAL at 18:07

## 2018-01-02 ASSESSMENT — ENCOUNTER SYMPTOMS
VOMITING: 0
FEVER: 0
ABDOMINAL PAIN: 0

## 2018-01-02 NOTE — PROGRESS NOTES
"\"What Is Outpatient Observation\" brochure was given & explained to the patient and spouse. Questions answered. Patient and spouse verbalized understanding.  Pt and spouse very concerned about the cost of medication, most importantly her glaucoma eye drops. Will talk to pharmacy to see if pt can use her own drops from home.     "

## 2018-01-02 NOTE — PHARMACY-ADMISSION MEDICATION HISTORY
Admission medication history interview status for the 1/1/2018  admission is complete. See EPIC admission navigator for prior to admission medications     Medication history source reliability:Good    Actions taken by pharmacist (provider contacted, etc): interviewed patient and , epic notes from recent admission, office notes     Additional medication history information not noted on PTA med list :None    Medication reconciliation/reorder completed by provider prior to medication history? No    Time spent in this activity: 15 min    Prior to Admission medications    Medication Sig Last Dose Taking? Auth Provider   predniSONE (DELTASONE) 20 MG tablet Take 40 mg by mouth daily As directed 1/1/2018 at am Yes Reported, Patient   diltiazem (CARDIZEM CD/CARTIA XT) 120 MG 24 hr capsule Take 1 capsule (120 mg) by mouth daily 1/1/2018 at am Yes Linsey Lauren APRN CNP   brimonidine-timolol (COMBIGAN) 0.2-0.5 % ophthalmic solution Place 1 drop into both eyes 2 times daily  1/1/2018 at x 2 doses Yes Reported, Patient   Omega-3 Fatty Acids (FISH OIL PO) Take 1,000 mg by mouth daily  12/31/2017 at pm Yes Reported, Patient   Multiple Vitamin (MULTI-VITAMIN) per tablet Take 1 tablet by mouth daily. 12/31/2017 at pm Yes Reported, Patient

## 2018-01-02 NOTE — ED NOTES
[facility]  ED Nurse Handoff Report    ED Chief complaint: Palpitations (Pt reports feeling palpitations begin this afternoon. Pt has a hx of atrial fib and concerned she is in atrial fib again. Pt denies chest pain but reports some SOB. Pt states she was cardioverted yesterday or Saturday)      ED Diagnosis:   Final diagnoses:   None       Code Status: hospitalist to address    Allergies:   Allergies   Allergen Reactions     Zocor [Simvastatin - High Dose]      Elevated LFTs       Activity level - Baseline/Home:  Independent    Activity Level - Current:   Stand with Assist     Needed?: No    Isolation: No  Infection: Not Applicable    Bariatric?: No    Vital Signs:   Vitals:    01/01/18 2115 01/01/18 2130 01/01/18 2145 01/01/18 2207   BP: 114/80 122/65 139/80 (!) 158/94   Pulse:    163   Resp:    22   Temp:       TempSrc:       SpO2: 98% 96% 96% 98%   Weight:       Height:           Cardiac Rhythm: ,   Cardiac  Cardiac Rhythm: Atrial fibrillation    Pain level:      Is this patient confused?: No    Patient Report: Initial Complaint: palpitations   Focused Assessment: pt presented to ED with c/o palpitations - A-fib with RVR, pt cardioverted X 5-started on cardizem drip  Tests Performed:   Results for orders placed or performed during the hospital encounter of 01/01/18   CBC with platelets differential   Result Value Ref Range    WBC 8.7 4.0 - 11.0 10e9/L    RBC Count 4.70 3.8 - 5.2 10e12/L    Hemoglobin 14.7 11.7 - 15.7 g/dL    Hematocrit 43.6 35.0 - 47.0 %    MCV 93 78 - 100 fl    MCH 31.3 26.5 - 33.0 pg    MCHC 33.7 31.5 - 36.5 g/dL    RDW 13.9 10.0 - 15.0 %    Platelet Count 266 150 - 450 10e9/L    Diff Method Automated Method     % Neutrophils 79.7 %    % Lymphocytes 16.6 %    % Monocytes 3.4 %    % Eosinophils 0.0 %    % Basophils 0.0 %    % Immature Granulocytes 0.3 %    Nucleated RBCs 0 0 /100    Absolute Neutrophil 6.9 1.6 - 8.3 10e9/L    Absolute Lymphocytes 1.4 0.8 - 5.3 10e9/L     Absolute Monocytes 0.3 0.0 - 1.3 10e9/L    Absolute Eosinophils 0.0 0.0 - 0.7 10e9/L    Absolute Basophils 0.0 0.0 - 0.2 10e9/L    Abs Immature Granulocytes 0.0 0 - 0.4 10e9/L    Absolute Nucleated RBC 0.0    Basic metabolic panel   Result Value Ref Range    Sodium Canceled, Test credited 133 - 144 mmol/L    Potassium Canceled, Test credited 3.4 - 5.3 mmol/L    Chloride Canceled, Test credited 94 - 109 mmol/L    Carbon Dioxide Canceled, Test credited 20 - 32 mmol/L    Anion Gap Canceled, Test credited 6 - 17 mmol/L    Glucose Canceled, Test credited 70 - 99 mg/dL    Urea Nitrogen Canceled, Test credited 7 - 30 mg/dL    Creatinine Canceled, Test credited 0.52 - 1.04 mg/dL    GFR Estimate Canceled, Test credited >60 mL/min/1.7m2    GFR Estimate If Black Canceled, Test credited >60 mL/min/1.7m2    Calcium Canceled, Test credited 8.5 - 10.1 mg/dL   Hepatic panel   Result Value Ref Range    Bilirubin Direct Canceled, Test credited 0.0 - 0.2 mg/dL    Bilirubin Total Canceled, Test credited 0.2 - 1.3 mg/dL    Albumin Canceled, Test credited 3.4 - 5.0 g/dL    Protein Total Canceled, Test credited 6.8 - 8.8 g/dL    Alkaline Phosphatase Canceled, Test credited 40 - 150 U/L    ALT Canceled, Test credited 0 - 50 U/L    AST Canceled, Test credited 0 - 45 U/L   Hepatic panel   Result Value Ref Range    Bilirubin Direct 0.1 0.0 - 0.2 mg/dL    Bilirubin Total 0.6 0.2 - 1.3 mg/dL    Albumin 2.9 (L) 3.4 - 5.0 g/dL    Protein Total 6.9 6.8 - 8.8 g/dL    Alkaline Phosphatase 153 (H) 40 - 150 U/L     (H) 0 - 50 U/L    AST 77 (H) 0 - 45 U/L   Basic metabolic panel   Result Value Ref Range    Sodium 142 133 - 144 mmol/L    Potassium 4.7 3.4 - 5.3 mmol/L    Chloride 109 94 - 109 mmol/L    Carbon Dioxide 26 20 - 32 mmol/L    Anion Gap 7 3 - 14 mmol/L    Glucose 142 (H) 70 - 99 mg/dL    Urea Nitrogen 36 (H) 7 - 30 mg/dL    Creatinine 1.19 (H) 0.52 - 1.04 mg/dL    GFR Estimate 44 (L) >60 mL/min/1.7m2    GFR Estimate If Black 54 (L)  >60 mL/min/1.7m2    Calcium 8.5 8.5 - 10.1 mg/dL   EKG 12 lead   Result Value Ref Range    Interpretation ECG Click View Image link to view waveform and result      Abnormal Results: see above  Treatments provided: Cardioversion see MAR    Family Comments: family at bedside    OBS brochure/video discussed/provided to patient: N/A    ED Medications:   Medications   diltiazem (CARDIZEM) 125 mg in NaCl 0.9 % 125 mL infusion (13 mg/hr Intravenous Rate/Dose Change 1/1/18 2206)   propofol (DIPRIVAN) injection 10 mg/mL vial (110 mg Intravenous Given 1/1/18 2111)   diltiazem (CARDIZEM) injection 10 mg (10 mg Intravenous Given 1/1/18 2053)       Drips infusing?:  Yes      ED NURSE PHONE NUMBER: [phone]

## 2018-01-02 NOTE — H&P
[facility]    History and Physical  Hospitalist       Date of Admission:  1/1/2018    Assessment & Plan   Elaina Winn is a 72 year old female with PMH of paroxysmal afib, elevated creatinine, hepatitis of unclear etiology, who presents with recurrent atrial fibrillation with RVR. On a diltiazem drip.    Atrial fibrillation with RVR: Patient was diagnosed with afib in 11/2017. She was initially treated with metoprolol and Eliquis, however metoprolol was stopped due to persistent orthostatic hypotension. Eliquis was also discontinued as she was undergoing evaluation for elevated LFTs. She has been controlled primarily with diltiazem. She was seen in the ED on 12/31 for afib with RVR and underwent three shocks with conversion to NSR. Unfortunately she developed recurrent symptomatic afib today with rates in the 150s. She was shocked 5 times in the ED today with conversion to NSR but would immediately convert back to afib. There is no clear catalyst to this episode of afib with RVR. She was then started on a diltiazem drip and spontaneously converted to NSR just prior to transfer to [facility].  - Continue Diltiazem drip goal HR < 100  - Repeat EKG tomorrow at 0700  - Cardiology consult    Hepatitis of unclear etiology: Patient was noted to have significantly elevated LFTs in Sept 2017 of unclear etiology. She underwent a workup with [facility] which culminated in a liver biopsy on 12/21 which shows hepatitis, unclear what the etology is. She is currently on prednisone, and her LFTs today appear improved. Her elevated LFTs will likely complicate her cardiac regimen.  - Continue prednisone 40mg daily    Elevated creatinine: Patient with gradually rising Cr from baseline of 0.93 in 2015 to 1.2 today. UA obtained in 11/2017 and 12/2017 appear bland without notable protein.  - Continue to monitor    Cont PTA multivitamin, fish oil, eye drops    DVT Prophylaxis: PCDs  Code Status: Full Code    Disposition: Expected  discharge in 1 day folowing cards consult. Observation status.    Ronan Freitas MD    Primary Care Physician   Cady Marie    Chief Complaint   Jitteriness, fast heart rate    History is obtained from the patient   Nino at bedside    History of Present Illness   Elaina Winn is a 72 year old female who presents with fast heart rate and jitteriness. She was seen in the ED yesterday and shocked x3 for atrial fibrillation, and then returned home. Today, sometime in the mid afternoon, she noticed that she felt more tired and jittery, and her pulse was high at 150. She denies chest pain but endorses some palpitations. She denies any SOB or cough, or HENDERSON--was still able to do chores around the house. She thinks her predominant symptom is feeling nervous. She denies any fevers or chills.    Past Medical History    I have reviewed this patient's medical history and updated it with pertinent information if needed.   Past Medical History:   Diagnosis Date     Anxiety      Atrial fibrillation (H)      Chronic renal insufficiency      Dementia      Dry eyes, bilateral      Generalized OA      Glaucoma      HTN (hypertension), benign      Hyperlipidemia LDL goal < 130      Liver dysfunction      Paroxysmal a-fib (H)        Past Surgical History   I have reviewed this patient's surgical history and updated it with pertinent information if needed.  Past Surgical History:   Procedure Laterality Date     DILATION AND CURETTAGE       HYSTERECTOMY      with USO, not for cancer     S/p partial vaginectomy       SEPTOPLASTY       TONSILLECTOMY & ADENOIDECTOMY         Prior to Admission Medications   Prior to Admission Medications   Prescriptions Last Dose Informant Patient Reported? Taking?   Multiple Vitamin (MULTI-VITAMIN) per tablet 12/31/2017 at pm Self Yes Yes   Sig: Take 1 tablet by mouth daily.   Omega-3 Fatty Acids (FISH OIL PO) 12/31/2017 at pm Self Yes Yes   Sig: Take 1,000 mg by mouth daily     brimonidine-timolol (COMBIGAN) 0.2-0.5 % ophthalmic solution 2018 at x 2 doses Self Yes Yes   Sig: Place 1 drop into both eyes 2 times daily    diltiazem (CARDIZEM CD/CARTIA XT) 120 MG 24 hr capsule 2018 at am Self No Yes   Sig: Take 1 capsule (120 mg) by mouth daily   predniSONE (DELTASONE) 20 MG tablet 2018 at am Self Yes Yes   Sig: Take 40 mg by mouth daily As directed      Facility-Administered Medications: None     Allergies   Allergies   Allergen Reactions     Zocor [Simvastatin - High Dose]      Elevated LFTs       Social History   I have reviewed this patient's social history and updated it with pertinent information if needed. Elaina Winn  reports that she quit smoking about 28 years ago. She has never used smokeless tobacco. She reports that she does not drink alcohol or use illicit drugs.   She had a glass of champagne for new years but otherwise rarely drinks.    Family History   I have reviewed this patient's family history and updated it with pertinent information if needed.   Family History   Problem Relation Age of Onset     Hypertension Mother       age 95     Alzheimer Disease Father 75     also CHF     CANCER Brother       of lung ca age 69     Lipids Brother      Alzheimer Disease Paternal Uncle      Alzheimer Disease Paternal Uncle        Review of Systems   The 10 point Review of Systems is negative other than noted in the HPI or here.     Physical Exam   Temp: 97.9  F (36.6  C) Temp src: Oral BP: 148/76 Pulse: 168 Heart Rate: 81 Resp: (!) 31 SpO2: 99 % O2 Device: Nasal cannula Oxygen Delivery: 2 LPM  Vital Signs with Ranges  Temp:  [97.9  F (36.6  C)] 97.9  F (36.6  C)  Pulse:  [142-190] 168  Heart Rate:  [] 81  Resp:  [6-114] 31  BP: ()/() 148/76  SpO2:  [92 %-100 %] 99 %  143 lbs 6.4 oz    Constitutional: Female mildly tremulous and anxious appearing  Eyes: PERRL, nonicteric, normal ocular movements  HEENT: Normocephalic, atraumatic, oral mucosa  moist  Respiratory: CTAB, no wheezing or crackles  Cardiovascular: Irregularly irregular, normal S1/2, no m/r/g  GI: No organomegaly, normoactive bowel sounds, nontender, nondistended  Vascular: Palpable peripheral pulses in upper and lower extremities, nonpitting or trace lower extremity pitting edema which patient states is her baseline  Skin: Large birth suraj over back  Musculoskeletal: Normal strength in UE and LE, moves all extremities  Neurologic: A&Ox3  Psychiatric: Appropriate affect and mood    Data   Data reviewed today:  I personally reviewed EKG showing afib with RVR, with resolution at 2300.    Recent Labs  Lab 01/01/18 2116 01/01/18 2035   WBC  --  8.7   HGB  --  14.7   MCV  --  93   PLT  --  266    Canceled, Test credited   POTASSIUM 4.7 Canceled, Test credited   CHLORIDE 109 Canceled, Test credited   CO2 26 Canceled, Test credited   BUN 36* Canceled, Test credited   CR 1.19* Canceled, Test credited   ANIONGAP 7 Canceled, Test credited   DANIELITO 8.5 Canceled, Test credited   * Canceled, Test credited   ALBUMIN 2.9* Canceled, Test credited   PROTTOTAL 6.9 Canceled, Test credited   BILITOTAL 0.6 Canceled, Test credited   ALKPHOS 153* Canceled, Test credited   * Canceled, Test credited   AST 77* Canceled, Test credited       Imaging:  No results found for this or any previous visit (from the past 24 hour(s)).

## 2018-01-02 NOTE — PROGRESS NOTES
RT monitored EtCO2 for cardioversion procedure. Pt was on normal EtCO2 during the procedure.  1/1/2018\  Albert Hartley

## 2018-01-02 NOTE — ED PROVIDER NOTES
History     Chief Complaint:  Palpitations    HPI   Elaina Winn is a 72 year old female with a medical history of glaucoma, hypertension, hyperlipidemia, atrial fibrillation, liver dysfunction, chronic renal insufficiency, and dementia who presents with palpitations. Patient was diagnosed with paroxysmal atrial fibrillation with RVR on 11/12/17. Patient presented here in the emergency department yesterday, 12/30/17-12/31/17 for palpitations, and was cardioverted which was successful after 3 attempts. Today, the patient was sitting until she noticed a sudden onset of palpitations this afternoon with associated shortness of breath. Patient checked her blood pressure and pulse, and noticed a very high increase of her heart rate. Patient notes last night she stayed up late and had a party. Last PO intake was shortly prior to ED visit. Patient denies chest pain. Of note, Patient was originally on Eliquis but after follow-up with a cardiologist, she was taken off Eliquis due to her liver dysfunction, as well as metoprolol. Patient currently on prednisone for liver dysfunction.    Allergies:  Zocor     Medications:    Deltasone  Cardizem  Combigan  Fish oil  Multivitamin    Past Medical History:    Anxiety  Atrial fibrillation  Chronic renal insufficiency  Dementia  Glaucoma  Hypertension  Hyperlipidemia  Liver dysfunction    Past Surgical History:    Dilation and curettage  Hysterectomy  Partial vaginectomy  Septoplasty  Tonsillectomy & Adenoidectomy    Family History:    Hypertension  Alzheimer disease  Cancer  Lipids    Social History:  Smoking status: Former smoker, 1990  Alcohol use: None   Marital Status:   [2]   at bedside.     Review of Systems   Constitutional: Negative for fever.   Respiratory: Positive for shortness of breath.    Cardiovascular: Positive for palpitations. Negative for chest pain.   Gastrointestinal: Negative for abdominal pain and vomiting.   All other systems reviewed and are  negative.    Physical Exam   Patient Vitals for the past 24 hrs:   BP Temp Temp src Pulse Heart Rate Resp SpO2 Height Weight   01/01/18 2307 - - - - 81 (!) 31 99 % - -   01/01/18 2305 - - - - 81 (!) 40 100 % - -   01/01/18 2300 148/76 - - - 80 (!) 42 100 % - -   01/01/18 2258 - - - - 125 (!) 109 100 % - -   01/01/18 2252 - - - - 109 20 99 % - -   01/01/18 2245 141/78 - - - 118 (!) 31 99 % - -   01/01/18 2243 - - - - 135 19 99 % - -   01/01/18 2241 (!) 144/91 - - - 124 12 99 % - -   01/01/18 2236 - - - - 128 (!) 114 99 % - -   01/01/18 2234 - - - - 115 18 99 % - -   01/01/18 2232 96/83 - - - - - - - -   01/01/18 2231 96/83 - - - 134 (!) 49 99 % - -   01/01/18 2227 - - - - 135 18 98 % - -   01/01/18 2221 (!) 151/92 - - 168 - - 99 % - -   01/01/18 2219 (!) 151/92 - - - 123 (!) 6 99 % - -   01/01/18 2215 - - - - 165 (!) 63 97 % - -   01/01/18 2214 - - - - 160 (!) 48 98 % - -   01/01/18 2213 154/87 - - 142 - (!) 37 97 % - -   01/01/18 2211 154/87 - - - 147 (!) 57 99 % - -   01/01/18 2208 - - - - 172 (!) 37 99 % - -   01/01/18 2207 (!) 158/94 - - 163 - 22 98 % - -   01/01/18 2145 139/80 - - - 158 - 96 % - -   01/01/18 2130 122/65 - - - 123 - 96 % - -   01/01/18 2115 114/80 - - - 122 - 98 % - -   01/01/18 2101 130/72 - - - 126 15 100 % - -   01/01/18 2058 93/58 - - - 114 15 100 % - -   01/01/18 2056 110/81 - - - 154 11 100 % - -   01/01/18 2053 - - - - 140 18 100 % - -   01/01/18 2052 - - - - 125 19 92 % - -   01/01/18 2051 115/84 - - - 158 18 100 % - -   01/01/18 2050 - - - - 172 12 100 % - -   01/01/18 2049 110/66 - - - 97 30 100 % - -   01/01/18 2048 (!) 108/96 - - - 179 13 100 % - -   01/01/18 2047 - - - - 163 9 100 % - -   01/01/18 2046 (!) 162/96 - - 161 186 16 100 % - -   01/01/18 2045 (!) 162/96 - - - 181 24 97 % - -   01/01/18 2044 - - - - 170 14 97 % - -   01/01/18 2037 (!) 178/107 - - - 184 11 98 % - -   01/01/18 2032 (!) 151/126 - - - - - 99 % - -   01/01/18 2023 174/88 97.9  F (36.6  C) Oral 190 190 20 98 %  "1.626 m (5' 4\") 65 kg (143 lb 6.4 oz)      Physical Exam  General: Appears well-developed and well-nourished.   Head: No signs of trauma.   CV:  Tachycardic  Resp: Effort normal and breath sounds normal. No respiratory distress.   GI: Soft. There is no tenderness or guarding.  Normal bowel sounds.    MSK: Normal range of motion. no edema. No Calf tenderness.  Neuro: The patient is alert and oriented.  Strength in upper/lower extremities normal and symmetrical.   Sensation normal. Speech normal.  GCS 15  Skin: Skin is warm and dry. No rash noted.   Psych: normal mood and affect. behavior is normal.       Emergency Department Course   ECG (20:26:33):  Rate 192 bpm. ND interval *. QRS duration 70. QT/QTc 236/422. P-R-T axes * 41 -67. Atrial fibrillation with rapid ventricular response. Marked abnormality, possible inferior subendocardial injury. Abnormal ECG. Agree with computer interpretation. Interpreted at 2045 by Michael Espinoza MD.     ECG (21:05:06):  Rate 134 bpm. ND interval *. QRS duration 64. QT/QTc 334/498. P-R-T axes * 60 23. Atrial flutter with variable AV block. Septal infarct, age undetermined. Abnormal ECG. Agree with computer interpretation. Interpreted at 2105 by Michael Espinoza MD.     ECG (23:02:35):  Rate 86 bpm. ND interval 126. QRS duration 74. QT/QTc 382/457. P-R-T axes 55 43 35. Sinus rhythm with marked sinus arrhythmia. Possible left atrial enlargement. Borderline ECG. Agree with computer interpretation. Interpreted at 2308 by Michael Espinoza MD.     Laboratory:  CBC: WNL (WBC 8.7, HGB 14.7, )    BMP: Glucose 142 (H), BUN 36 (H), GFR estimate 44 (L), GFR estimate if black 54 (L)    Hepatic panel: Albumin 2.9 (HL), Alkphos 153 (H),  (H), AST 77 (H)    Procedures:      Procedure: Sedation       Expected Level:  Moderate Sedation      Indication:  Sedation is required to allow for Cardioversion    Consent:  Risks (including but not limited to: respiratory depression, " respiratory failure, or death), benefits and alternatives were discussed with    patient and consent for procedure was obtained.       Timeout: Universal protocol was followed.  TIME OUT conducted prior to     Starting procedure confirmed patient identity, site/side, procedure, patient    Position, and availability of correct equipment and implants?     Ye      PO Intake:  Appropriate for procedural sedation      ASA Class:  Class 2 - MILD SYSTEMIC DISEASE, NO ACUTE PROBLEMS, NO FUNCTIONAL LIMITATIONS.      Mallampati:  Grade 1:  Soft palate, uvula, tonsillar pillars, and posterior pharyngeal wall visible      Lungs: CTAB       Heart: Tachy      Medication:  Propofol      Monitoring:  Monitoring consisted of:  heart rate, cardiac monitor, continuous pulse oximetry, continuous capnometry, frequent blood pressure checks, level of consciousness, IV access, constant attendance by RN until patient recovered, constant attendance by MD until patient stable and intubation and emergency airway equipment available      Patient tolerance: Patient tolerated the procedure well with no immediate complications, Vital signs stable, Airway patent, O2 Sats > 92%.      Patient Status:  Post procedure patient was sleepy.   Patient was monitored during recovery and returned to pre-procedure baseline.    TOTAL PHYSICIAN DRUG ADMINISTRATION/MONITORING TIME: 30 minutes    Electrical Cardioversion Procedure Note:         Indication:  Atrial Fibrilation        Consent:  Risks (including but not limited to: arrhythmia, stroke or death),  benefits and alternatives were discussed with patient and consent for procedure was obtained.      Timeout:  Universal protocol was followed.  TIME OUT conducted just    Prior to starting procedure confirmed patient identity, site/side, procedure    Patient position, and availability of correct equipment and implants:    Yes      Medication: Propofol (Diprivan)      Procedure note:  Electrodes were placed  in  the AP position,   Trial 1:  Synchronized shock at  200 was not successful, Trial 2:  Synchronized shock at  200  was not successful, Trial 3:  Synchronized shock at  200  was not successful and Trial 4:  Synchronized shock at  200 was not successful      Patient Status:  Patient tolerated the procedure well.  There were several attempts and cardioversion failed.    Interventions:  2053: Cardizem 10 mg IV  2128: Cardizem 5 mg/hr IV  2232: Cardizem 12 mg/hr IV    Emergency Department Course:  2026: ECG obtained, findings above.      Past medical records, nursing notes, and vitals reviewed.  2027: I performed an exam of the patient and obtained history, as documented above.     2031: IV inserted and blood drawn for basic laboratory testing. The patient was placed on continuous cardiac monitoring and pulse oximetry.      2032: Vitals were obtained.    2039: Given patient's history and presentation, I have elected to perform a cardioversion. I discussed the risk factors regarding the procedure and patient provides verbal consent to plan.     2045: I provided the patient a consent form for sedation and cardioversion.     2047: I performed a cardioversion per the above procedure note.     2053: 10 mg diltiazem administered.     2105: Repeat ECG obtained, findings above.     2300: I was updated by the nurse, who informed me that the patient may have cardioverted. I put in an order for another repeat ECG.    2302: Repeat ECG obtained, findings above.    Findings and plan explained to the Patient and spouse who consents to admission.     Discussed the patient with Dr. Freitas, who will admit the patient to an obs bed for further monitoring, evaluation, and treatment.      Impression & Plan    Medical Decision Making:  Elaina Winn is a 72-year-old woman presents due to palpitations.  She has a history of A. fib with RVR and was actually just seen yesterday for the same.  She had been cardioverted back into a sinus rhythm, but  when she felt it returned to A. fib she return to the ER.  In my evaluation, she was noted to be in A. fib with RVR.  I discussed the risks and benefits with her and she was in agreement to attempt cardioversion once again.  Unfortunately when we attempted cardioversion she would convert to a sinus rhythm briefly, but then in less than a minute would convert back into A. fib with RVR.  She was shocked multiple times hoping that she would stay in sinus rhythm, but she kept returning to A. fib.  I then attempted giving her a bolus of diltiazem before cardioverting went once again, but she continued to be in A. fib.  Given this, I started a diltiazem drip and she will be admitted for rate control.  She is fairly limited with regards to medication choices given her intolerance and liver disease.  She actually converted to a sinus rhythm just prior to her right prior to transfer to the floor, but I felt that admission was still warranted very close recurrent episodes of A. fib and the difficulty with managing it.      Diagnosis:  (R00.2) Palpitations  (I48.91) Atrial fibrillation with rapid ventricular response (H)    Disposition:  Admitted to the [facility]  1/1/2018    EMERGENCY DEPARTMENT  I, Marcy , am serving as a scribe at 8:26 PM on 1/1/2018 to document services personally performed by Michael Espinoza MD based on my observations and the provider's statements to me.       Michael Espinoza MD  01/02/18 0057

## 2018-01-02 NOTE — PLAN OF CARE
Problem: Patient Care Overview  Goal: Plan of Care/Patient Progress Review  Arrived from ED at 2315, pt had unsuccessful cardioversion x5 down in ED and converted to SR/SD just prior to arrival to the unit. Tele has been SD in the 70-90s. Pt denies SOB and pain. Skin issues include irritation of the areas where there were patches placed. SBA to the bathroom, Bed alarm is on.

## 2018-01-03 LAB
ALBUMIN SERPL-MCNC: 2.8 G/DL (ref 3.4–5)
ALP SERPL-CCNC: 103 U/L (ref 40–150)
ALT SERPL W P-5'-P-CCNC: 83 U/L (ref 0–50)
AST SERPL W P-5'-P-CCNC: 46 U/L (ref 0–45)
BILIRUB DIRECT SERPL-MCNC: 0.2 MG/DL (ref 0–0.2)
BILIRUB SERPL-MCNC: 0.7 MG/DL (ref 0.2–1.3)
GLUCOSE BLDC GLUCOMTR-MCNC: 127 MG/DL (ref 70–99)
GLUCOSE BLDC GLUCOMTR-MCNC: 156 MG/DL (ref 70–99)
PROT SERPL-MCNC: 6.3 G/DL (ref 6.8–8.8)

## 2018-01-03 PROCEDURE — 40000141 ZZH STATISTIC PERIPHERAL IV START W/O US GUIDANCE

## 2018-01-03 PROCEDURE — 25000132 ZZH RX MED GY IP 250 OP 250 PS 637: Mod: GY | Performed by: INTERNAL MEDICINE

## 2018-01-03 PROCEDURE — A9270 NON-COVERED ITEM OR SERVICE: HCPCS | Mod: GY | Performed by: INTERNAL MEDICINE

## 2018-01-03 PROCEDURE — 93010 ELECTROCARDIOGRAM REPORT: CPT | Performed by: INTERNAL MEDICINE

## 2018-01-03 PROCEDURE — 93005 ELECTROCARDIOGRAM TRACING: CPT

## 2018-01-03 PROCEDURE — 25000125 ZZHC RX 250: Performed by: INTERNAL MEDICINE

## 2018-01-03 PROCEDURE — 80076 HEPATIC FUNCTION PANEL: CPT | Performed by: INTERNAL MEDICINE

## 2018-01-03 PROCEDURE — 00000146 ZZHCL STATISTIC GLUCOSE BY METER IP

## 2018-01-03 PROCEDURE — 99233 SBSQ HOSP IP/OBS HIGH 50: CPT | Performed by: INTERNAL MEDICINE

## 2018-01-03 PROCEDURE — 36415 COLL VENOUS BLD VENIPUNCTURE: CPT | Performed by: INTERNAL MEDICINE

## 2018-01-03 PROCEDURE — 21000001 ZZH R&B HEART CARE

## 2018-01-03 PROCEDURE — 99232 SBSQ HOSP IP/OBS MODERATE 35: CPT | Performed by: INTERNAL MEDICINE

## 2018-01-03 RX ORDER — METOPROLOL TARTRATE 50 MG
50 TABLET ORAL 2 TIMES DAILY
Status: DISCONTINUED | OUTPATIENT
Start: 2018-01-03 | End: 2018-01-04 | Stop reason: HOSPADM

## 2018-01-03 RX ORDER — METOPROLOL TARTRATE 1 MG/ML
2.5 INJECTION, SOLUTION INTRAVENOUS EVERY 4 HOURS PRN
Status: DISCONTINUED | OUTPATIENT
Start: 2018-01-03 | End: 2018-01-04 | Stop reason: HOSPADM

## 2018-01-03 RX ADMIN — HYDROCORTISONE: 1 CREAM TOPICAL at 19:46

## 2018-01-03 RX ADMIN — METOPROLOL TARTRATE 50 MG: 50 TABLET ORAL at 19:47

## 2018-01-03 RX ADMIN — METOPROLOL TARTRATE 50 MG: 50 TABLET ORAL at 10:25

## 2018-01-03 RX ADMIN — HYDROCORTISONE: 1 CREAM TOPICAL at 08:34

## 2018-01-03 RX ADMIN — PREDNISONE 40 MG: 20 TABLET ORAL at 08:33

## 2018-01-03 RX ADMIN — MULTIPLE VITAMINS W/ MINERALS TAB 1 TABLET: TAB at 08:33

## 2018-01-03 RX ADMIN — APIXABAN 5 MG: 5 TABLET, FILM COATED ORAL at 19:46

## 2018-01-03 RX ADMIN — METOPROLOL TARTRATE 2.5 MG: 5 INJECTION INTRAVENOUS at 09:24

## 2018-01-03 RX ADMIN — Medication 1000 MG: at 08:33

## 2018-01-03 RX ADMIN — DILTIAZEM HYDROCHLORIDE 180 MG: 180 CAPSULE, EXTENDED RELEASE ORAL at 08:33

## 2018-01-03 NOTE — PLAN OF CARE
Problem: Patient Care Overview  Goal: Plan of Care/Patient Progress Review  Outcome: No Change  Pt is A/Ox4.Denies any cp and sob.VSS on RA.Up independent to BR.Blood glucose check twice a day.BG-130 at bedtime.po Diltiazem 180 mg given and stopped dilt gtt. As per order.Currently SR on TELE. Upper back itching got better after Cortisone ointment. Posible d/c in 1-2 days. Pending card/ EP recommendations.Will continue to monitor.

## 2018-01-03 NOTE — PROGRESS NOTES
Diltiazem drip stopped at 1800 and po dilt 180 mg given as per cardiology order.Pt is in SR on monitor.Denies any sob and cp.Will continue to monitor.

## 2018-01-03 NOTE — PROGRESS NOTES
[facility]    Hospitalist Progress Note      Assessment & Plan   Elaina Winn is a 72 year old female with PMH of paroxysmal afib, CKD stage 3, hepatitis of unclear etiology, who presented in ER for evaluation of palpitations; she was found to have recurrent atrial fibrillation with RVR    Paroxysmal Atrial fibrillation with RVR: Patient was diagnosed with afib in 11/2017. She was initially treated with metoprolol and Eliquis, however metoprolol was stopped due to persistent orthostatic hypotension. Eliquis was also discontinued as she was undergoing evaluation for elevated LFTs. She has been controlled primarily with diltiazem.   - Echo in 11/2017- EF 55-60%, mild MR  - was seen by Dr Erwin in the clinic on 12/29- she was in NSR at that time; Zio patch recommended; it was discussed that if she will have recurrent Afib with RVR- she may need AVN ablation and PPM    -She was seen in the ED on 12/31 for afib with RVR and underwent three shocks with conversion to NSR and d/c home  - Unfortunately she developed recurrent symptomatic afib with RVR again on 1/1- she was shocked 5 times in the ED with conversion to NSR but would immediately convert back to afib. There is no clear catalyst to this episode of afib with RVR; TSH 1.59 in 11/2017.  - started initially on Cardizem drip which was stopped and PTA Diltiazem ER was increased form 120 mg po daily to 180 mg po daily on 1/2  - Tele- this am- again with intermittent Afib with RVR (HR as high as 200's); she also reports palpitations with RVR  - Cardiology and EP consulted  - AVN ablation and PPM recommended but patient prefers medical management at this time; started on Metoprolol 50 mg po BID today 1/3; continue Diltiazem  mg po daily  - monitor Tele and adjust meds as needed  - CHADSVAsc score 3  - defer anticoagulation to EP   - Cards might consider Lexiscan while in the hospital or outpatient     Hepatitis of unclear etiology- autoimmune? Patient  was noted to have significantly elevated LFTs in Sept 2017 of unclear etiology. She underwent a workup with [facility] which culminated in a liver biopsy on 12/21 which shows hepatitis, unclear what the etology is. She is currently on prednisone, and her LFTs today appear improved. Her elevated LFTs will likely complicate her cardiac regimen.  - Continue prednisone 40mg daily  - LFTs improving  - avoid hepatotoxic drugs     CKD stage 3  - baseline cr seems to be 1.2--1.3  - cr here 1.19--0.98- better than baseline  - avoid nephrotoxic drugs    Dyslipidemia  - lipid profile in 11/2017- with , total chol 252  - not on statin because of abnormal LFTs    DVT Prophylaxis: Pneumatic Compression Devices for now    Code Status: Full Code    Disposition: possible d/c in 1-2 days, pending HR control and Cards/EP recommendations; discussed with UR and will switch her to inpatient status.    Gail Smith MD    Interval History    In am she was in NSR but later on went back into Afib with RVR, reported palpitations, denies chest pain, no SOB, no N/V, no abd pain; discussed with bedside RN,  and Dr Jennings    -Data reviewed today: I reviewed all new labs and imaging results over the last 24 hours. I personally reviewed the EKG tracing showing Afib with RVR, ST depression in inf-lat leads.    Physical Exam   Temp: 98.1  F (36.7  C) Temp src: Oral BP: 113/66   Heart Rate: 98 Resp: 16 SpO2: 97 % O2 Device: None (Room air)    Vitals:    01/01/18 2023 01/02/18 0512 01/03/18 0614   Weight: 65 kg (143 lb 6.4 oz) 62.5 kg (137 lb 12.6 oz) 62.1 kg (136 lb 12.8 oz)     Vital Signs with Ranges  Temp:  [97.8  F (36.6  C)-99  F (37.2  C)] 98.1  F (36.7  C)  Heart Rate:  [] 98  Resp:  [16-18] 16  BP: (106-155)/(64-77) 113/66  SpO2:  [95 %-97 %] 97 %  I/O last 3 completed shifts:  In: 440 [P.O.:440]  Out: 1550 [Urine:1550]    Constitutional: Awake, alert, NAD  Respiratory: B/l air entry, no wheezing, no rales, no  crackles  Cardiovascular: irregularly irregular, tachycardia, no murmurs, no rubs  GI: abdomen- soft, nonT, nonTD, BS present  Skin/Integumen: intact, no rashes, no cyanosis  Extremities- no leg edema      Medications        sodium chloride (PF)  3 mL Intracatheter Q8H     metoprolol  50 mg Oral BID     hydrocortisone   Topical BID     diltiazem  180 mg Oral Daily     multivitamin, therapeutic with minerals  1 tablet Oral Daily     predniSONE  40 mg Oral Daily     fish oil-omega-3 fatty acids  1,000 mg Oral Daily       Data     Recent Labs  Lab 01/03/18  0611 01/02/18  0550 01/01/18  2116 01/01/18  2035   WBC  --   --   --  8.7   HGB  --   --   --  14.7   MCV  --   --   --  93   PLT  --   --   --  266   NA  --  143 142 Canceled, Test credited   POTASSIUM  --  3.7 4.7 Canceled, Test credited   CHLORIDE  --  112* 109 Canceled, Test credited   CO2  --  26 26 Canceled, Test credited   BUN  --  31* 36* Canceled, Test credited   CR  --  0.98 1.19* Canceled, Test credited   ANIONGAP  --  5 7 Canceled, Test credited   DANIELITO  --  8.7 8.5 Canceled, Test credited   GLC  --  88 142* Canceled, Test credited   ALBUMIN 2.8* 2.8* 2.9* Canceled, Test credited   PROTTOTAL 6.3* 6.1* 6.9 Canceled, Test credited   BILITOTAL 0.7 0.5 0.6 Canceled, Test credited   ALKPHOS 103 121 153* Canceled, Test credited   ALT 83* 98* 113* Canceled, Test credited   AST 46* 58* 77* Canceled, Test credited       No results found for this or any previous visit (from the past 24 hour(s)).

## 2018-01-03 NOTE — CONSULTS
"CARDIOLOGY CONSULTATION      REQUESTING PHYSICIAN:   None stated.       REASON FOR CONSULTATION:  I have been asked to see Elaina Winn in cardiology consultation because of her presentation to [facility] with continued history of intermittent paroxysmal atrial fibrillation requiring medical therapy and attempt at cardioversion.      HISTORY OF PRESENT ILLNESS:  The patient is a 72-year-old slender white female who presented to [facility] emergency room the day before this consultation with symptoms of rapid heartbeat and \"jitteriness.\"  She was evaluated in the emergency room and had attempted cardioversion x3 for atrial fibrillation and then returned home.  She felt more tired and jittery later in the afternoon and pulse was high and she represented to the emergency room for recurrent atrial fibrillation with a rapid ventricular response.  She does not describe anthony chest pain, shortness of breath or cough.  She does feel that the palpitations and rapid heartbeat are her major symptoms without significant dizziness or syncope.  She denies fever, chills or sweats.  She has no history of myocardial infarction, congestive heart failure, rheumatic heart disease, congenital heart disease or heart murmur.        PAST MEDICAL HISTORY:  Remarkable for anxiety, the recurrent paroxysmal atrial fibrillation and renal insufficiency, early dementia, bilateral dry eyes.  Generalized osteoarthritis, glaucoma, previous history of hypertension, hyperlipidemia with intolerance of statins, primarily due to elevated liver function tests.  The etiology of her elevated liver function tests appears to be that of a type of autoimmune disease that has been treated most recently with prednisone with improvement of her liver function tests.        MEDICATIONS PRIOR TO ADMISSION:     1.  Multivitamins 1 per day.   2.  Omega-3 fatty acids 1000 mg a day.   3.  Diltiazem extended release 120 mg a day.   4.  " Prednisone 20 mg a day.      The patient had been seen in electrophysiologic consultation prior to this hospitalization at which time metoprolol which had been used prior to this time from previous hospitalization in 11/27 and had been increased, but it caused her symptoms of orthostatic hypotension.  She was also recommended at that time because of an elevated CHADS-VASc score of at least 3 to undergo anticoagulation.  She did not want to use warfarin initially because of frequent monitoring.  It was attempted to use NOAC drug in the form of Xarelto or Eliquis.  There was some concern about using the NOACs with her elevated liver function tests and at one point she was restarted on warfarin but this has more recently been discontinued.  She also was switched from metoprolol that had been 100 mg twice a day to diltiazem extended release 120 mg a day prior to the recurrence of her atrial fibrillation.  Her history for coronary artery disease risk factors is positive for a very distant history of cigarette smoking and positive for hypertension, but negative for diabetes mellitus, positive for hyperlipidemia and negative for family history of premature coronary disease.      REVIEW OF SYSTEMS:  Unremarkable for significant headache, seizure, stroke.  There is the suggestion of early dementia or cognitive decline.  There is no history of difficulty swallowing, asthma, pneumonia, bronchitis, chest pain, peptic ulcer disease, dyspepsia, bowel or bladder dysfunction.  There is the history of abnormal liver function tests and apparently there has been a liver biopsy performed, the records of which are not obtainable at this time because of this being done in [place].  She has not described bladder or bowel dysfunction.  She has not had significant swelling or weakness of lower extremities.      Cardiology consultation again is requested because of recurrent paroxysmal atrial fibrillation with rapid ventricular response  despite use of beta blocker and calcium channel blocker drugs.  At the present time, she is not on anticoagulation or antihyperlipidemic therapy.      PHYSICAL EXAMINATION:   GENERAL:  The patient is an elderly, slender white female in no acute distress.   VITAL SIGNS:  Blood pressure 140/70 with a heart rate varying anywhere from 70-80 in sinus rhythm after being started on IV diltiazem, after having a heart rate of 160-170.   HEENT:  Pupils equal, round, react to light and accommodate.   NECK:  Without jugular venous distention, carotid bruit or palpable thyroid.   CHEST:  Essentially clear to percussion and auscultation with slight decreased breath sounds at the bases.   CARDIAC:  Regular rhythm with occasional premature beat and a soft S1, physiologically split S2.  There was an S4 gallop.  There was a 1-2/6 systolic murmur at the left sternal border with minimal radiation.  There was no diastolic murmur, rub or S3.   ABDOMEN:  Soft, nontender, without palpable liver, spleen or masses.  Bowel sounds are present.   EXTREMITIES:  Without significant cyanosis.  There was trace edema present.  Pulses were 1-2+ below the femorals, 2+ above.   NEUROLOGIC:  Appeared to be grossly intact.  The patient was able to move all extremities and respond to sensory stimuli.      Electrocardiogram at the time of this admission showed atrial fibrillation with a rapid ventricular response, nonspecific ST-T wave changes with possible ischemic and consistent with possible ischemia.  Follow up electrocardiogram while on diltiazem intravenously demonstrated a normal sinus rhythm with some sinus dysrhythmia and occasional PAC, very mild nonspecific ST-T wave changes.      LABORATORY DATA:  Demonstrated a sodium 143, potassium 3.7, chloride 112, CO2 26, BUN 31, creatinine 0.9, albumin 2.8, protein 6.1, alkaline phosphatase 121, ALT is 98.  AST is 58.  WBC 8.7, hemoglobin 14.7, hematocrit 43.6.  No chest x-ray is available for viewing at  this time.      IMPRESSION:   1.  Recurrent atrial fibrillation with a rapid ventricular response, now resolved in sinus rhythm on IV diltiazem.   2.  History of paroxysmal atrial fibrillation with multiple cardioversions.   3.  Hypertension with slight elevation of systolic blood pressure, first treatment attempted with beta blocker therapy with orthostatic symptoms, second treatment with a low-dose diltiazem.   4.  Abnormal liver function tests thought to be related to autoimmune liver disease, improving with systemic steroids in the form of prednisone.   5.  Mild cognitive impairment.   6.  History of osteoarthritis.   7.  History of hyperlipidemia, question of statin intolerance versus elevated liver function tests.   8.  History of glaucoma.      DISCUSSION:  The patient presents with paroxysmal atrial fibrillation.  She has had initial therapy with beta blockers that were not tolerated at higher dose for blood pressure control and on the lower dose of diltiazem she broke through and required intravenous diltiazem.  A higher dose of diltiazem extended release of 180 to 240 mg a day will be attempted while weaning or stopping her IV diltiazem drip.  Per previous electrophysiologic evaluation as an outpatient, consideration may be given for AV node ablation and pacemaker insertion.  The discussion of possible usage of warfarin versus NOAC therapy for stroke prevention since she does have an elevated CHADS2 score will be ongoing.  She is not anxious to take statin drugs for her lipids, which will be rechecked.  It is not clear as to whether the patient has had any type of evaluation for ischemic disease and that may be worthwhile test either later in this hospitalization or as an outpatient,  i.e., with Lexiscan Cardiolite stress test.  The pros and cons of anticoagulation as well as heart rate control versus conversion of the patient's rhythm were discussed with the patient and her spouse.      RECOMMENDATIONS:    1.  Telemetry.   2.  Change IV diltiazem to diltiazem extended release 180-240 mg a day.   3.  Continue steroid therapy for autoimmune hepatitis.   4.  Strongly consider anticoagulation for stroke prevention with elevated CHADS2 score.   5.  May need further adjustment of medication for better hypertension control.   6.  If heart rate control is difficult to achieve, would consider AV node ablation with pacemaker insertion.   7.  EP consultation for followup of her recurrent dysrhythmia.   8.  Routine medical followup.         VIKI ALVAREZ MD, FACC             D: 2018 23:02   T: 2018 23:46   MT: JAYDEN      Name:     ABDULLAHI BLACKWOOD   MRN:      [id number]        Account:       [id number]   :      1945           Consult Date:  2018      Document: [id number]       cc: EREN GARCIA

## 2018-01-03 NOTE — PHARMACY
Patient meeting $405 new ins year deductible. All of first fill submitted to ins will go towards deductible. Once deductible is met copay / month for all NOAC's ~ $90    Eliquis #60 copay = $410.  We can offer patient a free trial voucher for the first fill. This pt is not eligible for the  copay assistance card for refills as she/he has Part D coverage.    Pradaxa #60 copay = $375.    Savaysa is non-formulary and would require a prior authorization.    Xarelto #30 copay = $390.  We can offer patient a free trial voucher for the first fill. This pt is not eligible for the  copay assistance card for refills as she/he has Part D coverage.    Thank you,  [facility] [place] [facility] Liaison  [phone]

## 2018-01-03 NOTE — PROGRESS NOTES
[facility]    Hospitalist Progress Note      Assessment & Plan   Elaina Winn is a 72 year old female with PMH of paroxysmal afib, elevated creatinine, hepatitis of unclear etiology, who presented in ER for evaluation of palpitations; she ws found to have recurrent atrial fibrillation with RVR    Atrial fibrillation with RVR: Patient was diagnosed with afib in 11/2017. She was initially treated with metoprolol and Eliquis, however metoprolol was stopped due to persistent orthostatic hypotension. Eliquis was also discontinued as she was undergoing evaluation for elevated LFTs. She has been controlled primarily with diltiazem.   - Echo in 11/2017- EF 55-60%, mild MR  - was seen by Dr Erwin in the clinic on 12/29- she was in NSR at that time; Zio patch recommended; it was discussed that if she will have recurrent Afib with RVR- she may need AVN ablation and PPM    -She was seen in the ED on 12/31 for afib with RVR and underwent three shocks with conversion to NSR.   - Unfortunately she developed recurrent symptomatic afib with RVR again on 1/1- she was shocked 5 times in the ED with conversion to NSR but would immediately convert back to afib. There is no clear catalyst to this episode of afib with RVR; TSH 1.59 in 11/2017  - started on a diltiazem drip and spontaneously converted to NSR just prior to transfer to [facility].  - Tele with NSR now  - Cardiology consulted  - Diltiazem drip stopped and her PTA Diltiazem ER was increased form 120 mg po daily to 180 mg po daily  - EP to see her in am  - off Eliquis now- defer to Cardiology/EF initiating anticoagulation     Hepatitis of unclear etiology: Patient was noted to have significantly elevated LFTs in Sept 2017 of unclear etiology. She underwent a workup with [facility] which culminated in a liver biopsy on 12/21 which shows hepatitis, unclear what the etology is. She is currently on prednisone, and her LFTs today appear improved. Her elevated LFTs will likely  complicate her cardiac regimen.  - Continue prednisone 40mg daily  - LFTs improving  - avoid hepatotoxic drugs     CKD stage 3  - baseline cr seems to be 1.2--1.3  - cr here 1.19--0.98- better than baseline  - avoid nephrotoxic drugs    DVT Prophylaxis: Pneumatic Compression Devices for now    Code Status: Full Code    Disposition: possible d/c in 1-2 days, pending HR control and Cards/EP recommendations; discussed with UR and will switch her to inpatient status.    Gail Smith MD    Interval History   Doing fine today, denies chest pain, no SOB, no palpitations; seems a little anxious about further plans; discussed with  at bedside, RN and cardiology    -Data reviewed today: I reviewed all new labs and imaging results over the last 24 hours. I personally reviewed the EKG tracing showing NSR with sinus arrhythmia .    Physical Exam   Temp: 97.8  F (36.6  C) Temp src: Oral BP: 152/75 Pulse: 168 Heart Rate: 76 Resp: 18 SpO2: 97 % O2 Device: None (Room air) Oxygen Delivery: 2 LPM  Vitals:    01/01/18 2023 01/02/18 0512   Weight: 65 kg (143 lb 6.4 oz) 62.5 kg (137 lb 12.6 oz)     Vital Signs with Ranges  Temp:  [97.7  F (36.5  C)-99  F (37.2  C)] 97.8  F (36.6  C)  Pulse:  [142-190] 168  Heart Rate:  [] 76  Resp:  [6-114] 18  BP: ()/() 152/75  SpO2:  [92 %-100 %] 97 %       Constitutional: Awake, alert, NAD  Respiratory: B/l air entry, no wheezing, no rales, no crackles  Cardiovascular: S1S2, RRR, no murmurs, no rubs  GI: abdomen- soft, nonT, nonTD, BS present  Skin/Integumen: intact, no rashes, no cyanosis  Extremities- no leg edema      Medications        hydrocortisone   Topical BID     diltiazem  180 mg Oral Daily     multivitamin, therapeutic with minerals  1 tablet Oral Daily     predniSONE  40 mg Oral Daily     fish oil-omega-3 fatty acids  1,000 mg Oral Daily       Data     Recent Labs  Lab 01/02/18  0550 01/01/18 2116 01/01/18 2035   WBC  --   --  8.7   HGB  --   --  14.7    MCV  --   --  93   PLT  --   --  266    142 Canceled, Test credited   POTASSIUM 3.7 4.7 Canceled, Test credited   CHLORIDE 112* 109 Canceled, Test credited   CO2 26 26 Canceled, Test credited   BUN 31* 36* Canceled, Test credited   CR 0.98 1.19* Canceled, Test credited   ANIONGAP 5 7 Canceled, Test credited   DANIELITO 8.7 8.5 Canceled, Test credited   GLC 88 142* Canceled, Test credited   ALBUMIN 2.8* 2.9* Canceled, Test credited   PROTTOTAL 6.1* 6.9 Canceled, Test credited   BILITOTAL 0.5 0.6 Canceled, Test credited   ALKPHOS 121 153* Canceled, Test credited   ALT 98* 113* Canceled, Test credited   AST 58* 77* Canceled, Test credited       No results found for this or any previous visit (from the past 24 hour(s)).

## 2018-01-03 NOTE — PROVIDER NOTIFICATION
LUE PIV site slightly reddened and swollen approx. 1 inch above insertion site. Pt denies pain. Dr. Smith updated, will start new IV, suraj reddened borders and monitor site.

## 2018-01-03 NOTE — PLAN OF CARE
Problem: Patient Care Overview  Goal: Plan of Care/Patient Progress Review  Outcome: No Change  Pt denies pain, afebrile, remains in Afib since approx. 0900 this AM, HR improved to 70s-90s after prn and scheduled Metoprolol started, BP stable. Up with SBA. Tolerating diet. Monitoring reddened, swollen site above L antecubital IV site, borders marked and ice applied, swelling seems to be improving. Plan for medication management, as pt does not with to pursue ablation with PPM placement at this time. Spouse updated, involved and supportive.

## 2018-01-03 NOTE — PROVIDER NOTIFICATION
"Pt noted to be in SAEID 140s during medication administration and assessment, BP stable 155/77, pt denies CP or lightheadedness, states she can feel \"heart racing\". Verbal update to Dr. Jennings and Dr. Smith. EKG obtained, HR increased to 150s with brief periods in 170s and 80s, max HR noted 205. Prn IV metoprolol ordered by Dr. Smith and given. -120s 5 min after administration, /76. Dr. Jennings to see pt, will continue to monitor. Pt's spouse supportive at bedside.  "

## 2018-01-03 NOTE — PROGRESS NOTES
"EP Cardiology Progress Note  Selena Jennings MD         Assessment and Plan:      1.  Symptomatic paroxysmal AF with impressive RVR despite diltiazem:  liver dz (hepatitis), mildly prolonged baseline QTc (450-460 msec) and somewhat fluctuating renal function severely limit AA drug choices.  In fact, no AA appears completely 'safe'.        Thus, I recommended AV node ablation + PM implantation.  As a second lower-yield option we could try to optimize rate control with meds.  The patient says she is \"mentally not ready\" for a significant cardiac procedure and prefers the medical option to begin with.  BP is adequate today to allow introduction of metoprolol.          She needs AC.  [id number] score is 3.  I don't see a clear contraindication to AC.  She does not have apparent coagulopathy related to her liver disease (INR is normal).  Most NOACs are not metabolized in the liver.    Plan:  - continue dilt  mg daily  - add metoprolol 50 mg bid  - discuss with pharmacy which NOAC may be preferable given her clinical circumstances        Total Time: 35 min;   20 minutes spent in direct communication with patient / family and care coordination.               Interval History:   no new complaints; feels \"anxious\" when HR is >120          Review of Systems:   C: NEGATIVE for fever, chills, change in weight  E/M: NEGATIVE for ear, mouth and throat problems  R: NEGATIVE for significant cough or SOB  CV: NEGATIVE for chest pain, palpitations or peripheral edema          Physical Exam:        Blood pressure 106/75, pulse 168, temperature 98.1  F (36.7  C), temperature source Oral, resp. rate 16, height 1.626 m (5' 4\"), weight 62.1 kg (136 lb 12.8 oz), SpO2 97 %, not currently breastfeeding.  Vitals:    01/01/18 2023 01/02/18 0512 01/03/18 0614   Weight: 65 kg (143 lb 6.4 oz) 62.5 kg (137 lb 12.6 oz) 62.1 kg (136 lb 12.8 oz)     Vital Signs with Ranges  Temp:  [97.8  F (36.6  C)-99  F (37.2  C)] 98.1  F (36.7  C)  Heart " Rate:  [] 112  Resp:  [16-18] 16  BP: (106-155)/(64-77) 106/75  SpO2:  [95 %-97 %] 97 %  I/O's Last 24 hours  I/O last 3 completed shifts:  In: 440 [P.O.:440]  Out: 1550 [Urine:1550]    EXAM:  Alert  Lungs: clear  CV: irreg irreg 100-110, no S3  ABD: soft, NT  EXTR: trace edema  NEURO: A+O x 3         Medications:          sodium chloride (PF)  3 mL Intracatheter Q8H     metoprolol  50 mg Oral BID     hydrocortisone   Topical BID     diltiazem  180 mg Oral Daily     multivitamin, therapeutic with minerals  1 tablet Oral Daily     predniSONE  40 mg Oral Daily     fish oil-omega-3 fatty acids  1,000 mg Oral Daily            Data:      All new lab and imaging data was reviewed.   Recent Labs   Lab Test  01/01/18   2035  12/21/17   0746  12/07/17   0950  11/16/17   1509  11/15/17   0726  11/13/17   0735   WBC  8.7   --   7.0   --   5.1  5.1   HGB  14.7   --   14.0  13.5  13.2  13.4   MCV  93   --   94   --   94  96   PLT  266   --   202   --   200  203   INR   --   1.02   --    --    --   1.01      Recent Labs   Lab Test  01/02/18   0550  01/01/18   2116  01/01/18   2035   NA  143  142  Canceled, Test credited   POTASSIUM  3.7  4.7  Canceled, Test credited   CHLORIDE  112*  109  Canceled, Test credited   CO2  26  26  Canceled, Test credited   BUN  31*  36*  Canceled, Test credited   CR  0.98  1.19*  Canceled, Test credited   ANIONGAP  5  7  Canceled, Test credited   DANIELITO  8.7  8.5  Canceled, Test credited   GLC  88  142*  Canceled, Test credited     Recent Labs   Lab Test  12/07/17   0950  04/26/16   1125   TROPI  <0.015  <0.015  The 99th percentile for upper reference range is 0.045 ug/L.  Troponin values in   the range of 0.045 - 0.120 ug/L may be associated with risks of adverse   clinical events.           EKG results:  Reviewed      Echo Results:  Recent Results (from the past 4320 hour(s))   Echocardiogram Complete    Narrative    [id number]  [facility]  [id number]  [id number]^KATTY^ZAINA^MOMO Saab  [facility]  Echocardiography Laboratory  [address] [place] [address]        Name: ABDULLAHI BLACKWOOD  MRN: [id number]  : 1945  Study Date: 2017 03:12 PM  Age: 72 yrs  Gender: Female  Patient Location: [facility]  Reason For Study: Afib  Ordering Physician: ZAINA ALANIZ  Referring Physician: Cady Marie  Performed By: Santiago Monzon RDCS     BSA: 1.6 m2  Height: 64 in  Weight: 132 lb  HR: 65  BP: 112/63 mmHg  _____________________________________________________________________________  __        Procedure  Complete Portable Echo Adult.  _____________________________________________________________________________  __        Interpretation Summary     Left ventricular systolic function is normal.  The visual ejection fraction is estimated at 55-60%.  There is mild (1+) mitral regurgitation.  Sinus rhythm was noted.  The study was technically adequate. There is no comparison study available.  _____________________________________________________________________________  __        Left Ventricle  The left ventricle is normal in size. There is normal left ventricular wall  thickness. Left ventricular systolic function is normal. The visual ejection  fraction is estimated at 55-60%. E by E prime ratio is between 8 and 15, which  is indeterminate for assessment of left ventricular filling pressures. No  regional wall motion abnormalities noted.     Right Ventricle  The right ventricle is normal size. The right ventricular systolic function is  normal.     Atria  The left atrium is mildly dilated. Right atrial size is normal. There is no  color Doppler evidence of an atrial shunt.     Mitral Valve  There is mild (1+) mitral regurgitation.        Tricuspid Valve  There is mild (1+) tricuspid regurgitation. The right ventricular systolic  pressure is approximated at 24.0 mmHg plus the right atrial pressure. Normal  IVC (1.5-2.5cm) with >50% respiratory collapse; right atrial pressure is  estimated  at 5-10mmHg.     Aortic Valve  The aortic valve is trileaflet. There is trace aortic regurgitation. No aortic  stenosis is present.     Pulmonic Valve  The pulmonic valve is not well visualized.     Vessels  The aortic root is normal size.     Pericardium  There is no pericardial effusion.        Rhythm  Sinus rhythm was noted.  _____________________________________________________________________________  __  MMode/2D Measurements & Calculations  IVSd: 1.1 cm     LVIDd: 3.9 cm  LVIDs: 2.7 cm  LVPWd: 0.84 cm  FS: 30.7 %  EDV(Teich): 64.1 ml  ESV(Teich): 26.3 ml  LV mass(C)d: 110.7 grams  LV mass(C)dI: 67.5 grams/m2  Ao root diam: 3.0 cm  LA dimension: 4.3 cm  asc Aorta Diam: 3.2 cm  LA/Ao: 1.4  LA Volume (BP): 56.8 ml  LA Volume Index (BP): 34.6 ml/m2  RWT: 0.44           Doppler Measurements & Calculations  MV E max mendoza: 88.4 cm/sec  MV A max mendoza: 58.6 cm/sec  MV E/A: 1.5  MV dec time: 0.18 sec  TR max mendoza: 245.1 cm/sec  TR max P.0 mmHg  E/E' av.3  Lateral E/e': 13.5  Medial E/e': 11.2           _____________________________________________________________________________  __           Report approved by: Varinder Richter 2017 04:02 PM

## 2018-01-03 NOTE — PLAN OF CARE
Problem: Patient Care Overview  Goal: Plan of Care/Patient Progress Review  Outcome: Improving  Pt is A&O. Selvin SOB. VSS on RA. Up independent in room. Tele is NSR.  Posible d/c in 1-2 days. Pending cardio recommendations.Will continue to observe.

## 2018-01-04 VITALS
RESPIRATION RATE: 16 BRPM | HEIGHT: 64 IN | WEIGHT: 135.6 LBS | SYSTOLIC BLOOD PRESSURE: 105 MMHG | BODY MASS INDEX: 23.15 KG/M2 | HEART RATE: 168 BPM | TEMPERATURE: 98.2 F | DIASTOLIC BLOOD PRESSURE: 62 MMHG | OXYGEN SATURATION: 93 %

## 2018-01-04 LAB
ALBUMIN SERPL-MCNC: 2.7 G/DL (ref 3.4–5)
ALP SERPL-CCNC: 103 U/L (ref 40–150)
ALT SERPL W P-5'-P-CCNC: 74 U/L (ref 0–50)
AST SERPL W P-5'-P-CCNC: 43 U/L (ref 0–45)
BILIRUB DIRECT SERPL-MCNC: 0.2 MG/DL (ref 0–0.2)
BILIRUB SERPL-MCNC: 0.7 MG/DL (ref 0.2–1.3)
GLUCOSE BLDC GLUCOMTR-MCNC: 130 MG/DL (ref 70–99)
GLUCOSE BLDC GLUCOMTR-MCNC: 80 MG/DL (ref 70–99)
INTERPRETATION ECG - MUSE: NORMAL
PROT SERPL-MCNC: 6.5 G/DL (ref 6.8–8.8)

## 2018-01-04 PROCEDURE — A9270 NON-COVERED ITEM OR SERVICE: HCPCS | Mod: GY | Performed by: INTERNAL MEDICINE

## 2018-01-04 PROCEDURE — 99233 SBSQ HOSP IP/OBS HIGH 50: CPT | Performed by: INTERNAL MEDICINE

## 2018-01-04 PROCEDURE — 00000146 ZZHCL STATISTIC GLUCOSE BY METER IP

## 2018-01-04 PROCEDURE — 25000132 ZZH RX MED GY IP 250 OP 250 PS 637: Mod: GY | Performed by: INTERNAL MEDICINE

## 2018-01-04 PROCEDURE — 80076 HEPATIC FUNCTION PANEL: CPT | Performed by: INTERNAL MEDICINE

## 2018-01-04 PROCEDURE — 25000125 ZZHC RX 250: Performed by: INTERNAL MEDICINE

## 2018-01-04 PROCEDURE — 36415 COLL VENOUS BLD VENIPUNCTURE: CPT | Performed by: INTERNAL MEDICINE

## 2018-01-04 PROCEDURE — 99239 HOSP IP/OBS DSCHRG MGMT >30: CPT | Performed by: INTERNAL MEDICINE

## 2018-01-04 RX ORDER — DILTIAZEM HYDROCHLORIDE 240 MG/1
240 CAPSULE, EXTENDED RELEASE ORAL DAILY
Status: DISCONTINUED | OUTPATIENT
Start: 2018-01-05 | End: 2018-01-04 | Stop reason: HOSPADM

## 2018-01-04 RX ORDER — METOPROLOL TARTRATE 50 MG
50 TABLET ORAL 2 TIMES DAILY
Qty: 60 TABLET | Refills: 0 | Status: SHIPPED | OUTPATIENT
Start: 2018-01-04 | End: 2018-01-18

## 2018-01-04 RX ORDER — DILTIAZEM HYDROCHLORIDE 120 MG/1
240 CAPSULE, COATED, EXTENDED RELEASE ORAL DAILY
Qty: 90 CAPSULE | Refills: 3 | COMMUNITY
Start: 2018-01-04 | End: 2018-01-18

## 2018-01-04 RX ADMIN — MULTIPLE VITAMINS W/ MINERALS TAB 1 TABLET: TAB at 08:44

## 2018-01-04 RX ADMIN — APIXABAN 5 MG: 5 TABLET, FILM COATED ORAL at 08:44

## 2018-01-04 RX ADMIN — DILTIAZEM HYDROCHLORIDE 180 MG: 180 CAPSULE, EXTENDED RELEASE ORAL at 08:44

## 2018-01-04 RX ADMIN — METOPROLOL TARTRATE 50 MG: 50 TABLET ORAL at 08:44

## 2018-01-04 RX ADMIN — PREDNISONE 40 MG: 20 TABLET ORAL at 08:44

## 2018-01-04 RX ADMIN — Medication 1000 MG: at 08:44

## 2018-01-04 RX ADMIN — HYDROCORTISONE: 1 CREAM TOPICAL at 08:44

## 2018-01-04 NOTE — PLAN OF CARE
Problem: Arrhythmia/Dysrhythmia (Symptomatic) (Adult)  Goal: Signs and Symptoms of Listed Potential Problems Will be Absent, Minimized or Managed (Arrhythmia/Dysrhythmia)  Signs and symptoms of listed potential problems will be absent, minimized or managed by discharge/transition of care (reference Arrhythmia/Dysrhythmia (Symptomatic) (Adult) CPG).   Outcome: Improving  Rhythm remains sinus rate in the 60's, stable BP. Denies any complaints, possible d/c later to day.

## 2018-01-04 NOTE — PROGRESS NOTES
"EP Cardiology Progress Note  Selena Jennings MD         Assessment and Plan:      1.  Symptomatic paroxysmal AF with impressive RVR despite diltiazem  mg daily:  see my note from yesterday for discussion of rx options.       She has spontaneously converted to SR overnight.  Not bradycardic while in SR.        We are pursuing rate control (dilt XR + metoprolol).       Anticoagulation has been started.  I see no clear contraindication to AC.    Plan:  - OK to send home on dilt  mg daily and metoprolol 50 mg bid  - apixaban  - f/up with Beckie Goel NP on 01/18/18, 3 pm  - it was explained that she will have more AF.  Unless she feels poorly with it, she can wait home for several hours (as there is a good chance of spontaneous conversion).    - her  asked what can be done long-term if she has recurrent AF with RVR:  given the paroxysmal nature of her AF and only mildly enlarged atria, I think she would be a candidate for AF ablation rather than AVN ablation + PM.   In fact, both options are reasonable.        Total Time:  35 min;   20 minutes spent in direct communication with patient / family and care coordination.               Interval History:   no new complaints and doing well; no cp, sob, n/v/d, or abd pain.          Review of Systems:   C: NEGATIVE for fever, chills, change in weight  E/M: NEGATIVE for ear, mouth and throat problems  R: NEGATIVE for significant cough or SOB  CV: NEGATIVE for chest pain, palpitations or peripheral edema          Physical Exam:        Blood pressure 137/66, pulse 168, temperature 97.5  F (36.4  C), temperature source Oral, resp. rate 16, height 1.626 m (5' 4\"), weight 61.5 kg (135 lb 9.6 oz), SpO2 96 %, not currently breastfeeding.  Vitals:    01/02/18 0512 01/03/18 0614 01/04/18 0400   Weight: 62.5 kg (137 lb 12.6 oz) 62.1 kg (136 lb 12.8 oz) 61.5 kg (135 lb 9.6 oz)     Vital Signs with Ranges  Temp:  [97.5  F (36.4  C)-98.4  F (36.9  C)] 97.5  F (36.4 "  C)  Heart Rate:  [] 58  Resp:  [14-16] 16  BP: (106-137)/(66-90) 137/66  SpO2:  [96 %-98 %] 96 %  I/O's Last 24 hours  I/O last 3 completed shifts:  In: 660 [P.O.:660]  Out: 1000 [Urine:1000]    EXAM:  A+O x 3  LUNGS: clear  CV: RRR, no g/m/r  ABD: soft, NT  EXTR: no edema         Medications:          [START ON 1/5/2018] diltiazem  240 mg Oral Daily     sodium chloride (PF)  3 mL Intracatheter Q8H     metoprolol  50 mg Oral BID     apixaban ANTICOAGULANT  5 mg Oral BID     hydrocortisone   Topical BID     multivitamin, therapeutic with minerals  1 tablet Oral Daily     predniSONE  40 mg Oral Daily     fish oil-omega-3 fatty acids  1,000 mg Oral Daily            Data:      All new lab and imaging data was reviewed.   Recent Labs   Lab Test  01/01/18   2035  12/21/17   0746  12/07/17   0950  11/16/17   1509  11/15/17   0726  11/13/17   0735   WBC  8.7   --   7.0   --   5.1  5.1   HGB  14.7   --   14.0  13.5  13.2  13.4   MCV  93   --   94   --   94  96   PLT  266   --   202   --   200  203   INR   --   1.02   --    --    --   1.01      Recent Labs   Lab Test  01/02/18   0550  01/01/18   2116  01/01/18   2035   NA  143  142  Canceled, Test credited   POTASSIUM  3.7  4.7  Canceled, Test credited   CHLORIDE  112*  109  Canceled, Test credited   CO2  26  26  Canceled, Test credited   BUN  31*  36*  Canceled, Test credited   CR  0.98  1.19*  Canceled, Test credited   ANIONGAP  5  7  Canceled, Test credited   DANIELITO  8.7  8.5  Canceled, Test credited   GLC  88  142*  Canceled, Test credited     Recent Labs   Lab Test  12/07/17   0950  04/26/16   1125   TROPI  <0.015  <0.015  The 99th percentile for upper reference range is 0.045 ug/L.  Troponin values in   the range of 0.045 - 0.120 ug/L may be associated with risks of adverse   clinical events.           EKG results:  Reviewed      Echo Results:  Recent Results (from the past 4320 hour(s))   Echocardiogram Complete    Narrative     [id number]  [facility]  [id number]  [id number]^KATTY^ZAINA^MOMO           [facility]  Echocardiography Laboratory  [address] [place] [address]        Name: ABDULLAHI BLACKWOOD  MRN: [id number]  : 1945  Study Date: 2017 03:12 PM  Age: 72 yrs  Gender: Female  Patient Location: [facility]  Reason For Study: Afib  Ordering Physician: ZAINA ALANIZ  Referring Physician: Cady Marie  Performed By: Santiago Monzon RDCS     BSA: 1.6 m2  Height: 64 in  Weight: 132 lb  HR: 65  BP: 112/63 mmHg  _____________________________________________________________________________  __        Procedure  Complete Portable Echo Adult.  _____________________________________________________________________________  __        Interpretation Summary     Left ventricular systolic function is normal.  The visual ejection fraction is estimated at 55-60%.  There is mild (1+) mitral regurgitation.  Sinus rhythm was noted.  The study was technically adequate. There is no comparison study available.  _____________________________________________________________________________  __        Left Ventricle  The left ventricle is normal in size. There is normal left ventricular wall  thickness. Left ventricular systolic function is normal. The visual ejection  fraction is estimated at 55-60%. E by E prime ratio is between 8 and 15, which  is indeterminate for assessment of left ventricular filling pressures. No  regional wall motion abnormalities noted.     Right Ventricle  The right ventricle is normal size. The right ventricular systolic function is  normal.     Atria  The left atrium is mildly dilated. Right atrial size is normal. There is no  color Doppler evidence of an atrial shunt.     Mitral Valve  There is mild (1+) mitral regurgitation.        Tricuspid Valve  There is mild (1+) tricuspid regurgitation. The right ventricular systolic  pressure is approximated at 24.0 mmHg plus the right atrial pressure. Normal  IVC  (1.5-2.5cm) with >50% respiratory collapse; right atrial pressure is  estimated at 5-10mmHg.     Aortic Valve  The aortic valve is trileaflet. There is trace aortic regurgitation. No aortic  stenosis is present.     Pulmonic Valve  The pulmonic valve is not well visualized.     Vessels  The aortic root is normal size.     Pericardium  There is no pericardial effusion.        Rhythm  Sinus rhythm was noted.  _____________________________________________________________________________  __  MMode/2D Measurements & Calculations  IVSd: 1.1 cm     LVIDd: 3.9 cm  LVIDs: 2.7 cm  LVPWd: 0.84 cm  FS: 30.7 %  EDV(Teich): 64.1 ml  ESV(Teich): 26.3 ml  LV mass(C)d: 110.7 grams  LV mass(C)dI: 67.5 grams/m2  Ao root diam: 3.0 cm  LA dimension: 4.3 cm  asc Aorta Diam: 3.2 cm  LA/Ao: 1.4  LA Volume (BP): 56.8 ml  LA Volume Index (BP): 34.6 ml/m2  RWT: 0.44           Doppler Measurements & Calculations  MV E max mendoza: 88.4 cm/sec  MV A max mendoza: 58.6 cm/sec  MV E/A: 1.5  MV dec time: 0.18 sec  TR max mendoza: 245.1 cm/sec  TR max P.0 mmHg  E/E' av.3  Lateral E/e': 13.5  Medial E/e': 11.2           _____________________________________________________________________________  __           Report approved by: Varinder Richter 2017 04:02 PM              Imaging:   No results found for this or any previous visit (from the past 24 hour(s)).

## 2018-01-04 NOTE — DISCHARGE SUMMARY
[facility]    Discharge Summary  Hospitalist    Date of Admission:  1/1/2018  Date of Discharge:  1/4/2018  4:56 PM  Discharging Provider: Gail Smith  Date of Service (when I saw the patient): 01/04/18    Discharge Diagnoses   Recurrent Paroxysmal symptomatic Afib with RVR  Hepatitis of unclear etiology  Dyslipidemia  CKD stage 3    History of Present Illness   Elaina Winn is a 72 year old female with PMH of paroxysmal afib, CKD stage 3, hepatitis of unclear etiology, who presented in ER for evaluation of palpitations; she was found to have recurrent atrial fibrillation with RVR; for a detailed HPI- please refer to H&P done by Dr Ronan Freitas on 01/01/2017.    Hospital Course   Elaina Winn was admitted on 1/1/2018.  The following problems were addressed during her hospitalization:    Paroxysmal Atrial fibrillation with RVR: Patient was diagnosed with afib in 11/2017. She was initially treated with metoprolol and Eliquis, however metoprolol was stopped due to persistent orthostatic hypotension. Eliquis was also discontinued as she was undergoing evaluation for elevated LFTs; she remained on Diltiazem 120 mg po daily   - Echo in 11/2017- EF 55-60%, mild MR  - was seen by Dr Erwin in the clinic on 12/29- she was in NSR at that time; Zio patch recommended; it was discussed that if she will have recurrent Afib with RVR- she may need AVN ablation and PPM     -She was seen in the ED on 12/31 for afib with RVR and underwent three shocks with conversion to NSR and d/c home  - Unfortunately she developed recurrent symptomatic afib with RVR again on 1/1- she was shocked 5 times in the ED with conversion to NSR but would immediately convert back to afib. There is no clear catalyst to this episode of afib with RVR; TSH 1.59 in 11/2017.  - started initially on Cardizem drip which was stopped and PTA Diltiazem ER was increased form 120 mg po daily to 180 mg po daily on 1/2  - she continued to have  intermittent A fib with RVR  - she was seen by Cardiology and EP  - AVN ablation and PPM recommended but patient prefers medical management at this time; started on Metoprolol 50 mg po BID and Diltiazem ER was increased to 240 mg po daily  - eventually - she converted back in NSR on this regimen which she will continue after discharge    - CHADSVAsc score 3  - EP preferred to put her back on NOAC so she was started on Eliquis 5 mg p BID  - follow up with EP as scheduled on 01/18/2018      Hepatitis of unclear etiology- autoimmune? Patient was noted to have significantly elevated LFTs in Sept 2017 of unclear etiology. She underwent a workup with [facility] which culminated in a liver biopsy on 12/21 which shows hepatitis, unclear what the etology is  - she was started on steroids by GI  - PTA Prednisone 40 mg po daily was continued at the time of the d/c  - her LFTs continued to improve during this hospital stay.  - avoid hepatotoxic drugs  - follow up with GI      CKD stage 3  - baseline cr seems to be 1.2--1.3  - cr here 1.19--0.98- better than baseline  - avoid nephrotoxic drugs     Dyslipidemia  - lipid profile in 11/2017- with , total chol 252  - not on statin because of abnormal LFTs     Gail Smith MD    Significant Results and Procedures   See below    Pending Results   None  Unresulted Labs Ordered in the Past 30 Days of this Admission     No orders found from 11/2/2017 to 1/2/2018.          Code Status   Full Code       Primary Care Physician   Cady Marie    Physical Exam   Temp: 98.2  F (36.8  C) Temp src: Oral BP: 105/62   Heart Rate: 66 Resp: 16 SpO2: 93 % O2 Device: None (Room air)    Vitals:    01/02/18 0512 01/03/18 0614 01/04/18 0400   Weight: 62.5 kg (137 lb 12.6 oz) 62.1 kg (136 lb 12.8 oz) 61.5 kg (135 lb 9.6 oz)     Vital Signs with Ranges  Temp:  [97.5  F (36.4  C)-98.2  F (36.8  C)] 98.2  F (36.8  C)  Heart Rate:  [58-66] 66  Resp:  [16] 16  BP: (105-137)/(62-66) 105/62  SpO2:   [93 %-96 %] 93 %  I/O last 3 completed shifts:  In: 240 [P.O.:240]  Out: 550 [Urine:550]    Constitutional:  Awake, alert, NAD  Respiratory:  B/l air entry, no wheezing, no rales, no crackles  Cardiovascular: S1S2, RRR, no murmurs, no rubs  GI: abdomen- soft, nonT, nonTD, BS present  Skin/Integumen: intact, no rashes, no cyanosis  Extremities- no leg edema                     Discharge Disposition   Discharged to home  Condition at discharge: Good    Consultations This Hospital Stay   CARDIOLOGY IP CONSULT  PHARMACY LIAISON FOR MEDICATION COVERAGE CONSULT    Time Spent on this Encounter   IGail, personally saw the patient today and spent greater than 30 minutes discharging this patient.    Discharge Orders     Reason for your hospital stay   A fib with rapid ventricular rate     Follow-up and recommended labs and tests    Follow up with primary care provider, Cady Marie, within 7 days for hospital follow- up.  No follow up labs or test are needed. Monitor blood pressure and heart rate.  Follow up with Cardiology/EP as scheduled  Follow up with GI     Activity   Your activity upon discharge: activity as tolerated and no driving for today     When to contact your care team   Call your primary doctor or return to ER if you have any of the following: temperature greater than 100.5 or less than 96, increased shortness of breath, chest pain, palpitations persisting>2 hours, dizziness, loss of consciousness, severe headache, numbness/weakness, abnormal bleeding, bloody stools, black stools.     Full Code     Diet   Follow this diet upon discharge: Orders Placed This Encounter     Regular Diet Adult       Discharge Medications   Current Discharge Medication List      START taking these medications    Details   apixaban ANTICOAGULANT (ELIQUIS) 5 MG tablet Take 1 tablet (5 mg) by mouth 2 times daily    Associated Diagnoses: Atrial fibrillation with rapid ventricular response (H)      metoprolol  (LOPRESSOR) 50 MG tablet Take 1 tablet (50 mg) by mouth 2 times daily  Qty: 60 tablet, Refills: 0    Associated Diagnoses: Atrial fibrillation with rapid ventricular response (H)         CONTINUE these medications which have CHANGED    Details   diltiazem (CARDIZEM CD/CARTIA XT) 120 MG 24 hr capsule Take 2 capsules (240 mg) by mouth daily  Qty: 90 capsule, Refills: 3    Associated Diagnoses: PAF (paroxysmal atrial fibrillation) (H)         CONTINUE these medications which have NOT CHANGED    Details   predniSONE (DELTASONE) 20 MG tablet Take 40 mg by mouth daily As directed      brimonidine-timolol (COMBIGAN) 0.2-0.5 % ophthalmic solution Place 1 drop into both eyes 2 times daily       Omega-3 Fatty Acids (FISH OIL PO) Take 1,000 mg by mouth daily       Multiple Vitamin (MULTI-VITAMIN) per tablet Take 1 tablet by mouth daily.           Allergies   Allergies   Allergen Reactions     Zocor [Simvastatin - High Dose]      Elevated LFTs     Data   Most Recent 3 CBC's:  Recent Labs   Lab Test  01/01/18   2035  12/07/17   0950  11/16/17   1509  11/15/17   0726   WBC  8.7  7.0   --   5.1   HGB  14.7  14.0  13.5  13.2   MCV  93  94   --   94   PLT  266  202   --   200      Most Recent 3 BMP's:  Recent Labs   Lab Test  01/02/18   0550  01/01/18   2116  01/01/18   2035   NA  143  142  Canceled, Test credited   POTASSIUM  3.7  4.7  Canceled, Test credited   CHLORIDE  112*  109  Canceled, Test credited   CO2  26  26  Canceled, Test credited   BUN  31*  36*  Canceled, Test credited   CR  0.98  1.19*  Canceled, Test credited   ANIONGAP  5  7  Canceled, Test credited   DANIELITO  8.7  8.5  Canceled, Test credited   GLC  88  142*  Canceled, Test credited     Most Recent 2 LFT's:  Recent Labs   Lab Test  01/04/18   0623  01/03/18   0611   AST  43  46*   ALT  74*  83*   ALKPHOS  103  103   BILITOTAL  0.7  0.7     Most Recent INR's and Anticoagulation Dosing History:  Anticoagulation Dose History     Recent Dosing and Labs Latest Ref Rng &  Units 11/13/2017 12/21/2017    INR 0.86 - 1.14 1.01 1.02        Most Recent 3 Troponin's:  Recent Labs   Lab Test  12/07/17   0950  04/26/16   1125   TROPI  <0.015  <0.015  The 99th percentile for upper reference range is 0.045 ug/L.  Troponin values in   the range of 0.045 - 0.120 ug/L may be associated with risks of adverse   clinical events.       Most Recent Cholesterol Panel:  Recent Labs   Lab Test  11/09/17   1156   CHOL  252*   LDL  148*   HDL  77   TRIG  133     Most Recent 6 Bacteria Isolates From Any Culture (See EPIC Reports for Culture Details):  Recent Labs   Lab Test  11/14/17   0425   CULT  10,000 to 50,000 colonies/mL  mixed urogenital linda  Susceptibility testing not routinely done       Most Recent TSH, T4 and A1c Labs:  Recent Labs   Lab Test  11/12/17   1702   TSH  1.59     Results for orders placed or performed during the hospital encounter of 12/21/17   US Biopsy Liver    Narrative    ULTRASOUND BIOPSY LIVER December 21, 2017 9:20 AM     HISTORY: Elevated liver function tests.    COMPARISON: None.    MEDICATIONS: 6 mL 1% lidocaine.    TECHNIQUE: Informed consent was obtained from the patient. A timeout  was performed. Patient received conscious sedation with Versed and  fentanyl with constant monitoring by the physician and nursing staff.  Total sedation time was 6 minutes. The skin was prepped and draped in  a sterile manner. 1% lidocaine was used for local anesthesia. Under  ultrasound guidance, a 17-gauge access needle was advanced into the  liver. Through this needle, three  18-gauge core biopsy specimens were  obtained and submitted to pathology. The patient tolerated the  procedure well with no immediate complications.    FINDINGS: Limited ultrasound images demonstrate needle access to the  right lobe of the liver.      Impression    IMPRESSION: Ultrasound-guided random biopsy of the liver.    LOLY MONTE MD

## 2018-01-04 NOTE — PLAN OF CARE
Problem: Arrhythmia/Dysrhythmia (Symptomatic) (Adult)  Goal: Signs and Symptoms of Listed Potential Problems Will be Absent, Minimized or Managed (Arrhythmia/Dysrhythmia)  Signs and symptoms of listed potential problems will be absent, minimized or managed by discharge/transition of care (reference Arrhythmia/Dysrhythmia (Symptomatic) (Adult) CPG).   Outcome: Adequate for Discharge Date Met: 01/04/18  A/o x4. VSS. Okay to Discharge per provider. Follow up appointment made. Medications unable to be filled. Pt and Spouse updated on this, pt does have these medications at home

## 2018-01-04 NOTE — PLAN OF CARE
Problem: Patient Care Overview  Goal: Plan of Care/Patient Progress Review  Outcome: Improving  Pt VSS on RA, HR elevated in the 120s. Tele initially afib RVR, converted to NSR at 830pm 1/3/18. A&Ox4. Up with SBA-1, using bathroom. Hydrocortisone cream applied to back. Denies pain. Slept. Plan to continue with medical management and continue to monitor.

## 2018-01-05 ENCOUNTER — TELEPHONE (OUTPATIENT)
Dept: FAMILY MEDICINE | Facility: CLINIC | Age: 73
End: 2018-01-05

## 2018-01-05 ENCOUNTER — CARE COORDINATION (OUTPATIENT)
Dept: CARDIOLOGY | Facility: CLINIC | Age: 73
End: 2018-01-05

## 2018-01-05 LAB — INTERPRETATION ECG - MUSE: NORMAL

## 2018-01-05 NOTE — PROGRESS NOTES
Patient was evaluated by cardiology while inpatient for paroxysmal afib with RVR. Called patient to discuss any post hospital d/c questions she may have, review medication changes, and confirm f/u appts.Patient denied any questions regarding new medications or changes to some of her current medications that she was taking prior to admission. Patient denied any SOB, chest pain, or light headedness. RN confirmed with patient that she has an apt scheduled on 1/18/18 with RONALDO Beckie Jackson. Patient advised to call clinic with any cardiac related questions or concerns prior to her apt on 1/18/18. Patient verbalized understanding and agreed with plan.

## 2018-01-08 ENCOUNTER — CARE COORDINATION (OUTPATIENT)
Dept: CARE COORDINATION | Facility: CLINIC | Age: 73
End: 2018-01-08

## 2018-01-08 NOTE — PROGRESS NOTES
Clinic Care Coordination Contact  [facility]/Voicemail    Referral Source: CTS  Clinical Data: Care Coordinator Outreach  Outreach attempted x 1.  Left message on voicemail with call back information and requested return call.  Plan:  Care Coordinator will try to reach patient again in 1-2 business days.  MAGDALENE LopezN, RN, PHN  Clinic Care Coordinator  [facility]  [phone]

## 2018-01-09 NOTE — PROGRESS NOTES
Clinic Care Coordination Contact  OUTREACH    Referral Information:  Referral Source: CTS  Reason for Contact: Hospital follow up  [facility] 01/01/18-01/04/18 for Recurrent Paroxysmal symptomatic Afib with RVR   Hepatitis of unclear etiology    Last PCP appointment: 11/16/17        Multiple Providers or Specialists: LATA, Cardiology    Clinical Concerns:  Current Medical Concerns: Afib    AVN ablation and PPM recommended but patient prefers medical management at this time; started on Metoprolol 50 mg po BID and Diltiazem ER was increased to 240 mg po daily  - eventually - she converted back in NSR on this regimen which she will continue after discharge     - CHADSVAsc score 3  - EP preferred to put her back on NOAC so she was started on Eliquis 5 mg p BID  - follow up with EP as scheduled on 01/18/2018    - Анна GUDINO at [facility] on Thursday. [phone].  Hospital d/c summary:   Hepatitis of unclear etiology- autoimmune? Patient was noted to have significantly elevated LFTs in Sept 2017 of unclear etiology. She underwent a workup with [facility] which culminated in a liver biopsy on 12/21 which shows hepatitis, unclear what the etology is  - she was started on steroids by GI  - PTA Prednisone 40 mg po daily was continued at the time of the d/c  - her LFTs continued to improve during this hospital stay.  - avoid hepatotoxic drugs  - follow up with GI      CKD stage 3  - baseline cr seems to be 1.2--1.3  - cr here 1.19--0.98- better than baseline  - avoid nephrotoxic drugs      Dyslipidemia  - lipid profile in 11/2017- with , total chol 252  - not on statin because of abnormal LFTs    Most recent BP readings.    /56 HR 69 129/57 HR 71 137/61 HR 67, 122/80 HR 59  123/68 HR 59  124/61 HR 61  Today; 119/63 HR 57    No dizziness, HA, dyspnea, CP.  Current Behavioral Concerns: none. Has known h/o memory concerns, [facility]. Delgado.  Education Provided to patient: Advised patient and her   to seek medical attention if she should have return of the sx that brought her to the ED.  Reviewed importance of close f/u, and role of RN CCC.    Clinical Pathway Name: None    Medication Management:  START taking these medications     Details   apixaban ANTICOAGULANT (ELIQUIS) 5 MG tablet Take 1 tablet (5 mg) by mouth 2 times daily     Associated Diagnoses: Atrial fibrillation with rapid ventricular response (H)       metoprolol (LOPRESSOR) 50 MG tablet Take 1 tablet (50 mg) by mouth 2 times daily  Qty: 60 tablet, Refills: 0     Associated Diagnoses: Atrial fibrillation with rapid ventricular response (H)               Functional Status:  Mobility Status: Independent  Equipment Currently Used at Home: none  Transportation: provided by spouse      Psychosocial:  Current living arrangement::living in a private home with spouse  Cognitive concerns. Supported by spouse.   Financial/Insurance: [employer]/Medicare     Resources and Interventions:  Current Resources:  [facility]        Advanced Care Plans/Directives on file:: No        Goals:       Barriers: Patient is overwhelmed by all the appts and new medications.   Strengths: Spouse has a good understanding of f/u instructions.   Patient/Caregiver understanding: Will f/u as directed (see appts below). Understands to call if questions/concerns. May outreach to RN CCC for coordination/clarification/education/support.  Frequency of Care Coordination: prn  Upcoming appointment: 01/11/18     Plan:   Has f/u lab at [facility] on 01/11/18 at 115 lab. Writer cancelled appt with [facility] lab, and requested lab order be faxed to [facility] so they may be drawn at PCP OV on same date.    01/18/18 f/u with Cardiology  01/30 with Dr. Pitt at 2:45  02/02 125pm [facility].   02/14 f/u with Dr. Reynoso    Will route for review to PCP increased pulse pressure and HR 56-61. Metoprolol increased at d/c.        MAGDALENE LopezN, RN, PHN  Clinic Care Coordinator  [facility]  [facility]  [phone]

## 2018-01-10 DIAGNOSIS — R79.89 ABNORMAL LIVER FUNCTION TESTS: Primary | ICD-10-CM

## 2018-01-11 ENCOUNTER — OFFICE VISIT (OUTPATIENT)
Dept: FAMILY MEDICINE | Facility: CLINIC | Age: 73
End: 2018-01-11
Payer: COMMERCIAL

## 2018-01-11 VITALS
SYSTOLIC BLOOD PRESSURE: 114 MMHG | TEMPERATURE: 97.7 F | WEIGHT: 139 LBS | HEIGHT: 64 IN | BODY MASS INDEX: 23.73 KG/M2 | DIASTOLIC BLOOD PRESSURE: 64 MMHG | RESPIRATION RATE: 14 BRPM | HEART RATE: 60 BPM

## 2018-01-11 DIAGNOSIS — K75.9 HEPATITIS: ICD-10-CM

## 2018-01-11 DIAGNOSIS — I48.91 ATRIAL FIBRILLATION WITH RAPID VENTRICULAR RESPONSE (H): Primary | ICD-10-CM

## 2018-01-11 DIAGNOSIS — R79.89 ABNORMAL LIVER FUNCTION TESTS: ICD-10-CM

## 2018-01-11 PROCEDURE — 99495 TRANSJ CARE MGMT MOD F2F 14D: CPT | Performed by: PHYSICIAN ASSISTANT

## 2018-01-11 PROCEDURE — 36415 COLL VENOUS BLD VENIPUNCTURE: CPT | Performed by: PHYSICIAN ASSISTANT

## 2018-01-11 PROCEDURE — 80076 HEPATIC FUNCTION PANEL: CPT | Performed by: PHYSICIAN ASSISTANT

## 2018-01-11 NOTE — NURSING NOTE
"Chief Complaint   Patient presents with     Hospital F/U     FVSD       Initial /64  Pulse 60  Temp 97.7  F (36.5  C) (Oral)  Resp 14  Ht 5' 4\" (1.626 m)  Wt 139 lb (63 kg)  BMI 23.86 kg/m2 Estimated body mass index is 23.86 kg/(m^2) as calculated from the following:    Height as of this encounter: 5' 4\" (1.626 m).    Weight as of this encounter: 139 lb (63 kg).  Medication Reconciliation: complete  "

## 2018-01-11 NOTE — MR AVS SNAPSHOT
After Visit Summary   1/11/2018    Elaina Winn    MRN: [id number]           Patient Information     Date Of Birth          1945        Visit Information        Provider Department      1/11/2018 1:30 PM Cady Marie PA-C [facility]        Today's Diagnoses     Atrial fibrillation with rapid ventricular response (H)    -  1    Hepatitis           Follow-ups after your visit        Your next 10 appointments already scheduled     Jan 18, 2018  3:00 PM CST   Return Discharge with CATY Marsh CNP   [facility] ([facility])    [address] [place], [place] [id number]  [facility] [address]   [phone]            Feb 14, 2018 11:15 AM CST   Return Visit with Juan C Perez MD   [facility] ([facility])    [facility] [id number]  [facility] [address] [phone]              Who to contact     If you have questions or need follow up information about today's clinic visit or your schedule please contact [facility] directly at [phone].  Normal or non-critical lab and imaging results will be communicated to you by MyChart, letter or phone within 4 business days after the clinic has received the results. If you do not hear from us within 7 days, please contact the clinic through [employer] or phone. If you have a critical or abnormal lab result, we will notify you by phone as soon as possible.  Submit refill requests through [employer] or call your pharmacy and they will forward the refill request to us. Please allow 3 business days for your refill to be completed.          Additional Information About Your Visit        MyChart Information     [employer] gives you secure access to your electronic health record. If you see a primary care provider, you can also send messages to your care team and make appointments. If you have questions, please call your primary  "care clinic.  If you do not have a primary care provider, please call [phone] and they will assist you.        Care EveryWhere ID     This is your Care EveryWhere ID. This could be used by other organizations to access your [facility] medical records  [id number]        Your Vitals Were     Pulse Temperature Respirations Height BMI (Body Mass Index)       60 97.7  F (36.5  C) (Oral) 14 5' 4\" (1.626 m) 23.86 kg/m2        Blood Pressure from Last 3 Encounters:   01/11/18 114/64   01/04/18 105/62   12/31/17 114/78    Weight from Last 3 Encounters:   01/11/18 139 lb (63 kg)   01/04/18 135 lb 9.6 oz (61.5 kg)   12/31/17 135 lb (61.2 kg)              Today, you had the following     No orders found for display       Primary Care Provider Office Phone # Fax #    Cady Marie PA-C [phone] [address] [facility] [address] [facility] [address]  [facility] [address]        Equal Access to Services     [facility]: Hadii aad ku hadasho Soomaali, waaxda luqadaha, qaybta kaalmada adetimothy, zoran hernandez . So [facility] [phone].    ATENCIÓN: Si habla español, tiene a munoz disposición servicios gratuitos de asistencia lingüística. Casimiro al [phone].    We comply with applicable federal civil rights laws and [place] laws. We do not discriminate on the basis of race, color, national origin, age, disability, sex, sexual orientation, or gender identity.            Thank you!     Thank you for choosing [facility]  for your care. Our goal is always to provide you with excellent care. Hearing back from our patients is one way we can continue to improve our services. Please take a few minutes to complete the written survey that you may receive in the mail after your visit with us. Thank you!             Your Updated Medication List - Protect others around you: Learn how to safely use, store and throw away your medicines at www.disposemymeds.org.          This list is accurate as of: 1/11/18  2:04 " PM.  Always use your most recent med list.                   Brand Name Dispense Instructions for use Diagnosis    apixaban ANTICOAGULANT 5 MG tablet    ELIQUIS     Take 1 tablet (5 mg) by mouth 2 times daily    Atrial fibrillation with rapid ventricular response (H)       brimonidine-timolol 0.2-0.5 % ophthalmic solution    COMBIGAN     Place 1 drop into both eyes 2 times daily        diltiazem 120 MG 24 hr capsule    CARDIZEM CD/CARTIA XT    90 capsule    Take 2 capsules (240 mg) by mouth daily    PAF (paroxysmal atrial fibrillation) (H)       FISH OIL PO      Take 1,000 mg by mouth daily        metoprolol tartrate 50 MG tablet    LOPRESSOR    60 tablet    Take 1 tablet (50 mg) by mouth 2 times daily    Atrial fibrillation with rapid ventricular response (H)       Multi-vitamin Tabs tablet   Generic drug:  multivitamin, therapeutic with minerals      Take 1 tablet by mouth daily.        predniSONE 20 MG tablet    DELTASONE     Take 40 mg by mouth daily As directed

## 2018-01-11 NOTE — PROGRESS NOTES
SUBJECTIVE:   Elaina Winn is a 72 year old female who presents to clinic today for the following health issues:    Hospital f/u with DC summary as follows:     Hospital Course     Elaina Winn was admitted on 1/1/2018.  The following problems were addressed during her hospitalization:     Paroxysmal Atrial fibrillation with RVR: Patient was diagnosed with afib in 11/2017. She was initially treated with metoprolol and Eliquis, however metoprolol was stopped due to persistent orthostatic hypotension. Eliquis was also discontinued as she was undergoing evaluation for elevated LFTs; she remained on Diltiazem 120 mg po daily   - Echo in 11/2017- EF 55-60%, mild MR  - was seen by Dr Erwin in the clinic on 12/29- she was in NSR at that time; Zio patch recommended; it was discussed that if she will have recurrent Afib with RVR- she may need AVN ablation and PPM      -She was seen in the ED on 12/31 for afib with RVR and underwent three shocks with conversion to NSR and d/c home  - Unfortunately she developed recurrent symptomatic afib with RVR again on 1/1- she was shocked 5 times in the ED with conversion to NSR but would immediately convert back to afib. There is no clear catalyst to this episode of afib with RVR; TSH 1.59 in 11/2017.  - started initially on Cardizem drip which was stopped and PTA Diltiazem ER was increased form 120 mg po daily to 180 mg po daily on 1/2  - she continued to have intermittent A fib with RVR  - she was seen by Cardiology and EP  - AVN ablation and PPM recommended but patient prefers medical management at this time; started on Metoprolol 50 mg po BID and Diltiazem ER was increased to 240 mg po daily  - eventually - she converted back in NSR on this regimen which she will continue after discharge     - CHADSVAsc score 3  - EP preferred to put her back on NOAC so she was started on Eliquis 5 mg p BID  - follow up with EP as scheduled on 01/18/2018      Hepatitis of unclear etiology-  autoimmune? Patient was noted to have significantly elevated LFTs in Sept 2017 of unclear etiology. She underwent a workup with [facility] which culminated in a liver biopsy on 12/21 which shows hepatitis, unclear what the etology is  - she was started on steroids by GI  - PTA Prednisone 40 mg po daily was continued at the time of the d/c  - her LFTs continued to improve during this hospital stay.  - avoid hepatotoxic drugs  - follow up with GI      CKD stage 3  - baseline cr seems to be 1.2--1.3  - cr here 1.19--0.98- better than baseline  - avoid nephrotoxic drugs      Dyslipidemia  - lipid profile in 11/2017- with , total chol 252  - not on statin because of abnormal LFTs     Gail Smith MD    HPI:     Pt is currently on prednisone 40mg  She is having some hot flashes on the prednisone    is worried about who is refilling her meds  She has normal BMs  Her appetite has been normal        Hospital Follow-up Visit:    Hospital/Nursing Home/IP Rehab Facility: [facility]  Date of Admission: 1/1/18  Date of Discharge: 1/4/18  Reason(s) for Admission: hepatitis, Afib            Problems taking medications regularly:  None       Medication changes since discharge: None       Problems adhering to non-medication therapy:  None    Summary of hospitalization:  [facility] discharge summary reviewed  Diagnostic Tests/Treatments reviewed.  Follow up needed: cardiology  Other Healthcare Providers Involved in Patient s Care:         Specialist appointment - [facility] and cardiology  Update since discharge: improved.     Post Discharge Medication Reconciliation: discharge medications reconciled and changed, per note/orders (see AVS).  Plan of care communicated with patient     Coding guidelines for this visit:  Type of Medical   Decision Making Face-to-Face Visit       within 7 Days of discharge Face-to-Face Visit        within 14 days of discharge   Moderate Complexity [id number]   High  "Complexity [id number]          Past Medical History:   Diagnosis Date     Anxiety      Atrial fibrillation (H)      Chronic renal insufficiency      Dementia      Dry eyes, bilateral      Generalized OA      Glaucoma      HTN (hypertension), benign      Hyperlipidemia LDL goal < 130      Liver dysfunction      Paroxysmal a-fib (H)      Past Surgical History:   Procedure Laterality Date     DILATION AND CURETTAGE       HYSTERECTOMY      with USO, not for cancer     S/p partial vaginectomy       SEPTOPLASTY       TONSILLECTOMY & ADENOIDECTOMY       Social History   Substance Use Topics     Smoking status: Former Smoker     Quit date: 1/1/1990     Smokeless tobacco: Never Used      Comment: quit 1990     Alcohol use No     Current Outpatient Prescriptions   Medication Sig Dispense Refill     diltiazem (CARDIZEM CD/CARTIA XT) 120 MG 24 hr capsule Take 2 capsules (240 mg) by mouth daily 90 capsule 3     apixaban ANTICOAGULANT (ELIQUIS) 5 MG tablet Take 1 tablet (5 mg) by mouth 2 times daily       metoprolol (LOPRESSOR) 50 MG tablet Take 1 tablet (50 mg) by mouth 2 times daily 60 tablet 0     predniSONE (DELTASONE) 20 MG tablet Take 40 mg by mouth daily As directed       brimonidine-timolol (COMBIGAN) 0.2-0.5 % ophthalmic solution Place 1 drop into both eyes 2 times daily        Omega-3 Fatty Acids (FISH OIL PO) Take 1,000 mg by mouth daily        Multiple Vitamin (MULTI-VITAMIN) per tablet Take 1 tablet by mouth daily.       Allergies   Allergen Reactions     Adhesive Tape      Zocor [Simvastatin - High Dose]      Elevated LFTs     FAMILY HISTORY NOTED AND REVIEWED    PHYSICAL EXAM:    /64  Pulse 60  Temp 97.7  F (36.5  C) (Oral)  Resp 14  Ht 5' 4\" (1.626 m)  Wt 139 lb (63 kg)  BMI 23.86 kg/m2    Patient appears non toxic  Lungs: CTA bilat  Heart: RRR without any skips  Abd: soft, NT/ND, no masses or HSM  Extr: no edema.    Assessment and Plan:     (I48.91) Atrial fibrillation with rapid ventricular response " (H)  (primary encounter diagnosis)  Comment: BP and pulse look great; scanned in readings from home.   Plan: Pt denies any recurrence of palpitations. Has f/u with cards next week.  She is tolerating the metoprolol, diltiazem and eliquis without SE.    (K75.9) Hepatitis  Comment: she is on prednisone 40mg qd and LFTs trending downward  Plan: LFTS from today pending. She is being managed by CIRILO GUDINO.      Cady Marie PA-C

## 2018-01-12 LAB
ALBUMIN SERPL-MCNC: 3.3 G/DL (ref 3.4–5)
ALP SERPL-CCNC: 132 U/L (ref 40–150)
ALT SERPL W P-5'-P-CCNC: 52 U/L (ref 0–50)
AST SERPL W P-5'-P-CCNC: 36 U/L (ref 0–45)
BILIRUB DIRECT SERPL-MCNC: 0.2 MG/DL (ref 0–0.2)
BILIRUB SERPL-MCNC: 0.7 MG/DL (ref 0.2–1.3)
PROT SERPL-MCNC: 7.1 G/DL (ref 6.8–8.8)

## 2018-01-18 ENCOUNTER — OFFICE VISIT (OUTPATIENT)
Dept: CARDIOLOGY | Facility: CLINIC | Age: 73
End: 2018-01-18
Payer: COMMERCIAL

## 2018-01-18 VITALS
DIASTOLIC BLOOD PRESSURE: 58 MMHG | BODY MASS INDEX: 24.14 KG/M2 | HEART RATE: 56 BPM | SYSTOLIC BLOOD PRESSURE: 124 MMHG | WEIGHT: 141.4 LBS | HEIGHT: 64 IN

## 2018-01-18 DIAGNOSIS — R22.9 LOCALIZED SUPERFICIAL SWELLING, MASS, OR LUMP: ICD-10-CM

## 2018-01-18 DIAGNOSIS — I10 HTN (HYPERTENSION), BENIGN: ICD-10-CM

## 2018-01-18 DIAGNOSIS — E78.5 HYPERLIPIDEMIA WITH TARGET LDL LESS THAN 130: Primary | ICD-10-CM

## 2018-01-18 DIAGNOSIS — R60.0 LOCALIZED EDEMA: ICD-10-CM

## 2018-01-18 DIAGNOSIS — I48.0 PAF (PAROXYSMAL ATRIAL FIBRILLATION) (H): ICD-10-CM

## 2018-01-18 DIAGNOSIS — I48.91 ATRIAL FIBRILLATION WITH RAPID VENTRICULAR RESPONSE (H): ICD-10-CM

## 2018-01-18 PROCEDURE — 93000 ELECTROCARDIOGRAM COMPLETE: CPT | Performed by: NURSE PRACTITIONER

## 2018-01-18 PROCEDURE — 99214 OFFICE O/P EST MOD 30 MIN: CPT | Performed by: NURSE PRACTITIONER

## 2018-01-18 RX ORDER — METOPROLOL TARTRATE 25 MG/1
25 TABLET, FILM COATED ORAL 2 TIMES DAILY
Qty: 60 TABLET | Refills: 3 | Status: SHIPPED | OUTPATIENT
Start: 2018-01-18 | End: 2018-06-04

## 2018-01-18 RX ORDER — DILTIAZEM HYDROCHLORIDE 240 MG/1
240 CAPSULE, COATED, EXTENDED RELEASE ORAL DAILY
COMMUNITY
End: 2018-01-18

## 2018-01-18 RX ORDER — DILTIAZEM HYDROCHLORIDE 240 MG/1
240 CAPSULE, COATED, EXTENDED RELEASE ORAL DAILY
Qty: 30 CAPSULE | COMMUNITY
Start: 2018-01-18 | End: 2018-03-20

## 2018-01-18 NOTE — MR AVS SNAPSHOT
After Visit Summary   1/18/2018    Elaina Winn    MRN: [id number]           Patient Information     Date Of Birth          1945        Visit Information        Provider Department      1/18/2018 3:00 PM Kaitlynn Jackson APRN CNP [facility]        Today's Diagnoses     Hyperlipidemia with target LDL less than 130    -  1    PAF (paroxysmal atrial fibrillation) (H)        HTN (hypertension), benign        Localized superficial swelling, mass, or lump        Atrial fibrillation with rapid ventricular response (H)          Care Instructions    We will do an ultrasound of your right upper arm to rule out DVT.    Decrease metoprolol to 25 mg twice a day.    Call my nurse when you need a prescription for diltiazem sent in.     Follow up with Maddisno Turner in 3-4 weeks.    Beckie  [phone]              Follow-ups after your visit        Your next 10 appointments already scheduled     Feb 14, 2018 11:15 AM CST   Return Visit with Juan C Perez MD   [facility] ([facility])    [address] [place] [address] [id number] [phone]              Future tests that were ordered for you today     Open Future Orders        Priority Expected Expires Ordered    US Upper Extremity Venous Duplex Right Routine 1/26/2018 1/18/2019 1/18/2018            Who to contact     If you have questions or need follow up information about today's clinic visit or your schedule please contact [facility] directly at [phone].  Normal or non-critical lab and imaging results will be communicated to you by MyChart, letter or phone within 4 business days after the clinic has received the results. If you do not hear from us within 7 days, please contact the clinic through MyChart or phone. If you have a critical or abnormal lab result, we will notify you by phone as soon as  "possible.  Submit refill requests through [employer] or call your pharmacy and they will forward the refill request to us. Please allow 3 business days for your refill to be completed.          Additional Information About Your Visit        [employer] Information     [employer] gives you secure access to your electronic health record. If you see a primary care provider, you can also send messages to your care team and make appointments. If you have questions, please call your primary care clinic.  If you do not have a primary care provider, please call [phone] and they will assist you.        Care EveryWhere ID     This is your Care EveryWhere ID. This could be used by other organizations to access your [place] medical records  [id number]        Your Vitals Were     Pulse Height BMI (Body Mass Index)             56 1.626 m (5' 4\") 24.27 kg/m2          Blood Pressure from Last 3 Encounters:   01/18/18 124/58   01/11/18 114/64   01/04/18 105/62    Weight from Last 3 Encounters:   01/18/18 64.1 kg (141 lb 6.4 oz)   01/11/18 63 kg (139 lb)   01/04/18 61.5 kg (135 lb 9.6 oz)              We Performed the Following     EKG 12-lead complete w/read - Clinics (performed today)          Today's Medication Changes          These changes are accurate as of: 1/18/18  3:40 PM.  If you have any questions, ask your nurse or doctor.               These medicines have changed or have updated prescriptions.        Dose/Directions    metoprolol tartrate 25 MG tablet   Commonly known as:  LOPRESSOR   This may have changed:    - medication strength  - how much to take   Used for:  Atrial fibrillation with rapid ventricular response (H)   Changed by:  Kaitlynn Jackson APRN CNP        Dose:  25 mg   Take 1 tablet (25 mg) by mouth 2 times daily   Quantity:  60 tablet   Refills:  3            Where to get your medicines      These medications were sent to [facility] [address], [facility] [address]     Phone:  [phone]     apixaban ANTICOAGULANT 5 MG tablet         These medications were sent to [id number] - [place], [place] - [address] AT [facility] [address] [address]    Hours:  24-hours Phone:  [phone]     metoprolol tartrate 25 MG tablet                Primary Care Provider Office Phone # Fax #    Cady Marie PA-C [phone] [address]  [facility] [address]        Equal Access to Services     KAREN BABCOCK : Hadii aad ku hadasho Soomaali, waaxda luqadaha, qaybta kaalmada adeegyada, waxay medinain bradley sanchez. So [facility] [phone].    ATENCIÓN: Si habla español, tiene a munoz disposición servicios gratuitos de asistencia lingüística. [facility] [phone].    We comply with applicable federal civil rights laws and [place] laws. We do not discriminate on the basis of race, color, national origin, age, disability, sex, sexual orientation, or gender identity.            Thank you!     Thank you for choosing [facility]  for your care. Our goal is always to provide you with excellent care. Hearing back from our patients is one way we can continue to improve our services. Please take a few minutes to complete the written survey that you may receive in the mail after your visit with us. Thank you!             Your Updated Medication List - Protect others around you: Learn how to safely use, store and throw away your medicines at www.disposemymeds.org.          This list is accurate as of: 1/18/18  3:40 PM.  Always use your most recent med list.                   Brand Name Dispense Instructions for use Diagnosis    apixaban ANTICOAGULANT 5 MG tablet    ELIQUIS    180 tablet    Take 1 tablet (5 mg) by mouth 2 times daily    Atrial fibrillation with rapid ventricular response (H)       brimonidine-timolol 0.2-0.5 % ophthalmic  solution    [facility]     Place 1 drop into both eyes 2 times daily        DILTIAZEM  MG 24 hr capsule   Generic drug:  diltiazem     30 capsule    Take 1 capsule (240 mg) by mouth daily        FISH OIL PO      Take 1,000 mg by mouth daily        metoprolol tartrate 25 MG tablet    LOPRESSOR    60 tablet    Take 1 tablet (25 mg) by mouth 2 times daily    Atrial fibrillation with rapid ventricular response (H)       Multi-vitamin Tabs tablet   Generic drug:  multivitamin, therapeutic with minerals      Take 1 tablet by mouth daily.        predniSONE 20 MG tablet    DELTASONE     Take 40 mg by mouth daily As directed

## 2018-01-18 NOTE — PATIENT INSTRUCTIONS
We will do an ultrasound of your right upper arm to rule out DVT.    Decrease metoprolol to 25 mg twice a day.    Call my nurse when you need a prescription for diltiazem sent in.     Follow up with Maddison Turner in 3-4 weeks.    [place] [phone]

## 2018-01-18 NOTE — LETTER
1/18/2018    Cady Marie PA-C  [address]  [facility] [address]    RE: Elaina E Pa       Dear Colleague,    I had the pleasure of seeing Elaina Winn in the [facility].  HPI and Plan:   I had the pleasure of meeting Elaina Rice and her  today in cardiology clinic for hospital follow-up.      She is a pleasant 72-year-old patient, she recently met Dr. Erwin in clinic following an earlier hospitalization for paroxysmal atrial fibrillation with rapid ventricular response.  She has a normal LV ejection fraction and was discharged on metoprolol and diltiazem and Eliquis.  When he saw her in follow-up she was quite fatigued and  having symptoms of orthostatic hypotension.  She is not put on antiarrhythmics because of her severe liver dysfunction, she was not put on sotalol because of kidney dysfunction.  Dr. Erwin discontinued her metoprolol and recommended as outpatient monitor and follow-up visit to discuss AV node ablation and pacemaker.     Following her office visit with Dr. Erwin, she was readmitted for symptomatic paroxysmal atrial fibrillation with rapid ventricular response despite being on diltiazem 120 mg.  EP was consulted and she was started on metoprolol 50 mg twice daily and her diltiazem was increased to 240 mg.  She had another discussion during that hospitalization about being a candidate for A. fib ablation or AV node ablation and pacemaker.    Today William brings with her her home blood pressure readings and heart rates, her heart rate is typically in the 50s and 60s.  Her blood pressure seem pretty well controlled.  She says she is still fatigued but otherwise is not having any cardiac symptoms.  She is wondering about a bump on her right upper arm, it feels encapsulated, she wonders if it is a clot.  It is nontender, not red, and feels a bit like a large lipoma.    Physical exam  Please see below    EKG reviewed.  This demonstrated a sinus rhythm with  prolonged QT.    Assessment and plan  1.  Paroxysmal atrial fibrillation.  She does not appear, by  history or by her heart rates that she brings, in that she has been in atrial fibrillation with RVR since being discharged on diltiazem 240 and  Lopressor 50 twice daily.  She continues to complain of profound fatigue and so I have cut her metoprolol down to 25 mg twice a day but continued her on diltiazem 240.  She is anticoagulated with apixaban 5 mg twice daily.  I think she would be better suited to follow-up with Maddison in EP clinic.  2.  Large encapsulated mass on her right bicep.  This does not appear by exam to be a DVT, both both she and her  are quite concerned about that.  It is non tender, it appears encapsulated there is no discoloration no redness or ecchymosis.  I did order right upper extremity ultrasound to rule out DVT, however they did, in the end, choose not to schedule it.  I did also recommend they follow up with her primary care doctor.    Thank you for allowing me to see Elaina today.    CATY Ugarte, CNP      Orders Placed This Encounter   Procedures     US Upper Extremity Venous Duplex Right     EKG 12-lead complete w/read - Clinics (performed today)       Orders Placed This Encounter   Medications     DISCONTD: diltiazem (DILTIAZEM CD) 240 MG 24 hr capsule     Sig: Take 240 mg by mouth daily     metoprolol tartrate (LOPRESSOR) 25 MG tablet     Sig: Take 1 tablet (25 mg) by mouth 2 times daily     Dispense:  60 tablet     Refill:  3     apixaban ANTICOAGULANT (ELIQUIS) 5 MG tablet     Sig: Take 1 tablet (5 mg) by mouth 2 times daily     Dispense:  180 tablet     Refill:  3     diltiazem (DILTIAZEM CD) 240 MG 24 hr capsule     Sig: Take 1 capsule (240 mg) by mouth daily     Dispense:  30 capsule       Medications Discontinued During This Encounter   Medication Reason     diltiazem (CARDIZEM CD/CARTIA XT) 120 MG 24 hr capsule Medication Reconciliation Clean Up     metoprolol  (LOPRESSOR) 50 MG tablet Reorder     apixaban ANTICOAGULANT (ELIQUIS) 5 MG tablet Reorder     diltiazem (DILTIAZEM CD) 240 MG 24 hr capsule Reorder         Encounter Diagnoses   Name Primary?     Hyperlipidemia with target LDL less than 130 Yes     PAF (paroxysmal atrial fibrillation) (H)      HTN (hypertension), benign      Localized superficial swelling, mass, or lump      Atrial fibrillation with rapid ventricular response (H)      Localized edema        CURRENT MEDICATIONS:  Current Outpatient Prescriptions   Medication Sig Dispense Refill     metoprolol tartrate (LOPRESSOR) 25 MG tablet Take 1 tablet (25 mg) by mouth 2 times daily 60 tablet 3     apixaban ANTICOAGULANT (ELIQUIS) 5 MG tablet Take 1 tablet (5 mg) by mouth 2 times daily 180 tablet 3     diltiazem (DILTIAZEM CD) 240 MG 24 hr capsule Take 1 capsule (240 mg) by mouth daily 30 capsule      predniSONE (DELTASONE) 20 MG tablet Take 40 mg by mouth daily As directed       brimonidine-timolol (COMBIGAN) 0.2-0.5 % ophthalmic solution Place 1 drop into both eyes 2 times daily        Omega-3 Fatty Acids (FISH OIL PO) Take 1,000 mg by mouth daily        Multiple Vitamin (MULTI-VITAMIN) per tablet Take 1 tablet by mouth daily.         ALLERGIES     Allergies   Allergen Reactions     Adhesive Tape      Zocor [Simvastatin - High Dose]      Elevated LFTs       PAST MEDICAL HISTORY:  Past Medical History:   Diagnosis Date     Anxiety      Atrial fibrillation (H)      Chronic renal insufficiency      Dementia      Dry eyes, bilateral      Generalized OA      Glaucoma      HTN (hypertension), benign      Hyperlipidemia LDL goal < 130      Liver dysfunction      Paroxysmal a-fib (H)        PAST SURGICAL HISTORY:  Past Surgical History:   Procedure Laterality Date     DILATION AND CURETTAGE       HYSTERECTOMY      with USO, not for cancer     S/p partial vaginectomy       SEPTOPLASTY       TONSILLECTOMY & ADENOIDECTOMY         FAMILY HISTORY:  Family History   Problem  "Relation Age of Onset     Hypertension Mother       age 95     Alzheimer Disease Father 75     also CHF     CANCER Brother       of lung ca age 69     Lipids Brother      Alzheimer Disease Paternal Uncle      Alzheimer Disease Paternal Uncle        SOCIAL HISTORY:  Social History     Social History     Marital status:      Spouse name: N/A     Number of children: N/A     Years of education: N/A     Social History Main Topics     Smoking status: Former Smoker     Quit date: 1990     Smokeless tobacco: Never Used      Comment: quit      Alcohol use No     Drug use: No     Sexual activity: Not Currently     Partners: Male     Other Topics Concern     None     Social History Narrative    , 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  Walks 3miles per day        dtr (33) from [place] and lives in [facility]    Son from [place] and has mental health issues       Review of Systems:  Skin:  Negative       Eyes:  Positive for glasses    ENT:  Negative      Respiratory:  Negative       Cardiovascular:    Positive for;palpitations;edema;fatigue    Gastroenterology: Negative      Genitourinary:  Negative      Musculoskeletal:  Positive for   muscle pains  Neurologic:  Positive for headaches occ  Psychiatric:  Positive for anxiety;depression    Heme/Lymph/Imm:  Positive for allergies    Endocrine:  Negative        Physical Exam:  Vitals: /58  Pulse 56  Ht 1.626 m (5' 4\")  Wt 64.1 kg (141 lb 6.4 oz)  BMI 24.27 kg/m2    Constitutional:           Skin:             Head:           Eyes:           Lymph:      ENT:           Neck:           Respiratory:            Cardiac:                                                           GI:           Extremities and Muscular Skeletal:                 Neurological:           Psych:         Thank you for allowing me to participate in the care of your patient.    Sincerely,     CATY Crowder [facility]    "

## 2018-01-19 NOTE — PROGRESS NOTES
HPI and Plan:   I had the pleasure of meeting Elaina Rice and her  today in cardiology clinic for hospital follow-up.      She is a pleasant 72-year-old patient, she recently met Dr. Erwin in clinic following an earlier hospitalization for paroxysmal atrial fibrillation with rapid ventricular response.  She has a normal LV ejection fraction and was discharged on metoprolol and diltiazem and Eliquis.  When he saw her in follow-up she was quite fatigued and  having symptoms of orthostatic hypotension.  She is not put on antiarrhythmics because of her severe liver dysfunction, she was not put on sotalol because of kidney dysfunction.  Dr. Erwin discontinued her metoprolol and recommended as outpatient monitor and follow-up visit to discuss AV node ablation and pacemaker.     Following her office visit with Dr. Erwin, she was readmitted for symptomatic paroxysmal atrial fibrillation with rapid ventricular response despite being on diltiazem 120 mg.  EP was consulted and she was started on metoprolol 50 mg twice daily and her diltiazem was increased to 240 mg.  She had another discussion during that hospitalization about being a candidate for A. fib ablation or AV node ablation and pacemaker.    Today William brings with her her home blood pressure readings and heart rates, her heart rate is typically in the 50s and 60s.  Her blood pressure seem pretty well controlled.  She says she is still fatigued but otherwise is not having any cardiac symptoms.  She is wondering about a bump on her right upper arm, it feels encapsulated, she wonders if it is a clot.  It is nontender, not red, and feels a bit like a large lipoma.    Physical exam  Please see below    EKG reviewed.  This demonstrated a sinus rhythm with prolonged QT.    Assessment and plan  1.  Paroxysmal atrial fibrillation.  She does not appear, by  history or by her heart rates that she brings, in that she has been in atrial fibrillation with RVR since being discharged  on diltiazem 240 and  Lopressor 50 twice daily.  She continues to complain of profound fatigue and so I have cut her metoprolol down to 25 mg twice a day but continued her on diltiazem 240.  She is anticoagulated with apixaban 5 mg twice daily.  I think she would be better suited to follow-up with Maddison in EP clinic.  2.  Large encapsulated mass on her right bicep.  This does not appear by exam to be a DVT, both both she and her  are quite concerned about that.  It is non tender, it appears encapsulated there is no discoloration no redness or ecchymosis.  I did order right upper extremity ultrasound to rule out DVT, however they did, in the end, choose not to schedule it.  I did also recommend they follow up with her primary care doctor.    Thank you for allowing me to see Elaina today.    Beckie Jackson, CATY, CNP        Orders Placed This Encounter   Procedures     US Upper Extremity Venous Duplex Right     EKG 12-lead complete w/read - Clinics (performed today)       Orders Placed This Encounter   Medications     DISCONTD: diltiazem (DILTIAZEM CD) 240 MG 24 hr capsule     Sig: Take 240 mg by mouth daily     metoprolol tartrate (LOPRESSOR) 25 MG tablet     Sig: Take 1 tablet (25 mg) by mouth 2 times daily     Dispense:  60 tablet     Refill:  3     apixaban ANTICOAGULANT (ELIQUIS) 5 MG tablet     Sig: Take 1 tablet (5 mg) by mouth 2 times daily     Dispense:  180 tablet     Refill:  3     diltiazem (DILTIAZEM CD) 240 MG 24 hr capsule     Sig: Take 1 capsule (240 mg) by mouth daily     Dispense:  30 capsule       Medications Discontinued During This Encounter   Medication Reason     diltiazem (CARDIZEM CD/CARTIA XT) 120 MG 24 hr capsule Medication Reconciliation Clean Up     metoprolol (LOPRESSOR) 50 MG tablet Reorder     apixaban ANTICOAGULANT (ELIQUIS) 5 MG tablet Reorder     diltiazem (DILTIAZEM CD) 240 MG 24 hr capsule Reorder         Encounter Diagnoses   Name Primary?     Hyperlipidemia with target LDL  less than 130 Yes     PAF (paroxysmal atrial fibrillation) (H)      HTN (hypertension), benign      Localized superficial swelling, mass, or lump      Atrial fibrillation with rapid ventricular response (H)      Localized edema        CURRENT MEDICATIONS:  Current Outpatient Prescriptions   Medication Sig Dispense Refill     metoprolol tartrate (LOPRESSOR) 25 MG tablet Take 1 tablet (25 mg) by mouth 2 times daily 60 tablet 3     apixaban ANTICOAGULANT (ELIQUIS) 5 MG tablet Take 1 tablet (5 mg) by mouth 2 times daily 180 tablet 3     diltiazem (DILTIAZEM CD) 240 MG 24 hr capsule Take 1 capsule (240 mg) by mouth daily 30 capsule      predniSONE (DELTASONE) 20 MG tablet Take 40 mg by mouth daily As directed       brimonidine-timolol (COMBIGAN) 0.2-0.5 % ophthalmic solution Place 1 drop into both eyes 2 times daily        Omega-3 Fatty Acids (FISH OIL PO) Take 1,000 mg by mouth daily        Multiple Vitamin (MULTI-VITAMIN) per tablet Take 1 tablet by mouth daily.         ALLERGIES     Allergies   Allergen Reactions     Adhesive Tape      Zocor [Simvastatin - High Dose]      Elevated LFTs       PAST MEDICAL HISTORY:  Past Medical History:   Diagnosis Date     Anxiety      Atrial fibrillation (H)      Chronic renal insufficiency      Dementia      Dry eyes, bilateral      Generalized OA      Glaucoma      HTN (hypertension), benign      Hyperlipidemia LDL goal < 130      Liver dysfunction      Paroxysmal a-fib (H)        PAST SURGICAL HISTORY:  Past Surgical History:   Procedure Laterality Date     DILATION AND CURETTAGE       HYSTERECTOMY      with USO, not for cancer     S/p partial vaginectomy       SEPTOPLASTY       TONSILLECTOMY & ADENOIDECTOMY         FAMILY HISTORY:  Family History   Problem Relation Age of Onset     Hypertension Mother       age 95     Alzheimer Disease Father 75     also CHF     CANCER Brother       of lung ca age 69     Lipids Brother      Alzheimer Disease Paternal Uncle      Alzheimer  "Disease Paternal Uncle        SOCIAL HISTORY:  Social History     Social History     Marital status:      Spouse name: N/A     Number of children: N/A     Years of education: N/A     Social History Main Topics     Smoking status: Former Smoker     Quit date: 1/1/1990     Smokeless tobacco: Never Used      Comment: quit 1990     Alcohol use No     Drug use: No     Sexual activity: Not Currently     Partners: Male     Other Topics Concern     None     Social History Narrative    , 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  Walks 3miles per day        dtr (33) from [place] and lives in [facility]    Son from [place] and has mental health issues       Review of Systems:  Skin:  Negative       Eyes:  Positive for glasses    ENT:  Negative      Respiratory:  Negative       Cardiovascular:    Positive for;palpitations;edema;fatigue    Gastroenterology: Negative      Genitourinary:  Negative      Musculoskeletal:  Positive for   muscle pains  Neurologic:  Positive for headaches occ  Psychiatric:  Positive for anxiety;depression    Heme/Lymph/Imm:  Positive for allergies    Endocrine:  Negative        Physical Exam:  Vitals: /58  Pulse 56  Ht 1.626 m (5' 4\")  Wt 64.1 kg (141 lb 6.4 oz)  BMI 24.27 kg/m2    Constitutional:           Skin:             Head:           Eyes:           Lymph:      ENT:           Neck:           Respiratory:            Cardiac:                                                           GI:           Extremities and Muscular Skeletal:                 Neurological:           Psych:           CC  No referring provider defined for this encounter.              "

## 2018-02-02 ENCOUNTER — TRANSFERRED RECORDS (OUTPATIENT)
Dept: HEALTH INFORMATION MANAGEMENT | Facility: CLINIC | Age: 73
End: 2018-02-02

## 2018-02-05 DIAGNOSIS — K75.4 AUTOIMMUNE HEPATITIS (H): Primary | ICD-10-CM

## 2018-02-05 DIAGNOSIS — R03.0 ELEVATED BLOOD PRESSURE READING WITHOUT DIAGNOSIS OF HYPERTENSION: ICD-10-CM

## 2018-02-19 ENCOUNTER — TRANSFERRED RECORDS (OUTPATIENT)
Dept: HEALTH INFORMATION MANAGEMENT | Facility: CLINIC | Age: 73
End: 2018-02-19

## 2018-02-19 DIAGNOSIS — R03.0 ELEVATED BLOOD PRESSURE READING WITHOUT DIAGNOSIS OF HYPERTENSION: ICD-10-CM

## 2018-02-19 DIAGNOSIS — K75.4 AUTOIMMUNE HEPATITIS (H): ICD-10-CM

## 2018-02-19 LAB
ALBUMIN SERPL-MCNC: 2.9 G/DL (ref 3.4–5)
ALP SERPL-CCNC: 106 U/L (ref 40–150)
ALT SERPL W P-5'-P-CCNC: 33 U/L (ref 0–50)
AST SERPL W P-5'-P-CCNC: 33 U/L (ref 0–45)
BILIRUB DIRECT SERPL-MCNC: 0.2 MG/DL (ref 0–0.2)
BILIRUB SERPL-MCNC: 0.6 MG/DL (ref 0.2–1.3)
PROT SERPL-MCNC: 7 G/DL (ref 6.8–8.8)

## 2018-02-19 PROCEDURE — 82657 ENZYME CELL ACTIVITY: CPT | Mod: 90 | Performed by: INTERNAL MEDICINE

## 2018-02-19 PROCEDURE — 99000 SPECIMEN HANDLING OFFICE-LAB: CPT | Performed by: INTERNAL MEDICINE

## 2018-02-19 PROCEDURE — 80076 HEPATIC FUNCTION PANEL: CPT | Performed by: INTERNAL MEDICINE

## 2018-02-19 PROCEDURE — 36415 COLL VENOUS BLD VENIPUNCTURE: CPT | Performed by: INTERNAL MEDICINE

## 2018-02-20 ENCOUNTER — OFFICE VISIT (OUTPATIENT)
Dept: CARDIOLOGY | Facility: CLINIC | Age: 73
End: 2018-02-20
Payer: COMMERCIAL

## 2018-02-20 VITALS
SYSTOLIC BLOOD PRESSURE: 122 MMHG | BODY MASS INDEX: 24.07 KG/M2 | HEART RATE: 74 BPM | HEIGHT: 64 IN | OXYGEN SATURATION: 95 % | WEIGHT: 141 LBS | DIASTOLIC BLOOD PRESSURE: 74 MMHG

## 2018-02-20 DIAGNOSIS — I48.91 ATRIAL FIBRILLATION WITH RVR (H): ICD-10-CM

## 2018-02-20 DIAGNOSIS — I48.0 PAROXYSMAL ATRIAL FIBRILLATION (H): Primary | ICD-10-CM

## 2018-02-20 DIAGNOSIS — R53.83 OTHER FATIGUE: ICD-10-CM

## 2018-02-20 PROCEDURE — 93000 ELECTROCARDIOGRAM COMPLETE: CPT | Performed by: PHYSICIAN ASSISTANT

## 2018-02-20 PROCEDURE — 99214 OFFICE O/P EST MOD 30 MIN: CPT | Mod: 25 | Performed by: PHYSICIAN ASSISTANT

## 2018-02-20 NOTE — PROGRESS NOTES
HPI and Plan:   See dictation #[id number]    Orders Placed This Encounter   Procedures     Follow-Up with Cardiac Advanced Practice Provider     EKG 12-lead complete w/read - Clinics (performed today)     EKG 12-lead complete w/read - Clinics (to be scheduled)       Orders Placed This Encounter   Medications     Apoaequorin (PREVAGEN PO)     Sig: Take 1 tablet by mouth daily       There are no discontinued medications.      Encounter Diagnosis   Name Primary?     Atrial fibrillation with RVR (H) Yes       CURRENT MEDICATIONS:  Current Outpatient Prescriptions   Medication Sig Dispense Refill     Apoaequorin (PREVAGEN PO) Take 1 tablet by mouth daily       metoprolol tartrate (LOPRESSOR) 25 MG tablet Take 1 tablet (25 mg) by mouth 2 times daily 60 tablet 3     apixaban ANTICOAGULANT (ELIQUIS) 5 MG tablet Take 1 tablet (5 mg) by mouth 2 times daily 180 tablet 3     diltiazem (DILTIAZEM CD) 240 MG 24 hr capsule Take 1 capsule (240 mg) by mouth daily 30 capsule      predniSONE (DELTASONE) 20 MG tablet Take 40 mg by mouth daily As directed       brimonidine-timolol (COMBIGAN) 0.2-0.5 % ophthalmic solution Place 1 drop into both eyes 2 times daily        Omega-3 Fatty Acids (FISH OIL PO) Take 1,000 mg by mouth daily        Multiple Vitamin (MULTI-VITAMIN) per tablet Take 1 tablet by mouth daily.         ALLERGIES     Allergies   Allergen Reactions     Adhesive Tape      Zocor [Simvastatin - High Dose]      Elevated LFTs       PAST MEDICAL HISTORY:  Past Medical History:   Diagnosis Date     Anxiety      Atrial fibrillation (H)      Chronic renal insufficiency      Dementia      Dry eyes, bilateral      Generalized OA      Glaucoma      HTN (hypertension), benign      Hyperlipidemia LDL goal < 130      Liver dysfunction      Paroxysmal a-fib (H)        PAST SURGICAL HISTORY:  Past Surgical History:   Procedure Laterality Date     DILATION AND CURETTAGE       HYSTERECTOMY      with USO, not for cancer     S/p partial  "vaginectomy       SEPTOPLASTY       TONSILLECTOMY & ADENOIDECTOMY         FAMILY HISTORY:  Family History   Problem Relation Age of Onset     Hypertension Mother       age 95     Alzheimer Disease Father 75     also CHF     CANCER Brother       of lung ca age 69     Lipids Brother      Alzheimer Disease Paternal Uncle      Alzheimer Disease Paternal Uncle        SOCIAL HISTORY:  Social History     Social History     Marital status:      Spouse name: N/A     Number of children: N/A     Years of education: N/A     Social History Main Topics     Smoking status: Former Smoker     Quit date: 1990     Smokeless tobacco: Never Used      Comment: quit      Alcohol use No     Drug use: No     Sexual activity: Not Currently     Partners: Male     Other Topics Concern     None     Social History Narrative    , 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  Walks 3miles per day        dtr (33) from [place] and lives in [facility]    Son from [place] and has mental health issues       Review of Systems:  Skin:  Negative       Eyes:  Positive for glasses    ENT:  Negative      Respiratory:  Positive for dyspnea on exertion     Cardiovascular:  Negative for;chest pain;edema Positive for;fatigue    Gastroenterology: Negative for melena;hematochezia    Genitourinary:  Negative      Musculoskeletal:  Positive for arthritis    Neurologic:  Positive for headaches occ  Psychiatric:  Positive for anxiety;depression    Heme/Lymph/Imm:  Positive for allergies    Endocrine:  Negative        Physical Exam:  Vitals: /74 (BP Location: Left arm, Patient Position: Chair, Cuff Size: Adult Regular)  Pulse 74  Ht 1.626 m (5' 4\")  Wt 64 kg (141 lb)  SpO2 95%  BMI 24.2 kg/m2    Constitutional:  cooperative, alert and oriented, well developed, well nourished, in no acute distress        Skin:  warm and dry to the touch, no apparent skin lesions or masses noted          Head:  normocephalic, no masses or lesions        Eyes:  " pupils equal and round;conjunctivae and lids unremarkable;sclera white;no xanthalasma        Lymph:      ENT:  no pallor or cyanosis, dentition good        Neck:  carotid pulses are full and equal bilaterally        Respiratory:  normal breath sounds, clear to auscultation, normal A-P diameter, normal symmetry, normal respiratory excursion, no use of accessory muscles         Cardiac: regular rhythm, normal S1/S2, no S3 or S4, apical impulse not displaced, no murmurs, gallops or rubs                                                         GI:  abdomen soft        Extremities and Muscular Skeletal:  no deformities, clubbing, cyanosis, erythema observed;no edema              Neurological:  no gross motor deficits        Psych:  Alert and Oriented x 3        CC  Cady Marie, MICHELE  [address]  [place] [address]

## 2018-02-20 NOTE — PATIENT INSTRUCTIONS
1. Your EKG today showed normal rhythm - hooray!    2. Discussed fatigue - unsure of what this is related to.  You've had a normal hemoglobin (no anemia), thyroid is normal, you're sleeping well, reduction in metoprolol did not help at all, echo showed normal EF and BP is under control.   ?? May just need to consciously move around more to get in better shape/more conditioned to improve fatigue    3. See me in a few months to touch base about breathing and fatigue. Call if any issues with AFib prior: [phone]    4. When need refills for metoprolol, Diltiazem or Eliquis, call us!  We will be happy to send in Refills at that time. Let us know if you want us to send it to [employer] or [facility]

## 2018-02-20 NOTE — MR AVS SNAPSHOT
After Visit Summary   2/20/2018    Elaina Winn    MRN: [id number]           Patient Information     Date Of Birth          1945        Visit Information        Provider Department      2/20/2018 3:00 PM Nubia Turner PA-C [facility]   [place]        Today's Diagnoses     Atrial fibrillation with RVR (H)    -  1      Care Instructions    1. Your EKG today showed normal rhythm - hooray!    2. Discussed fatigue - unsure of what this is related to.  You've had a normal hemoglobin (no anemia), thyroid is normal, you're sleeping well, reduction in metoprolol did not help at all, echo showed normal EF and BP is under control.   ?? May just need to consciously move around more to get in better shape/more conditioned to improve fatigue    3. See me in a few months to touch base about breathing and fatigue. Call if any issues with AFib prior: [phone]    4. When need refills for metoprolol, Diltiazem or Eliquis, call us!  We will be happy to send in Refills at that time. Let us know if you want us to send it to [facility] or [facility]          Follow-ups after your visit        Additional Services     Follow-Up with Cardiac Advanced Practice Provider                 Future tests that were ordered for you today     Open Future Orders        Priority Expected Expires Ordered    EKG 12-lead complete w/read - Clinics (to be scheduled) Routine 4/20/2018 2/20/2019 2/20/2018    Follow-Up with Cardiac Advanced Practice Provider Routine 4/20/2018 2/20/2019 2/20/2018            Who to contact     If you have questions or need follow up information about today's clinic visit or your schedule please contact [facility]   [place] directly at [phone].  Normal or non-critical lab and imaging results will be communicated to you by MyChart, letter or phone within 4 business days after the clinic has received the results. If you do not hear from  "us within 7 days, please contact the clinic through [email or web address] or phone. If you have a critical or abnormal lab result, we will notify you by phone as soon as possible.  Submit refill requests through [email or web address] or call your pharmacy and they will forward the refill request to us. Please allow 3 business days for your refill to be completed.          Additional Information About Your Visit        [email or web address] Read Books Information     [email or web address] gives you secure access to your electronic health record. If you see a primary care provider, you can also send messages to your care team and make appointments. If you have questions, please call your primary care clinic.  If you do not have a primary care provider, please call [phone] and they will assist you.        Care EveryWhere ID     This is your Care EveryWhere ID. This could be used by other organizations to access your [facility] medical records  [id number]        Your Vitals Were     Pulse Height Pulse Oximetry BMI (Body Mass Index)          74 1.626 m (5' 4\") 95% 24.2 kg/m2         Blood Pressure from Last 3 Encounters:   02/20/18 122/74   01/18/18 124/58   01/11/18 114/64    Weight from Last 3 Encounters:   02/20/18 64 kg (141 lb)   01/18/18 64.1 kg (141 lb 6.4 oz)   01/11/18 63 kg (139 lb)              We Performed the Following     EKG 12-lead complete w/read - Clinics (performed today)        Primary Care Provider Office Phone # Fax #    Cady Marie PA-C [phone] [address]  [place] [address]        Equal Access to Services     [facility]: Hadii aad ku hadasho Soomaali, waaxda luqadaha, qaybta kaalmada adeegyada, zoran hernandez . So [facility] [phone].    ATENCIÓN: Si habla español, tiene a munoz disposición servicios gratuitos de asistencia lingüística. Llame al [phone].    We comply with applicable federal civil rights laws and [place] laws. We do not discriminate on the basis of race, color, national " origin, age, disability, sex, sexual orientation, or gender identity.            Thank you!     Thank you for choosing [facility]  for your care. Our goal is always to provide you with excellent care. Hearing back from our patients is one way we can continue to improve our services. Please take a few minutes to complete the written survey that you may receive in the mail after your visit with us. Thank you!             Your Updated Medication List - Protect others around you: Learn how to safely use, store and throw away your medicines at www.disposemymeds.org.          This list is accurate as of 2/20/18  3:43 PM.  Always use your most recent med list.                   Brand Name Dispense Instructions for use Diagnosis    apixaban ANTICOAGULANT 5 MG tablet    ELIQUIS    180 tablet    Take 1 tablet (5 mg) by mouth 2 times daily    Atrial fibrillation with rapid ventricular response (H)       brimonidine-timolol 0.2-0.5 % ophthalmic solution    COMBIGAN     Place 1 drop into both eyes 2 times daily        DILTIAZEM  MG 24 hr capsule   Generic drug:  diltiazem     30 capsule    Take 1 capsule (240 mg) by mouth daily        FISH OIL PO      Take 1,000 mg by mouth daily        metoprolol tartrate 25 MG tablet    LOPRESSOR    60 tablet    Take 1 tablet (25 mg) by mouth 2 times daily    Atrial fibrillation with rapid ventricular response (H)       Multi-vitamin Tabs tablet   Generic drug:  multivitamin, therapeutic with minerals      Take 1 tablet by mouth daily.        predniSONE 20 MG tablet    DELTASONE     Take 40 mg by mouth daily As directed        PREVAGEN PO      Take 1 tablet by mouth daily

## 2018-02-20 NOTE — LETTER
2/20/2018      Cady Marie PA-C  [address]  [facility] [address]      RE: Elaina GONGORA Pa       Dear Colleague,    I had the pleasure of seeing Elaina Winn in the [facility].    Service Date: 02/20/2018      HISTORY OF PRESENT ILLNESS:  I had the pleasure of seeing Elaina today when she came for followup of her paroxysmal atrial fibrillation.  She is a pleasant 72-year-old who has a history of hypertension, dyslipidemia and has some dementia.  She was noted to have very abnormal LFTs with AST and ALT over 500 in 12/2017.  She was ultimately diagnosed with autoimmune hepatitis by [facility] after a liver biopsy.  She was placed on prednisone.      As above, in 12/2017, she was hospitalized after she was found to have significant weakness and elevated hepatic transaminases.  She was noted to be episodes of atrial fibrillation with heart rates up in the 140s.  She was seen by Dr. Perez at that time and placed on Xarelto for a CHADS-VASc score of 3 (hypertension, age, female) and metoprolol.  Followup event monitor continued to show elevated episodes of rapid atrial fibrillation.  Metoprolol was adjusted.  She ultimately had that discontinued secondary to persistent orthostatic hypotension.  She was started on diltiazem for an attempt at control of her atrial fibrillation.      She saw Dr. Erwin, who was concerned about her elevated LFTs and the use of anticoagulation.  He also noted that she would be a poor candidate for sotalol given her renal insufficiency.      After that appointment with Dr. Erwin, she was actually hospitalized on 01/01 to 01/04 complaining of significant palpitations.  She actually was in the Emergency Department on 12/31 and underwent cardioversion x3 which successfully converted her to sinus rhythm.  The following day on 01/01, she was back fibrillation in the 150s.  The ER shocked her 5 times with brief resumption of sinus rhythm, but she would quickly  "convert back to atrial fibrillation.  A diltiazem drip was started.        She was seen by Dr. Jennings in the hospital.  He noted that antiarrhythmic drug choices were very limited given her mildly prolonged baseline QTc (450-460 milliseconds), autoimmune hepatitis based on liver biopsy and fluctuating renal function.  At that time, he recommended AV node ablation and pacemaker implantation.  She opted to wait to proceed with this as she was \"not mentally ready\" for a significant cardiac procedure.  She was restarted on anticoagulation with Eliquis 5 mg twice daily.       She saw Beckie Jackson NP in discharge return on 01/18.  At that time, she was in sinus rhythm.  She complained of severe fatigue despite adequate blood pressure and heart rate control.  Metoprolol was decreased to 25 mg twice daily and she was continued on diltiazem 240 mg.  She complained of what looked like a right upper extremity lipoma.  An ultrasound was offered but the patient opted not to proceed with this.      Elaina tells me that despite the reduction in her metoprolol, she continues to be very fatigued.  She tells me that she does not fall asleep when talking to someone or trying to do something, but she has \"so comfortable\" in bed and when she is sitting on the couch that she just does not have any gumption to get moving and will nap.  She does mention that depression may play a role in that as she previously had \"never been sick.\"  She does not think the reduction of metoprolol helped at all.  She does not had any documented history of sleep apnea and her , Nino, confirms that she does not snore, nor hold her breath while sleeping.  She does feel rested when she first wakes up.  She denies any blood loss.  TSH and hemoglobin recently have been normal.      Echocardiogram done 11/2017 showed a preserved EF of 55%-60% with mild mitral regurgitation.      EKG today, which I overread, showed sinus rhythm at 71 beats per minute.  QTc " was listed at 405 milliseconds.        ASSESSMENT AND PLAN:     1.  Paroxysmal atrial fibrillation.  She actually does not appear to have had any of this since 1/2018 when she spontaneously converted on a diltiazem drip after multiple attempts at DC cardioversion in the ER.  She remains on metoprolol tartrate 25 mg twice daily and diltiazem 240 mg.  Her heart rate and blood pressure at home are under excellent control and it appears that her significant fatigue is not related to hypotension or bradycardia.     Dr. Jennings and Dr. Erwin both noted that she would be a candidate for AV node ablation and pacemaker implantation should she get any recurrent atrial fibrillation, given the limited antiarrhythmic drugs that would be appropriate given her hepatic disease, renal disease and long QT.     Happily, she has not had any recurrent episodes, so we will keep her on metoprolol and diltiazem.  She will of course remain on anticoagulation.  I would like to see her in a couple months just to touch base.       2.  Fatigue.  This is likely multifactorial.  She does admit to some depression.  She also notes that she has had significant lack of energy to do things and she understands that she may be more fatigued because she's not making herself do anything.  At this time, I have encouraged her to actively seek out activities to get done and that she cannot just sit on the couch or in the bed.  I have also encouraged her to restart a walking program at the mall until it gets nice enough to walk outside.      I would like to see me in a couple of months just to touch base.  She has mentioned some mild dyspnea when she does exert herself.  I think this is likely secondary to deconditioning given her normal ejection fraction and hemoglobin, but would like to make sure that this improves as she gets into better shape.      She will see me in a couple of months, but I have asked her to call me if she runs into any issues or concerns.          BASILIO SILVESTRE PA-C             D: 2018   T: 2018   MT: MELISSA      Name:     ABDULLAHI BLACKWOOD   MRN:      [id number]        Account:      [id number]   :      1945           Service Date: 2018      Document: [id number]           Outpatient Encounter Prescriptions as of 2018   Medication Sig Dispense Refill     Apoaequorin (PREVAGEN PO) Take 1 tablet by mouth daily       metoprolol tartrate (LOPRESSOR) 25 MG tablet Take 1 tablet (25 mg) by mouth 2 times daily 60 tablet 3     apixaban ANTICOAGULANT (ELIQUIS) 5 MG tablet Take 1 tablet (5 mg) by mouth 2 times daily 180 tablet 3     diltiazem (DILTIAZEM CD) 240 MG 24 hr capsule Take 1 capsule (240 mg) by mouth daily 30 capsule      predniSONE (DELTASONE) 20 MG tablet Take 40 mg by mouth daily As directed       brimonidine-timolol (COMBIGAN) 0.2-0.5 % ophthalmic solution Place 1 drop into both eyes 2 times daily        Omega-3 Fatty Acids (FISH OIL PO) Take 1,000 mg by mouth daily        Multiple Vitamin (MULTI-VITAMIN) per tablet Take 1 tablet by mouth daily.       No facility-administered encounter medications on file as of 2018.        Again, thank you for allowing me to participate in the care of your patient.      Sincerely,    Basilio Silvestre PA-C     [facility]

## 2018-02-20 NOTE — PROGRESS NOTES
Service Date: 02/20/2018      HISTORY OF PRESENT ILLNESS:  I had the pleasure of seeing Elaina today when she came for followup of her paroxysmal atrial fibrillation.  She is a pleasant 72-year-old who has a history of hypertension, dyslipidemia and has some dementia.  She was noted to have very abnormal LFTs with AST and ALT over 500 in 12/2017.  She was ultimately diagnosed with autoimmune hepatitis by [facility] after a liver biopsy.  She was placed on prednisone.      As above, in 12/2017, she was hospitalized after she was found to have significant weakness and elevated hepatic transaminases.  She was noted to be episodes of atrial fibrillation with heart rates up in the 140s.  She was seen by Dr. Perez at that time and placed on Xarelto for a CHADS-VASc score of 3 (hypertension, age, female) and metoprolol.  Followup event monitor continued to show elevated episodes of rapid atrial fibrillation.  Metoprolol was adjusted.  She ultimately had that discontinued secondary to persistent orthostatic hypotension.  She was started on diltiazem for an attempt at control of her atrial fibrillation.      She saw Dr. Erwin, who was concerned about her elevated LFTs and the use of anticoagulation.  He also noted that she would be a poor candidate for sotalol given her renal insufficiency.      After that appointment with Dr. Erwin, she was actually hospitalized on 01/01 to 01/04 complaining of significant palpitations.  She actually was in the Emergency Department on 12/31 and underwent cardioversion x3 which successfully converted her to sinus rhythm.  The following day on 01/01, she was back fibrillation in the 150s.  The ER shocked her 5 times with brief resumption of sinus rhythm, but she would quickly convert back to atrial fibrillation.  A diltiazem drip was started.        She was seen by Dr. Jennings in the hospital.  He noted that antiarrhythmic drug choices were very limited given her mildly prolonged baseline QTc  "(450-460 milliseconds), autoimmune hepatitis based on liver biopsy and fluctuating renal function.  At that time, he recommended AV node ablation and pacemaker implantation.  She opted to wait to proceed with this as she was \"not mentally ready\" for a significant cardiac procedure.  She was restarted on anticoagulation with Eliquis 5 mg twice daily.       She saw Beckie Jackson NP in discharge return on 01/18.  At that time, she was in sinus rhythm.  She complained of severe fatigue despite adequate blood pressure and heart rate control.  Metoprolol was decreased to 25 mg twice daily and she was continued on diltiazem 240 mg.  She complained of what looked like a right upper extremity lipoma.  An ultrasound was offered but the patient opted not to proceed with this.      Elaina tells me that despite the reduction in her metoprolol, she continues to be very fatigued.  She tells me that she does not fall asleep when talking to someone or trying to do something, but she has \"so comfortable\" in bed and when she is sitting on the couch that she just does not have any gumption to get moving and will nap.  She does mention that depression may play a role in that as she previously had \"never been sick.\"  She does not think the reduction of metoprolol helped at all.  She does not had any documented history of sleep apnea and her , Nino, confirms that she does not snore, nor hold her breath while sleeping.  She does feel rested when she first wakes up.  She denies any blood loss.  TSH and hemoglobin recently have been normal.      Echocardiogram done 11/2017 showed a preserved EF of 55%-60% with mild mitral regurgitation.      EKG today, which I overread, showed sinus rhythm at 71 beats per minute.  QTc was listed at 405 milliseconds.        ASSESSMENT AND PLAN:     1.  Paroxysmal atrial fibrillation.  She actually does not appear to have had any of this since 1/2018 when she spontaneously converted on a diltiazem drip " after multiple attempts at DC cardioversion in the ER.  She remains on metoprolol tartrate 25 mg twice daily and diltiazem 240 mg.  Her heart rate and blood pressure at home are under excellent control and it appears that her significant fatigue is not related to hypotension or bradycardia.     Dr. Jennings and Dr. Erwin both noted that she would be a candidate for AV node ablation and pacemaker implantation should she get any recurrent atrial fibrillation, given the limited antiarrhythmic drugs that would be appropriate given her hepatic disease, renal disease and long QT.     Happily, she has not had any recurrent episodes, so we will keep her on metoprolol and diltiazem.  She will of course remain on anticoagulation.  I would like to see her in a couple months just to touch base.       2.  Fatigue.  This is likely multifactorial.  She does admit to some depression.  She also notes that she has had significant lack of energy to do things and she understands that she may be more fatigued because she's not making herself do anything.  At this time, I have encouraged her to actively seek out activities to get done and that she cannot just sit on the couch or in the bed.  I have also encouraged her to restart a walking program at the mall until it gets nice enough to walk outside.      I would like to see me in a couple of months just to touch base.  She has mentioned some mild dyspnea when she does exert herself.  I think this is likely secondary to deconditioning given her normal ejection fraction and hemoglobin, but would like to make sure that this improves as she gets into better shape.      She will see me in a couple of months, but I have asked her to call me if she runs into any issues or concerns.         BASILIO SILVESTRE PA-C             D: 2018   T: 2018   MT: MELISSA      Name:     ABDULLAHI BLACKWOOD   MRN:      [id number]        Account:      [id number]   :      1945           Service Date:  02/20/2018      Document: [id number]

## 2018-02-21 ENCOUNTER — TRANSFERRED RECORDS (OUTPATIENT)
Dept: HEALTH INFORMATION MANAGEMENT | Facility: CLINIC | Age: 73
End: 2018-02-21

## 2018-02-24 LAB — TPMT BLD-CCNC: 23.7 U/ML (ref 24–44)

## 2018-02-26 DIAGNOSIS — I48.91 ATRIAL FIBRILLATION WITH RAPID VENTRICULAR RESPONSE (H): ICD-10-CM

## 2018-03-07 ENCOUNTER — TELEPHONE (OUTPATIENT)
Dept: CARDIOLOGY | Facility: CLINIC | Age: 73
End: 2018-03-07

## 2018-03-07 NOTE — TELEPHONE ENCOUNTER
Patient's spouse called in to say that she is 'not doing well'. Evidently, she is getting winded easily when she gets up to walk around. Denies any other symptoms like heart racing or fluttering other than back pain which they both state as their main concern. He has been taking BP's and they are in the range of /60-90 with HR's 65-91. Occasionally, her HR will go in the 90's. It was difficult to sort out if she is back in afib.Her recent labs have all been normal and it was felt her fatigue and breathing problems are due to deconditioning. I told him, too, that her back pain could be making things worse and to call her PCP to discuss this. Other than that I told him that I don't see any concerning signs about her heart. He agreed to call PCP. Sathya

## 2018-03-09 ENCOUNTER — TRANSFERRED RECORDS (OUTPATIENT)
Dept: HEALTH INFORMATION MANAGEMENT | Facility: CLINIC | Age: 73
End: 2018-03-09

## 2018-03-13 ENCOUNTER — OFFICE VISIT (OUTPATIENT)
Dept: FAMILY MEDICINE | Facility: CLINIC | Age: 73
End: 2018-03-13
Payer: COMMERCIAL

## 2018-03-13 VITALS
BODY MASS INDEX: 23.56 KG/M2 | HEART RATE: 68 BPM | DIASTOLIC BLOOD PRESSURE: 65 MMHG | OXYGEN SATURATION: 97 % | HEIGHT: 64 IN | WEIGHT: 138 LBS | TEMPERATURE: 97.7 F | SYSTOLIC BLOOD PRESSURE: 106 MMHG

## 2018-03-13 DIAGNOSIS — R53.83 OTHER FATIGUE: ICD-10-CM

## 2018-03-13 DIAGNOSIS — Z12.11 COLON CANCER SCREENING: ICD-10-CM

## 2018-03-13 DIAGNOSIS — S40.021A HEMATOMA OF ARM, RIGHT, INITIAL ENCOUNTER: Primary | ICD-10-CM

## 2018-03-13 DIAGNOSIS — I48.0 PAF (PAROXYSMAL ATRIAL FIBRILLATION) (H): ICD-10-CM

## 2018-03-13 DIAGNOSIS — I10 HTN (HYPERTENSION), BENIGN: ICD-10-CM

## 2018-03-13 DIAGNOSIS — R23.3 EASY BRUISING: ICD-10-CM

## 2018-03-13 DIAGNOSIS — K75.4 AUTOIMMUNE HEPATITIS (H): ICD-10-CM

## 2018-03-13 LAB
ERYTHROCYTE [DISTWIDTH] IN BLOOD BY AUTOMATED COUNT: 16.7 % (ref 10–15)
HCT VFR BLD AUTO: 42.4 % (ref 35–47)
HGB BLD-MCNC: 13.1 G/DL (ref 11.7–15.7)
MCH RBC QN AUTO: 30.3 PG (ref 26.5–33)
MCHC RBC AUTO-ENTMCNC: 30.9 G/DL (ref 31.5–36.5)
MCV RBC AUTO: 98 FL (ref 78–100)
PLATELET # BLD AUTO: 74 10E9/L (ref 150–450)
RBC # BLD AUTO: 4.33 10E12/L (ref 3.8–5.2)
WBC # BLD AUTO: 6.4 10E9/L (ref 4–11)

## 2018-03-13 PROCEDURE — 85027 COMPLETE CBC AUTOMATED: CPT | Performed by: PHYSICIAN ASSISTANT

## 2018-03-13 PROCEDURE — 99215 OFFICE O/P EST HI 40 MIN: CPT | Performed by: PHYSICIAN ASSISTANT

## 2018-03-13 PROCEDURE — 36415 COLL VENOUS BLD VENIPUNCTURE: CPT | Performed by: PHYSICIAN ASSISTANT

## 2018-03-13 RX ORDER — AZATHIOPRINE 50 MG/1
50 TABLET ORAL DAILY
Qty: 30 TABLET | Refills: 1 | COMMUNITY
Start: 2018-03-13 | End: 2018-04-09

## 2018-03-13 NOTE — PROGRESS NOTES
"HPI: 73 yo female here for f/u autoimmune hepatitis, paroxysmal afib here with easy bruising. Also ongoing fatigue.  She tapered off of prednisone with last dose 3 days ago.  Currently on Imuran but not sure when that medication was started by [facility].  She had labs last week at [facility] and results pending per pt  She is on Eliquis for afib and fish oil for her \"joints\" and dry eyes    She has a large bruise and hematoma on the R arm from reaching in between some couch cushions and getting her arm stuck.  She continues to feel tired all of the time  She can fall to sleep easily in her recliner and this makes her back hurt more  She denies palpitations or chest pain  She feels her afib is under good control now on toprol and diltiazem  They bring in BP readings which are /60-79 with pulse 72-99   notes she does not snore or have apneic episodes    Past Medical History:   Diagnosis Date     Anxiety      Atrial fibrillation (H)      Chronic renal insufficiency      Dementia      Dry eyes, bilateral      Generalized OA      Glaucoma      HTN (hypertension), benign      Hyperlipidemia LDL goal < 130      Liver dysfunction      Paroxysmal a-fib (H)      Past Surgical History:   Procedure Laterality Date     DILATION AND CURETTAGE       HYSTERECTOMY      with USO, not for cancer     S/p partial vaginectomy       SEPTOPLASTY       TONSILLECTOMY & ADENOIDECTOMY       Social History   Substance Use Topics     Smoking status: Former Smoker     Quit date: 1/1/1990     Smokeless tobacco: Never Used      Comment: quit 1990     Alcohol use No     Current Outpatient Prescriptions   Medication Sig Dispense Refill     azaTHIOprine (IMURAN) 50 MG tablet Take 1 tablet (50 mg) by mouth daily 30 tablet 1     apixaban ANTICOAGULANT (ELIQUIS) 5 MG tablet Take 1 tablet (5 mg) by mouth 2 times daily 180 tablet 3     Apoaequorin (PREVAGEN PO) Take 1 tablet by mouth daily       metoprolol tartrate (LOPRESSOR) 25 MG tablet Take 1 " "tablet (25 mg) by mouth 2 times daily 60 tablet 3     diltiazem (DILTIAZEM CD) 240 MG 24 hr capsule Take 1 capsule (240 mg) by mouth daily 30 capsule      brimonidine-timolol (COMBIGAN) 0.2-0.5 % ophthalmic solution Place 1 drop into both eyes 2 times daily        Multiple Vitamin (MULTI-VITAMIN) per tablet Take 1 tablet by mouth daily.       Allergies   Allergen Reactions     Adhesive Tape      Zocor [Simvastatin - High Dose]      Elevated LFTs     FAMILY HISTORY NOTED AND REVIEWED    PHYSICAL EXAM:    /65 (BP Location: Left arm, Patient Position: Chair, Cuff Size: Adult Regular)  Pulse 68  Temp 97.7  F (36.5  C) (Tympanic)  Ht 5' 4\" (1.626 m)  Wt 138 lb (62.6 kg)  SpO2 97%  BMI 23.69 kg/m2    Patient appears non toxic  R forearm: there is a marble sized soft mass in the middle of a large purple eccymosis suggestive of hematoma from arm injury  Tiny petechiae also noted R forearm  Mouth: no bleeding of gums  Lungs: CTA bilat  Heart: RRR without any skips on exam today  Extr: no edema.  Psych: approp affect and mood.    Results for orders placed or performed in visit on 03/13/18   CBC with platelets   Result Value Ref Range    WBC 6.4 4.0 - 11.0 10e9/L    RBC Count 4.33 3.8 - 5.2 10e12/L    Hemoglobin 13.1 11.7 - 15.7 g/dL    Hematocrit 42.4 35.0 - 47.0 %    MCV 98 78 - 100 fl    MCH 30.3 26.5 - 33.0 pg    MCHC 30.9 (L) 31.5 - 36.5 g/dL    RDW 16.7 (H) 10.0 - 15.0 %    Platelet Count 74 (L) 150 - 450 10e9/L     Assessment and Plan:     (S40.021A) Hematoma of arm, right, initial encounter  (primary encounter diagnosis)  Comment: she trapped her arm in the couch.    Plan: this will need to resolve spont with time.    (R23.8) Easy bruising  Comment: suspect due to eliquis, fish oil, recent prednisone and now plts low which may be SE of Imuran  Plan: CBC with platelets            (K75.4) Autoimmune hepatitis (H)  Comment:   Plan: Will call [facility] to discuss    (I48.0) PAF (paroxysmal atrial fibrillation) " (H)  Comment:   Plan: denies any recurrence of rapid afib, sob or chest pain    (I10) HTN (hypertension), benign  Comment:   Plan: well controlled, cont same.      (R53.83) Other fatigue  Comment: suspect multifactorial  Plan: CBC with platelets            (Z12.11) Colon cancer screening  Comment:   Plan: Fecal colorectal cancer screen (FIT)          Spent 40 minutes FTF with patient and her  of which over 50% was spent discussing the coordination of care and management of their issues noted above.      Cady Marie PA-C

## 2018-03-13 NOTE — NURSING NOTE
"Chief Complaint   Patient presents with     Excessive Bruising        Initial /65 (BP Location: Left arm, Patient Position: Chair, Cuff Size: Adult Regular)  Pulse 68  Temp 97.7  F (36.5  C) (Tympanic)  Ht 5' 4\" (1.626 m)  Wt 138 lb (62.6 kg)  SpO2 97%  BMI 23.69 kg/m2 Estimated body mass index is 23.69 kg/(m^2) as calculated from the following:    Height as of this encounter: 5' 4\" (1.626 m).    Weight as of this encounter: 138 lb (62.6 kg)..    BP completed using cuff size: regular  MEDICATIONS REVIEWED  SOCIAL AND FAMILY HX REVIEWED  Elizabeth Mendoza CMA  "

## 2018-03-13 NOTE — MR AVS SNAPSHOT
After Visit Summary   3/13/2018    Elaina Winn    MRN: [id number]           Patient Information     Date Of Birth          1945        Visit Information        Provider Department      3/13/2018 10:30 AM Cady Marie PA-C [facility]        Today's Diagnoses     Easy bruising    -  1    PAF (paroxysmal atrial fibrillation) (H)        Autoimmune hepatitis (H)        HTN (hypertension), benign        Memory loss        Other fatigue           Follow-ups after your visit        Your next 10 appointments already scheduled     Apr 26, 2018  3:30 PM CDT   [facility] EP RETURN with Nubia Turner PA-C   [facility] ([facility])    [address] [place], [place] [id number]  [facility] [address] [phone]              Who to contact     If you have questions or need follow up information about today's clinic visit or your schedule please contact [facility] directly at [phone].  Normal or non-critical lab and imaging results will be communicated to you by Flinqerhart, letter or phone within 4 business days after the clinic has received the results. If you do not hear from us within 7 days, please contact the clinic through Azimat or phone. If you have a critical or abnormal lab result, we will notify you by phone as soon as possible.  Submit refill requests through Pure Nootropics or call your pharmacy and they will forward the refill request to us. Please allow 3 business days for your refill to be completed.          Additional Information About Your Visit        MyChart Information     Pure Nootropics gives you secure access to your electronic health record. If you see a primary care provider, you can also send messages to your care team and make appointments. If you have questions, please call your primary care clinic.  If you do not have a primary care provider, please call [phone] and they will assist you.        Care EveryWhere ID      "This is your Care EveryWhere ID. This could be used by other organizations to access your [facility] medical records  [id number]        Your Vitals Were     Pulse Temperature Height Pulse Oximetry BMI (Body Mass Index)       68 97.7  F (36.5  C) (Tympanic) 5' 4\" (1.626 m) 97% 23.69 kg/m2        Blood Pressure from Last 3 Encounters:   03/13/18 106/65   02/20/18 122/74   01/18/18 124/58    Weight from Last 3 Encounters:   03/13/18 138 lb (62.6 kg)   02/20/18 141 lb (64 kg)   01/18/18 141 lb 6.4 oz (64.1 kg)              We Performed the Following     CBC with platelets          Today's Medication Changes          These changes are accurate as of 3/13/18 11:01 AM.  If you have any questions, ask your nurse or doctor.               Stop taking these medicines if you haven't already. Please contact your care team if you have questions.     FISH OIL PO   Stopped by:  Cady Marie PA-C                    Primary Care Provider Office Phone # Fax #    Cady Marie PA-C [phone] [address] [address]        Equal Access to Services     MARVA BABCOCK : Hadii mark ku hadasho Sobillali, waaxda luqadaha, qaybta kaalmada adeegyada, zoran hernandez . So [facility] [phone].    ATENCIÓN: Si habla español, tiene a munoz disposición servicios gratuitos de asistencia lingüística. Llame al [phone].    We comply with applicable federal civil rights laws and [place] laws. We do not discriminate on the basis of race, color, national origin, age, disability, sex, sexual orientation, or gender identity.            Thank you!     Thank you for choosing [facility]  for your care. Our goal is always to provide you with excellent care. Hearing back from our patients is one way we can continue to improve our services. Please take a few minutes to complete the written survey that you may receive in the mail after your visit with us. Thank you!             Your " Updated Medication List - Protect others around you: Learn how to safely use, store and throw away your medicines at www.disposemymeds.org.          This list is accurate as of 3/13/18 11:01 AM.  Always use your most recent med list.                   Brand Name Dispense Instructions for use Diagnosis    apixaban ANTICOAGULANT 5 MG tablet    ELIQUIS    180 tablet    Take 1 tablet (5 mg) by mouth 2 times daily    Atrial fibrillation with rapid ventricular response (H)       brimonidine-timolol 0.2-0.5 % ophthalmic solution    COMBIGAN     Place 1 drop into both eyes 2 times daily        DILTIAZEM  MG 24 hr capsule   Generic drug:  diltiazem     30 capsule    Take 1 capsule (240 mg) by mouth daily        IMURAN 50 MG tablet   Generic drug:  azaTHIOprine     30 tablet    Take 1 tablet (50 mg) by mouth daily        metoprolol tartrate 25 MG tablet    LOPRESSOR    60 tablet    Take 1 tablet (25 mg) by mouth 2 times daily    Atrial fibrillation with rapid ventricular response (H)       Multi-vitamin Tabs tablet   Generic drug:  multivitamin, therapeutic with minerals      Take 1 tablet by mouth daily.        PREVAGEN PO      Take 1 tablet by mouth daily

## 2018-03-15 NOTE — PROGRESS NOTES
Talked to pt and her   Talked to CIRILO GUDINO (Quynh Sawant) [phone]  She will d/c imuran due to low platelets and continue to monitor.

## 2018-03-20 ENCOUNTER — CARE COORDINATION (OUTPATIENT)
Dept: CARE COORDINATION | Facility: CLINIC | Age: 73
End: 2018-03-20

## 2018-03-20 DIAGNOSIS — I48.0 PAROXYSMAL ATRIAL FIBRILLATION (H): Primary | ICD-10-CM

## 2018-03-20 RX ORDER — DILTIAZEM HYDROCHLORIDE 240 MG/1
240 CAPSULE, COATED, EXTENDED RELEASE ORAL DAILY
Qty: 90 CAPSULE | Refills: 3 | Status: SHIPPED | OUTPATIENT
Start: 2018-03-20 | End: 2018-04-26

## 2018-03-20 NOTE — PROGRESS NOTES
Clinic Care Coordination Contact  [facility]/Voicemail       Clinical Data: Care Coordinator Outreach  Outreach attempted x 1.  Left message on voicemail with call back information and requested return call.  Chart reviewed. Patient continues to follow with cardiology for HTN.    Was recently in to see Cady Marie for Hematoma, advised to   Plan: Care Coordinator will mail out care coordination introduction letter with care coordinator contact information and explanation of care coordination services. Care Coordinator will try to reach patient again in 2-3 weeks.   Izzy Lynn, RN, BSN, PHN  Clinic Care Coordinator  [facility]  [phone]

## 2018-03-20 NOTE — LETTER
[facility]ARE [facility]ORDINATION  [address] [address]  Phone: [phone]      March 22, 2018      Elaina Winn  [address] [facility] [address]    Dear Elaina,    We have been trying to reach you to introduce you to [facility] Care Coordination program.  The goal of care coordination is to help you manage your health and improve access to the [facility] system in the most efficient manner.  The Care Coordinator is a nurse who understands the healthcare system and will assist you in improving your access to care.     As your Physician and Care Coordinator we partner to help you achieve your health care goals.     We will continue to reach out; however, if you are able to call your Care Coordinator at [phone], that would be appreciated.  We at [facility] are focused on providing you with the highest-quality healthcare experience possible.      It is a pleasure to partner with you as we work towards achieving your optimal state of wellness.        Sincerely,        Cady Meier  None   [address] [place] [address] / [place] [address]

## 2018-03-21 ENCOUNTER — TELEPHONE (OUTPATIENT)
Dept: FAMILY MEDICINE | Facility: CLINIC | Age: 73
End: 2018-03-21

## 2018-03-21 ENCOUNTER — TRANSFERRED RECORDS (OUTPATIENT)
Dept: HEALTH INFORMATION MANAGEMENT | Facility: CLINIC | Age: 73
End: 2018-03-21

## 2018-03-21 DIAGNOSIS — D69.6 THROMBOCYTOPENIA (H): ICD-10-CM

## 2018-03-21 DIAGNOSIS — D69.6 THROMBOCYTOPENIA (H): Primary | ICD-10-CM

## 2018-03-21 LAB
ERYTHROCYTE [DISTWIDTH] IN BLOOD BY AUTOMATED COUNT: 16.2 % (ref 10–15)
HCT VFR BLD AUTO: 40.3 % (ref 35–47)
HGB BLD-MCNC: 12.7 G/DL (ref 11.7–15.7)
MCH RBC QN AUTO: 30.5 PG (ref 26.5–33)
MCHC RBC AUTO-ENTMCNC: 31.5 G/DL (ref 31.5–36.5)
MCV RBC AUTO: 97 FL (ref 78–100)
PLATELET # BLD AUTO: 93 10E9/L (ref 150–450)
RBC # BLD AUTO: 4.16 10E12/L (ref 3.8–5.2)
WBC # BLD AUTO: 2.9 10E9/L (ref 4–11)

## 2018-03-21 PROCEDURE — 36415 COLL VENOUS BLD VENIPUNCTURE: CPT | Performed by: PHYSICIAN ASSISTANT

## 2018-03-21 PROCEDURE — 85027 COMPLETE CBC AUTOMATED: CPT | Performed by: PHYSICIAN ASSISTANT

## 2018-03-21 NOTE — TELEPHONE ENCOUNTER
Reason for call:  Patient reporting a symptom    Symptom or request: Pt's  states that pt is still having problems with bleeding from her gums, has no appetite, and she is still bruising easily. Pt would like Cady Marie to call her to discuss    Duration (how long have symptoms been present): Unknown    Have you been treated for this before? Yes    Phone Number patient can be reached at:  Home number on file [phone] (home)    Best Time:  Anytime    Can we leave a detailed message on this number:  YES    Call taken on 3/21/2018 at 9:17 AM by Veronica Lema

## 2018-03-21 NOTE — TELEPHONE ENCOUNTER
FYI   Pt coming in for platelets today.  Had question about Eliquis/should she be taking with low platelets, but as currently followed closely by Cards, advised to reach out to cards to discuss.     Note in last Ov states that pt is on Eliquis. Please advise if any feedback for pt/.  Christy Gray RN

## 2018-04-09 ENCOUNTER — OFFICE VISIT (OUTPATIENT)
Dept: FAMILY MEDICINE | Facility: CLINIC | Age: 73
End: 2018-04-09
Payer: COMMERCIAL

## 2018-04-09 ENCOUNTER — TRANSFERRED RECORDS (OUTPATIENT)
Dept: HEALTH INFORMATION MANAGEMENT | Facility: CLINIC | Age: 73
End: 2018-04-09

## 2018-04-09 VITALS
WEIGHT: 140 LBS | SYSTOLIC BLOOD PRESSURE: 125 MMHG | BODY MASS INDEX: 24.03 KG/M2 | TEMPERATURE: 98.9 F | DIASTOLIC BLOOD PRESSURE: 65 MMHG | HEART RATE: 63 BPM | OXYGEN SATURATION: 99 %

## 2018-04-09 DIAGNOSIS — I10 HTN (HYPERTENSION), BENIGN: ICD-10-CM

## 2018-04-09 DIAGNOSIS — R60.0 BILATERAL LOWER EXTREMITY EDEMA: Primary | ICD-10-CM

## 2018-04-09 DIAGNOSIS — R61 NIGHT SWEATS: ICD-10-CM

## 2018-04-09 DIAGNOSIS — I48.0 PAF (PAROXYSMAL ATRIAL FIBRILLATION) (H): ICD-10-CM

## 2018-04-09 DIAGNOSIS — R53.82 CHRONIC FATIGUE: ICD-10-CM

## 2018-04-09 DIAGNOSIS — K75.4 AUTOIMMUNE HEPATITIS (H): ICD-10-CM

## 2018-04-09 DIAGNOSIS — D69.6 THROMBOCYTOPENIA (H): ICD-10-CM

## 2018-04-09 PROCEDURE — 99214 OFFICE O/P EST MOD 30 MIN: CPT | Performed by: PHYSICIAN ASSISTANT

## 2018-04-09 NOTE — MR AVS SNAPSHOT
After Visit Summary   4/9/2018    Elaina Winn    MRN: [id number]           Patient Information     Date Of Birth          1945        Visit Information        Provider Department      4/9/2018 2:00 PM Cady Marie PA-C [facility]        Today's Diagnoses     Bilateral lower extremity edema    -  1    PAF (paroxysmal atrial fibrillation) (H)        Thrombocytopenia (H)        Autoimmune hepatitis (H)        Night sweats        Chronic fatigue        HTN (hypertension), benign           Follow-ups after your visit        Additional Services     DME REFERRAL       Please be aware that coverage of these services is subject to the terms and limitations of your health insurance plan.  Call member services at your health plan with any benefit or coverage questions.      Dispense 2 pair of medium compression knee high jobst stockings    Please bring the following to your appointment:  Any x-rays, CTs or MRIs which have been performed.  Contact the facility where they were done to arrange for  prior to your scheduled appointment.    List of current medications   This referral request   Any documents/labs given to you for this referral                  Your next 10 appointments already scheduled     Apr 26, 2018  3:30 PM CDT   [facility] EP RETURN with Nubia Turner PA-C   [facility] ([facility])    [address] [place] [address] [phone] OPT 2              Who to contact     If you have questions or need follow up information about today's clinic visit or your schedule please contact [facility] directly at [phone].  Normal or non-critical lab and imaging results will be communicated to you by MyChart, letter or phone within 4 business days after the clinic has received the results. If you do not hear from us within 7 days, please contact the clinic through MyChart or phone. If you have  a critical or abnormal lab result, we will notify you by phone as soon as possible.  Submit refill requests through CasaHop or call your pharmacy and they will forward the refill request to us. Please allow 3 business days for your refill to be completed.          Additional Information About Your Visit        Redkneehart Information     CasaHop gives you secure access to your electronic health record. If you see a primary care provider, you can also send messages to your care team and make appointments. If you have questions, please call your primary care clinic.  If you do not have a primary care provider, please call [phone] and they will assist you.        Care EveryWhere ID     This is your Care EveryWhere ID. This could be used by other organizations to access your [facility] medical records  [id number]        Your Vitals Were     Pulse Temperature Pulse Oximetry BMI (Body Mass Index)          63 98.9  F (37.2  C) (Tympanic) 99% 24.03 kg/m2         Blood Pressure from Last 3 Encounters:   04/09/18 125/65   03/13/18 106/65   02/20/18 122/74    Weight from Last 3 Encounters:   04/09/18 140 lb (63.5 kg)   03/13/18 138 lb (62.6 kg)   02/20/18 141 lb (64 kg)              We Performed the Following     DME REFERRAL          Today's Medication Changes          These changes are accurate as of 4/9/18  2:42 PM.  If you have any questions, ask your nurse or doctor.               Stop taking these medicines if you haven't already. Please contact your care team if you have questions.     IMURAN 50 MG tablet   Generic drug:  azaTHIOprine   Stopped by:  Cady Marie PA-C                    Primary Care Provider Office Phone # Fax #    Cady Marie PA-C [phone] [address]  [place] [address]        Equal Access to Services     KAREN BABCOCK : Hadii mark Diamond, waaxda luqadaha, qaybta kaalmada ellie, zoran snachez. So [facility]  [phone].    ATENCIÓN: Si kristi wallace, tiene a munoz disposición servicios gratuitos de asistencia lingüística. Casimiro ku [phone].    We comply with applicable federal civil rights laws and [place] laws. We do not discriminate on the basis of race, color, national origin, age, disability, sex, sexual orientation, or gender identity.            Thank you!     Thank you for choosing [facility]  for your care. Our goal is always to provide you with excellent care. Hearing back from our patients is one way we can continue to improve our services. Please take a few minutes to complete the written survey that you may receive in the mail after your visit with us. Thank you!             Your Updated Medication List - Protect others around you: Learn how to safely use, store and throw away your medicines at www.disposemymeds.org.          This list is accurate as of 4/9/18  2:42 PM.  Always use your most recent med list.                   Brand Name Dispense Instructions for use Diagnosis    apixaban ANTICOAGULANT 5 MG tablet    ELIQUIS    180 tablet    Take 1 tablet (5 mg) by mouth 2 times daily    Atrial fibrillation with rapid ventricular response (H)       brimonidine-timolol 0.2-0.5 % ophthalmic solution    COMBIGAN     Place 1 drop into both eyes 2 times daily        diltiazem 240 MG 24 hr capsule    DILTIAZEM CD    90 capsule    Take 1 capsule (240 mg) by mouth daily    Paroxysmal atrial fibrillation (H)       metoprolol tartrate 25 MG tablet    LOPRESSOR    60 tablet    Take 1 tablet (25 mg) by mouth 2 times daily    Atrial fibrillation with rapid ventricular response (H)       Multi-vitamin Tabs tablet   Generic drug:  multivitamin, therapeutic with minerals      Take 1 tablet by mouth daily.        PREVAGEN PO      Take 1 tablet by mouth daily

## 2018-04-09 NOTE — NURSING NOTE
"Chief Complaint   Patient presents with     Edema     left ankle       Initial /65 (BP Location: Right arm, Patient Position: Sitting, Cuff Size: Adult Regular)  Pulse 63  Temp 98.9  F (37.2  C) (Tympanic)  Wt 140 lb (63.5 kg)  SpO2 99%  BMI 24.03 kg/m2 Estimated body mass index is 24.03 kg/(m^2) as calculated from the following:    Height as of 3/13/18: 5' 4\" (1.626 m).    Weight as of this encounter: 140 lb (63.5 kg).  Medication Reconciliation: complete  Paulino Clements CMA on 4/9/2018 at 2:03 PM    "

## 2018-04-09 NOTE — PROGRESS NOTES
HPI:  73 yo female here with complaint of L ankle swelling diogenes after being upright during the day  Pt has paroxysmal afib and is on Eliquis  She has some sob with going on a walk that resolves with rest    She was having easy bruising and did have low plts so she was taken off of imuran which she was taking for possible autoimmune hepatitis.  She had LFTs and CBC at [facility] earlier today and results are pending    She has bleeding gums if she tries to floss her teeth  She denies epistaxis  Has easy bruising    Her blood pressures have been excellent with readings in the 102-118/ 50-60s with pulse in the 60-70s    She is having some night sweats which is new in the past week or so.  She continues to have ongoing fatigue x several weeks now.    Past Medical History:   Diagnosis Date     Anxiety      Atrial fibrillation (H)      Chronic renal insufficiency      Dementia      Dry eyes, bilateral      Generalized OA      Glaucoma      HTN (hypertension), benign      Hyperlipidemia LDL goal < 130      Liver dysfunction      Paroxysmal a-fib (H)      Past Surgical History:   Procedure Laterality Date     DILATION AND CURETTAGE       HYSTERECTOMY      with USO, not for cancer     S/p partial vaginectomy       SEPTOPLASTY       TONSILLECTOMY & ADENOIDECTOMY       Social History   Substance Use Topics     Smoking status: Former Smoker     Quit date: 1/1/1990     Smokeless tobacco: Never Used      Comment: quit 1990     Alcohol use No     Current Outpatient Prescriptions   Medication Sig Dispense Refill     diltiazem (DILTIAZEM CD) 240 MG 24 hr capsule Take 1 capsule (240 mg) by mouth daily 90 capsule 3     apixaban ANTICOAGULANT (ELIQUIS) 5 MG tablet Take 1 tablet (5 mg) by mouth 2 times daily 180 tablet 3     metoprolol tartrate (LOPRESSOR) 25 MG tablet Take 1 tablet (25 mg) by mouth 2 times daily 60 tablet 3     brimonidine-timolol (COMBIGAN) 0.2-0.5 % ophthalmic solution Place 1 drop into both eyes 2 times daily         Multiple Vitamin (MULTI-VITAMIN) per tablet Take 1 tablet by mouth daily.       azaTHIOprine (IMURAN) 50 MG tablet Take 1 tablet (50 mg) by mouth daily 30 tablet 1     Apoaequorin (PREVAGEN PO) Take 1 tablet by mouth daily       Allergies   Allergen Reactions     Adhesive Tape      Zocor [Simvastatin - High Dose]      Elevated LFTs     FAMILY HISTORY NOTED AND REVIEWED    PHYSICAL EXAM:    /65 (BP Location: Right arm, Patient Position: Sitting, Cuff Size: Adult Regular)  Pulse 63  Temp 98.9  F (37.2  C) (Tympanic)  Wt 140 lb (63.5 kg)  SpO2 99%  BMI 24.03 kg/m2    Patient appears non toxic  Skin: scattered tiny eccymoses forearms  Lungs: CTA bilat  Heart: RRR without m/r/g  Extr: slightly puffy ankles, but no pitting, skin changes or ulcerations. No erythema. No rash.    Assessment and Plan:     (R60.0) Bilateral lower extremity edema  (primary encounter diagnosis)  Comment: may be a side effect of diltiazem. Resolves overnight. Recd leg elevation and knee high compression hose  Plan: DME REFERRAL        jobst stockings    (I48.0) PAF (paroxysmal atrial fibrillation) (H)  Comment:   Plan: stable, has f/u with cards end of the month    (D69.6) Thrombocytopenia (H)  Comment:   Plan: [facility] checked CBC earlier today so we called them to have that faxed here when complete.  She has been off of Imuran so if plts and wbc continue to run low may need to refer to hematology    (K75.4) Autoimmune hepatitis (H)  Comment:   Plan: LFTs pending at [facility]    (R61) Night sweats  Comment:   Plan: new, will see what CBC shows    (R53.82) Chronic fatigue  Comment:   Plan: multifactorial    (I10) HTN (hypertension), benign  Comment:   Plan: well controlled, reviewed her readings from home today.      Cady Marie PA-C

## 2018-04-20 ENCOUNTER — TRANSFERRED RECORDS (OUTPATIENT)
Dept: HEALTH INFORMATION MANAGEMENT | Facility: CLINIC | Age: 73
End: 2018-04-20

## 2018-04-26 ENCOUNTER — OFFICE VISIT (OUTPATIENT)
Dept: CARDIOLOGY | Facility: CLINIC | Age: 73
End: 2018-04-26
Attending: PHYSICIAN ASSISTANT
Payer: COMMERCIAL

## 2018-04-26 VITALS
SYSTOLIC BLOOD PRESSURE: 134 MMHG | HEIGHT: 64 IN | DIASTOLIC BLOOD PRESSURE: 77 MMHG | WEIGHT: 138 LBS | HEART RATE: 77 BPM | BODY MASS INDEX: 23.56 KG/M2

## 2018-04-26 DIAGNOSIS — I48.91 ATRIAL FIBRILLATION WITH RVR (H): ICD-10-CM

## 2018-04-26 PROCEDURE — 99214 OFFICE O/P EST MOD 30 MIN: CPT | Performed by: PHYSICIAN ASSISTANT

## 2018-04-26 PROCEDURE — 93000 ELECTROCARDIOGRAM COMPLETE: CPT | Performed by: PHYSICIAN ASSISTANT

## 2018-04-26 NOTE — PATIENT INSTRUCTIONS
1. EKG today showed normal rhythm - hooray!    2. Discussed the swelling in the legs  - could be related to Diltiazem as that is a common side effect.  We could try you off of it x 1-2 weeks to see if this improves, but do worry about rapid heart if you do go back in atrial fibrillation.     * As we discussed, HOLD the Diltiazem 240 mg daily for now.     * Call my nurses in 1 week with an update ([phone]) - is your swelling any better? Fatigue any better? How are BPs?      3. Call me earlier if any issues! [phone]

## 2018-04-26 NOTE — LETTER
4/26/2018      Cady Marie PA-C  [address]  [facility] [address]      RE: Elaina FRANSISCA Winn       Dear Colleague,    I had the pleasure of seeing Elaina Winn in the [facility].    Service Date: 04/26/2018      HISTORY OF PRESENT ILLNESS:  I had the pleasure of seeing Elaina today when she came accompanied by her , Nino, for followup of her paroxysmal atrial fibrillation.  She is a 72-year-old with a history of hypertension and dyslipidemia.  Back in 11/2017, she was noted to have very elevated liver enzymes for which she ultimately underwent liver biopsy.  She was diagnosed with autoimmune hepatitis by [facility] and placed on prednisone.  During that hospitalization in 12/2017, she was noted to have episodes of atrial fibrillation with heart rates into the 140s.  She was initiated on anticoagulation given a CHADS-VASc score of 3 (hypertension, age and female) as well as metoprolol.  Followup event monitor continued to show episodes of rapid atrial fibrillation and metoprolol was adjusted.  This was later discontinued secondary to orthostatic blood pressures and she was started on diltiazem instead for an attempt at rate control when in AFib.      Dr. Erwin saw her 12/29/2017 and was quite concerned about the use of anticoagulation in the setting of elevated liver enzymes. She was in sinus rhythm and at that time, anticoagulation was discontinued.      She went to the Emergency Department on 12/31 with palpitations and was noted to be in atrial fibrillation.  She underwent cardioversion x3 which ultimately converted her to sinus rhythm.  The following day on 01/01 she was back into atrial fibrillation in the 150s.  She underwent cardioversion x5 in the Emergency Department, but was only able to briefly resume sinus rhythm and quickly flipped back into atrial fibrillation.  She was then seen by Dr. Jennings in the hospital who noted that antiarrhythmic drug choices were  "limited.  She had a mildly prolonged baseline QTc of 450-460 milliseconds, autoimmune hepatitis based on liver biopsy and fluctuating renal function.  Given her rapid heart rates and inability to tolerate atrial fibrillation, he actually recommended AV node ablation and pacemaker implantation.  She did not want to proceed with that yet and was therefore restarted on anticoagulation and was seen in followup.      She has subsequently had adjustments in her metoprolol May due to significant fatigue but despite this, when I saw her back in February, she continued to note significant \"exhaustion.\"  At that time, she had been maintained in sinus rhythm and had been on diltiazem and metoprolol without issues.  When I saw her, we reviewed her echo showing a near normal ejection fraction of 55%-60% with just mild mitral regurgitation.  I did not make any changes at that appointment, but I asked her to see me back in a few months to see how she was doing.      For her autoimmune hepatitis, she was placed on prednisone and azathioprine.  It was believed that this caused a significant drop in her platelet count with platelets on 03/09/2018 of only 40.  Prednisone and azathioprine were then discontinued and subsequent blood work showed an improvement in her platelet count back up to 130 (04/20/2018).  Unfortunately, AST and ALT, which had completely normalized on blood work done 02/19/2018 (33 and 33 respectively).  AST and ALT most recently have started increasing again to  99 and 112, respectively on 04/20/2018.  She is due to see Dr. Bean for a second opinion.      From an arrhythmia standpoint, she thinks she has done okay.  She denies any problems with palpitations, lightheadedness or feeling like her heart is racing.  She is tolerating the diltiazem 240 mg and metoprolol tartrate 25 mg b.i.d. without too much trouble, but has been recently complaining of lower extremity edema.      She denies chest pain, pressure or " "tightness.  She states that the edema she complained of to Cady Marie on 04/09 had been going on for about a week and has been much improved with compression stockings.  Her most recent albumin and protein levels were both normal on 04/20/2018.        She also continues to complain of significant fatigue.  As above, it was initially thought the metoprolol was the culprit of this and this was discontinued, but this made no improvement.  Her most recent hemoglobin was normal.  Most recent TSH in 11/2017 was normal at 1.59.  She and Nino both deny any concerns regarding sleep apnea.  She does note that she uses quite a bit of salt in her diet.      Her other concern is that she has developed episodes of hot flashes/night sweats.  This started occurring about a week before she saw Cady Marie.  She also notes that the bruising that she had previously has come back.  She denies any melena, epistaxis or hematochezia.  She denies any hemoptysis.        Finally, she also has had intermittent \"lumps\" appear beneath the skin, most noticeably in the right bicep and in the midsternal subcarinal area.  An ultrasound was offered, but she did not proceed with it as it \"went away.\" She cannot think of anything that she has done to make these come back, but they seem to coincide with the elevation in her liver enzymes.      EKG today, which I overread, confirms sinus rhythm.  Heart rates at home have been ranging from the 60s-80s, blood pressures have been ranging in the 80s-120s.  When they are low, she does not have any concerns or symptoms.        ASSESSMENT AND PLAN:     1. Paroxysmal Atrial fibrillation.  She had this starting back in the hospital in December.  She was extremely weak and had rapid heart rates.  Subsequently, a rate control strategy was attempted.  This ultimately has shown to work, but Dr. Jennings noted that if this were to recur, they would have a hard time treating with antiarrhythmic therapy given the fluctuating " renal function, liver disease and borderline QTc.     Right now we are concerned that the diltiazem may be worsening her lower extremity edema.  She has been able to maintain sinus rhythm since January and at this time we discussed trialing her off of diltiazem 240 mg daily just for the next week or so to see if her symptoms improve at all.  I would also like to see if that improves her fatigue and she will call me next week with an update.     If she goes back into atrial fibrillation, we will certainly need to restart diltiazem for rate control but I do note that a reduction in her metoprolol did not seem to improve her fatigue at all, so we could probably retry that as well.  I do note that she previously had problems with orthostasis and reduction in the metoprolol dose did help that.     I will talk to her next week and we will determine when to follow up.     She will remain on Eliquis therapy.  I do note that generally we are able to continue anticoagulation until the platelet count reaches under 50.  It is certainly robust at this time.     2.  Hypertension.  Blood pressure is under adequate control and good at home.  As above, we are holding her diltiazem and she will call me next week with an update on how her blood pressures are faring.     3.  Autoimmune hepatitis.  She is due to see Dr. Bean for her elevation in liver enzymes off of the prednisone and azathioprine.  She meets with Dr. Bean on .   We will get the blood work obtained from [facility] into [facility] [employer].     It has been a pleasure to see Elaina and Nino in clinic today.  I will talk to her next week.  We will get her back into Dr. Erwin down the road.         BASILIO SILVESTRE PA-C             D: 2018   T: 2018   MT: MELISSA      Name:     ELAINA BLACKWOOD   MRN:      [id number]        Account:      [id number]   :      1945           Service Date: 2018      Document: [id number]           Outpatient Encounter Prescriptions as of  4/26/2018   Medication Sig Dispense Refill     apixaban ANTICOAGULANT (ELIQUIS) 5 MG tablet Take 1 tablet (5 mg) by mouth 2 times daily 180 tablet 3     Apoaequorin (PREVAGEN PO) Take 1 tablet by mouth daily       brimonidine-timolol (COMBIGAN) 0.2-0.5 % ophthalmic solution Place 1 drop into both eyes 2 times daily        metoprolol tartrate (LOPRESSOR) 25 MG tablet Take 1 tablet (25 mg) by mouth 2 times daily 60 tablet 3     Multiple Vitamin (MULTI-VITAMIN) per tablet Take 1 tablet by mouth daily.       [DISCONTINUED] diltiazem (DILTIAZEM CD) 240 MG 24 hr capsule Take 1 capsule (240 mg) by mouth daily 90 capsule 3     No facility-administered encounter medications on file as of 4/26/2018.        Again, thank you for allowing me to participate in the care of your patient.      Sincerely,    Nubia Turner PA-C     [facility]

## 2018-04-26 NOTE — PROGRESS NOTES
HPI and Plan:   See dictation #[id number]    No orders of the defined types were placed in this encounter.      No orders of the defined types were placed in this encounter.      Medications Discontinued During This Encounter   Medication Reason     diltiazem (DILTIAZEM CD) 240 MG 24 hr capsule        Encounter Diagnosis   Name Primary?     Atrial fibrillation with RVR (H)        CURRENT MEDICATIONS:  Current Outpatient Prescriptions   Medication Sig Dispense Refill     apixaban ANTICOAGULANT (ELIQUIS) 5 MG tablet Take 1 tablet (5 mg) by mouth 2 times daily 180 tablet 3     Apoaequorin (PREVAGEN PO) Take 1 tablet by mouth daily       brimonidine-timolol (COMBIGAN) 0.2-0.5 % ophthalmic solution Place 1 drop into both eyes 2 times daily        metoprolol tartrate (LOPRESSOR) 25 MG tablet Take 1 tablet (25 mg) by mouth 2 times daily 60 tablet 3     Multiple Vitamin (MULTI-VITAMIN) per tablet Take 1 tablet by mouth daily.         ALLERGIES     Allergies   Allergen Reactions     Adhesive Tape      Zocor [Simvastatin - High Dose]      Elevated LFTs       PAST MEDICAL HISTORY:  Past Medical History:   Diagnosis Date     Anxiety      Atrial fibrillation (H)      Chronic renal insufficiency      Dementia      Dry eyes, bilateral      Generalized OA      Glaucoma      HTN (hypertension), benign      Hyperlipidemia LDL goal < 130      Liver dysfunction      Paroxysmal a-fib (H)        PAST SURGICAL HISTORY:  Past Surgical History:   Procedure Laterality Date     DILATION AND CURETTAGE       HYSTERECTOMY      with USO, not for cancer     S/p partial vaginectomy       SEPTOPLASTY       TONSILLECTOMY & ADENOIDECTOMY         FAMILY HISTORY:  Family History   Problem Relation Age of Onset     Hypertension Mother       age 95     Alzheimer Disease Father 75     also CHF     CANCER Brother       of lung ca age 69     Lipids Brother      Alzheimer Disease Paternal Uncle      Alzheimer Disease Paternal Uncle        SOCIAL  "HISTORY:  Social History     Social History     Marital status:      Spouse name: N/A     Number of children: N/A     Years of education: N/A     Social History Main Topics     Smoking status: Former Smoker     Quit date: 1/1/1990     Smokeless tobacco: Never Used      Comment: quit 1990     Alcohol use No     Drug use: No     Sexual activity: Not Currently     Partners: Male     Other Topics Concern     None     Social History Narrative    , 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  Walks 3miles per day        dtr (33) from [place] and lives in [facility]    Son from [place] and has mental health issues       Review of Systems:  Skin:          Eyes:  Positive for glasses    ENT:  Negative      Respiratory:  Positive for dyspnea on exertion     Cardiovascular:  Negative for;chest pain;edema;lightheadedness;dizziness Positive for;fatigue;edema    Gastroenterology: Negative for melena;hematochezia    Genitourinary:  Negative      Musculoskeletal:  Positive for arthritis    Neurologic:  Positive for headaches occ  Psychiatric:  Positive for anxiety;depression    Heme/Lymph/Imm:  Positive for allergies    Endocrine:  Negative        Physical Exam:  Vitals: /77  Pulse 77  Ht 1.626 m (5' 4\")  Wt 62.6 kg (138 lb)  BMI 23.69 kg/m2    Constitutional:  cooperative, alert and oriented, well developed, well nourished, in no acute distress        Skin:  warm and dry to the touch, no apparent skin lesions or masses noted          Head:  normocephalic, no masses or lesions        Eyes:  pupils equal and round;conjunctivae and lids unremarkable;sclera white;no xanthalasma        Lymph:      ENT:  no pallor or cyanosis, dentition good        Neck:  carotid pulses are full and equal bilaterally;JVP normal        Respiratory:  normal breath sounds, clear to auscultation, normal A-P diameter, normal symmetry, normal respiratory excursion, no use of accessory muscles         Cardiac: regular rhythm, normal S1/S2, no S3 or " S4, apical impulse not displaced, no murmurs, gallops or rubs                                                         GI:  abdomen soft;non-tender        Extremities and Muscular Skeletal:  no deformities, clubbing, cyanosis, erythema observed   bilateral LE edema;trace     compression stockings on    Neurological:  no gross motor deficits        Psych:  Alert and Oriented x 3        CC  Cady Marie, PA-C  [address]  [place] [address]

## 2018-04-26 NOTE — LETTER
4/26/2018    Cady Marie PA-C  [address]  [facility] [address]    RE: Elaina Winn       Dear Colleague,    I had the pleasure of seeing Elaina Winn in the [facility].    HPI and Plan:   See dictation #[id number]    No orders of the defined types were placed in this encounter.      No orders of the defined types were placed in this encounter.      Medications Discontinued During This Encounter   Medication Reason     diltiazem (DILTIAZEM CD) 240 MG 24 hr capsule        Encounter Diagnosis   Name Primary?     Atrial fibrillation with RVR (H)        CURRENT MEDICATIONS:  Current Outpatient Prescriptions   Medication Sig Dispense Refill     apixaban ANTICOAGULANT (ELIQUIS) 5 MG tablet Take 1 tablet (5 mg) by mouth 2 times daily 180 tablet 3     Apoaequorin (PREVAGEN PO) Take 1 tablet by mouth daily       brimonidine-timolol (COMBIGAN) 0.2-0.5 % ophthalmic solution Place 1 drop into both eyes 2 times daily        metoprolol tartrate (LOPRESSOR) 25 MG tablet Take 1 tablet (25 mg) by mouth 2 times daily 60 tablet 3     Multiple Vitamin (MULTI-VITAMIN) per tablet Take 1 tablet by mouth daily.         ALLERGIES     Allergies   Allergen Reactions     Adhesive Tape      Zocor [Simvastatin - High Dose]      Elevated LFTs       PAST MEDICAL HISTORY:  Past Medical History:   Diagnosis Date     Anxiety      Atrial fibrillation (H)      Chronic renal insufficiency      Dementia      Dry eyes, bilateral      Generalized OA      Glaucoma      HTN (hypertension), benign      Hyperlipidemia LDL goal < 130      Liver dysfunction      Paroxysmal a-fib (H)        PAST SURGICAL HISTORY:  Past Surgical History:   Procedure Laterality Date     DILATION AND CURETTAGE       HYSTERECTOMY      with USO, not for cancer     S/p partial vaginectomy       SEPTOPLASTY       TONSILLECTOMY & ADENOIDECTOMY         FAMILY HISTORY:  Family History   Problem Relation Age of Onset     Hypertension Mother       " age 95     Alzheimer Disease Father 75     also CHF     CANCER Brother       of lung ca age 69     Lipids Brother      Alzheimer Disease Paternal Uncle      Alzheimer Disease Paternal Uncle        SOCIAL HISTORY:  Social History     Social History     Marital status:      Spouse name: N/A     Number of children: N/A     Years of education: N/A     Social History Main Topics     Smoking status: Former Smoker     Quit date: 1990     Smokeless tobacco: Never Used      Comment: quit      Alcohol use No     Drug use: No     Sexual activity: Not Currently     Partners: Male     Other Topics Concern     None     Social History Narrative    , 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  Walks 3miles per day        dtr (33) from [place] and lives in [facility]    Son from [place] and has mental health issues       Review of Systems:  Skin:          Eyes:  Positive for glasses    ENT:  Negative      Respiratory:  Positive for dyspnea on exertion     Cardiovascular:  Negative for;chest pain;edema;lightheadedness;dizziness Positive for;fatigue;edema    Gastroenterology: Negative for melena;hematochezia    Genitourinary:  Negative      Musculoskeletal:  Positive for arthritis    Neurologic:  Positive for headaches occ  Psychiatric:  Positive for anxiety;depression    Heme/Lymph/Imm:  Positive for allergies    Endocrine:  Negative        Physical Exam:  Vitals: /77  Pulse 77  Ht 1.626 m (5' 4\")  Wt 62.6 kg (138 lb)  BMI 23.69 kg/m2    Constitutional:  cooperative, alert and oriented, well developed, well nourished, in no acute distress        Skin:  warm and dry to the touch, no apparent skin lesions or masses noted          Head:  normocephalic, no masses or lesions        Eyes:  pupils equal and round;conjunctivae and lids unremarkable;sclera white;no xanthalasma        Lymph:      ENT:  no pallor or cyanosis, dentition good        Neck:  carotid pulses are full and equal bilaterally;JVP normal    "     Respiratory:  normal breath sounds, clear to auscultation, normal A-P diameter, normal symmetry, normal respiratory excursion, no use of accessory muscles         Cardiac: regular rhythm, normal S1/S2, no S3 or S4, apical impulse not displaced, no murmurs, gallops or rubs                                                         GI:  abdomen soft;non-tender        Extremities and Muscular Skeletal:  no deformities, clubbing, cyanosis, erythema observed   bilateral LE edema;trace     compression stockings on    Neurological:  no gross motor deficits        Psych:  Alert and Oriented x 3        CC  Cady Marie PA-C  [address]  [place] [id number]                Thank you for allowing me to participate in the care of your patient.      Sincerely,     Nubia Turner PA-C     [facility]    cc:   Cady Marie PA-C  [address]  [place] [address]

## 2018-04-26 NOTE — MR AVS SNAPSHOT
After Visit Summary   4/26/2018    Elaina Winn    MRN: [id number]           Patient Information     Date Of Birth          1945        Visit Information        Provider Department      4/26/2018 3:30 PM Nubia Turner PA-C [facility]        Today's Diagnoses     Atrial fibrillation with RVR (H)          Care Instructions    1. EKG today showed normal rhythm - hooray!    2. Discussed the swelling in the legs  - could be related to Diltiazem as that is a common side effect.  We could try you off of it x 1-2 weeks to see if this improves, but do worry about rapid heart if you do go back in atrial fibrillation.     * As we discussed, HOLD the Diltiazem 240 mg daily for now.     * Call my nurses in 1 week with an update ([phone]) - is your swelling any better? Fatigue any better? How are BPs?      3. Call me earlier if any issues! [phone]                  Follow-ups after your visit        Who to contact     If you have questions or need follow up information about today's clinic visit or your schedule please contact [facility] directly at [phone].  Normal or non-critical lab and imaging results will be communicated to you by iConnect CRMhart, letter or phone within 4 business days after the clinic has received the results. If you do not hear from us within 7 days, please contact the clinic through iConnect CRMhart or phone. If you have a critical or abnormal lab result, we will notify you by phone as soon as possible.  Submit refill requests through [employer] or call your pharmacy and they will forward the refill request to us. Please allow 3 business days for your refill to be completed.          Additional Information About Your Visit        iConnect [email or web address] Golf Information     [employer] gives you secure access to your electronic health record. If you see a primary care provider, you can also send messages to your care team and  "make appointments. If you have questions, please call your primary care clinic.  If you do not have a primary care provider, please call [phone] and they will assist you.        Care EveryWhere ID     This is your Care EveryWhere ID. This could be used by other organizations to access your [facility] medical records  [id number]        Your Vitals Were     Pulse Height BMI (Body Mass Index)             77 1.626 m (5' 4\") 23.69 kg/m2          Blood Pressure from Last 3 Encounters:   04/26/18 134/77   04/09/18 125/65   03/13/18 106/65    Weight from Last 3 Encounters:   04/26/18 62.6 kg (138 lb)   04/09/18 63.5 kg (140 lb)   03/13/18 62.6 kg (138 lb)              We Performed the Following     EKG 12-lead complete w/read - Clinics (to be scheduled)     Follow-Up with Cardiac Advanced Practice Provider          Today's Medication Changes          These changes are accurate as of 4/26/18  4:03 PM.  If you have any questions, ask your nurse or doctor.               Stop taking these medicines if you haven't already. Please contact your care team if you have questions.     diltiazem 240 MG 24 hr capsule   Commonly known as:  DILTIAZEM CD   Stopped by:  Nubia Turner PA-C                    Primary Care Provider Office Phone # Fax #    Cady Mary Marie PA-C [phone] [address]  [place] [address]        Equal Access to Services     [facility]: Hadii aad ku hadasho Soomaali, waaxda luqadaha, qaybta kaalmada adeegyada, zoran hernandez . So [facility] [phone].    ATENCIÓN: Si habla español, tiene a munoz disposición servicios gratuitos de asistencia lingüística. Llame al [phone].    We comply with applicable federal civil rights laws and [place] laws. We do not discriminate on the basis of race, color, national origin, age, disability, sex, sexual orientation, or gender identity.            Thank you!     Thank you for choosing [facility]" [facility]  for your care. Our goal is always to provide you with excellent care. Hearing back from our patients is one way we can continue to improve our services. Please take a few minutes to complete the written survey that you may receive in the mail after your visit with us. Thank you!             Your Updated Medication List - Protect others around you: Learn how to safely use, store and throw away your medicines at www.disposemymeds.org.          This list is accurate as of 4/26/18  4:03 PM.  Always use your most recent med list.                   Brand Name Dispense Instructions for use Diagnosis    apixaban ANTICOAGULANT 5 MG tablet    ELIQUIS    180 tablet    Take 1 tablet (5 mg) by mouth 2 times daily    Atrial fibrillation with rapid ventricular response (H)       brimonidine-timolol 0.2-0.5 % ophthalmic solution    COMBIGAN     Place 1 drop into both eyes 2 times daily        metoprolol tartrate 25 MG tablet    LOPRESSOR    60 tablet    Take 1 tablet (25 mg) by mouth 2 times daily    Atrial fibrillation with rapid ventricular response (H)       Multi-vitamin Tabs tablet   Generic drug:  multivitamin, therapeutic with minerals      Take 1 tablet by mouth daily.        PREVAGEN PO      Take 1 tablet by mouth daily

## 2018-04-27 NOTE — PROGRESS NOTES
Service Date: 04/26/2018      HISTORY OF PRESENT ILLNESS:  I had the pleasure of seeing Elaina today when she came accompanied by her , Nino, for followup of her paroxysmal atrial fibrillation.  She is a 72-year-old with a history of hypertension and dyslipidemia.  Back in 11/2017, she was noted to have very elevated liver enzymes for which she ultimately underwent liver biopsy.  She was diagnosed with autoimmune hepatitis by [facility] and placed on prednisone.  During that hospitalization in 12/2017, she was noted to have episodes of atrial fibrillation with heart rates into the 140s.  She was initiated on anticoagulation given a CHADS-VASc score of 3 (hypertension, age and female) as well as metoprolol.  Followup event monitor continued to show episodes of rapid atrial fibrillation and metoprolol was adjusted.  This was later discontinued secondary to orthostatic blood pressures and she was started on diltiazem instead for an attempt at rate control when in AFib.      Dr. Erwin saw her 12/29/2017 and was quite concerned about the use of anticoagulation in the setting of elevated liver enzymes. She was in sinus rhythm and at that time, anticoagulation was discontinued.      She went to the Emergency Department on 12/31 with palpitations and was noted to be in atrial fibrillation.  She underwent cardioversion x3 which ultimately converted her to sinus rhythm.  The following day on 01/01 she was back into atrial fibrillation in the 150s.  She underwent cardioversion x5 in the Emergency Department, but was only able to briefly resume sinus rhythm and quickly flipped back into atrial fibrillation.  She was then seen by Dr. Jennings in the hospital who noted that antiarrhythmic drug choices were limited.  She had a mildly prolonged baseline QTc of 450-460 milliseconds, autoimmune hepatitis based on liver biopsy and fluctuating renal function.  Given her rapid heart rates and inability to tolerate atrial fibrillation,  "he actually recommended AV node ablation and pacemaker implantation.  She did not want to proceed with that yet and was therefore restarted on anticoagulation and was seen in followup.      She has subsequently had adjustments in her metoprolol May due to significant fatigue but despite this, when I saw her back in February, she continued to note significant \"exhaustion.\"  At that time, she had been maintained in sinus rhythm and had been on diltiazem and metoprolol without issues.  When I saw her, we reviewed her echo showing a near normal ejection fraction of 55%-60% with just mild mitral regurgitation.  I did not make any changes at that appointment, but I asked her to see me back in a few months to see how she was doing.      For her autoimmune hepatitis, she was placed on prednisone and azathioprine.  It was believed that this caused a significant drop in her platelet count with platelets on 03/09/2018 of only 40.  Prednisone and azathioprine were then discontinued and subsequent blood work showed an improvement in her platelet count back up to 130 (04/20/2018).  Unfortunately, AST and ALT, which had completely normalized on blood work done 02/19/2018 (33 and 33 respectively).  AST and ALT most recently have started increasing again to  99 and 112, respectively on 04/20/2018.  She is due to see Dr. Bean for a second opinion.      From an arrhythmia standpoint, she thinks she has done okay.  She denies any problems with palpitations, lightheadedness or feeling like her heart is racing.  She is tolerating the diltiazem 240 mg and metoprolol tartrate 25 mg b.i.d. without too much trouble, but has been recently complaining of lower extremity edema.      She denies chest pain, pressure or tightness.  She states that the edema she complained of to Cady Marie on 04/09 had been going on for about a week and has been much improved with compression stockings.  Her most recent albumin and protein levels were both normal " "on 04/20/2018.        She also continues to complain of significant fatigue.  As above, it was initially thought the metoprolol was the culprit of this and this was discontinued, but this made no improvement.  Her most recent hemoglobin was normal.  Most recent TSH in 11/2017 was normal at 1.59.  She and Nino both deny any concerns regarding sleep apnea.  She does note that she uses quite a bit of salt in her diet.      Her other concern is that she has developed episodes of hot flashes/night sweats.  This started occurring about a week before she saw Cady Marie.  She also notes that the bruising that she had previously has come back.  She denies any melena, epistaxis or hematochezia.  She denies any hemoptysis.        Finally, she also has had intermittent \"lumps\" appear beneath the skin, most noticeably in the right bicep and in the midsternal subcarinal area.  An ultrasound was offered, but she did not proceed with it as it \"went away.\" She cannot think of anything that she has done to make these come back, but they seem to coincide with the elevation in her liver enzymes.      EKG today, which I overread, confirms sinus rhythm.  Heart rates at home have been ranging from the 60s-80s, blood pressures have been ranging in the 80s-120s.  When they are low, she does not have any concerns or symptoms.        ASSESSMENT AND PLAN:     1. Paroxysmal Atrial fibrillation.  She had this starting back in the hospital in December.  She was extremely weak and had rapid heart rates.  Subsequently, a rate control strategy was attempted.  This ultimately has shown to work, but Dr. Jennings noted that if this were to recur, they would have a hard time treating with antiarrhythmic therapy given the fluctuating renal function, liver disease and borderline QTc.     Right now we are concerned that the diltiazem may be worsening her lower extremity edema.  She has been able to maintain sinus rhythm since January and at this time we " discussed trialing her off of diltiazem 240 mg daily just for the next week or so to see if her symptoms improve at all.  I would also like to see if that improves her fatigue and she will call me next week with an update.     If she goes back into atrial fibrillation, we will certainly need to restart diltiazem for rate control but I do note that a reduction in her metoprolol did not seem to improve her fatigue at all, so we could probably retry that as well.  I do note that she previously had problems with orthostasis and reduction in the metoprolol dose did help that.     I will talk to her next week and we will determine when to follow up.     She will remain on Eliquis therapy.  I do note that generally we are able to continue anticoagulation until the platelet count reaches under 50.  It is certainly robust at this time.     2.  Hypertension.  Blood pressure is under adequate control and good at home.  As above, we are holding her diltiazem and she will call me next week with an update on how her blood pressures are faring.     3.  Autoimmune hepatitis.  She is due to see Dr. Bean for her elevation in liver enzymes off of the prednisone and azathioprine.  She meets with Dr. Bean on .   We will get the blood work obtained from [facility] into [place].     It has been a pleasure to see Elaina and Nino in clinic today.  I will talk to her next week.  We will get her back into Dr. Erwin down the road.         BASILIO SILVESTRE PA-C             D: 2018   T: 2018   MT: MELISSA      Name:     ELAINA BLACKWOOD   MRN:      [id number]        Account:      [id number]   :      1945           Service Date: 2018      Document: [id number]

## 2018-05-01 ENCOUNTER — TRANSFERRED RECORDS (OUTPATIENT)
Dept: HEALTH INFORMATION MANAGEMENT | Facility: CLINIC | Age: 73
End: 2018-05-01

## 2018-05-03 ENCOUNTER — TELEPHONE (OUTPATIENT)
Dept: CARDIOLOGY | Facility: CLINIC | Age: 73
End: 2018-05-03

## 2018-05-03 DIAGNOSIS — I48.0 PAROXYSMAL ATRIAL FIBRILLATION (H): Primary | ICD-10-CM

## 2018-05-03 NOTE — TELEPHONE ENCOUNTER
Thx for update -     As BPs 90-120s/50-70s and HR 60-80s, will not make any changes and keep her OFF of Diltaizem 240 mg daily.    Continue to monitor for recurrent AFib and call right away if it reoccurs.    Otherwise, see Dr. Erwin in 4-6 months (I've placed order)

## 2018-05-03 NOTE — TELEPHONE ENCOUNTER
Phone call to patient and spoke to spouse to provide him with Maddison's impressions. She will not make any changes and she is to remain off Diltiazem. I instructed him to call us if she has recurrence of afib. I also told him that scheduling will call her in the near future for a return visit with Dr. Erwin 4-6 months from now. He had no questions for me at this time. Ariane

## 2018-05-03 NOTE — TELEPHONE ENCOUNTER
called with follow up.  His wife was seen last week per JOSE Harman on 4/26 and diltiazem was discontinued d/t LACIE.   reports her edema has improved since being off the medication.  Home BP since being off medicaition averaging  systolic 59-71 diastolic HR 66-83 regular rhythm.  Will update JOSE Harman regarding above to determine when patient should follow up again with cardiology.  SANYA Serra

## 2018-05-15 ENCOUNTER — TRANSFERRED RECORDS (OUTPATIENT)
Dept: HEALTH INFORMATION MANAGEMENT | Facility: CLINIC | Age: 73
End: 2018-05-15

## 2018-05-29 ENCOUNTER — TRANSFERRED RECORDS (OUTPATIENT)
Dept: HEALTH INFORMATION MANAGEMENT | Facility: CLINIC | Age: 73
End: 2018-05-29

## 2018-06-04 DIAGNOSIS — I48.91 ATRIAL FIBRILLATION WITH RAPID VENTRICULAR RESPONSE (H): ICD-10-CM

## 2018-06-04 RX ORDER — METOPROLOL TARTRATE 25 MG/1
25 TABLET, FILM COATED ORAL 2 TIMES DAILY
Qty: 180 TABLET | Refills: 3 | Status: SHIPPED | OUTPATIENT
Start: 2018-06-04 | End: 2019-04-09

## 2018-07-03 ENCOUNTER — TRANSFERRED RECORDS (OUTPATIENT)
Dept: HEALTH INFORMATION MANAGEMENT | Facility: CLINIC | Age: 73
End: 2018-07-03

## 2018-08-09 ENCOUNTER — TRANSFERRED RECORDS (OUTPATIENT)
Dept: HEALTH INFORMATION MANAGEMENT | Facility: CLINIC | Age: 73
End: 2018-08-09

## 2018-08-21 ENCOUNTER — TRANSFERRED RECORDS (OUTPATIENT)
Dept: HEALTH INFORMATION MANAGEMENT | Facility: CLINIC | Age: 73
End: 2018-08-21

## 2018-08-23 ENCOUNTER — TRANSFERRED RECORDS (OUTPATIENT)
Dept: HEALTH INFORMATION MANAGEMENT | Facility: CLINIC | Age: 73
End: 2018-08-23

## 2018-09-25 ENCOUNTER — TRANSFERRED RECORDS (OUTPATIENT)
Dept: HEALTH INFORMATION MANAGEMENT | Facility: CLINIC | Age: 73
End: 2018-09-25

## 2018-10-03 ENCOUNTER — OFFICE VISIT (OUTPATIENT)
Dept: CARDIOLOGY | Facility: CLINIC | Age: 73
End: 2018-10-03
Attending: PHYSICIAN ASSISTANT
Payer: COMMERCIAL

## 2018-10-03 VITALS
BODY MASS INDEX: 25.78 KG/M2 | HEIGHT: 64 IN | WEIGHT: 151 LBS | DIASTOLIC BLOOD PRESSURE: 77 MMHG | SYSTOLIC BLOOD PRESSURE: 115 MMHG | HEART RATE: 65 BPM

## 2018-10-03 DIAGNOSIS — I48.0 PAROXYSMAL ATRIAL FIBRILLATION (H): ICD-10-CM

## 2018-10-03 PROCEDURE — 99214 OFFICE O/P EST MOD 30 MIN: CPT | Performed by: INTERNAL MEDICINE

## 2018-10-03 PROCEDURE — 93000 ELECTROCARDIOGRAM COMPLETE: CPT | Performed by: INTERNAL MEDICINE

## 2018-10-03 NOTE — LETTER
10/3/2018    Cady Marie PA-C  [address]  [facility] [address]    RE: Elaina Winn       Dear Colleague,    I had the pleasure of seeing Elaina Winn in the [facility].    HPI and Plan:   See dictation    Orders Placed This Encounter   Procedures     Follow-Up with Cardiac Advanced Practice Provider     Follow-Up with Electrophysiologist       No orders of the defined types were placed in this encounter.      Medications Discontinued During This Encounter   Medication Reason     apixaban ANTICOAGULANT (ELIQUIS) 5 MG tablet          Encounter Diagnosis   Name Primary?     Paroxysmal atrial fibrillation (H)        CURRENT MEDICATIONS:  Current Outpatient Prescriptions   Medication Sig Dispense Refill     Apoaequorin (PREVAGEN PO) Take 1 tablet by mouth daily       brimonidine-timolol (COMBIGAN) 0.2-0.5 % ophthalmic solution Place 1 drop into both eyes 2 times daily        metoprolol tartrate (LOPRESSOR) 25 MG tablet Take 1 tablet (25 mg) by mouth 2 times daily 180 tablet 3     Multiple Vitamin (MULTI-VITAMIN) per tablet Take 1 tablet by mouth daily.         ALLERGIES     Allergies   Allergen Reactions     Adhesive Tape      Zocor [Simvastatin - High Dose]      Elevated LFTs       PAST MEDICAL HISTORY:  Past Medical History:   Diagnosis Date     Anxiety      Atrial fibrillation (H)      Chronic renal insufficiency      Dementia      Glaucoma      Hepatitis     autoimmune     HTN (hypertension), benign      Hyperlipidemia LDL goal < 130      Paroxysmal A-fib (H)        PAST SURGICAL HISTORY:  Past Surgical History:   Procedure Laterality Date     DILATION AND CURETTAGE       HYSTERECTOMY      with USO, not for cancer     S/p partial vaginectomy       SEPTOPLASTY       TONSILLECTOMY & ADENOIDECTOMY         FAMILY HISTORY:  Family History   Problem Relation Age of Onset     Hypertension Mother       age 95     Alzheimer Disease Father 75     also CHF     Cancer Brother   "     of lung ca age 69     Lipids Brother      Alzheimer Disease Paternal Uncle      Alzheimer Disease Paternal Uncle        SOCIAL HISTORY:  Social History     Social History     Marital status:      Spouse name: N/A     Number of children: N/A     Years of education: N/A     Social History Main Topics     Smoking status: Former Smoker     Quit date: 1990     Smokeless tobacco: Never Used      Comment: quit      Alcohol use No     Drug use: No     Sexual activity: Not Currently     Partners: Male     Other Topics Concern     None     Social History Narrative    , 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  Walks 3miles per day        dtr (33) from [place] and lives in [facility]    Son from [place] and has mental health issues       Review of Systems:  Skin:          Eyes:  Positive for glasses    ENT:  Negative      Respiratory:  Positive for dyspnea on exertion     Cardiovascular:  Negative for;chest pain;edema;lightheadedness;dizziness Positive for;fatigue;edema    Gastroenterology: Negative for melena;hematochezia    Genitourinary:  Negative      Musculoskeletal:  Positive for arthritis    Neurologic:  Positive for headaches occ  Psychiatric:  Positive for anxiety;depression    Heme/Lymph/Imm:  Positive for allergies    Endocrine:  Negative        Physical Exam:  Vitals: /77  Pulse 65  Ht 1.626 m (5' 4\")  Wt 68.5 kg (151 lb)  BMI 25.92 kg/m2    Constitutional:  cooperative, alert and oriented, well developed, well nourished, in no acute distress        Skin:  warm and dry to the touch, no apparent skin lesions or masses noted          Head:  normocephalic, no masses or lesions        Eyes:  pupils equal and round;conjunctivae and lids unremarkable;sclera white;no xanthalasma        Lymph:No Cervical lymphadenopathy present     ENT:  no pallor or cyanosis, dentition good        Neck:  carotid pulses are full and equal bilaterally;JVP normal        Respiratory:  normal breath sounds, clear " to auscultation, normal A-P diameter, normal symmetry, normal respiratory excursion, no use of accessory muscles         Cardiac: regular rhythm, normal S1/S2, no S3 or S4, apical impulse not displaced, no murmurs, gallops or rubs                                                         GI:  abdomen soft;non-tender        Extremities and Muscular Skeletal:  no deformities, clubbing, cyanosis, erythema observed   bilateral LE edema;trace     compression stockings on    Neurological:  no gross motor deficits        Psych:  Alert and Oriented x 3        CC  Nubia Turner PA-C  [address]  [place] [address]                Service Date: 10/03/2018      HISTORY OF PRESENT ILLNESS:  I saw Ms. Winn for followup of atrial fibrillation.  She is a 73-year-old white female who was found to have paroxysmal atrial fibrillation in 2017 when she was admitted for liver dysfunction.  When I saw her in 12/2017, I stopped her anticoagulation because of liver dysfunction.  She subsequently had recurrent atrial fibrillation that required ER visit and DC cardioversion.  Over the last few months, she has been on Eliquis and metoprolol.  Symptomatically, she is not aware of palpitations.  She complains of fatigue but no dizziness or near syncope.  She does not feel atrial fibrillation symptomatically.  She brought in a list of her daily blood pressures and heart rates which are all in the normal range.      The patient was diagnosed to have autoimmune hepatitis.  Her liver enzyme was back to normal in 02/2018.  Her platelets are still low in the 90s.  She has been on medication for dementia.  She complains about easy bruising.      PHYSICAL EXAMINATION:   VITAL SIGNS:  Blood pressure was 115/77, heart rate 65 beats per minute, body weight 151 pounds.   HEENT:  Eyes and ENT were unremarkable.   LUNGS:  Clear.   CARDIAC:  Rhythm was regular and heart sounds were normal without murmur.   ABDOMEN:  Examination showed no  hepatomegaly.   EXTREMITIES:  There was no pedal edema.      RADIOLOGIC STUDIES:  EKG today showed normal sinus rhythm.      ASSESSMENT AND RECOMMENDATIONS:  Ms. Blackwood is doing well symptomatically without apparent symptomatic atrial fibrillation.  She has some mild fatigue.  Because of the multiple risk factors of bleeding including dementia, thrombocytopenia, liver dysfunction and renal dysfunction, I have stopped her Eliquis.  She will continue low dose of metoprolol.  She is scheduled for return Cardiology visit in 6 months.      cc:   JOSE Salomon    [facility]    [address]   [place] [address]         NORM ERWIN MD             D: 10/03/2018   T: 10/03/2018   MT: YURI      Name:     ABDULLAHI BLACKWOOD   MRN:      [id number]        Account:      [id number]   :      1945           Service Date: 10/03/2018      Document: [id number]        Thank you for allowing me to participate in the care of your patient.      Sincerely,     Norm Erwin MD     [facility]    cc:   Nubia Turner PA-C  [address]  [place] [address]

## 2018-10-03 NOTE — MR AVS SNAPSHOT
After Visit Summary   10/3/2018    Elaina Winn    MRN: [id number]           Patient Information     Date Of Birth          1945        Visit Information        Provider Department      10/3/2018 7:45 AM Yamil Erwin MD [facility]        Today's Diagnoses     Paroxysmal atrial fibrillation (H)           Follow-ups after your visit        Additional Services     Follow-Up with Cardiac Advanced Practice Provider           Follow-Up with Electrophysiologist                 Future tests that were ordered for you today     Open Future Orders        Priority Expected Expires Ordered    Follow-Up with Cardiac Advanced Practice Provider Routine 4/1/2019 10/3/2019 10/3/2018    Follow-Up with Electrophysiologist Routine 10/3/2019 2/15/2020 10/3/2018            Who to contact     If you have questions or need follow up information about today's clinic visit or your schedule please contact [facility] directly at [phone].  Normal or non-critical lab and imaging results will be communicated to you by [employer], letter or phone within 4 business days after the clinic has received the results. If you do not hear from us within 7 days, please contact the clinic through [employer] or phone. If you have a critical or abnormal lab result, we will notify you by phone as soon as possible.  Submit refill requests through [employer] or call your pharmacy and they will forward the refill request to us. Please allow 3 business days for your refill to be completed.          Additional Information About Your Visit        MyChart Information     [employer] gives you secure access to your electronic health record. If you see a primary care provider, you can also send messages to your care team and make appointments. If you have questions, please call your primary care clinic.  If you do not have a primary care provider, please call [phone] and they  "will assist you.        Care EveryWhere ID     This is your Care EveryWhere ID. This could be used by other organizations to access your [facility] medical records  [id number]        Your Vitals Were     Pulse Height BMI (Body Mass Index)             65 1.626 m (5' 4\") 25.92 kg/m2          Blood Pressure from Last 3 Encounters:   10/03/18 115/77   04/26/18 134/77   04/09/18 125/65    Weight from Last 3 Encounters:   10/03/18 68.5 kg (151 lb)   04/26/18 62.6 kg (138 lb)   04/09/18 63.5 kg (140 lb)              We Performed the Following     EKG 12-lead complete w/read - Clinics     Follow-Up with Electrophysiologist          Today's Medication Changes          These changes are accurate as of 10/3/18 11:59 PM.  If you have any questions, ask your nurse or doctor.               Stop taking these medicines if you haven't already. Please contact your care team if you have questions.     apixaban ANTICOAGULANT 5 MG tablet   Commonly known as:  ELIQUIS   Stopped by:  Yamil Erwin MD                    Primary Care Provider Office Phone # Fax #    Cady Mary Marie PA-C [phone] [address] [address]        Equal Access to Services     KAREN BABCOCK AH: Hadii mark ku hadaleenao Sobillali, waaxda luqadaha, qaybta kaalmada adeegyada, zoran hernandez . So [facility] [phone].    ATENCIÓN: Si habla español, tiene a munoz disposición servicios gratuitos de asistencia lingüística. Llame al [phone].    We comply with applicable federal civil rights laws and [place] laws. We do not discriminate on the basis of race, color, national origin, age, disability, sex, sexual orientation, or gender identity.            Thank you!     Thank you for choosing [facility]  for your care. Our goal is always to provide you with excellent care. Hearing back from our patients is one way we can continue to improve our services. Please take a few " minutes to complete the written survey that you may receive in the mail after your visit with us. Thank you!             Your Updated Medication List - Protect others around you: Learn how to safely use, store and throw away your medicines at www.disposemymeds.org.          This list is accurate as of 10/3/18 11:59 PM.  Always use your most recent med list.                   Brand Name Dispense Instructions for use Diagnosis    brimonidine-timolol 0.2-0.5 % ophthalmic solution    COMBIGAN     Place 1 drop into both eyes 2 times daily        metoprolol tartrate 25 MG tablet    LOPRESSOR    180 tablet    Take 1 tablet (25 mg) by mouth 2 times daily    Atrial fibrillation with rapid ventricular response (H)       Multi-vitamin Tabs tablet   Generic drug:  multivitamin, therapeutic with minerals      Take 1 tablet by mouth daily.        PREVAGEN PO      Take 1 tablet by mouth daily

## 2018-10-03 NOTE — PROGRESS NOTES
HPI and Plan:   See dictation    Orders Placed This Encounter   Procedures     Follow-Up with Cardiac Advanced Practice Provider     Follow-Up with Electrophysiologist       No orders of the defined types were placed in this encounter.      Medications Discontinued During This Encounter   Medication Reason     apixaban ANTICOAGULANT (ELIQUIS) 5 MG tablet          Encounter Diagnosis   Name Primary?     Paroxysmal atrial fibrillation (H)        CURRENT MEDICATIONS:  Current Outpatient Prescriptions   Medication Sig Dispense Refill     Apoaequorin (PREVAGEN PO) Take 1 tablet by mouth daily       brimonidine-timolol (COMBIGAN) 0.2-0.5 % ophthalmic solution Place 1 drop into both eyes 2 times daily        metoprolol tartrate (LOPRESSOR) 25 MG tablet Take 1 tablet (25 mg) by mouth 2 times daily 180 tablet 3     Multiple Vitamin (MULTI-VITAMIN) per tablet Take 1 tablet by mouth daily.         ALLERGIES     Allergies   Allergen Reactions     Adhesive Tape      Zocor [Simvastatin - High Dose]      Elevated LFTs       PAST MEDICAL HISTORY:  Past Medical History:   Diagnosis Date     Anxiety      Atrial fibrillation (H)      Chronic renal insufficiency      Dementia      Glaucoma      Hepatitis     autoimmune     HTN (hypertension), benign      Hyperlipidemia LDL goal < 130      Paroxysmal A-fib (H)        PAST SURGICAL HISTORY:  Past Surgical History:   Procedure Laterality Date     DILATION AND CURETTAGE       HYSTERECTOMY      with USO, not for cancer     S/p partial vaginectomy       SEPTOPLASTY       TONSILLECTOMY & ADENOIDECTOMY         FAMILY HISTORY:  Family History   Problem Relation Age of Onset     Hypertension Mother       age 95     Alzheimer Disease Father 75     also CHF     Cancer Brother       of lung ca age 69     Lipids Brother      Alzheimer Disease Paternal Uncle      Alzheimer Disease Paternal Uncle        SOCIAL HISTORY:  Social History     Social History     Marital status:      Spouse  "name: N/A     Number of children: N/A     Years of education: N/A     Social History Main Topics     Smoking status: Former Smoker     Quit date: 1/1/1990     Smokeless tobacco: Never Used      Comment: quit 1990     Alcohol use No     Drug use: No     Sexual activity: Not Currently     Partners: Male     Other Topics Concern     None     Social History Narrative    , 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  Walks 3miles per day        dtr (33) from [place] and lives in [facility]    Son from [place] and has mental health issues       Review of Systems:  Skin:          Eyes:  Positive for glasses    ENT:  Negative      Respiratory:  Positive for dyspnea on exertion     Cardiovascular:  Negative for;chest pain;edema;lightheadedness;dizziness Positive for;fatigue;edema    Gastroenterology: Negative for melena;hematochezia    Genitourinary:  Negative      Musculoskeletal:  Positive for arthritis    Neurologic:  Positive for headaches occ  Psychiatric:  Positive for anxiety;depression    Heme/Lymph/Imm:  Positive for allergies    Endocrine:  Negative        Physical Exam:  Vitals: /77  Pulse 65  Ht 1.626 m (5' 4\")  Wt 68.5 kg (151 lb)  BMI 25.92 kg/m2    Constitutional:  cooperative, alert and oriented, well developed, well nourished, in no acute distress        Skin:  warm and dry to the touch, no apparent skin lesions or masses noted          Head:  normocephalic, no masses or lesions        Eyes:  pupils equal and round;conjunctivae and lids unremarkable;sclera white;no xanthalasma        Lymph:No Cervical lymphadenopathy present     ENT:  no pallor or cyanosis, dentition good        Neck:  carotid pulses are full and equal bilaterally;JVP normal        Respiratory:  normal breath sounds, clear to auscultation, normal A-P diameter, normal symmetry, normal respiratory excursion, no use of accessory muscles         Cardiac: regular rhythm, normal S1/S2, no S3 or S4, apical impulse not displaced, no murmurs, " gallops or rubs                                                         GI:  abdomen soft;non-tender        Extremities and Muscular Skeletal:  no deformities, clubbing, cyanosis, erythema observed   bilateral LE edema;trace     compression stockings on    Neurological:  no gross motor deficits        Psych:  Alert and Oriented x 3        CC  Nubia Turner PA-C  [address]  [place] [id number]

## 2018-10-03 NOTE — PROGRESS NOTES
Service Date: 10/03/2018      HISTORY OF PRESENT ILLNESS:  I saw Ms. Winn for followup of atrial fibrillation.  She is a 73-year-old white female who was found to have paroxysmal atrial fibrillation in 2017 when she was admitted for liver dysfunction.  When I saw her in 12/2017, I stopped her anticoagulation because of liver dysfunction.  She subsequently had recurrent atrial fibrillation that required ER visit and DC cardioversion.  Over the last few months, she has been on Eliquis and metoprolol.  Symptomatically, she is not aware of palpitations.  She complains of fatigue but no dizziness or near syncope.  She does not feel atrial fibrillation symptomatically.  She brought in a list of her daily blood pressures and heart rates which are all in the normal range.      The patient was diagnosed to have autoimmune hepatitis.  Her liver enzyme was back to normal in 02/2018.  Her platelets are still low in the 90s.  She has been on medication for dementia.  She complains about easy bruising.      PHYSICAL EXAMINATION:   VITAL SIGNS:  Blood pressure was 115/77, heart rate 65 beats per minute, body weight 151 pounds.   HEENT:  Eyes and ENT were unremarkable.   LUNGS:  Clear.   CARDIAC:  Rhythm was regular and heart sounds were normal without murmur.   ABDOMEN:  Examination showed no hepatomegaly.   EXTREMITIES:  There was no pedal edema.      RADIOLOGIC STUDIES:  EKG today showed normal sinus rhythm.      ASSESSMENT AND RECOMMENDATIONS:  Ms. Winn is doing well symptomatically without apparent symptomatic atrial fibrillation.  She has some mild fatigue.  Because of the multiple risk factors of bleeding including dementia, thrombocytopenia, liver dysfunction and renal dysfunction, I have stopped her Eliquis.  She will continue low dose of metoprolol.  She is scheduled for return Cardiology visit in 6 months.      cc:   JOSE Salomon    [address], [address]   [place] [address]          NORM HERNANDES MD             D: 10/03/2018   T: 10/03/2018   MT: YURI      Name:     ABDULLAHI BLACKWOOD   MRN:      [id number]        Account:      [id number]   :      1945           Service Date: 10/03/2018      Document: [id number]

## 2018-10-26 ENCOUNTER — TRANSFERRED RECORDS (OUTPATIENT)
Dept: HEALTH INFORMATION MANAGEMENT | Facility: CLINIC | Age: 73
End: 2018-10-26

## 2018-12-07 ENCOUNTER — TRANSFERRED RECORDS (OUTPATIENT)
Dept: HEALTH INFORMATION MANAGEMENT | Facility: CLINIC | Age: 73
End: 2018-12-07

## 2018-12-17 ENCOUNTER — TRANSFERRED RECORDS (OUTPATIENT)
Dept: HEALTH INFORMATION MANAGEMENT | Facility: CLINIC | Age: 73
End: 2018-12-17

## 2019-01-28 ENCOUNTER — TRANSFERRED RECORDS (OUTPATIENT)
Dept: HEALTH INFORMATION MANAGEMENT | Facility: CLINIC | Age: 74
End: 2019-01-28

## 2019-03-13 ENCOUNTER — TRANSFERRED RECORDS (OUTPATIENT)
Dept: HEALTH INFORMATION MANAGEMENT | Facility: CLINIC | Age: 74
End: 2019-03-13

## 2019-04-09 DIAGNOSIS — I48.91 ATRIAL FIBRILLATION WITH RAPID VENTRICULAR RESPONSE (H): ICD-10-CM

## 2019-04-09 RX ORDER — METOPROLOL TARTRATE 25 MG/1
25 TABLET, FILM COATED ORAL 2 TIMES DAILY
Qty: 180 TABLET | Refills: 0 | Status: SHIPPED | OUTPATIENT
Start: 2019-04-09 | End: 2019-05-03

## 2019-05-03 ENCOUNTER — OFFICE VISIT (OUTPATIENT)
Dept: CARDIOLOGY | Facility: CLINIC | Age: 74
End: 2019-05-03
Payer: MEDICARE

## 2019-05-03 VITALS
BODY MASS INDEX: 24.72 KG/M2 | WEIGHT: 144 LBS | HEART RATE: 66 BPM | DIASTOLIC BLOOD PRESSURE: 66 MMHG | SYSTOLIC BLOOD PRESSURE: 116 MMHG

## 2019-05-03 DIAGNOSIS — I48.0 PAROXYSMAL ATRIAL FIBRILLATION (H): ICD-10-CM

## 2019-05-03 DIAGNOSIS — I48.0 PAROXYSMAL ATRIAL FIBRILLATION (H): Primary | ICD-10-CM

## 2019-05-03 DIAGNOSIS — I48.91 ATRIAL FIBRILLATION WITH RAPID VENTRICULAR RESPONSE (H): ICD-10-CM

## 2019-05-03 LAB — TSH SERPL DL<=0.005 MIU/L-ACNC: 1.34 MU/L (ref 0.4–4)

## 2019-05-03 PROCEDURE — 36415 COLL VENOUS BLD VENIPUNCTURE: CPT | Performed by: INTERNAL MEDICINE

## 2019-05-03 PROCEDURE — 99213 OFFICE O/P EST LOW 20 MIN: CPT | Performed by: PHYSICIAN ASSISTANT

## 2019-05-03 PROCEDURE — 84443 ASSAY THYROID STIM HORMONE: CPT | Performed by: PHYSICIAN ASSISTANT

## 2019-05-03 PROCEDURE — 93000 ELECTROCARDIOGRAM COMPLETE: CPT | Performed by: PHYSICIAN ASSISTANT

## 2019-05-03 RX ORDER — MYCOPHENOLATE MOFETIL 250 MG/1
250 CAPSULE ORAL 2 TIMES DAILY
COMMUNITY
End: 2024-02-14

## 2019-05-03 RX ORDER — METOPROLOL TARTRATE 25 MG/1
25 TABLET, FILM COATED ORAL 2 TIMES DAILY
Qty: 180 TABLET | Refills: 3 | Status: SHIPPED | OUTPATIENT
Start: 2019-05-03 | End: 2019-11-04

## 2019-05-03 NOTE — PROGRESS NOTES
"HPI:   I had the pleasure of seeing Elaina when she and Nino came for follow up of paroxysmal atrial fibrillation. She is a 73 year old who sees Dr. Erwin for her history of:    1.  Paroxysmal atrial fibrillation status post DC cardioversion 12/31/2017 and again 1/1/2018.  Currently on metoprolol tartrate.  No anticoagulation given high bleeding risk as documented by Dr. Erwin (10/2018)  2.  Autoimmune hepatitis, with recent labs 3/2019 with normalization of transaminases and hepatic function (see below) disease.  Followed by [facility]   3.  Thrombocytopenia with recent platelets 3/2019 back to normal      Dr. Erwin saw Elaina 10/2018.  At that time, he reviewed her history of autoimmune hepatitis, thrombocytopenia and renal dysfunction.  He had previously stopped her Xarelto due to liver and renal dysfunction.  Our choices of antiarrhythmics were quite limited due to her fluctuating renal function and liver dysfunction, and at one time it was felt that she may require AV node ablation/pacemaker.  She ended up getting placed on Eliquis due to her high CHADSVASc.    Happily, atrial fibrillation settled down, and Dr. Erwin noted that she had not felt her atrial fibrillation when he last saw her 10/2018.  Given her risk factors of bleeding including dementia, thrombocytopenia, liver dysfunction and renal dysfunction, he discontinued Eliquis therapy entirely.    Overall, Elaina really feels like she is doing well.  She denies any problems with lightheadedness, dizziness, chest pain, pressure tightness.  She denies edema, orthopnea or PND.  She does think that she has atrial fibrillation about twice per month.  Episodes are short-lived, and she \"feels jittery\" for 20 minutes or less.    Her only complaint is that of continued fatigue.  She does not think that this has dramatically changed since she saw Dr. Erwin 10/2018.      EKG today, which I overread, showed sinus rhythm with left atrial enlargement.  Heart rate was 71 " "bpm    Echocardiogram 11/2017 showed EF 55 to 60%.  RV was normal in size and function.  Left atrium was mildly dilated with the left atrial size of 4.3 cm with a volume index of 34.6 mL/m .  She had mild valvular abnormalities, with 1+ TR, 1+ MR and no aortic valve disease.    [facility]   3/13/2019 1/28/2019   Hct 42 48.1   Hgb 13.5 15.8   Plt 205 257        Protein 6.8 7.2   Albumin 3.6 3.7   Total Bili 0.7 0.5   Direct Bili 0.2 0.2   Alk Phos 170 201   AST 45 53   ALT 57 64       Assessment & Plan:    1.  Paroxysmal atrial fibrillation    She thinks she is getting brief episodes (less than 20 minutes) every few weeks.  With this, she feels \"jittery\" but overall feels that she is doing well    She is happy on the metoprolol    CHADSVASc is 4 (hypertension, age, sex) Dr. Erwin documented in his 10/2018 note that he was concerned about her bleeding risk due to her dementia, history of thrombus cytopenia, renal dysfunction and liver dysfunction    PLAN:    Continue metoprolol.  I sent a new prescription in for her per her request    See me in 6 months with an EKG      2.  Fatigue    She understands this is a very nonspecific complaint, but on a side note, told me that she thought the prednisone that she was on for autoimmune hepatitis caused a significant change in her hair, causing it to be much coarser    We have actually not checked a TSH since she was admitted 2017    EKG shows sinus rhythm, her fatigue is not related to significant fluctuations in her blood pressures or heart rates and echocardiogram showed a preserved ejection fraction.    Nino confirms that she has had no apneic spells and he denies that she snores    PLAN:    TSH with T4 reflex.  Will contact her with the results      Maddison Turner PA-C, MSPAS      Orders Placed This Encounter   Procedures     TSH with free T4 reflex     Follow-Up with Cardiac Advanced Practice Provider     EKG 12-lead complete w/read - Clinics (performed today)     EKG 12-lead " complete w/read - Clinics     Orders Placed This Encounter   Medications     mycophenolate, IDS 3865, (CELLCEPT) 250 MG capsule     Sig: Take 250 mg by mouth 2 times daily     CELLCEPT (BRAND) 250 MG capsule     Sig: Take 250 mg by mouth 2 times daily     metoprolol tartrate (LOPRESSOR) 25 MG tablet     Sig: Take 1 tablet (25 mg) by mouth 2 times daily     Dispense:  180 tablet     Refill:  3     Medications Discontinued During This Encounter   Medication Reason     metoprolol tartrate (LOPRESSOR) 25 MG tablet Reorder         Encounter Diagnoses   Name Primary?     Paroxysmal atrial fibrillation (H) Yes     Atrial fibrillation with rapid ventricular response (H)        CURRENT MEDICATIONS:  Current Outpatient Medications   Medication Sig Dispense Refill     Apoaequorin (PREVAGEN PO) Take 1 tablet by mouth daily       brimonidine-timolol (COMBIGAN) 0.2-0.5 % ophthalmic solution Place 1 drop into both eyes 2 times daily        CELLCEPT (BRAND) 250 MG capsule Take 250 mg by mouth 2 times daily       metoprolol tartrate (LOPRESSOR) 25 MG tablet Take 1 tablet (25 mg) by mouth 2 times daily 180 tablet 3     Multiple Vitamin (MULTI-VITAMIN) per tablet Take 1 tablet by mouth daily.       mycophenolate, IDS 3865, (CELLCEPT) 250 MG capsule Take 250 mg by mouth 2 times daily         ALLERGIES     Allergies   Allergen Reactions     Adhesive Tape      Zocor [Simvastatin - High Dose]      Elevated LFTs       PAST MEDICAL HISTORY:  Past Medical History:   Diagnosis Date     Anxiety      Atrial fibrillation (H)      Chronic renal insufficiency      Dementia      Glaucoma      Hepatitis     autoimmune     HTN (hypertension), benign      Hyperlipidemia LDL goal < 130      Paroxysmal A-fib (H)        PAST SURGICAL HISTORY:  Past Surgical History:   Procedure Laterality Date     DILATION AND CURETTAGE       HYSTERECTOMY      with USO, not for cancer     S/p partial vaginectomy       SEPTOPLASTY       TONSILLECTOMY & ADENOIDECTOMY          FAMILY HISTORY:  Family History   Problem Relation Age of Onset     Hypertension Mother          age 95     Alzheimer Disease Father 75        also CHF     Cancer Brother          of lung ca age 69     Lipids Brother      Alzheimer Disease Paternal Uncle      Alzheimer Disease Paternal Uncle        SOCIAL HISTORY:  Social History     Socioeconomic History     Marital status:      Spouse name: None     Number of children: None     Years of education: None     Highest education level: None   Occupational History     None   Social Needs     Financial resource strain: None     Food insecurity:     Worry: None     Inability: None     Transportation needs:     Medical: None     Non-medical: None   Tobacco Use     Smoking status: Former Smoker     Last attempt to quit: 1990     Years since quittin.3     Smokeless tobacco: Never Used     Tobacco comment: quit    Substance and Sexual Activity     Alcohol use: No     Alcohol/week: 0.0 oz     Drug use: No     Sexual activity: Not Currently     Partners: Male   Lifestyle     Physical activity:     Days per week: None     Minutes per session: None     Stress: None   Relationships     Social connections:     Talks on phone: None     Gets together: None     Attends Worship service: None     Active member of club or organization: None     Attends meetings of clubs or organizations: None     Relationship status: None     Intimate partner violence:     Fear of current or ex partner: None     Emotionally abused: None     Physically abused: None     Forced sexual activity: None   Other Topics Concern     Parent/sibling w/ CABG, MI or angioplasty before 65F 55M? 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  Walks 3miles per day        dtr (33) from [place] and lives in [facility]    Son from [place] and has mental health issues       Review of Systems:  Skin:        Eyes:  Positive for glasses  ENT:  Negative     Respiratory:  Negative for dyspnea on exertion;shortness of breath;cough  Cardiovascular:  Negative for;chest pain;edema;lightheadedness;dizziness Positive for;fatigue  Gastroenterology: Negative for melena;hematochezia  Genitourinary:  Negative    Musculoskeletal:  Positive for arthritis  Neurologic:  Positive for headaches  Psychiatric:  Positive for anxiety;depression  Heme/Lymph/Imm:  Positive for allergies  Endocrine:  Negative      Physical Exam:  Vitals: /66   Pulse 66   Wt 65.3 kg (144 lb)   BMI 24.72 kg/m      Constitutional:  cooperative, alert and oriented, well developed, well nourished, in no acute distress        Skin:  warm and dry to the touch, no apparent skin lesions or masses noted        Head:  normocephalic, no masses or lesions        Eyes:  pupils equal and round;conjunctivae and lids unremarkable;sclera white;no xanthalasma        ENT:  no pallor or cyanosis, dentition good        Neck:  carotid pulses are full and equal bilaterally;JVP normal        Chest:  normal breath sounds, clear to auscultation, normal A-P diameter, normal symmetry, normal respiratory excursion, no use of accessory muscles        Cardiac: regular rhythm, normal S1/S2, no S3 or S4, apical impulse not displaced, no murmurs, gallops or rubs                  Abdomen:  abdomen soft;non-tender        Vascular: pulses full and equal                                      Extremities and Back:  no deformities, clubbing, cyanosis, erythema observed        Neurological:  no gross motor deficits          Recent Lab Results:  LIPID RESULTS:  Lab Results   Component Value Date    CHOL 252 (H) 11/09/2017    HDL 77 11/09/2017     (H) 11/09/2017    TRIG 133 11/09/2017    CHOLHDLRATIO 2.4 01/27/2014       LIVER ENZYME RESULTS:  Lab Results   Component Value Date    AST 33 02/19/2018    ALT 33 02/19/2018       CBC RESULTS:  Lab Results   Component Value Date    WBC 2.9 (L) 03/21/2018    RBC 4.16 03/21/2018    HGB 12.7  03/21/2018    HCT 40.3 03/21/2018    MCV 97 03/21/2018    MCH 30.5 03/21/2018    MCHC 31.5 03/21/2018    RDW 16.2 (H) 03/21/2018    PLT 93 (L) 03/21/2018       BMP RESULTS:  Lab Results   Component Value Date     01/02/2018    POTASSIUM 3.7 01/02/2018    CHLORIDE 112 (H) 01/02/2018    CO2 26 01/02/2018    ANIONGAP 5 01/02/2018    GLC 88 01/02/2018    BUN 31 (H) 01/02/2018    CR 0.98 01/02/2018    GFRESTIMATED 55 (L) 01/02/2018    GFRESTBLACK 67 01/02/2018    DANIELITO 8.7 01/02/2018        CC  Cady Marie, PA-C  [address]  [place] [address]

## 2019-05-03 NOTE — PATIENT INSTRUCTIONS
1. Discussed your fatigue ... We'll check a thyroid test     2. Continue metoprolol - I have sent new Rx in ([facility])    3. See us back with an EKG in 6 months but CALL if issues beforehand! [phone]

## 2019-05-03 NOTE — LETTER
"5/3/2019    Cady Marie PA-C  [address]  [facility] [address]    RE: Elaina Winn       Dear Colleague,    I had the pleasure of seeing Elaina Winn in the [facility].    HPI:   I had the pleasure of seeing Elaina when she and Nino came for follow up of paroxysmal atrial fibrillation. She is a 73 year old who sees Dr. Erwin for her history of:    1.  Paroxysmal atrial fibrillation status post DC cardioversion 12/31/2017 and again 1/1/2018.  Currently on metoprolol tartrate.  No anticoagulation given high bleeding risk as documented by Dr. Erwin (10/2018)  2.  Autoimmune hepatitis, with recent labs 3/2019 with normalization of transaminases and hepatic function (see below) disease.  Followed by [facility]   3.  Thrombocytopenia with recent platelets 3/2019 back to normal      Dr. Erwin saw Elaina 10/2018.  At that time, he reviewed her history of autoimmune hepatitis, thrombocytopenia and renal dysfunction.  He had previously stopped her Xarelto due to liver and renal dysfunction.  Our choices of antiarrhythmics were quite limited due to her fluctuating renal function and liver dysfunction, and at one time it was felt that she may require AV node ablation/pacemaker.  She ended up getting placed on Eliquis due to her high CHADSVASc.    Happily, atrial fibrillation settled down, and Dr. Erwin noted that she had not felt her atrial fibrillation when he last saw her 10/2018.  Given her risk factors of bleeding including dementia, thrombocytopenia, liver dysfunction and renal dysfunction, he discontinued Eliquis therapy entirely.    Overall, Elaina really feels like she is doing well.  She denies any problems with lightheadedness, dizziness, chest pain, pressure tightness.  She denies edema, orthopnea or PND.  She does think that she has atrial fibrillation about twice per month.  Episodes are short-lived, and she \"feels jittery\" for 20 minutes or less.    Her only complaint is that of " "continued fatigue.  She does not think that this has dramatically changed since she saw Dr. Erwin 10/2018.      EKG today, which I overread, showed sinus rhythm with left atrial enlargement.  Heart rate was 71 bpm    Echocardiogram 11/2017 showed EF 55 to 60%.  RV was normal in size and function.  Left atrium was mildly dilated with the left atrial size of 4.3 cm with a volume index of 34.6 mL/m .  She had mild valvular abnormalities, with 1+ TR, 1+ MR and no aortic valve disease.    [facility]   3/13/2019 1/28/2019   Hct 42 48.1   Hgb 13.5 15.8   Plt 205 257        Protein 6.8 7.2   Albumin 3.6 3.7   Total Bili 0.7 0.5   Direct Bili 0.2 0.2   Alk Phos 170 201   AST 45 53   ALT 57 64       Assessment & Plan:    1.  Paroxysmal atrial fibrillation    She thinks she is getting brief episodes (less than 20 minutes) every few weeks.  With this, she feels \"jittery\" but overall feels that she is doing well    She is happy on the metoprolol    CHADSVASc is 4 (hypertension, age, sex) Dr. Erwin documented in his 10/2018 note that he was concerned about her bleeding risk due to her dementia, history of thrombus cytopenia, renal dysfunction and liver dysfunction    PLAN:    Continue metoprolol.  I sent a new prescription in for her per her request    See me in 6 months with an EKG      2.  Fatigue    She understands this is a very nonspecific complaint, but on a side note, told me that she thought the prednisone that she was on for autoimmune hepatitis caused a significant change in her hair, causing it to be much coarser    We have actually not checked a TSH since she was admitted 2017    EKG shows sinus rhythm, her fatigue is not related to significant fluctuations in her blood pressures or heart rates and echocardiogram showed a preserved ejection fraction.    Nino confirms that she has had no apneic spells and he denies that she snores    PLAN:    TSH with T4 reflex.  Will contact her with the results      Maddison Turner PA-C, " MSPAS      Orders Placed This Encounter   Procedures     TSH with free T4 reflex     Follow-Up with Cardiac Advanced Practice Provider     EKG 12-lead complete w/read - Clinics (performed today)     EKG 12-lead complete w/read - Clinics     Orders Placed This Encounter   Medications     mycophenolate, IDS [id number], (CELLCEPT) 250 MG capsule     Sig: Take 250 mg by mouth 2 times daily     CELLCEPT (BRAND) 250 MG capsule     Sig: Take 250 mg by mouth 2 times daily     metoprolol tartrate (LOPRESSOR) 25 MG tablet     Sig: Take 1 tablet (25 mg) by mouth 2 times daily     Dispense:  180 tablet     Refill:  3     Medications Discontinued During This Encounter   Medication Reason     metoprolol tartrate (LOPRESSOR) 25 MG tablet Reorder         Encounter Diagnoses   Name Primary?     Paroxysmal atrial fibrillation (H) Yes     Atrial fibrillation with rapid ventricular response (H)        CURRENT MEDICATIONS:  Current Outpatient Medications   Medication Sig Dispense Refill     Apoaequorin (PREVAGEN PO) Take 1 tablet by mouth daily       brimonidine-timolol (COMBIGAN) 0.2-0.5 % ophthalmic solution Place 1 drop into both eyes 2 times daily        CELLCEPT (BRAND) 250 MG capsule Take 250 mg by mouth 2 times daily       metoprolol tartrate (LOPRESSOR) 25 MG tablet Take 1 tablet (25 mg) by mouth 2 times daily 180 tablet 3     Multiple Vitamin (MULTI-VITAMIN) per tablet Take 1 tablet by mouth daily.       mycophenolate, IDS [id number], (CELLCEPT) 250 MG capsule Take 250 mg by mouth 2 times daily         ALLERGIES     Allergies   Allergen Reactions     Adhesive Tape      Zocor [Simvastatin - High Dose]      Elevated LFTs       PAST MEDICAL HISTORY:  Past Medical History:   Diagnosis Date     Anxiety      Atrial fibrillation (H)      Chronic renal insufficiency      Dementia      Glaucoma      Hepatitis     autoimmune     HTN (hypertension), benign      Hyperlipidemia LDL goal < 130      Paroxysmal A-fib (H)        PAST SURGICAL  HISTORY:  Past Surgical History:   Procedure Laterality Date     DILATION AND CURETTAGE       HYSTERECTOMY      with USO, not for cancer     S/p partial vaginectomy       SEPTOPLASTY       TONSILLECTOMY & ADENOIDECTOMY         FAMILY HISTORY:  Family History   Problem Relation Age of Onset     Hypertension Mother          age 95     Alzheimer Disease Father 75        also CHF     Cancer Brother          of lung ca age 69     Lipids Brother      Alzheimer Disease Paternal Uncle      Alzheimer Disease Paternal Uncle        SOCIAL HISTORY:  Social History     Socioeconomic History     Marital status:      Spouse name: None     Number of children: None     Years of education: None     Highest education level: None   Occupational History     None   Social Needs     Financial resource strain: None     Food insecurity:     Worry: None     Inability: None     Transportation needs:     Medical: None     Non-medical: None   Tobacco Use     Smoking status: Former Smoker     Last attempt to quit: 1990     Years since quittin.3     Smokeless tobacco: Never Used     Tobacco comment: quit    Substance and Sexual Activity     Alcohol use: No     Alcohol/week: 0.0 oz     Drug use: No     Sexual activity: Not Currently     Partners: Male   Lifestyle     Physical activity:     Days per week: None     Minutes per session: None     Stress: None   Relationships     Social connections:     Talks on phone: None     Gets together: None     Attends Yarsanism service: None     Active member of club or organization: None     Attends meetings of clubs or organizations: None     Relationship status: None     Intimate partner violence:     Fear of current or ex partner: None     Emotionally abused: None     Physically abused: None     Forced sexual activity: None   Other Topics Concern     Parent/sibling w/ CABG, MI or angioplasty before 65F 55M? 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  Walks 3miles per day        dtr (33) from [place] and lives in [facility]    Son from [place] and has mental health issues       Review of Systems:  Skin:        Eyes:  Positive for glasses  ENT:  Negative    Respiratory:  Negative for dyspnea on exertion;shortness of breath;cough  Cardiovascular:  Negative for;chest pain;edema;lightheadedness;dizziness Positive for;fatigue  Gastroenterology: Negative for melena;hematochezia  Genitourinary:  Negative    Musculoskeletal:  Positive for arthritis  Neurologic:  Positive for headaches  Psychiatric:  Positive for anxiety;depression  Heme/Lymph/Imm:  Positive for allergies  Endocrine:  Negative      Physical Exam:  Vitals: /66   Pulse 66   Wt 65.3 kg (144 lb)   BMI 24.72 kg/m       Constitutional:  cooperative, alert and oriented, well developed, well nourished, in no acute distress        Skin:  warm and dry to the touch, no apparent skin lesions or masses noted        Head:  normocephalic, no masses or lesions        Eyes:  pupils equal and round;conjunctivae and lids unremarkable;sclera white;no xanthalasma        ENT:  no pallor or cyanosis, dentition good        Neck:  carotid pulses are full and equal bilaterally;JVP normal        Chest:  normal breath sounds, clear to auscultation, normal A-P diameter, normal symmetry, normal respiratory excursion, no use of accessory muscles        Cardiac: regular rhythm, normal S1/S2, no S3 or S4, apical impulse not displaced, no murmurs, gallops or rubs                  Abdomen:  abdomen soft;non-tender        Vascular: pulses full and equal                                      Extremities and Back:  no deformities, clubbing, cyanosis, erythema observed        Neurological:  no gross motor deficits          Recent Lab Results:  LIPID RESULTS:  Lab Results   Component Value Date    CHOL 252 (H) 11/09/2017    HDL 77 11/09/2017     (H) 11/09/2017    TRIG 133 11/09/2017    CHOLHDLRATIO 2.4 01/27/2014        LIVER ENZYME RESULTS:  Lab Results   Component Value Date    AST 33 02/19/2018    ALT 33 02/19/2018       CBC RESULTS:  Lab Results   Component Value Date    WBC 2.9 (L) 03/21/2018    RBC 4.16 03/21/2018    HGB 12.7 03/21/2018    HCT 40.3 03/21/2018    MCV 97 03/21/2018    MCH 30.5 03/21/2018    MCHC 31.5 03/21/2018    RDW 16.2 (H) 03/21/2018    PLT 93 (L) 03/21/2018       BMP RESULTS:  Lab Results   Component Value Date     01/02/2018    POTASSIUM 3.7 01/02/2018    CHLORIDE 112 (H) 01/02/2018    CO2 26 01/02/2018    ANIONGAP 5 01/02/2018    GLC 88 01/02/2018    BUN 31 (H) 01/02/2018    CR 0.98 01/02/2018    GFRESTIMATED 55 (L) 01/02/2018    GFRESTBLACK 67 01/02/2018    DANIELITO 8.7 01/02/2018        CC  Cady Marie PA-C  [address]  [place] [address]                  Thank you for allowing me to participate in the care of your patient.    Sincerely,     Nubia Turner PA-C     [facility]

## 2019-06-13 ENCOUNTER — TRANSFERRED RECORDS (OUTPATIENT)
Dept: HEALTH INFORMATION MANAGEMENT | Facility: CLINIC | Age: 74
End: 2019-06-13

## 2019-06-18 ENCOUNTER — TRANSFERRED RECORDS (OUTPATIENT)
Dept: HEALTH INFORMATION MANAGEMENT | Facility: CLINIC | Age: 74
End: 2019-06-18

## 2019-09-19 ENCOUNTER — TRANSFERRED RECORDS (OUTPATIENT)
Dept: HEALTH INFORMATION MANAGEMENT | Facility: CLINIC | Age: 74
End: 2019-09-19

## 2019-10-02 ENCOUNTER — HEALTH MAINTENANCE LETTER (OUTPATIENT)
Age: 74
End: 2019-10-02

## 2019-10-30 NOTE — PROGRESS NOTES
HPI:   I had the pleasure of seeing Elaina when she and Nino came for follow up of paroxysmal atrial fibrillation. She is a 74 year old who sees Dr. Erwin for her history of:     1. Paroxysmal atrial fibrillation status post DC cardioversion 12/31/2017 and again 1/1/2018.  Currently on metoprolol tartrate.  No anticoagulation given high bleeding risk as documented by Dr. Erwin (10/2018)  2. Autoimmune hepatitis, with recent labs 9/2019 with normalization of transaminases and hepatic function (see below) disease.  Followed by [facility]  3. Thrombocytopenia resolved  4. Chronic Fatigue    I saw Elaina 5/2019 at which time she was doing well, only getting pAFib ~2/month, lasting <20 minutes. She remained off of Xarelto despite CHADSVASc 4 due to bleeding risk from dementia, h/o thrombocytopenia, renal dysfunction and liver dysfunction. No changes were made and 6 m follow-up was recommended.    In 6/2019, she saw [facility]. She continued CelCept BID, which was expected to remain on board for ~2 years. Suggestion to lower BB dose was made due to c/o fatigue and HR 60 with BP 82/54.  Most recent blood work 9/2019 showed that Plts, AlkPhos, AST and ALT were all back to normal.    Interval History:  Overall, feels like heart is doing well. No complaints of chest pain, pressure or tightness.  No orthopnea, PND or edema. No change in exercise tolerance (doing AODLs, grocery shopping and housework).     Still notes fatigue. Feels like she rests well and wakes up feeling rested but notes that later in the day she feels very fatigue.    Checks BPs at home. Occasionally gets BPs 80s/60s similar to she had at [facility] 6/2019 and when this happens she feels MUCH more tired.    No apnea per Nino.       Diagnostic Testing:  EKG today, which I overread, showed SR 69 bpm  Echocardiogram 11/2017 showed EF 55 to 60%.  RV was normal in size and function.  Left atrium was mildly dilated with the left atrial size of 4.3 cm with a volume index of 34.6  mL/m .  She had mild valvular abnormalities, with 1+ TR, 1+ MR and no aortic valve disease.    Assessment & Plan:    1. Hypotension    BP OK here but at home has occasionally gotten into 80s and feels very fatigued with this (worse than baseline). No falls/dizziness associated    PLAN:    Decrease metoprolol tartrate to 12.5 mg BID to see if this improves BPs and fatigue at all    2. Paroxysmal AFib    No recurrence that she's aware of    Remains OFF of AC. Dr. Erwin stopped this due to hepatitis, dementia and renal insufficiency.     We discussed that all but her dementia/memory changes have improved and discussed restarting AC.    PLAN:    She's disinterested in restarting AC despite CHADSVASc 4 (HTN, age, sex) despite risk of recurrence/stroke    See Dr. Erwin 1 year with EKG    Call if issues prior    Maddison Turner PA-C, MSPAS      Orders Placed This Encounter   Procedures     Follow-Up with Electrophysiologist     EKG 12-lead complete w/read - Clinics (performed today)     EKG 12-lead complete w/read - Clinics (to be scheduled)     Orders Placed This Encounter   Medications     metoprolol tartrate (LOPRESSOR) 25 MG tablet     Sig: Take 0.5 tablets (12.5 mg) by mouth 2 times daily     Medications Discontinued During This Encounter   Medication Reason     metoprolol tartrate (LOPRESSOR) 25 MG tablet          Encounter Diagnoses   Name Primary?     Paroxysmal atrial fibrillation (H)      Atrial fibrillation with rapid ventricular response (H)        CURRENT MEDICATIONS:  Current Outpatient Medications   Medication Sig Dispense Refill     Apoaequorin (PREVAGEN PO) Take 1 tablet by mouth daily       brimonidine-timolol (COMBIGAN) 0.2-0.5 % ophthalmic solution Place 1 drop into both eyes 2 times daily        metoprolol tartrate (LOPRESSOR) 25 MG tablet Take 0.5 tablets (12.5 mg) by mouth 2 times daily       Multiple Vitamin (MULTI-VITAMIN) per tablet Take 1 tablet by mouth daily.       CELLCEPT (BRAND) 250 MG capsule Take  250 mg by mouth 2 times daily       mycophenolate, IDS 3865, (CELLCEPT) 250 MG capsule Take 250 mg by mouth 2 times daily         ALLERGIES     Allergies   Allergen Reactions     Adhesive Tape      Zocor [Simvastatin - High Dose]      Elevated LFTs       PAST MEDICAL HISTORY:  Past Medical History:   Diagnosis Date     Anxiety      Atrial fibrillation (H)      Chronic renal insufficiency      Dementia      Glaucoma      Hepatitis     autoimmune     HTN (hypertension), benign      Hyperlipidemia LDL goal < 130      Paroxysmal A-fib (H)        PAST SURGICAL HISTORY:  Past Surgical History:   Procedure Laterality Date     DILATION AND CURETTAGE       HYSTERECTOMY      with USO, not for cancer     S/p partial vaginectomy       SEPTOPLASTY       TONSILLECTOMY & ADENOIDECTOMY         FAMILY HISTORY:  Family History   Problem Relation Age of Onset     Hypertension Mother          age 95     Alzheimer Disease Father 75        also CHF     Cancer Brother          of lung ca age 69     Lipids Brother      Alzheimer Disease Paternal Uncle      Alzheimer Disease Paternal Uncle        SOCIAL HISTORY:  Social History     Socioeconomic History     Marital status:      Spouse name: Not on file     Number of children: Not on file     Years of education: Not on file     Highest education level: Not on file   Occupational History     Not on file   Social Needs     Financial resource strain: Not on file     Food insecurity:     Worry: Not on file     Inability: Not on file     Transportation needs:     Medical: Not on file     Non-medical: Not on file   Tobacco Use     Smoking status: Former Smoker     Last attempt to quit: 1990     Years since quittin.8     Smokeless tobacco: Never Used     Tobacco comment: quit    Substance and Sexual Activity     Alcohol use: No     Alcohol/week: 0.0 standard drinks     Drug use: No     Sexual activity: Not Currently     Partners: Male   Lifestyle     Physical activity:  "    Days per week: Not on file     Minutes per session: Not on file     Stress: Not on file   Relationships     Social connections:     Talks on phone: Not on file     Gets together: Not on file     Attends Sikhism service: Not on file     Active member of club or organization: Not on file     Attends meetings of clubs or organizations: Not on file     Relationship status: Not on file     Intimate partner violence:     Fear of current or ex partner: Not on file     Emotionally abused: Not on file     Physically abused: Not on file     Forced sexual activity: Not on file   Other Topics Concern     Parent/sibling w/ CABG, MI or angioplasty before 65F 55M? 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  Walks 3miles per day        dtr (33) from [place] and lives in [facility]    Son from [place] and has mental health issues       Review of Systems:  Skin:        Eyes:  Positive for glasses  ENT:  Negative    Respiratory:  Negative for dyspnea on exertion;shortness of breath;cough  Cardiovascular:  Negative for;chest pain;edema;lightheadedness;dizziness Positive for;fatigue  Gastroenterology: Negative for melena;hematochezia  Genitourinary:  Negative    Musculoskeletal:  Positive for arthritis  Neurologic:  Positive for headaches  Psychiatric:  Positive for anxiety;depression  Heme/Lymph/Imm:  Positive for allergies  Endocrine:  Negative      Physical Exam:  Vitals: /67   Pulse 73   Ht 1.626 m (5' 4\")   Wt 62.9 kg (138 lb 9.6 oz)   BMI 23.79 kg/m      Constitutional:  cooperative, alert and oriented, well developed, well nourished, in no acute distress        Skin:  warm and dry to the touch, no apparent skin lesions or masses noted        Head:  normocephalic, no masses or lesions        Eyes:  pupils equal and round;conjunctivae and lids unremarkable;sclera white;no xanthalasma        ENT:  no pallor or cyanosis, dentition good        Neck:  carotid pulses are full and equal " bilaterally;JVP normal        Chest:  normal breath sounds, clear to auscultation, normal A-P diameter, normal symmetry, normal respiratory excursion, no use of accessory muscles        Cardiac: regular rhythm, normal S1/S2, no S3 or S4, apical impulse not displaced, no murmurs, gallops or rubs                  Abdomen:  abdomen soft;non-tender        Vascular: pulses full and equal                                      Extremities and Back:  no deformities, clubbing, cyanosis, erythema observed        Neurological:  no gross motor deficits          Recent Lab Results:  LIPID RESULTS:  Lab Results   Component Value Date    CHOL 252 (H) 11/09/2017    HDL 77 11/09/2017     (H) 11/09/2017    TRIG 133 11/09/2017    CHOLHDLRATIO 2.4 01/27/2014       LIVER ENZYME RESULTS:  Lab Results   Component Value Date    AST 33 02/19/2018    ALT 33 02/19/2018       CBC RESULTS:  Lab Results   Component Value Date    WBC 2.9 (L) 03/21/2018    RBC 4.16 03/21/2018    HGB 12.7 03/21/2018    HCT 40.3 03/21/2018    MCV 97 03/21/2018    MCH 30.5 03/21/2018    MCHC 31.5 03/21/2018    RDW 16.2 (H) 03/21/2018    PLT 93 (L) 03/21/2018       BMP RESULTS:  Lab Results   Component Value Date     01/02/2018    POTASSIUM 3.7 01/02/2018    CHLORIDE 112 (H) 01/02/2018    CO2 26 01/02/2018    ANIONGAP 5 01/02/2018    GLC 88 01/02/2018    BUN 31 (H) 01/02/2018    CR 0.98 01/02/2018    GFRESTIMATED 55 (L) 01/02/2018    GFRESTBLACK 67 01/02/2018    DANIELITO 8.7 01/02/2018

## 2019-11-04 ENCOUNTER — OFFICE VISIT (OUTPATIENT)
Dept: CARDIOLOGY | Facility: CLINIC | Age: 74
End: 2019-11-04
Attending: PHYSICIAN ASSISTANT
Payer: MEDICARE

## 2019-11-04 VITALS
HEIGHT: 64 IN | WEIGHT: 138.6 LBS | DIASTOLIC BLOOD PRESSURE: 67 MMHG | HEART RATE: 73 BPM | BODY MASS INDEX: 23.66 KG/M2 | SYSTOLIC BLOOD PRESSURE: 110 MMHG

## 2019-11-04 DIAGNOSIS — I48.0 PAROXYSMAL ATRIAL FIBRILLATION (H): ICD-10-CM

## 2019-11-04 DIAGNOSIS — I48.91 ATRIAL FIBRILLATION WITH RAPID VENTRICULAR RESPONSE (H): ICD-10-CM

## 2019-11-04 PROCEDURE — 93000 ELECTROCARDIOGRAM COMPLETE: CPT | Performed by: PHYSICIAN ASSISTANT

## 2019-11-04 PROCEDURE — 99214 OFFICE O/P EST MOD 30 MIN: CPT | Mod: 25 | Performed by: PHYSICIAN ASSISTANT

## 2019-11-04 RX ORDER — METOPROLOL TARTRATE 25 MG/1
12.5 TABLET, FILM COATED ORAL 2 TIMES DAILY
Start: 2019-11-04 | End: 2020-03-25

## 2019-11-04 ASSESSMENT — MIFFLIN-ST. JEOR: SCORE: 1103.69

## 2019-11-04 NOTE — PATIENT INSTRUCTIONS
1. Discussed fatigue and drops in BP noted at [facility] and at home into the 80s.   DECREASE metoprolol tartrate to 12.5 mg twice daily. No new Rx sent in    2. If fatigue persists, see Cady Marie. [facility] doesn't think fatigue is due to your CellCept. Your hepatis #s all look good so she doesn't think that's causing fatigue any longer. Could be due to drop in BP, which is why we're decreasing the metoprolol    3. See Dr. Erwin in 1 year but CALL if issues prior!  [phone]

## 2019-11-04 NOTE — LETTER
11/4/2019    Cady Marie PA-C  [address]  [place] [address]    RE: Elaina FRANSISCA Winn       Dear Colleague,    I had the pleasure of seeing Elaina Winn in the [facility].    HPI:   I had the pleasure of seeing Elaina when she and Nino came for follow up of paroxysmal atrial fibrillation. She is a 74 year old who sees Dr. Erwin for her history of:     1. Paroxysmal atrial fibrillation status post DC cardioversion 12/31/2017 and again 1/1/2018.  Currently on metoprolol tartrate.  No anticoagulation given high bleeding risk as documented by Dr. Erwin (10/2018)  2. Autoimmune hepatitis, with recent labs 9/2019 with normalization of transaminases and hepatic function (see below) disease.  Followed by [facility]  3. Thrombocytopenia resolved  4. Chronic Fatigue    I saw Elaina 5/2019 at which time she was doing well, only getting pAFib ~2/month, lasting <20 minutes. She remained off of Xarelto despite CHADSVASc 4 due to bleeding risk from dementia, h/o thrombocytopenia, renal dysfunction and liver dysfunction. No changes were made and 6 m follow-up was recommended.    In 6/2019, she saw [facility]. She continued CelCept BID, which was expected to remain on board for ~2 years. Suggestion to lower BB dose was made due to c/o fatigue and HR 60 with BP 82/54.  Most recent blood work 9/2019 showed that Plts, AlkPhos, AST and ALT were all back to normal.    Interval History:  Overall, feels like heart is doing well. No complaints of chest pain, pressure or tightness.  No orthopnea, PND or edema. No change in exercise tolerance (doing AODLs, grocery shopping and housework).     Still notes fatigue. Feels like she rests well and wakes up feeling rested but notes that later in the day she feels very fatigue.    Checks BPs at home. Occasionally gets BPs 80s/60s similar to she had at [facility] 6/2019 and when this happens she feels MUCH more tired.    No apnea per Nino.       Diagnostic Testing:  EKG  today, which I overread, showed SR 69 bpm  Echocardiogram 11/2017 showed EF 55 to 60%.  RV was normal in size and function.  Left atrium was mildly dilated with the left atrial size of 4.3 cm with a volume index of 34.6 mL/m .  She had mild valvular abnormalities, with 1+ TR, 1+ MR and no aortic valve disease.    Assessment & Plan:    1. Hypotension    BP OK here but at home has occasionally gotten into 80s and feels very fatigued with this (worse than baseline). No falls/dizziness associated    PLAN:    Decrease metoprolol tartrate to 12.5 mg BID to see if this improves BPs and fatigue at all    2. Paroxysmal AFib    No recurrence that she's aware of    Remains OFF of AC. Dr. Erwin stopped this due to hepatitis, dementia and renal insufficiency.     We discussed that all but her dementia/memory changes have improved and discussed restarting AC.    PLAN:    She's disinterested in restarting AC despite CHADSVASc 4 (HTN, age, sex) despite risk of recurrence/stroke    See Dr. Erwin 1 year with EKG    Call if issues prior    Maddison Turner PA-C, MSPAS      Orders Placed This Encounter   Procedures     Follow-Up with Electrophysiologist     EKG 12-lead complete w/read - Clinics (performed today)     EKG 12-lead complete w/read - Clinics (to be scheduled)     Orders Placed This Encounter   Medications     metoprolol tartrate (LOPRESSOR) 25 MG tablet     Sig: Take 0.5 tablets (12.5 mg) by mouth 2 times daily     Medications Discontinued During This Encounter   Medication Reason     metoprolol tartrate (LOPRESSOR) 25 MG tablet          Encounter Diagnoses   Name Primary?     Paroxysmal atrial fibrillation (H)      Atrial fibrillation with rapid ventricular response (H)        CURRENT MEDICATIONS:  Current Outpatient Medications   Medication Sig Dispense Refill     Apoaequorin (PREVAGEN PO) Take 1 tablet by mouth daily       brimonidine-timolol (COMBIGAN) 0.2-0.5 % ophthalmic solution Place 1 drop into both eyes 2 times daily         metoprolol tartrate (LOPRESSOR) 25 MG tablet Take 0.5 tablets (12.5 mg) by mouth 2 times daily       Multiple Vitamin (MULTI-VITAMIN) per tablet Take 1 tablet by mouth daily.       CELLCEPT (BRAND) 250 MG capsule Take 250 mg by mouth 2 times daily       mycophenolate, IDS [id number], (CELLCEPT) 250 MG capsule Take 250 mg by mouth 2 times daily         ALLERGIES     Allergies   Allergen Reactions     Adhesive Tape      Zocor [Simvastatin - High Dose]      Elevated LFTs       PAST MEDICAL HISTORY:  Past Medical History:   Diagnosis Date     Anxiety      Atrial fibrillation (H)      Chronic renal insufficiency      Dementia      Glaucoma      Hepatitis     autoimmune     HTN (hypertension), benign      Hyperlipidemia LDL goal < 130      Paroxysmal A-fib (H)        PAST SURGICAL HISTORY:  Past Surgical History:   Procedure Laterality Date     DILATION AND CURETTAGE       HYSTERECTOMY      with USO, not for cancer     S/p partial vaginectomy       SEPTOPLASTY       TONSILLECTOMY & ADENOIDECTOMY         FAMILY HISTORY:  Family History   Problem Relation Age of Onset     Hypertension Mother          age 95     Alzheimer Disease Father 75        also CHF     Cancer Brother          of lung ca age 69     Lipids Brother      Alzheimer Disease Paternal Uncle      Alzheimer Disease Paternal Uncle        SOCIAL HISTORY:  Social History     Socioeconomic History     Marital status:      Spouse name: Not on file     Number of children: Not on file     Years of education: Not on file     Highest education level: Not on file   Occupational History     Not on file   Social Needs     Financial resource strain: Not on file     Food insecurity:     Worry: Not on file     Inability: Not on file     Transportation needs:     Medical: Not on file     Non-medical: Not on file   Tobacco Use     Smoking status: Former Smoker     Last attempt to quit: 1990     Years since quittin.8     Smokeless tobacco: Never Used      "Tobacco comment: quit 1990   Substance and Sexual Activity     Alcohol use: No     Alcohol/week: 0.0 standard drinks     Drug use: No     Sexual activity: Not Currently     Partners: Male   Lifestyle     Physical activity:     Days per week: Not on file     Minutes per session: Not on file     Stress: Not on file   Relationships     Social connections:     Talks on phone: Not on file     Gets together: Not on file     Attends Jewish service: Not on file     Active member of club or organization: Not on file     Attends meetings of clubs or organizations: Not on file     Relationship status: Not on file     Intimate partner violence:     Fear of current or ex partner: Not on file     Emotionally abused: Not on file     Physically abused: Not on file     Forced sexual activity: Not on file   Other Topics Concern     Parent/sibling w/ CABG, MI or angioplasty before 65F 55M? 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  Walks 3miles per day        dtr (33) from [place] and lives in [place]    Son from [place] and has mental health issues       Review of Systems:  Skin:        Eyes:  Positive for glasses  ENT:  Negative    Respiratory:  Negative for dyspnea on exertion;shortness of breath;cough  Cardiovascular:  Negative for;chest pain;edema;lightheadedness;dizziness Positive for;fatigue  Gastroenterology: Negative for melena;hematochezia  Genitourinary:  Negative    Musculoskeletal:  Positive for arthritis  Neurologic:  Positive for headaches  Psychiatric:  Positive for anxiety;depression  Heme/Lymph/Imm:  Positive for allergies  Endocrine:  Negative      Physical Exam:  Vitals: /67   Pulse 73   Ht 1.626 m (5' 4\")   Wt 62.9 kg (138 lb 9.6 oz)   BMI 23.79 kg/m       Constitutional:  cooperative, alert and oriented, well developed, well nourished, in no acute distress        Skin:  warm and dry to the touch, no apparent skin lesions or masses noted        Head:  " normocephalic, no masses or lesions        Eyes:  pupils equal and round;conjunctivae and lids unremarkable;sclera white;no xanthalasma        ENT:  no pallor or cyanosis, dentition good        Neck:  carotid pulses are full and equal bilaterally;JVP normal        Chest:  normal breath sounds, clear to auscultation, normal A-P diameter, normal symmetry, normal respiratory excursion, no use of accessory muscles        Cardiac: regular rhythm, normal S1/S2, no S3 or S4, apical impulse not displaced, no murmurs, gallops or rubs                  Abdomen:  abdomen soft;non-tender        Vascular: pulses full and equal                                      Extremities and Back:  no deformities, clubbing, cyanosis, erythema observed        Neurological:  no gross motor deficits          Recent Lab Results:  LIPID RESULTS:  Lab Results   Component Value Date    CHOL 252 (H) 11/09/2017    HDL 77 11/09/2017     (H) 11/09/2017    TRIG 133 11/09/2017    CHOLHDLRATIO 2.4 01/27/2014       LIVER ENZYME RESULTS:  Lab Results   Component Value Date    AST 33 02/19/2018    ALT 33 02/19/2018       CBC RESULTS:  Lab Results   Component Value Date    WBC 2.9 (L) 03/21/2018    RBC 4.16 03/21/2018    HGB 12.7 03/21/2018    HCT 40.3 03/21/2018    MCV 97 03/21/2018    MCH 30.5 03/21/2018    MCHC 31.5 03/21/2018    RDW 16.2 (H) 03/21/2018    PLT 93 (L) 03/21/2018       BMP RESULTS:  Lab Results   Component Value Date     01/02/2018    POTASSIUM 3.7 01/02/2018    CHLORIDE 112 (H) 01/02/2018    CO2 26 01/02/2018    ANIONGAP 5 01/02/2018    GLC 88 01/02/2018    BUN 31 (H) 01/02/2018    CR 0.98 01/02/2018    GFRESTIMATED 55 (L) 01/02/2018    GFRESTBLACK 67 01/02/2018    DANIELITO 8.7 01/02/2018            Thank you for allowing me to participate in the care of your patient.    Sincerely,     Nubia Turner PA-C     [facility]

## 2019-11-04 NOTE — LETTER
11/4/2019    Cady Marie PA-C  [address]  [facility] [address]    RE: Elaina FRANSISCA Winn       Dear Colleague,    I had the pleasure of seeing Elaina Winn in the [facility].    HPI:   I had the pleasure of seeing Elaina when she and Nino came for follow up of paroxysmal atrial fibrillation. She is a 74 year old who sees Dr. Erwin for her history of:     1. Paroxysmal atrial fibrillation status post DC cardioversion 12/31/2017 and again 1/1/2018.  Currently on metoprolol tartrate.  No anticoagulation given high bleeding risk as documented by Dr. Erwin (10/2018)  2. Autoimmune hepatitis, with recent labs 9/2019 with normalization of transaminases and hepatic function (see below) disease.  Followed by [facility]  3. Thrombocytopenia resolved  4. Chronic Fatigue    I saw Elaina 5/2019 at which time she was doing well, only getting pAFib ~2/month, lasting <20 minutes. She remained off of Xarelto despite CHADSVASc 4 due to bleeding risk from dementia, h/o thrombocytopenia, renal dysfunction and liver dysfunction. No changes were made and 6 m follow-up was recommended.    In 6/2019, she saw [facility]. She continued CelCept BID, which was expected to remain on board for ~2 years. Suggestion to lower BB dose was made due to c/o fatigue and HR 60 with BP 82/54.  Most recent blood work 9/2019 showed that Plts, AlkPhos, AST and ALT were all back to normal.    Interval History:  Overall, feels like heart is doing well. No complaints of chest pain, pressure or tightness.  No orthopnea, PND or edema. No change in exercise tolerance (doing AODLs, grocery shopping and housework).     Still notes fatigue. Feels like she rests well and wakes up feeling rested but notes that later in the day she feels very fatigue.    Checks BPs at home. Occasionally gets BPs 80s/60s similar to she had at [facility] 6/2019 and when this happens she feels MUCH more tired.    No apnea per Nino.       Diagnostic Testing:  EKG  today, which I overread, showed SR 69 bpm  Echocardiogram 11/2017 showed EF 55 to 60%.  RV was normal in size and function.  Left atrium was mildly dilated with the left atrial size of 4.3 cm with a volume index of 34.6 mL/m .  She had mild valvular abnormalities, with 1+ TR, 1+ MR and no aortic valve disease.    Assessment & Plan:    1. Hypotension    BP OK here but at home has occasionally gotten into 80s and feels very fatigued with this (worse than baseline). No falls/dizziness associated    PLAN:    Decrease metoprolol tartrate to 12.5 mg BID to see if this improves BPs and fatigue at all    2. Paroxysmal AFib    No recurrence that she's aware of    Remains OFF of AC. Dr. Erwin stopped this due to hepatitis, dementia and renal insufficiency.     We discussed that all but her dementia/memory changes have improved and discussed restarting AC.    PLAN:    She's disinterested in restarting AC despite CHADSVASc 4 (HTN, age, sex) despite risk of recurrence/stroke    See Dr. Erwin 1 year with EKG    Call if issues prior    Maddison Turner PA-C, MSPAS      Orders Placed This Encounter   Procedures     Follow-Up with Electrophysiologist     EKG 12-lead complete w/read - Clinics (performed today)     EKG 12-lead complete w/read - Clinics (to be scheduled)     Orders Placed This Encounter   Medications     metoprolol tartrate (LOPRESSOR) 25 MG tablet     Sig: Take 0.5 tablets (12.5 mg) by mouth 2 times daily     Medications Discontinued During This Encounter   Medication Reason     metoprolol tartrate (LOPRESSOR) 25 MG tablet          Encounter Diagnoses   Name Primary?     Paroxysmal atrial fibrillation (H)      Atrial fibrillation with rapid ventricular response (H)        CURRENT MEDICATIONS:  Current Outpatient Medications   Medication Sig Dispense Refill     Apoaequorin (PREVAGEN PO) Take 1 tablet by mouth daily       brimonidine-timolol (COMBIGAN) 0.2-0.5 % ophthalmic solution Place 1 drop into both eyes 2 times daily         metoprolol tartrate (LOPRESSOR) 25 MG tablet Take 0.5 tablets (12.5 mg) by mouth 2 times daily       Multiple Vitamin (MULTI-VITAMIN) per tablet Take 1 tablet by mouth daily.       CELLCEPT (BRAND) 250 MG capsule Take 250 mg by mouth 2 times daily       mycophenolate, IDS [id number], (CELLCEPT) 250 MG capsule Take 250 mg by mouth 2 times daily         ALLERGIES     Allergies   Allergen Reactions     Adhesive Tape      Zocor [Simvastatin - High Dose]      Elevated LFTs       PAST MEDICAL HISTORY:  Past Medical History:   Diagnosis Date     Anxiety      Atrial fibrillation (H)      Chronic renal insufficiency      Dementia      Glaucoma      Hepatitis     autoimmune     HTN (hypertension), benign      Hyperlipidemia LDL goal < 130      Paroxysmal A-fib (H)        PAST SURGICAL HISTORY:  Past Surgical History:   Procedure Laterality Date     DILATION AND CURETTAGE       HYSTERECTOMY      with USO, not for cancer     S/p partial vaginectomy       SEPTOPLASTY       TONSILLECTOMY & ADENOIDECTOMY         FAMILY HISTORY:  Family History   Problem Relation Age of Onset     Hypertension Mother          age 95     Alzheimer Disease Father 75        also CHF     Cancer Brother          of lung ca age 69     Lipids Brother      Alzheimer Disease Paternal Uncle      Alzheimer Disease Paternal Uncle        SOCIAL HISTORY:  Social History     Socioeconomic History     Marital status:      Spouse name: Not on file     Number of children: Not on file     Years of education: Not on file     Highest education level: Not on file   Occupational History     Not on file   Social Needs     Financial resource strain: Not on file     Food insecurity:     Worry: Not on file     Inability: Not on file     Transportation needs:     Medical: Not on file     Non-medical: Not on file   Tobacco Use     Smoking status: Former Smoker     Last attempt to quit: 1990     Years since quittin.8     Smokeless tobacco: Never Used      "Tobacco comment: quit 1990   Substance and Sexual Activity     Alcohol use: No     Alcohol/week: 0.0 standard drinks     Drug use: No     Sexual activity: Not Currently     Partners: Male   Lifestyle     Physical activity:     Days per week: Not on file     Minutes per session: Not on file     Stress: Not on file   Relationships     Social connections:     Talks on phone: Not on file     Gets together: Not on file     Attends Mormonism service: Not on file     Active member of club or organization: Not on file     Attends meetings of clubs or organizations: Not on file     Relationship status: Not on file     Intimate partner violence:     Fear of current or ex partner: Not on file     Emotionally abused: Not on file     Physically abused: Not on file     Forced sexual activity: Not on file   Other Topics Concern     Parent/sibling w/ CABG, MI or angioplasty before 65F 55M? 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  Walks 3miles per day        dtr (33) from [place] and lives in [facility]    Son from [place] and has mental health issues       Review of Systems:  Skin:        Eyes:  Positive for glasses  ENT:  Negative    Respiratory:  Negative for dyspnea on exertion;shortness of breath;cough  Cardiovascular:  Negative for;chest pain;edema;lightheadedness;dizziness Positive for;fatigue  Gastroenterology: Negative for melena;hematochezia  Genitourinary:  Negative    Musculoskeletal:  Positive for arthritis  Neurologic:  Positive for headaches  Psychiatric:  Positive for anxiety;depression  Heme/Lymph/Imm:  Positive for allergies  Endocrine:  Negative      Physical Exam:  Vitals: /67   Pulse 73   Ht 1.626 m (5' 4\")   Wt 62.9 kg (138 lb 9.6 oz)   BMI 23.79 kg/m       Constitutional:  cooperative, alert and oriented, well developed, well nourished, in no acute distress        Skin:  warm and dry to the touch, no apparent skin lesions or masses noted        Head:  " normocephalic, no masses or lesions        Eyes:  pupils equal and round;conjunctivae and lids unremarkable;sclera white;no xanthalasma        ENT:  no pallor or cyanosis, dentition good        Neck:  carotid pulses are full and equal bilaterally;JVP normal        Chest:  normal breath sounds, clear to auscultation, normal A-P diameter, normal symmetry, normal respiratory excursion, no use of accessory muscles        Cardiac: regular rhythm, normal S1/S2, no S3 or S4, apical impulse not displaced, no murmurs, gallops or rubs                  Abdomen:  abdomen soft;non-tender        Vascular: pulses full and equal                                      Extremities and Back:  no deformities, clubbing, cyanosis, erythema observed        Neurological:  no gross motor deficits          Recent Lab Results:  LIPID RESULTS:  Lab Results   Component Value Date    CHOL 252 (H) 11/09/2017    HDL 77 11/09/2017     (H) 11/09/2017    TRIG 133 11/09/2017    CHOLHDLRATIO 2.4 01/27/2014       LIVER ENZYME RESULTS:  Lab Results   Component Value Date    AST 33 02/19/2018    ALT 33 02/19/2018       CBC RESULTS:  Lab Results   Component Value Date    WBC 2.9 (L) 03/21/2018    RBC 4.16 03/21/2018    HGB 12.7 03/21/2018    HCT 40.3 03/21/2018    MCV 97 03/21/2018    MCH 30.5 03/21/2018    MCHC 31.5 03/21/2018    RDW 16.2 (H) 03/21/2018    PLT 93 (L) 03/21/2018       BMP RESULTS:  Lab Results   Component Value Date     01/02/2018    POTASSIUM 3.7 01/02/2018    CHLORIDE 112 (H) 01/02/2018    CO2 26 01/02/2018    ANIONGAP 5 01/02/2018    GLC 88 01/02/2018    BUN 31 (H) 01/02/2018    CR 0.98 01/02/2018    GFRESTIMATED 55 (L) 01/02/2018    GFRESTBLACK 67 01/02/2018    DANIELITO 8.7 01/02/2018                    Thank you for allowing me to participate in the care of your patient.      Sincerely,     Nubia Turner PA-C     [facility]    cc:   Nubia Turner PA-C  [address]  [id number]  [place] [id number]

## 2019-12-15 ENCOUNTER — HEALTH MAINTENANCE LETTER (OUTPATIENT)
Age: 74
End: 2019-12-15

## 2020-03-25 DIAGNOSIS — I48.91 ATRIAL FIBRILLATION WITH RAPID VENTRICULAR RESPONSE (H): ICD-10-CM

## 2020-03-25 RX ORDER — METOPROLOL TARTRATE 25 MG/1
12.5 TABLET, FILM COATED ORAL 2 TIMES DAILY
Qty: 180 TABLET | Refills: 1 | Status: SHIPPED | OUTPATIENT
Start: 2020-03-25 | End: 2020-07-24

## 2020-07-24 ENCOUNTER — TELEPHONE (OUTPATIENT)
Dept: CARDIOLOGY | Facility: CLINIC | Age: 75
End: 2020-07-24

## 2020-07-24 DIAGNOSIS — I48.91 ATRIAL FIBRILLATION WITH RAPID VENTRICULAR RESPONSE (H): ICD-10-CM

## 2020-07-24 RX ORDER — METOPROLOL TARTRATE 25 MG/1
12.5 TABLET, FILM COATED ORAL 2 TIMES DAILY
Qty: 180 TABLET | Refills: 3 | Status: SHIPPED | OUTPATIENT
Start: 2020-07-24 | End: 2022-03-30

## 2020-07-24 NOTE — TELEPHONE ENCOUNTER
7/24/20 Pts  called requesting refill of Metoprolol.  states pt is taking Metorprolol Tartrate 25 mg BID. Per last OV note on 11/2019 with Maddisonnancy Turner, Metoprolol was decreased to 12.5 mg BID due to Hypotension , fatigue and dizziness.   reports she occasionally complains of fatigue. BP is checked everyday.  reports the following BPS  126/64 p 70  104/54 p 71  106/61 p 73  113/68 p 65  98/60 p 61  107/63 p 76  127/64 p 81  Will check with Maddison Turner PA-C for clarification on dose and call  back. He voiced understanding and agreement w plan. Dayana 1236 pm

## 2020-07-24 NOTE — TELEPHONE ENCOUNTER
7/24/20 Spoke with pts  who is agreeable to decreasing dose per Maddison Carcamo PA-C recommendations. Pt will take  Metoprolol 12.5 mg BID  Refill sent to [facility] in [place] per pt request.  Dayana 230 pm

## 2021-01-15 ENCOUNTER — HEALTH MAINTENANCE LETTER (OUTPATIENT)
Age: 76
End: 2021-01-15

## 2021-02-17 ENCOUNTER — IMMUNIZATION (OUTPATIENT)
Dept: NURSING | Facility: CLINIC | Age: 76
End: 2021-02-17
Payer: COMMERCIAL

## 2021-02-17 PROCEDURE — 91300 PR COVID VAC PFIZER DIL RECON 30 MCG/0.3 ML IM: CPT

## 2021-02-17 PROCEDURE — 0001A PR COVID VAC PFIZER DIL RECON 30 MCG/0.3 ML IM: CPT

## 2021-03-10 ENCOUNTER — IMMUNIZATION (OUTPATIENT)
Dept: NURSING | Facility: CLINIC | Age: 76
End: 2021-03-10
Attending: INTERNAL MEDICINE
Payer: COMMERCIAL

## 2021-03-10 PROCEDURE — 91300 PR COVID VAC PFIZER DIL RECON 30 MCG/0.3 ML IM: CPT

## 2021-03-10 PROCEDURE — 0002A PR COVID VAC PFIZER DIL RECON 30 MCG/0.3 ML IM: CPT

## 2021-07-14 ENCOUNTER — TRANSFERRED RECORDS (OUTPATIENT)
Dept: HEALTH INFORMATION MANAGEMENT | Facility: CLINIC | Age: 76
End: 2021-07-14

## 2021-07-14 LAB
ALT SERPL-CCNC: 21 U/L (ref 0–78)
AST SERPL-CCNC: 28 U/L (ref 0–37)

## 2021-09-04 ENCOUNTER — HEALTH MAINTENANCE LETTER (OUTPATIENT)
Age: 76
End: 2021-09-04

## 2021-09-30 ENCOUNTER — TRANSFERRED RECORDS (OUTPATIENT)
Dept: HEALTH INFORMATION MANAGEMENT | Facility: CLINIC | Age: 76
End: 2021-09-30

## 2021-10-20 ENCOUNTER — TRANSFERRED RECORDS (OUTPATIENT)
Dept: HEALTH INFORMATION MANAGEMENT | Facility: CLINIC | Age: 76
End: 2021-10-20
Payer: COMMERCIAL

## 2021-10-20 LAB
ALT SERPL-CCNC: 14 IU/L (ref 0–32)
AST SERPL-CCNC: 23 IU/L (ref 0–40)

## 2022-02-18 ENCOUNTER — TRANSFERRED RECORDS (OUTPATIENT)
Dept: HEALTH INFORMATION MANAGEMENT | Facility: CLINIC | Age: 77
End: 2022-02-18
Payer: COMMERCIAL

## 2022-02-18 LAB
ALT SERPL-CCNC: 20 IU/L (ref 0–32)
AST SERPL-CCNC: 34 IU/L (ref 0–40)

## 2022-02-19 ENCOUNTER — HEALTH MAINTENANCE LETTER (OUTPATIENT)
Age: 77
End: 2022-02-19

## 2022-03-29 DIAGNOSIS — I48.91 ATRIAL FIBRILLATION WITH RAPID VENTRICULAR RESPONSE (H): ICD-10-CM

## 2022-03-29 DIAGNOSIS — I48.0 PAROXYSMAL ATRIAL FIBRILLATION (H): Primary | ICD-10-CM

## 2022-03-29 RX ORDER — METOPROLOL TARTRATE 25 MG/1
12.5 TABLET, FILM COATED ORAL 2 TIMES DAILY
Qty: 180 TABLET | Refills: 0 | Status: CANCELLED | OUTPATIENT
Start: 2022-03-29

## 2022-03-29 NOTE — TELEPHONE ENCOUNTER
[facility] Cardiology Refill Guideline reviewed.  Medication does not meet criteria for refill due to LOV 11/4/19.  Messaged to providers team for further review.

## 2022-03-30 ENCOUNTER — OFFICE VISIT (OUTPATIENT)
Dept: CARDIOLOGY | Facility: CLINIC | Age: 77
End: 2022-03-30
Payer: COMMERCIAL

## 2022-03-30 VITALS
DIASTOLIC BLOOD PRESSURE: 80 MMHG | HEART RATE: 82 BPM | HEIGHT: 64 IN | BODY MASS INDEX: 24.07 KG/M2 | SYSTOLIC BLOOD PRESSURE: 174 MMHG | OXYGEN SATURATION: 99 % | WEIGHT: 141 LBS

## 2022-03-30 DIAGNOSIS — I48.0 PAROXYSMAL ATRIAL FIBRILLATION (H): ICD-10-CM

## 2022-03-30 DIAGNOSIS — I48.91 ATRIAL FIBRILLATION WITH RAPID VENTRICULAR RESPONSE (H): ICD-10-CM

## 2022-03-30 PROCEDURE — 93000 ELECTROCARDIOGRAM COMPLETE: CPT | Performed by: PHYSICIAN ASSISTANT

## 2022-03-30 PROCEDURE — 99214 OFFICE O/P EST MOD 30 MIN: CPT | Performed by: INTERNAL MEDICINE

## 2022-03-30 RX ORDER — METOPROLOL TARTRATE 25 MG/1
12.5 TABLET, FILM COATED ORAL 2 TIMES DAILY
Qty: 180 TABLET | Refills: 3 | Status: SHIPPED | OUTPATIENT
Start: 2022-03-30 | End: 2023-04-05

## 2022-03-30 NOTE — PROGRESS NOTES
HPI and Plan:   See dictation      Orders Placed This Encounter   Procedures     Follow-Up with Cardiology - RONALDO     EKG 12-lead complete w/read (Future)- to be scheduled       Orders Placed This Encounter   Medications     metoprolol tartrate (LOPRESSOR) 25 MG tablet     Sig: Take 0.5 tablets (12.5 mg) by mouth 2 times daily     Dispense:  180 tablet     Refill:  3       Medications Discontinued During This Encounter   Medication Reason     metoprolol tartrate (LOPRESSOR) 25 MG tablet Reorder         Encounter Diagnoses   Name Primary?     Paroxysmal atrial fibrillation (H)      Atrial fibrillation with rapid ventricular response (H)        CURRENT MEDICATIONS:  Current Outpatient Medications   Medication Sig Dispense Refill     Apoaequorin (PREVAGEN PO) Take 1 tablet by mouth daily       metoprolol tartrate (LOPRESSOR) 25 MG tablet Take 0.5 tablets (12.5 mg) by mouth 2 times daily 180 tablet 3     Multiple Vitamin (MULTI-VITAMIN) per tablet Take 1 tablet by mouth daily.       brimonidine-timolol (COMBIGAN) 0.2-0.5 % ophthalmic solution Place 1 drop into both eyes 2 times daily  (Patient not taking: Reported on 3/30/2022)       CELLCEPT (BRAND) 250 MG capsule Take 250 mg by mouth 2 times daily (Patient not taking: Reported on 3/30/2022)       mycophenolate, IDS [id number], (CELLCEPT) 250 MG capsule Take 250 mg by mouth 2 times daily (Patient not taking: Reported on 3/30/2022)         ALLERGIES     Allergies   Allergen Reactions     Adhesive Tape      Zocor [Simvastatin - High Dose]      Elevated LFTs       PAST MEDICAL HISTORY:  Past Medical History:   Diagnosis Date     Anxiety      Atrial fibrillation (H)      Chronic renal insufficiency      Dementia (H)      Glaucoma      Hepatitis     autoimmune     HTN (hypertension), benign      Hyperlipidemia LDL goal < 130      Paroxysmal A-fib (H)        PAST SURGICAL HISTORY:  Past Surgical History:   Procedure Laterality Date     DILATION AND CURETTAGE       HYSTERECTOMY       with USO, not for cancer     S/p partial vaginectomy       SEPTOPLASTY       TONSILLECTOMY & ADENOIDECTOMY         FAMILY HISTORY:  Family History   Problem Relation Age of Onset     Hypertension Mother          age 95     Alzheimer Disease Father 75        also CHF     Cancer Brother          of lung ca age 69     Lipids Brother      Alzheimer Disease Paternal Uncle      Alzheimer Disease Paternal Uncle        SOCIAL HISTORY:  Social History     Socioeconomic History     Marital status:      Spouse name: None     Number of children: None     Years of education: None     Highest education level: None   Occupational History     None   Tobacco Use     Smoking status: Former Smoker     Quit date: 1990     Years since quittin.2     Smokeless tobacco: Never Used     Tobacco comment: quit    Substance and Sexual Activity     Alcohol use: No     Alcohol/week: 0.0 standard drinks     Drug use: No     Sexual activity: Not Currently     Partners: Male   Other Topics Concern     Parent/sibling w/ CABG, MI or angioplasty before 65F 55M? 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  Walks 3miles per day        dtr (33) from [place] and lives in [facility]    Son from [place] and has mental health issues     Social Determinants of Health     Financial Resource Strain: Not on file   Food Insecurity: Not on file   Transportation Needs: Not on file   Physical Activity: Not on file   Stress: Not on file   Social Connections: Not on file   Intimate Partner Violence: Not on file   Housing Stability: Not on file       Review of Systems:  Skin:  Negative       Eyes:  Positive for glasses    ENT:  Negative      Respiratory:  Negative for       Cardiovascular:    Positive for;fatigue;edema slight swelling in lower legs - improved with compression socks,  Gastroenterology: Negative for melena;hematochezia Autoimmune hepatitis 2017 ([facility])  Genitourinary:  Negative     "  Musculoskeletal:  Positive for arthritis muscle pains  Neurologic:  Positive for headaches occ  Psychiatric:  Positive for anxiety;depression    Heme/Lymph/Imm:  Positive for allergies    Endocrine:  Negative        Physical Exam:  Vitals: BP (!) 174/80   Pulse 82   Ht 1.626 m (5' 4\")   Wt 64 kg (141 lb)   SpO2 99%   BMI 24.20 kg/m      Constitutional:           Skin:             Head:           Eyes:           Lymph:      ENT:           Neck:           Respiratory:            Cardiac:                                                           GI:           Extremities and Muscular Skeletal:                 Neurological:           Psych:           CC  No referring provider defined for this encounter.              "

## 2022-03-30 NOTE — PROGRESS NOTES
Service Date: 03/30/2022    HISTORY OF PRESENT ILLNESS:  I saw Mr. Winn for follow-up of atrial fibrillation.  She is a 76-year-old white female, who was diagnosed to have paroxysmal atrial fibrillation in 2017.  At that time, she was having liver dysfunction.  She did require cardioversion for atrial fibrillation.  Her liver function has been relatively normal over the last few years.  She has been diagnosed to have chronic autoimmune hepatitis.  She is not taking anticoagulation because of the liver issue.  She is not on class 1 or class 3 antiarrhythmic drugs.  Gladly, for the last few years, she has not had recurrent atrial fibrillation.  At the present time, she has no complaints.    PHYSICAL EXAMINATION:    VITAL SIGNS:  Blood pressure was 174/80, heart rate 82 beats per minute, body weight 141 pounds.  HEENT:  The eyes and ENT were unremarkable.  LUNGS:  Clear.  CARDIAC:  Rhythm was regular and heart sounds were normal without murmur.  ABDOMEN:  No obesity.  EXTREMITIES:  There was no pedal edema.    EKG showed normal sinus rhythm.    ASSESSMENT AND RECOMMENDATIONS:  Ms. Winn is doing well from the cardiology point of view.  Gladly, she has not had recurrent atrial fibrillation.  No other medication is needed for the previous diagnosis of atrial fibrillation.  I do not recommend further search for atrial fibrillation at this point.    The patient's liver function is normal and she is on low dose of metoprolol.  She can continue the current dose of metoprolol.    Her blood pressure is high today, but she has been monitoring her blood pressure daily at home, which is normal.  I, therefore, did not add any blood pressure medications.  She is scheduled to return for follow-up in a year.    cc:  Cady Marie PA-C  [facility]  [address] [place] [address], [address]  [place] [address]    Yamil Erwin MD        D: 03/30/2022   T: 03/30/2022   MT: al    Name:     ABDULLAHI WINN  MRN:      [id number]        Account:       [id number]   :      1945           Service Date: 2022       Document: [id number]

## 2022-03-30 NOTE — LETTER
3/30/2022    Cady Marie PA-C  [address]  [place] [address]    RE: Elaina Winn       Dear Colleague,     I had the pleasure of seeing Elaina Winn in the [facility].  HPI and Plan:   See dictation      Orders Placed This Encounter   Procedures     Follow-Up with Cardiology - RONALDO     EKG 12-lead complete w/read (Future)- to be scheduled       Orders Placed This Encounter   Medications     metoprolol tartrate (LOPRESSOR) 25 MG tablet     Sig: Take 0.5 tablets (12.5 mg) by mouth 2 times daily     Dispense:  180 tablet     Refill:  3       Medications Discontinued During This Encounter   Medication Reason     metoprolol tartrate (LOPRESSOR) 25 MG tablet Reorder         Encounter Diagnoses   Name Primary?     Paroxysmal atrial fibrillation (H)      Atrial fibrillation with rapid ventricular response (H)        CURRENT MEDICATIONS:  Current Outpatient Medications   Medication Sig Dispense Refill     Apoaequorin (PREVAGEN PO) Take 1 tablet by mouth daily       metoprolol tartrate (LOPRESSOR) 25 MG tablet Take 0.5 tablets (12.5 mg) by mouth 2 times daily 180 tablet 3     Multiple Vitamin (MULTI-VITAMIN) per tablet Take 1 tablet by mouth daily.       brimonidine-timolol (COMBIGAN) 0.2-0.5 % ophthalmic solution Place 1 drop into both eyes 2 times daily  (Patient not taking: Reported on 3/30/2022)       CELLCEPT (BRAND) 250 MG capsule Take 250 mg by mouth 2 times daily (Patient not taking: Reported on 3/30/2022)       mycophenolate, IDS 3865, (CELLCEPT) 250 MG capsule Take 250 mg by mouth 2 times daily (Patient not taking: Reported on 3/30/2022)         ALLERGIES     Allergies   Allergen Reactions     Adhesive Tape      Zocor [Simvastatin - High Dose]      Elevated LFTs       PAST MEDICAL HISTORY:  Past Medical History:   Diagnosis Date     Anxiety      Atrial fibrillation (H)      Chronic renal insufficiency      Dementia (H)      Glaucoma      Hepatitis     autoimmune     HTN  (hypertension), benign      Hyperlipidemia LDL goal < 130      Paroxysmal A-fib (H)        PAST SURGICAL HISTORY:  Past Surgical History:   Procedure Laterality Date     DILATION AND CURETTAGE       HYSTERECTOMY      with USO, not for cancer     S/p partial vaginectomy       SEPTOPLASTY       TONSILLECTOMY & ADENOIDECTOMY         FAMILY HISTORY:  Family History   Problem Relation Age of Onset     Hypertension Mother          age 95     Alzheimer Disease Father 75        also CHF     Cancer Brother          of lung ca age 69     Lipids Brother      Alzheimer Disease Paternal Uncle      Alzheimer Disease Paternal Uncle        SOCIAL HISTORY:  Social History     Socioeconomic History     Marital status:      Spouse name: None     Number of children: None     Years of education: None     Highest education level: None   Occupational History     None   Tobacco Use     Smoking status: Former Smoker     Quit date: 1990     Years since quittin.2     Smokeless tobacco: Never Used     Tobacco comment: quit    Substance and Sexual Activity     Alcohol use: No     Alcohol/week: 0.0 standard drinks     Drug use: No     Sexual activity: Not Currently     Partners: Male   Other Topics Concern     Parent/sibling w/ CABG, MI or angioplasty before 65F 55M? 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  Walks 3miles per day        dtr (33) from [place] and lives in [facility]    Son from [place] and has mental health issues     Social Determinants of Health     Financial Resource Strain: Not on file   Food Insecurity: Not on file   Transportation Needs: Not on file   Physical Activity: Not on file   Stress: Not on file   Social Connections: Not on file   Intimate Partner Violence: Not on file   Housing Stability: Not on file       Review of Systems:  Skin:  Negative       Eyes:  Positive for glasses    ENT:  Negative      Respiratory:  Negative for       Cardiovascular:     "Positive for;fatigue;edema slight swelling in lower legs - improved with compression socks,  Gastroenterology: Negative for melena;hematochezia Autoimmune hepatitis 2017 ([facility])  Genitourinary:  Negative      Musculoskeletal:  Positive for arthritis muscle pains  Neurologic:  Positive for headaches occ  Psychiatric:  Positive for anxiety;depression    Heme/Lymph/Imm:  Positive for allergies    Endocrine:  Negative        Physical Exam:  Vitals: BP (!) 174/80   Pulse 82   Ht 1.626 m (5' 4\")   Wt 64 kg (141 lb)   SpO2 99%   BMI 24.20 kg/m      Constitutional:           Skin:             Head:           Eyes:           Lymph:      ENT:           Neck:           Respiratory:            Cardiac:                                                           GI:           Extremities and Muscular Skeletal:                 Neurological:           Psych:           CC  No referring provider defined for this encounter.    Thank you for allowing me to participate in the care of your patient.      Sincerely,     Yamil Erwin MD     [facility]  "

## 2022-06-03 ENCOUNTER — TRANSFERRED RECORDS (OUTPATIENT)
Dept: HEALTH INFORMATION MANAGEMENT | Facility: CLINIC | Age: 77
End: 2022-06-03
Payer: COMMERCIAL

## 2022-06-03 LAB
ALT SERPL-CCNC: 22 IU/L (ref 0–32)
AST SERPL-CCNC: 30 IU/L (ref 0–40)
CREATININE (EXTERNAL): 1.18 MG/DL (ref 0.57–1)
GFR ESTIMATED (EXTERNAL): 47 ML/MIN/1.73M2

## 2022-09-02 ENCOUNTER — TRANSFERRED RECORDS (OUTPATIENT)
Dept: HEALTH INFORMATION MANAGEMENT | Facility: CLINIC | Age: 77
End: 2022-09-02

## 2022-10-22 ENCOUNTER — HEALTH MAINTENANCE LETTER (OUTPATIENT)
Age: 77
End: 2022-10-22

## 2023-04-01 ENCOUNTER — HEALTH MAINTENANCE LETTER (OUTPATIENT)
Age: 78
End: 2023-04-01

## 2023-04-05 DIAGNOSIS — I48.91 ATRIAL FIBRILLATION WITH RAPID VENTRICULAR RESPONSE (H): ICD-10-CM

## 2023-04-05 RX ORDER — METOPROLOL TARTRATE 25 MG/1
12.5 TABLET, FILM COATED ORAL 2 TIMES DAILY
Qty: 90 TABLET | Refills: 0 | Status: SHIPPED | OUTPATIENT
Start: 2023-04-05 | End: 2023-07-06

## 2023-07-06 DIAGNOSIS — I48.91 ATRIAL FIBRILLATION WITH RAPID VENTRICULAR RESPONSE (H): ICD-10-CM

## 2023-07-06 RX ORDER — METOPROLOL TARTRATE 25 MG/1
12.5 TABLET, FILM COATED ORAL 2 TIMES DAILY
Qty: 90 TABLET | Refills: 3 | Status: ON HOLD | OUTPATIENT
Start: 2023-07-06 | End: 2024-03-02

## 2023-07-06 NOTE — TELEPHONE ENCOUNTER
Received request from [facility] pharmacy for refill of Metoprolol Tartrate 25mg twice daily.    Last OV with ARELY Escobar  3\30\23    [facility] Refill Guideline reviewed.  Medication meets criteria for refill.     Tiffanie Soto RN on 7/6/2023 at 1:58 PM

## 2023-10-16 ENCOUNTER — TRANSFERRED RECORDS (OUTPATIENT)
Dept: HEALTH INFORMATION MANAGEMENT | Facility: CLINIC | Age: 78
End: 2023-10-16
Payer: COMMERCIAL

## 2023-10-16 LAB
ALT SERPL-CCNC: 13 IU/L (ref 0–32)
AST SERPL-CCNC: 21 IU/L (ref 0–40)
CREATININE (EXTERNAL): 1.14 MG/DL (ref 0.57–1)
GFR ESTIMATED (EXTERNAL): 49 ML/MIN/1.73
GLUCOSE (EXTERNAL): 93 MG/DL (ref 70–99)
POTASSIUM (EXTERNAL): 4.7 MMOL/L (ref 3.5–5.2)

## 2024-01-18 ENCOUNTER — DOCUMENTATION ONLY (OUTPATIENT)
Dept: OTHER | Facility: CLINIC | Age: 79
End: 2024-01-18
Payer: COMMERCIAL

## 2024-01-19 ENCOUNTER — TELEPHONE (OUTPATIENT)
Dept: FAMILY MEDICINE | Facility: CLINIC | Age: 79
End: 2024-01-19
Payer: COMMERCIAL

## 2024-01-19 NOTE — TELEPHONE ENCOUNTER
Dot Ryder that patient has not been seen with [facility] since 2018. Patient needs to establish care with new PCP for orders to be signed for admission to assisted living. Kimberlyn Najera RN

## 2024-01-25 ENCOUNTER — OFFICE VISIT (OUTPATIENT)
Dept: FAMILY MEDICINE | Facility: CLINIC | Age: 79
End: 2024-01-25
Payer: COMMERCIAL

## 2024-01-25 VITALS
SYSTOLIC BLOOD PRESSURE: 107 MMHG | OXYGEN SATURATION: 98 % | BODY MASS INDEX: 22.82 KG/M2 | TEMPERATURE: 97.2 F | DIASTOLIC BLOOD PRESSURE: 73 MMHG | HEART RATE: 98 BPM | HEIGHT: 63 IN | WEIGHT: 128.8 LBS | RESPIRATION RATE: 16 BRPM

## 2024-01-25 DIAGNOSIS — Z00.00 ENCOUNTER FOR MEDICARE ANNUAL WELLNESS EXAM: Primary | ICD-10-CM

## 2024-01-25 DIAGNOSIS — K75.4 AUTOIMMUNE HEPATITIS (H): ICD-10-CM

## 2024-01-25 DIAGNOSIS — R41.3 MEMORY LOSS: ICD-10-CM

## 2024-01-25 DIAGNOSIS — I48.0 PAF (PAROXYSMAL ATRIAL FIBRILLATION) (H): ICD-10-CM

## 2024-01-25 DIAGNOSIS — E78.5 HYPERLIPIDEMIA WITH TARGET LDL LESS THAN 130: ICD-10-CM

## 2024-01-25 PROCEDURE — 80053 COMPREHEN METABOLIC PANEL: CPT | Performed by: PHYSICIAN ASSISTANT

## 2024-01-25 PROCEDURE — 91320 SARSCV2 VAC 30MCG TRS-SUC IM: CPT | Performed by: PHYSICIAN ASSISTANT

## 2024-01-25 PROCEDURE — G0008 ADMIN INFLUENZA VIRUS VAC: HCPCS | Performed by: PHYSICIAN ASSISTANT

## 2024-01-25 PROCEDURE — 99213 OFFICE O/P EST LOW 20 MIN: CPT | Mod: 25 | Performed by: PHYSICIAN ASSISTANT

## 2024-01-25 PROCEDURE — 36415 COLL VENOUS BLD VENIPUNCTURE: CPT | Performed by: PHYSICIAN ASSISTANT

## 2024-01-25 PROCEDURE — G0439 PPPS, SUBSEQ VISIT: HCPCS | Performed by: PHYSICIAN ASSISTANT

## 2024-01-25 PROCEDURE — 90662 IIV NO PRSV INCREASED AG IM: CPT | Performed by: PHYSICIAN ASSISTANT

## 2024-01-25 PROCEDURE — 80061 LIPID PANEL: CPT | Performed by: PHYSICIAN ASSISTANT

## 2024-01-25 PROCEDURE — 90480 ADMN SARSCOV2 VAC 1/ONLY CMP: CPT | Performed by: PHYSICIAN ASSISTANT

## 2024-01-25 RX ORDER — BRIMONIDINE TARTRATE 2 MG/ML
SOLUTION/ DROPS OPHTHALMIC
COMMUNITY
Start: 2023-09-10 | End: 2024-02-14

## 2024-01-25 RX ORDER — TIMOLOL MALEATE 5 MG/ML
1 SOLUTION/ DROPS OPHTHALMIC 2 TIMES DAILY
COMMUNITY
Start: 2024-01-05 | End: 2024-02-14

## 2024-01-25 RX ORDER — RESPIRATORY SYNCYTIAL VIRUS VACCINE 120MCG/0.5
0.5 KIT INTRAMUSCULAR ONCE
Qty: 1 EACH | Refills: 0 | Status: CANCELLED | OUTPATIENT
Start: 2024-01-25 | End: 2024-01-25

## 2024-01-25 ASSESSMENT — ENCOUNTER SYMPTOMS
ARTHRALGIAS: 1
CHILLS: 0
SORE THROAT: 0
ABDOMINAL PAIN: 0
HEARTBURN: 0
MYALGIAS: 1
FREQUENCY: 0
COUGH: 0
CONSTIPATION: 0
DIZZINESS: 0
SHORTNESS OF BREATH: 0
HEMATURIA: 0
NERVOUS/ANXIOUS: 1
BREAST MASS: 0
FEVER: 0
NAUSEA: 0
DIARRHEA: 0
PARESTHESIAS: 0
PALPITATIONS: 0
HEMATOCHEZIA: 0
DYSURIA: 0
EYE PAIN: 0
JOINT SWELLING: 1
HEADACHES: 0
WEAKNESS: 0

## 2024-01-25 ASSESSMENT — ACTIVITIES OF DAILY LIVING (ADL)
CURRENT_FUNCTION: MONEY MANAGEMENT REQUIRES ASSISTANCE
CURRENT_FUNCTION: TRANSPORTATION REQUIRES ASSISTANCE
CURRENT_FUNCTION: MEDICATION ADMINISTRATION REQUIRES ASSISTANCE

## 2024-01-25 ASSESSMENT — PAIN SCALES - GENERAL: PAINLEVEL: NO PAIN (0)

## 2024-01-25 NOTE — PROGRESS NOTES
"Preventive Care Visit  [facility] TOLU Leija PA-C, Family Medicine  Jan 25, 2024      SUBJECTIVE:   Elaina is a 78 year old, presenting for the following:  Physical and Forms (Assisted living paperwork. )        1/25/2024     3:22 PM   Additional Questions   Roomed by Mague DIAZ   Accompanied by Shireen.     Are you in the first 12 months of your Medicare coverage?  No    Healthy Habits:     In general, how would you rate your overall health?  Good    Frequency of exercise:  None    Do you usually eat at least 4 servings of fruit and vegetables a day, include whole grains    & fiber and avoid regularly eating high fat or \"junk\" foods?  Yes    Taking medications regularly:  Yes    Medication side effects:  Not applicable    Ability to successfully perform activities of daily living:  Transportation requires assistance, medication administration requires assistance and money management requires assistance    Home Safety:  No safety concerns identified    Hearing Impairment:  No hearing concerns    In the past 6 months, have you been bothered by leaking of urine?  No    In general, how would you rate your overall mental or emotional health?  Fair    Additional concerns today:  No  Answers submitted by the patient for this visit:  Annual Preventive Visit (Submitted on 1/25/2024)  Chief Complaint: Annual Exam:  Blood in stool: No  heartburn: No  peripheral edema: No  mood changes: Yes  Skin sensation changes: No  tenderness: No  breast mass: No  breast discharge: No      Today's PHQ-2 Score:       1/25/2024     3:09 PM   PHQ-2 ( 1999 [employer])   Q1: Little interest or pleasure in doing things 1   Q2: Feeling down, depressed or hopeless 1   PHQ-2 Score 2   Q1: Little interest or pleasure in doing things Several days   Q2: Feeling down, depressed or hopeless Several days   PHQ-2 Score 2     Elaina presents to the clinic today for medicare wellness exam.  She is accompanied by her niece.  She " has some forms from [employer] to fill out.  She is currently living in independent living but will be moving to assisted living due to memory impairment.    She has a hx of autoimmune hepatitis, follows with [facility] and continues to take Cellcept  Has hx of paroxysmal atrial fibrillation.  She is taking metoprolol and is asymptomatic.  She is not on chronic anticoagulation due to her liver condition and goal of care.  She is feeling well currently.         Have you ever done Advance Care Planning? (For example, a Health Directive, POLST, or a discussion with a medical provider or your loved ones about your wishes): No, advance care planning information given to patient to review.  Patient plans to discuss their wishes with loved ones or provider.      Fall risk  Fallen 2 or more times in the past year?: No  Any fall with injury in the past year?: No    Cognitive Screening   1) Repeat 3 items (Leader, Season, Table)    2) Clock draw: ABNORMAL No arrows  3) 3 item recall: Recalls NO objects   Results: 0 items recalled: PROBABLE COGNITIVE IMPAIRMENT, **INFORM PROVIDER**    Mini-CogTM Copyright RENEE Perea. Licensed by the author for use in [facility]; reprinted with permission (funmi@[facility]). All rights reserved.      Do you have sleep apnea, excessive snoring or daytime drowsiness? : no    Reviewed and updated as needed this visit by clinical staff   Tobacco  Allergies  Meds              Reviewed and updated as needed this visit by Provider                  Social History     Tobacco Use    Smoking status: Former     Types: Cigarettes     Quit date: 1990     Years since quittin.0    Smokeless tobacco: Never    Tobacco comments:     quit    Substance Use Topics    Alcohol use: No     Alcohol/week: 0.0 standard drinks of alcohol           2024     3:08 PM   Alcohol Use   Prescreen: >3 drinks/day or >7 drinks/week? Not Applicable          No data to display              Do you have a  current opioid prescription? No  Do you use any other controlled substances or medications that are not prescribed by a provider? None          Current providers sharing in care for this patient include:   Patient Care Team:  Cady Marie PA-C as PCP - General (Internal Medicine)    The following health maintenance items are reviewed in Epic and correct as of today:  Health Maintenance   Topic Date Due    ANNUAL REVIEW OF HM ORDERS  Never done    RSV VACCINE (Pregnancy & 60+) (1 - 1-dose 60+ series) Never done    ZOSTER IMMUNIZATION (1 of 2) 03/02/2010    MEDICARE ANNUAL WELLNESS VISIT  11/09/2018    DTAP/TDAP/TD IMMUNIZATION (2 - Td or Tdap) 02/08/2022    LIPID  11/09/2022    INFLUENZA VACCINE (1) 09/01/2023    COVID-19 Vaccine (5 - 2023-24 season) 09/01/2023    FALL RISK ASSESSMENT  01/25/2025    ADVANCE CARE PLANNING  01/18/2029    DEXA  01/27/2029    PHQ-2 (once per calendar year)  Completed    Pneumococcal Vaccine: 65+ Years  Completed    IPV IMMUNIZATION  Aged Out    HPV IMMUNIZATION  Aged Out    MENINGITIS IMMUNIZATION  Aged Out    RSV MONOCLONAL ANTIBODY  Aged Out    COLORECTAL CANCER SCREENING  Discontinued    LUNG CANCER SCREENING  Discontinued     Patient Active Problem List   Diagnosis    Heel pain    Advanced directives, counseling/discussion    Hyperlipidemia with target LDL less than 130    Glaucoma    Generalized OA    Dry eyes, bilateral    HTN (hypertension), benign    Anxiety    Memory loss    Elevated serum creatinine    Abdominal pain    Abnormal liver function tests    PAF (paroxysmal atrial fibrillation) (H)    Pulmonary nodule, right    Atrial fibrillation with rapid ventricular response (H)    Atrial fibrillation with RVR (H)    Hepatitis    Autoimmune hepatitis (H)     Past Surgical History:   Procedure Laterality Date    DILATION AND CURETTAGE      HYSTERECTOMY      with USO, not for cancer    S/p partial vaginectomy      SEPTOPLASTY      TONSILLECTOMY & ADENOIDECTOMY         Social  History     Tobacco Use    Smoking status: Former     Types: Cigarettes     Quit date: 1990     Years since quittin.0    Smokeless tobacco: Never    Tobacco comments:     quit    Substance Use Topics    Alcohol use: No     Alcohol/week: 0.0 standard drinks of alcohol     Family History   Problem Relation Age of Onset    Hypertension Mother          age 95    Alzheimer Disease Father 75        also CHF    Cancer Brother          of lung ca age 69    Lipids Brother     Alzheimer Disease Paternal Uncle     Alzheimer Disease Paternal Uncle          Current Outpatient Medications   Medication Sig Dispense Refill    Apoaequorin (PREVAGEN PO) Take 1 tablet by mouth daily      brimonidine (ALPHAGAN) 0.2 % ophthalmic solution INSTILL 1 DROP IN BOTH EYES EVERY 12 HOURS      brimonidine-timolol (COMBIGAN) 0.2-0.5 % ophthalmic solution Place 1 drop into both eyes 2 times daily      CELLCEPT (BRAND) 250 MG capsule Take 250 mg by mouth 2 times daily      metoprolol tartrate (LOPRESSOR) 25 MG tablet Take 0.5 tablets (12.5 mg) by mouth 2 times daily Appointment required for further refills 90 tablet 3    mycophenolate, IDS [id number], (CELLCEPT) 250 MG capsule Take 250 mg by mouth 2 times daily      timolol maleate (TIMOPTIC) 0.5 % ophthalmic solution Place 1 drop into both eyes 2 times daily       Allergies   Allergen Reactions    Adhesive Tape     Zocor [Simvastatin - High Dose]      Elevated LFTs       Mammogram Screening - Patient over age 75, has elected to discontinue screenings.  Pertinent mammograms are reviewed under the imaging tab.  Review of Systems   Constitutional:  Negative for chills and fever.   HENT:  Negative for congestion, ear pain, hearing loss and sore throat.    Eyes:  Negative for pain and visual disturbance.   Respiratory:  Negative for cough and shortness of breath.    Cardiovascular:  Negative for chest pain and palpitations.   Gastrointestinal:  Negative for abdominal pain, constipation,  "diarrhea and nausea.   Genitourinary:  Negative for dysuria, frequency, genital sores, hematuria, pelvic pain, urgency, vaginal bleeding and vaginal discharge.   Musculoskeletal:  Positive for arthralgias, joint swelling and myalgias.   Skin:  Negative for rash.   Neurological:  Negative for dizziness, weakness and headaches.   Psychiatric/Behavioral:  The patient is nervous/anxious.           OBJECTIVE:   /73   Pulse 98   Temp 97.2  F (36.2  C) (Temporal)   Resp 16   Ht 1.593 m (5' 2.72\")   Wt 58.4 kg (128 lb 12.8 oz)   SpO2 98%   BMI 23.02 kg/m     Estimated body mass index is 23.02 kg/m  as calculated from the following:    Height as of this encounter: 1.593 m (5' 2.72\").    Weight as of this encounter: 58.4 kg (128 lb 12.8 oz).  Physical Exam  GENERAL: alert and no distress  EYES: Eyes grossly normal to inspection, PERRL and conjunctivae and sclerae normal  HENT: ear canals and TM's normal, nose and mouth without ulcers or lesions  NECK: no adenopathy, no asymmetry, masses, or scars  RESP: lungs clear to auscultation - no rales, rhonchi or wheezes  CV: regular rate, irregularly irregular rhythm, normal S1 S2, no S3 or S4, no murmur, click or rub, no peripheral edema   ABDOMEN: soft, nontender, no hepatosplenomegaly, no masses and bowel sounds normal  MS: no gross musculoskeletal defects noted, no edema  SKIN: no suspicious lesions or rashes  NEURO: Normal strength and tone, mentation intact and speech normal  PSYCH: mentation appears normal, affect normal/bright        ASSESSMENT / PLAN:       1. Encounter for Medicare annual wellness exam  Forms signed for admission to assisted living    2. Hyperlipidemia with target LDL less than 130  Labs today for surveillance.  We last saw patient in 2018  - Lipid panel reflex to direct LDL Non-fasting; Future  - Comprehensive metabolic panel (BMP + Alb, Alk Phos, ALT, AST, Total. Bili, TP); Future  - Lipid panel reflex to direct LDL Non-fasting  - " Comprehensive metabolic panel (BMP + Alb, Alk Phos, ALT, AST, Total. Bili, TP)    3. PAF (paroxysmal atrial fibrillation) (H)  Following with cardiology, rate controlled. Not currently a candidate for anticoagulation due to liver condition    4. Autoimmune hepatitis (H)  stable    5. Memory loss  Known dementia.  Will be moving to assisted living             Counseling  Reviewed preventive health counseling, as reflected in patient instructions       Regular exercise       Healthy diet/nutrition        She reports that she quit smoking about 34 years ago. Her smoking use included cigarettes. She has never used smokeless tobacco.      Appropriate preventive services were discussed with this patient, including applicable screening as appropriate for fall prevention, nutrition, physical activity, Tobacco-use cessation, weight loss and cognition.  Checklist reviewing preventive services available has been given to the patient.    Reviewed patients plan of care and provided an AVS. The Basic Care Plan (routine screening as documented in Health Maintenance) for Elaina meets the Care Plan requirement. This Care Plan has been established and reviewed with the Patient and other:donnie .      Signed Electronically by: Pritesh Leija PA-C    Identified Health Risks  I have reviewed Opioid Use Disorder and Substance Use Disorder risk factors and made any needed referrals. She is at risk for lack of exercise and has been provided with information to increase physical activity for the benefit of her well-being.  The patient reports that she has difficulty with activities of daily living. I have asked that the patient make a follow up appointment in weeks where this issue will be further evaluated and addressed.  The patient was provided with suggestions to help her develop a healthy emotional lifestyle.She is at risk for lack of exercise and has been provided with information to increase physical activity for the benefit of  her well-being.  The patient reports that she has difficulty with activities of daily living. I have asked that the patient make a follow up appointment in  weeks where this issue will be further evaluated and addressed.  The patient was provided with suggestions to help her develop a healthy emotional lifestyle.

## 2024-01-25 NOTE — PATIENT INSTRUCTIONS
"Needs Tdap, RSV and shingles vaccination at a pharmacy or in assisted living if this is covered by her insurance  Patient Education   Personalized Prevention Plan  You are due for the preventive services outlined below.  Your care team is available to assist you in scheduling these services.  If you have already completed any of these items, please share that information with your care team to update in your medical record.  Health Maintenance Due   Topic Date Due     RSV VACCINE (Pregnancy & 60+) (1 - 1-dose 60+ series) Never done     Zoster (Shingles) Vaccine (1 of 2) 03/02/2010     Diptheria Tetanus Pertussis (DTAP/TDAP/TD) Vaccine (2 - Td or Tdap) 02/08/2022     Cholesterol Lab  11/09/2022     Learning About Being Physically Active  What is physical activity?     Being physically active means doing any kind of activity that gets your body moving.  The types of physical activity that can help you get fit and stay healthy include:  Aerobic or \"cardio\" activities. These make your heart beat faster and make you breathe harder, such as brisk walking, riding a bike, or running. They strengthen your heart and lungs and build up your endurance.  Strength training activities. These make your muscles work against, or \"resist,\" something. Examples include lifting weights or doing push-ups. These activities help tone and strengthen your muscles and bones.  Stretches. These let you move your joints and muscles through their full range of motion. Stretching helps you be more flexible.  Reaching a balance between these three types of physical activity is important because each one contributes to your overall fitness.  What are the benefits of being active?  Being active is one of the best things you can do for your health. It helps you to:  Feel stronger and have more energy to do all the things you like to do.  Focus better at school or work.  Feel, think, and sleep better.  Reach and stay at a healthy weight.  Lose fat and " "build lean muscle.  Lower your risk for serious health problems, including diabetes, heart attack, high blood pressure, and some cancers.  Keep your heart, lungs, bones, muscles, and joints strong and healthy.  How can you make being active part of your life?  Start slowly. Make it your long-term goal to get at least 30 minutes of exercise on most days of the week. Walking is a good choice. You also may want to do other activities, such as running, swimming, cycling, or playing tennis or team sports.  Pick activities that you like--ones that make your heart beat faster, your muscles stronger, and your muscles and joints more flexible. If you find more than one thing you like doing, do them all. You don't have to do the same thing every day.  Get your heart pumping every day. Any activity that makes your heart beat faster and keeps it at that rate for a while counts.  Here are some great ways to get your heart beating faster:  Go for a brisk walk, run, or hike.  Go for a swim or bike ride.  Take an online exercise class or dance.  Play a game of touch football, basketball, or soccer.  Play tennis, pickleball, or racquetball.  Climb stairs.  Even some household chores can be aerobic. Just do them at a faster pace. Raking or mowing the lawn, sweeping the garage, and vacuuming and cleaning your home all can help get your heart rate up.  Strengthen your muscles during the week. You don't have to lift heavy weights or grow big, bulky muscles to get stronger. Doing a few simple activities that make your muscles work against, or \"resist,\" something can help you get stronger. Aim for at least twice a week.  For example, you can:  Do push-ups or sit-ups, which use your own body weight as resistance.  Lift weights or dumbbells or use stretch bands at home or in a gym or community center.  Stretch your muscles often. Stretching will help you as you become more active. It can help you stay flexible and loosen tight muscles. It " "can also help improve your balance and posture and can be a great way to relax.  Be sure to stretch the muscles you'll be using when you work out. It's best to warm your muscles slightly before you stretch them. Walk or do some other light aerobic activity for a few minutes. Then start stretching.  When you stretch your muscles:  Do it slowly. Stretching is not about going fast or making sudden movements.  Don't push or bounce during a stretch.  Hold each stretch for at least 15 to 30 seconds, if you can. You should feel a stretch in the muscle, but not pain.  Breathe out as you do the stretch. Then breathe in as you hold the stretch. Don't hold your breath.  If you're worried about how more activity might affect your health, have a checkup before you start. Follow any special advice your doctor gives you for getting a smart start.  Where can you learn more?  Go to https://www.[employer]/patiented  Enter W332 in the search box to learn more about \"Learning About Being Physically Active.\"  Current as of: June 6, 2023               Content Version: 13.8    8753-6917 [employer].   Care instructions adapted under license by your healthcare professional. If you have questions about a medical condition or this instruction, always ask your healthcare professional. [employer] disclaims any warranty or liability for your use of this information.      Activities of Daily Living    Your Health Risk Assessment indicates you have difficulties with activities of daily living such as housework, bathing, preparing meals, taking medication, etc. Please make a follow up appointment for us to address this issue in more detail.  Your Health Risk Assessment indicates you feel you are not in good emotional health.    Recreation   Recreation is not limited to sports and team events. It includes any activity that provides relaxation, interest, enjoyment, and exercise. Recreation provides an outlet for " "physical, mental, and social energy. It can give a sense of worth and achievement. It can help you stay healthy.    Mental Exercise and Social Involvement  Mental and emotional health is as important as physical health. Keep in touch with friends and family. Stay as active as possible. Continue to learn and challenge yourself.   Things you can do to stay mentally active are:  Learn something new, like a foreign language or musical instrument.   Play SCRABBLE or do crossword puzzles. If you cannot find people to play these games with you at home, you can play them with others on your computer through the Internet.   Join a games club--anything from card games to chess or checkers or lawn bowling.   Start a new hobby.   Go back to school.   Volunteer.   Read.   Keep up with world events.     Patient Education   Personalized Prevention Plan  You are due for the preventive services outlined below.  Your care team is available to assist you in scheduling these services.  If you have already completed any of these items, please share that information with your care team to update in your medical record.  Health Maintenance Due   Topic Date Due     RSV VACCINE (Pregnancy & 60+) (1 - 1-dose 60+ series) Never done     Zoster (Shingles) Vaccine (1 of 2) 03/02/2010     Diptheria Tetanus Pertussis (DTAP/TDAP/TD) Vaccine (2 - Td or Tdap) 02/08/2022     Cholesterol Lab  11/09/2022     Learning About Being Physically Active  What is physical activity?     Being physically active means doing any kind of activity that gets your body moving.  The types of physical activity that can help you get fit and stay healthy include:  Aerobic or \"cardio\" activities. These make your heart beat faster and make you breathe harder, such as brisk walking, riding a bike, or running. They strengthen your heart and lungs and build up your endurance.  Strength training activities. These make your muscles work against, or \"resist,\" something. Examples " include lifting weights or doing push-ups. These activities help tone and strengthen your muscles and bones.  Stretches. These let you move your joints and muscles through their full range of motion. Stretching helps you be more flexible.  Reaching a balance between these three types of physical activity is important because each one contributes to your overall fitness.  What are the benefits of being active?  Being active is one of the best things you can do for your health. It helps you to:  Feel stronger and have more energy to do all the things you like to do.  Focus better at school or work.  Feel, think, and sleep better.  Reach and stay at a healthy weight.  Lose fat and build lean muscle.  Lower your risk for serious health problems, including diabetes, heart attack, high blood pressure, and some cancers.  Keep your heart, lungs, bones, muscles, and joints strong and healthy.  How can you make being active part of your life?  Start slowly. Make it your long-term goal to get at least 30 minutes of exercise on most days of the week. Walking is a good choice. You also may want to do other activities, such as running, swimming, cycling, or playing tennis or team sports.  Pick activities that you like--ones that make your heart beat faster, your muscles stronger, and your muscles and joints more flexible. If you find more than one thing you like doing, do them all. You don't have to do the same thing every day.  Get your heart pumping every day. Any activity that makes your heart beat faster and keeps it at that rate for a while counts.  Here are some great ways to get your heart beating faster:  Go for a brisk walk, run, or hike.  Go for a swim or bike ride.  Take an online exercise class or dance.  Play a game of touch football, basketball, or soccer.  Play tennis, pickleball, or racquetball.  Climb stairs.  Even some household chores can be aerobic. Just do them at a faster pace. Raking or mowing the lawn,  "sweeping the garage, and vacuuming and cleaning your home all can help get your heart rate up.  Strengthen your muscles during the week. You don't have to lift heavy weights or grow big, bulky muscles to get stronger. Doing a few simple activities that make your muscles work against, or \"resist,\" something can help you get stronger. Aim for at least twice a week.  For example, you can:  Do push-ups or sit-ups, which use your own body weight as resistance.  Lift weights or dumbbells or use stretch bands at home or in a gym or community center.  Stretch your muscles often. Stretching will help you as you become more active. It can help you stay flexible and loosen tight muscles. It can also help improve your balance and posture and can be a great way to relax.  Be sure to stretch the muscles you'll be using when you work out. It's best to warm your muscles slightly before you stretch them. Walk or do some other light aerobic activity for a few minutes. Then start stretching.  When you stretch your muscles:  Do it slowly. Stretching is not about going fast or making sudden movements.  Don't push or bounce during a stretch.  Hold each stretch for at least 15 to 30 seconds, if you can. You should feel a stretch in the muscle, but not pain.  Breathe out as you do the stretch. Then breathe in as you hold the stretch. Don't hold your breath.  If you're worried about how more activity might affect your health, have a checkup before you start. Follow any special advice your doctor gives you for getting a smart start.  Where can you learn more?  Go to https://www.[place].net/patiented  Enter W332 in the search box to learn more about \"Learning About Being Physically Active.\"  Current as of: June 6, 2023               Content Version: 13.8    7628-0334 [employer].   Care instructions adapted under license by your healthcare professional. If you have questions about a medical condition or this instruction, " always ask your healthcare professional. [employer] disclaims any warranty or liability for your use of this information.      Activities of Daily Living    Your Health Risk Assessment indicates you have difficulties with activities of daily living such as housework, bathing, preparing meals, taking medication, etc. Please make a follow up appointment for us to address this issue in more detail.  Your Health Risk Assessment indicates you feel you are not in good emotional health.    Recreation   Recreation is not limited to sports and team events. It includes any activity that provides relaxation, interest, enjoyment, and exercise. Recreation provides an outlet for physical, mental, and social energy. It can give a sense of worth and achievement. It can help you stay healthy.    Mental Exercise and Social Involvement  Mental and emotional health is as important as physical health. Keep in touch with friends and family. Stay as active as possible. Continue to learn and challenge yourself.   Things you can do to stay mentally active are:  Learn something new, like a foreign language or musical instrument.   Play SCRABBLE or do crossword puzzles. If you cannot find people to play these games with you at home, you can play them with others on your computer through the Internet.   Join a games club--anything from card games to chess or checkers or lawn bowling.   Start a new hobby.   Go back to school.   Volunteer.   Read.   Keep up with world events.

## 2024-01-26 LAB
ALBUMIN SERPL BCG-MCNC: 3.7 G/DL (ref 3.5–5.2)
ALP SERPL-CCNC: 137 U/L (ref 40–150)
ALT SERPL W P-5'-P-CCNC: 16 U/L (ref 0–50)
ANION GAP SERPL CALCULATED.3IONS-SCNC: 13 MMOL/L (ref 7–15)
AST SERPL W P-5'-P-CCNC: 19 U/L (ref 0–45)
BILIRUB SERPL-MCNC: 0.7 MG/DL
BUN SERPL-MCNC: 42.6 MG/DL (ref 8–23)
CALCIUM SERPL-MCNC: 10.5 MG/DL (ref 8.8–10.2)
CHLORIDE SERPL-SCNC: 105 MMOL/L (ref 98–107)
CHOLEST SERPL-MCNC: 192 MG/DL
CREAT SERPL-MCNC: 1.44 MG/DL (ref 0.51–0.95)
DEPRECATED HCO3 PLAS-SCNC: 23 MMOL/L (ref 22–29)
EGFRCR SERPLBLD CKD-EPI 2021: 37 ML/MIN/1.73M2
FASTING STATUS PATIENT QL REPORTED: NO
GLUCOSE SERPL-MCNC: 119 MG/DL (ref 70–99)
HDLC SERPL-MCNC: 69 MG/DL
LDLC SERPL CALC-MCNC: 107 MG/DL
NONHDLC SERPL-MCNC: 123 MG/DL
POTASSIUM SERPL-SCNC: 4.7 MMOL/L (ref 3.4–5.3)
PROT SERPL-MCNC: 6.8 G/DL (ref 6.4–8.3)
SODIUM SERPL-SCNC: 141 MMOL/L (ref 135–145)
TRIGL SERPL-MCNC: 82 MG/DL

## 2024-01-30 ENCOUNTER — TELEPHONE (OUTPATIENT)
Dept: FAMILY MEDICINE | Facility: CLINIC | Age: 79
End: 2024-01-30
Payer: COMMERCIAL

## 2024-01-30 NOTE — TELEPHONE ENCOUNTER
Forms/Letter Request    Type of form/letter: [facility]- Physician's orders for admission     Do we have the form/letter: Yes: Red folder placed on Pritesh Leija's desk       How would you like the form/letter returned: Fax : [phone]

## 2024-01-31 NOTE — TELEPHONE ENCOUNTER
I will address when I return to the office tomorrow.  I did sign a form for her when I saw her last week.  Is this the same form?

## 2024-01-31 NOTE — TELEPHONE ENCOUNTER
Britni, Director of Health Services at [address] [place] [address] calling to check up on orders/form, see below. She states pt's family is anxious to get started and is calling to check up on this. Routing to provider and team to address.    Iman ROBLERO RN  [facility]

## 2024-01-31 NOTE — RESULT ENCOUNTER NOTE
Elaina's labs show that she has slightly elevated cholesterol.  I would not suggest medication at this time due to her liver concerns.   She was shown to have reduced kidney function.  I am not clear is this is new or long standing and so we should monitor this every 6 months.  She should stay well hydrated, and avoid medications that can affect her kidneys like NSAIDs.   Her blood sugar was slightly elevated, we can recheck that again when we check her kidneys.     Please let me know if you have questions    Prtiesh Leija PA-C

## 2024-02-01 ENCOUNTER — MEDICAL CORRESPONDENCE (OUTPATIENT)
Dept: HEALTH INFORMATION MANAGEMENT | Facility: CLINIC | Age: 79
End: 2024-02-01
Payer: COMMERCIAL

## 2024-02-01 NOTE — TELEPHONE ENCOUNTER
Problem list printed out.   Patient med list AND [facility] patient med list printed out. - Which one?    Signed off forms and print outs needing signature placed in red folder, Pritesh Leija's Inbox.     Shani Harris on 2/1/2024 at 5:24 PM

## 2024-02-05 NOTE — TELEPHONE ENCOUNTER
Calling to check on status of forms.     SANYA Parks, [facility]  [facility]. Living     Phone: [phone]  Fax: [phone]    Forms picked up and signed by Pritesh Leija PA-C.   Forms faxed to [facility].   Forms faxed to [facility] and filed team 3.     Shani Harris on 2/5/2024 at 2:21 PM

## 2024-02-06 ENCOUNTER — TELEPHONE (OUTPATIENT)
Dept: FAMILY MEDICINE | Facility: CLINIC | Age: 79
End: 2024-02-06
Payer: COMMERCIAL

## 2024-02-06 NOTE — TELEPHONE ENCOUNTER
SANYA Delacruz from patient's assisted called and is needing clarification on a couple of patient's prescriptions.     What strength should patient be taking of Apoaequorin?  Should patient be using both the Alphagan and Combigan eyedrops?    Fax: [phone]    Cecilia PENN RN  [facility] Triage Team

## 2024-02-06 NOTE — TELEPHONE ENCOUNTER
I am not the prescriber. They would need to contact patient eye doctor for clarification.    Also, please clarify whether patient will have a provider at the [facility] with whom she is following or if I will need to follow

## 2024-02-07 ENCOUNTER — MEDICAL CORRESPONDENCE (OUTPATIENT)
Dept: HEALTH INFORMATION MANAGEMENT | Facility: CLINIC | Age: 79
End: 2024-02-07
Payer: COMMERCIAL

## 2024-02-07 NOTE — TELEPHONE ENCOUNTER
1) Writer relayed Provider's message to SANYA Beauchamp from [facility]. Quynh expressed understanding.    2) According to [facility] RN, patient does not have provider at [facility].  -Requesting that YOU follow patient.

## 2024-02-08 ENCOUNTER — TELEPHONE (OUTPATIENT)
Dept: FAMILY MEDICINE | Facility: CLINIC | Age: 79
End: 2024-02-08
Payer: COMMERCIAL

## 2024-02-08 NOTE — TELEPHONE ENCOUNTER
Forms/Letter Request    Type of form/letter: [employer]- Orders/ PCP to sign off on orders     Do we have the form/letter: Yes: Red folder placed on Pritesh Leija's desk     How would you like the form/letter returned: Fax : [phone]

## 2024-02-08 NOTE — TELEPHONE ENCOUNTER
All placed to [facility] to let them know the PCP Pritesh Leija will follow the patient.     Facility was also told to check with the patient to determine her eye provider . The eye provider will have to approve her eye drops.       Marcelle Osborn RN  -[facility]     Marcelle Osborn RN  -[facility]

## 2024-02-12 ENCOUNTER — DOCUMENTATION ONLY (OUTPATIENT)
Dept: OTHER | Facility: CLINIC | Age: 79
End: 2024-02-12
Payer: COMMERCIAL

## 2024-02-13 ENCOUNTER — DOCUMENTATION ONLY (OUTPATIENT)
Dept: GERIATRICS | Facility: CLINIC | Age: 79
End: 2024-02-13
Payer: COMMERCIAL

## 2024-02-14 ENCOUNTER — ASSISTED LIVING VISIT (OUTPATIENT)
Dept: GERIATRICS | Facility: CLINIC | Age: 79
End: 2024-02-14
Payer: COMMERCIAL

## 2024-02-14 DIAGNOSIS — K75.4 AUTOIMMUNE HEPATITIS (H): ICD-10-CM

## 2024-02-14 DIAGNOSIS — Z71.89 ADVANCED DIRECTIVES, COUNSELING/DISCUSSION: ICD-10-CM

## 2024-02-14 DIAGNOSIS — M15.9 OSTEOARTHRITIS OF MULTIPLE JOINTS, UNSPECIFIED OSTEOARTHRITIS TYPE: ICD-10-CM

## 2024-02-14 DIAGNOSIS — F03.B4 MODERATE DEMENTIA WITH ANXIETY, UNSPECIFIED DEMENTIA TYPE (H): Primary | ICD-10-CM

## 2024-02-14 DIAGNOSIS — I48.0 PAROXYSMAL A-FIB (H): ICD-10-CM

## 2024-02-14 DIAGNOSIS — F41.9 ANXIETY: ICD-10-CM

## 2024-02-14 DIAGNOSIS — F32.A DEPRESSION, UNSPECIFIED DEPRESSION TYPE: ICD-10-CM

## 2024-02-14 DIAGNOSIS — I10 HTN (HYPERTENSION), BENIGN: ICD-10-CM

## 2024-02-14 DIAGNOSIS — N18.31 STAGE 3A CHRONIC KIDNEY DISEASE (H): ICD-10-CM

## 2024-02-14 PROCEDURE — 99344 HOME/RES VST NEW MOD MDM 60: CPT | Performed by: NURSE PRACTITIONER

## 2024-02-14 RX ORDER — CITALOPRAM HYDROBROMIDE 10 MG/1
10 TABLET ORAL DAILY
Qty: 30 TABLET | Refills: 11 | Status: SHIPPED | OUTPATIENT
Start: 2024-02-14

## 2024-02-14 NOTE — LETTER
"    2/14/2024        RE: Elaina Winn  [address]  [place] [facility] [address]        [facility] GERIATRICS    PRIMARY CARE PROVIDER AND CLINIC:  CATY Kang CNP, [address] [facility] [address]  Chief Complaint   Patient presents with     [facility] Medical Record Number:  [id number]  Place of Service where encounter took place:  [facility] [269]    Elaina Winn  is a 78 year old  (1945), admitted to the above facility 1/2024 from home due to progressive dementia.     HPI:    Medical history significant for dementia, paroxsymal afib, hepatitis C, autoimmune hepatitis, CKD stage 3, HTN, osteoarthritis, glaucoma.   [place]. Unfortunately, her  had a stroke and is now at [facility]. She transferred to [place] 2/7/2024.     She is a poor historian. Her niece is present and I spoke with her daughter Tresa Stanton. Daughter reports family noticed cognitive decline ~ 8 yrs ago, worse over the past 3 yrs. She's having a difficult time adjusting to the facility. Anxious, depressed, not sleeping well, agitated at times. Staff reports she resists assistance and refuses meds at times.   Pleasant and talkative. Conversation is repetitive, \"I can't stay here forever.\" Acknowledges that her  is on hospice, but asks when he will return home. Denies feeling ill. Reports bilateral hip pain, but unable to provide much detail. Ambulates without a device. No falls.     CODE STATUS/ADVANCE DIRECTIVES DISCUSSION:  Full Code  CPR/Full code   ALLERGIES:   Allergies   Allergen Reactions     Adhesive Tape      Zocor [Simvastatin - High Dose]      Elevated LFTs      PAST MEDICAL HISTORY:   Past Medical History:   Diagnosis Date     Anxiety      Atrial fibrillation (H)      Chronic renal insufficiency      Dementia (H)      Glaucoma      Hepatitis     autoimmune     HTN (hypertension), benign      " Hyperlipidemia LDL goal < 130      Paroxysmal A-fib (H)       PAST SURGICAL HISTORY:   has a past surgical history that includes Hysterectomy; Dilation and curettage; tonsillectomy & adenoidectomy; Septoplasty; and S/p partial vaginectomy.  FAMILY HISTORY: family history includes Alzheimer Disease in her paternal uncle and paternal uncle; Alzheimer Disease (age of onset: 75) in her father; Cancer in her brother; Hypertension in her mother; Lipids in her brother.  SOCIAL HISTORY:   reports that she quit smoking about 34 years ago. Her smoking use included cigarettes. She has never used smokeless tobacco. She reports that she does not drink alcohol and does not use drugs.  Patient's living condition: lives in an assisted living facility  Previously lived at home with her , who is now at a hospice facility. Daughter Tresa Stanton is medical decision maker and lives in [facility]. Retired OR nurse.       No current facility-administered medications for this visit.     No current outpatient medications on file.     Facility-Administered Medications Ordered in Other Visits   Medication     amiodarone (CORDARONE) 1.8 mg/mL in sodium chloride 0.9% 500 mL ADULT STANDARD infusion     amiodarone (CORDARONE) 1.8 mg/mL in sodium chloride 0.9% 500 mL ADULT STANDARD infusion     bimatoprost (LUMIGAN) 0.01 % ophthalmic drops 1 drop     brimonidine-timolol (COMBIGAN) 0.2-0.5 % ophthalmic solution 1 drop     calcium carbonate (TUMS) chewable tablet 1,000 mg     citalopram (celeXA) tablet 10 mg     digoxin (LANOXIN) tablet 125 mcg     [Held by provider] diltiazem (CARDIZEM) 125 mg in sodium chloride 0.9 % 125 mL infusion     guaiFENesin (MUCINEX) 12 hr tablet 600 mg     heparin infusion 25,000 units in D5W 250 mL ANTICOAGULANT     ipratropium (ATROVENT) 0.02 % neb solution 0.5 mg     levalbuterol (XOPENEX) neb solution 1.25 mg     lidocaine (LMX4) cream     lidocaine 1 % 0.1-1 mL     menthol-zinc oxide (CALMOSEPTINE) 0.44-20.6  % ointment OINT     metoprolol (LOPRESSOR) injection 5 mg     metoprolol tartrate (LOPRESSOR) tablet 25 mg     mycophenolate (GENERIC EQUIVALENT) capsule 250 mg     nitroGLYcerin (NITROSTAT) sublingual tablet 0.4 mg     No anticoagulants IF patient has had acute trauma/surgery or recent intracranial, GI or urinary tract bleeding.      ondansetron (ZOFRAN ODT) ODT tab 4 mg    Or     ondansetron (ZOFRAN) injection 4 mg     predniSONE (DELTASONE) tablet 20 mg     senna-docusate (SENOKOT-S/PERICOLACE) 8.6-50 MG per tablet 1 tablet    Or     senna-docusate (SENOKOT-S/PERICOLACE) 8.6-50 MG per tablet 2 tablet     sodium chloride (PF) 0.9% PF flush 3 mL     sodium chloride (PF) 0.9% PF flush 3 mL       ROS:  Limited due to dementia. Positives as noted under HPI     Vitals:  BP (!) 166/104   Wt 60.9 kg (134 lb 4.8 oz)   BMI 24.01 kg/m    Exam:  GENERAL APPEARANCE:  Alert, in no distress  ENT:  [place], oropharynx clear  EYES:  conjunctiva and lids normal  NECK:  no adenopathy, no thyromegaly  RESP:  lungs clear to auscultation   CV:  irregular rhythm, AP 80, no murmur, no edema  ABDOMEN:  soft, non-tender, no distension  M/S:   gait steady. HUMPHRIES with good strength. No acute joint inflammation   SKIN:  no rashes or open areas  PSYCH:  oriented to self, niece, situation, insight and judgement impaired, memory impaired , affect and mood normal    Lab/Diagnostic data:  Recent labs in [facility] reviewed by me today.     ASSESSMENT / PLAN:  (F03.B4) Moderate dementia with anxiety, unspecified dementia type (H)  (primary encounter diagnosis)  (F41.9) Anxiety   (F32.A) Depression, unspecified depression type  Comment: progressive disease, now requiring a higher level of care. Having difficulty adjusting to the facility and her 's situation. Will likely require Memory Care in the near future   Plan: start citalopram 10 mg daily-potential benefits and side effects discussed with daughter. Resume Prevagen per daughter's request. AL  staff assistance with cares, meals, activities, med admin.   Discussed with staff.     (I48.0) Paroxysmal A-fib (H)  Comment: rhythm irregular, rate controlled at 80. Not anticoagulated due to liver disease  Most recent ECHO is from 2017.   Plan: continue metoprolol 12.5 mg bid.     (I10) HTN (hypertension), benign  Comment: recent BPs: 105/85, 132/68  Plan: continue metoprolol for rate control. Avoid hypotension due to advanced age and fall risk     (N18.31) Stage 3a chronic kidney disease (H)  Comment: creatinine 1.44 on 1/25/2024  Plan: follow BMP. Avoid nephrotoxins. Renal dose meds     (M15.9) Osteoarthritis of multiple joints, unspecified osteoarthritis type  Comment: no significant pain issues   Plan: monitor. Consider lidocaine patch or diclofenac gel is consistent hip pain     (K75.4) Autoimmune hepatitis (H)  Comment: history of hepatitis C. No acute symptoms.   LFTs normal 1/25/2024  Plan: continue mycophenolate. Follow up with [facility] per usual schedule.     (Z71.89) Advanced directives, counseling/discussion  Comment: daughter confirms Full Code      Electronically signed by:  CATY Kang CNP                     Sincerely,        CATY Kang CNP

## 2024-02-14 NOTE — PROGRESS NOTES
"[facility] GERIATRICS    PRIMARY CARE PROVIDER AND CLINIC:  Shlomo Munguia, CATY CNP, [address] [facility] [address]  Chief Complaint   Patient presents with    [facility] Medical Record Number:  [id number]  Place of Service where encounter took place:  [facility] [269]    Elaina Winn  is a 78 year old  (1945), admitted to the above facility 1/2024 from home due to progressive dementia.     HPI:    Medical history significant for dementia, paroxsymal afib, hepatitis C, autoimmune hepatitis, CKD stage 3, HTN, osteoarthritis, glaucoma.   [place]. Unfortunately, her  had a stroke and is now at [facility]. She transferred to [place] 2/7/2024.     She is a poor historian. Her niece is present and I spoke with her daughter Tresa Stanton. Daughter reports family noticed cognitive decline ~ 8 yrs ago, worse over the past 3 yrs. She's having a difficult time adjusting to the facility. Anxious, depressed, not sleeping well, agitated at times. Staff reports she resists assistance and refuses meds at times.   Pleasant and talkative. Conversation is repetitive, \"I can't stay here forever.\" Acknowledges that her  is on hospice, but asks when he will return home. Denies feeling ill. Reports bilateral hip pain, but unable to provide much detail. Ambulates without a device. No falls.     CODE STATUS/ADVANCE DIRECTIVES DISCUSSION:  Full Code  CPR/Full code   ALLERGIES:   Allergies   Allergen Reactions    Adhesive Tape     Zocor [Simvastatin - High Dose]      Elevated LFTs      PAST MEDICAL HISTORY:   Past Medical History:   Diagnosis Date    Anxiety     Atrial fibrillation (H)     Chronic renal insufficiency     Dementia (H)     Glaucoma     Hepatitis     autoimmune    HTN (hypertension), benign     Hyperlipidemia LDL goal < 130     Paroxysmal A-fib (H)       PAST SURGICAL HISTORY:   has a past surgical " history that includes Hysterectomy; Dilation and curettage; tonsillectomy & adenoidectomy; Septoplasty; and S/p partial vaginectomy.  FAMILY HISTORY: family history includes Alzheimer Disease in her paternal uncle and paternal uncle; Alzheimer Disease (age of onset: 75) in her father; Cancer in her brother; Hypertension in her mother; Lipids in her brother.  SOCIAL HISTORY:   reports that she quit smoking about 34 years ago. Her smoking use included cigarettes. She has never used smokeless tobacco. She reports that she does not drink alcohol and does not use drugs.  Patient's living condition: lives in an assisted living facility  Previously lived at home with her , who is now at a hospice facility. Daughter Tresa Stanton is medical decision maker and lives in [facility]. Retired OR nurse.       No current facility-administered medications for this visit.     No current outpatient medications on file.     Facility-Administered Medications Ordered in Other Visits   Medication    amiodarone (CORDARONE) 1.8 mg/mL in sodium chloride 0.9% 500 mL ADULT STANDARD infusion    amiodarone (CORDARONE) 1.8 mg/mL in sodium chloride 0.9% 500 mL ADULT STANDARD infusion    bimatoprost (LUMIGAN) 0.01 % ophthalmic drops 1 drop    brimonidine-timolol (COMBIGAN) 0.2-0.5 % ophthalmic solution 1 drop    calcium carbonate (TUMS) chewable tablet 1,000 mg    citalopram (celeXA) tablet 10 mg    digoxin (LANOXIN) tablet 125 mcg    [Held by provider] diltiazem (CARDIZEM) 125 mg in sodium chloride 0.9 % 125 mL infusion    guaiFENesin (MUCINEX) 12 hr tablet 600 mg    heparin infusion 25,000 units in D5W 250 mL ANTICOAGULANT    ipratropium (ATROVENT) 0.02 % neb solution 0.5 mg    levalbuterol (XOPENEX) neb solution 1.25 mg    lidocaine (LMX4) cream    lidocaine 1 % 0.1-1 mL    menthol-zinc oxide (CALMOSEPTINE) 0.44-20.6 % ointment OINT    metoprolol (LOPRESSOR) injection 5 mg    metoprolol tartrate (LOPRESSOR) tablet 25 mg    mycophenolate  (GENERIC EQUIVALENT) capsule 250 mg    nitroGLYcerin (NITROSTAT) sublingual tablet 0.4 mg    No anticoagulants IF patient has had acute trauma/surgery or recent intracranial, GI or urinary tract bleeding.     ondansetron (ZOFRAN ODT) ODT tab 4 mg    Or    ondansetron (ZOFRAN) injection 4 mg    predniSONE (DELTASONE) tablet 20 mg    senna-docusate (SENOKOT-S/PERICOLACE) 8.6-50 MG per tablet 1 tablet    Or    senna-docusate (SENOKOT-S/PERICOLACE) 8.6-50 MG per tablet 2 tablet    sodium chloride (PF) 0.9% PF flush 3 mL    sodium chloride (PF) 0.9% PF flush 3 mL       ROS:  Limited due to dementia. Positives as noted under HPI     Vitals:  BP (!) 166/104   Wt 60.9 kg (134 lb 4.8 oz)   BMI 24.01 kg/m    Exam:  GENERAL APPEARANCE:  Alert, in no distress  ENT:  [place], oropharynx clear  EYES:  conjunctiva and lids normal  NECK:  no adenopathy, no thyromegaly  RESP:  lungs clear to auscultation   CV:  irregular rhythm, AP 80, no murmur, no edema  ABDOMEN:  soft, non-tender, no distension  M/S:   gait steady. HUMPHRIES with good strength. No acute joint inflammation   SKIN:  no rashes or open areas  PSYCH:  oriented to self, niece, situation, insight and judgement impaired, memory impaired , affect and mood normal    Lab/Diagnostic data:  Recent labs in [facility] reviewed by me today.     ASSESSMENT / PLAN:  (F03.B4) Moderate dementia with anxiety, unspecified dementia type (H)  (primary encounter diagnosis)  (F41.9) Anxiety   (F32.A) Depression, unspecified depression type  Comment: progressive disease, now requiring a higher level of care. Having difficulty adjusting to the facility and her 's situation. Will likely require Memory Care in the near future   Plan: start citalopram 10 mg daily-potential benefits and side effects discussed with daughter. Resume Prevagen per daughter's request. AL staff assistance with cares, meals, activities, med admin.   Discussed with staff.     (I48.0) Paroxysmal A-fib (H)  Comment: rhythm  irregular, rate controlled at 80. Not anticoagulated due to liver disease  Most recent ECHO is from 2017.   Plan: continue metoprolol 12.5 mg bid.     (I10) HTN (hypertension), benign  Comment: recent BPs: 105/85, 132/68  Plan: continue metoprolol for rate control. Avoid hypotension due to advanced age and fall risk     (N18.31) Stage 3a chronic kidney disease (H)  Comment: creatinine 1.44 on 1/25/2024  Plan: follow BMP. Avoid nephrotoxins. Renal dose meds     (M15.9) Osteoarthritis of multiple joints, unspecified osteoarthritis type  Comment: no significant pain issues   Plan: monitor. Consider lidocaine patch or diclofenac gel is consistent hip pain     (K75.4) Autoimmune hepatitis (H)  Comment: history of hepatitis C. No acute symptoms.   LFTs normal 1/25/2024  Plan: continue mycophenolate. Follow up with [facility] per usual schedule.     (Z71.89) Advanced directives, counseling/discussion  Comment: daughter confirms Full Code      Electronically signed by:  CATY Kang CNP

## 2024-02-16 ENCOUNTER — TELEPHONE (OUTPATIENT)
Dept: GERIATRICS | Facility: CLINIC | Age: 79
End: 2024-02-16
Payer: COMMERCIAL

## 2024-02-16 ENCOUNTER — APPOINTMENT (OUTPATIENT)
Dept: GENERAL RADIOLOGY | Facility: CLINIC | Age: 79
DRG: 242 | End: 2024-02-16
Attending: EMERGENCY MEDICINE
Payer: COMMERCIAL

## 2024-02-16 ENCOUNTER — HOSPITAL ENCOUNTER (INPATIENT)
Facility: CLINIC | Age: 79
LOS: 15 days | Discharge: NURSING, REHAB OR LONG-TERM CARE | DRG: 242 | End: 2024-03-02
Attending: EMERGENCY MEDICINE | Admitting: HOSPITALIST
Payer: COMMERCIAL

## 2024-02-16 DIAGNOSIS — I42.9 SECONDARY CARDIOMYOPATHY (H): ICD-10-CM

## 2024-02-16 DIAGNOSIS — I24.89 DEMAND ISCHEMIA (H): ICD-10-CM

## 2024-02-16 DIAGNOSIS — L30.9 DERMATITIS: Primary | ICD-10-CM

## 2024-02-16 DIAGNOSIS — I48.91 ATRIAL FIBRILLATION WITH RVR (H): ICD-10-CM

## 2024-02-16 DIAGNOSIS — I48.91 ATRIAL FIBRILLATION WITH RAPID VENTRICULAR RESPONSE (H): Primary | ICD-10-CM

## 2024-02-16 DIAGNOSIS — J18.9 PNEUMONIA DUE TO INFECTIOUS ORGANISM, UNSPECIFIED LATERALITY, UNSPECIFIED PART OF LUNG: ICD-10-CM

## 2024-02-16 LAB
ALBUMIN SERPL BCG-MCNC: 3.5 G/DL (ref 3.5–5.2)
ALP SERPL-CCNC: 128 U/L (ref 40–150)
ALT SERPL W P-5'-P-CCNC: 21 U/L (ref 0–50)
ANION GAP SERPL CALCULATED.3IONS-SCNC: 13 MMOL/L (ref 7–15)
AST SERPL W P-5'-P-CCNC: 30 U/L (ref 0–45)
BASOPHILS # BLD AUTO: 0.1 10E3/UL (ref 0–0.2)
BASOPHILS NFR BLD AUTO: 1 %
BILIRUB SERPL-MCNC: 1.1 MG/DL
BUN SERPL-MCNC: 32.4 MG/DL (ref 8–23)
CALCIUM SERPL-MCNC: 9.9 MG/DL (ref 8.8–10.2)
CHLORIDE SERPL-SCNC: 103 MMOL/L (ref 98–107)
CREAT SERPL-MCNC: 1.24 MG/DL (ref 0.51–0.95)
DEPRECATED HCO3 PLAS-SCNC: 19 MMOL/L (ref 22–29)
EGFRCR SERPLBLD CKD-EPI 2021: 44 ML/MIN/1.73M2
EOSINOPHIL # BLD AUTO: 0 10E3/UL (ref 0–0.7)
EOSINOPHIL NFR BLD AUTO: 0 %
ERYTHROCYTE [DISTWIDTH] IN BLOOD BY AUTOMATED COUNT: 15.5 % (ref 10–15)
GLUCOSE SERPL-MCNC: 96 MG/DL (ref 70–99)
HCT VFR BLD AUTO: 41.5 % (ref 35–47)
HGB BLD-MCNC: 13 G/DL (ref 11.7–15.7)
HOLD SPECIMEN: NORMAL
IMM GRANULOCYTES # BLD: 0 10E3/UL
IMM GRANULOCYTES NFR BLD: 0 %
LYMPHOCYTES # BLD AUTO: 0.6 10E3/UL (ref 0.8–5.3)
LYMPHOCYTES NFR BLD AUTO: 9 %
MCH RBC QN AUTO: 29.1 PG (ref 26.5–33)
MCHC RBC AUTO-ENTMCNC: 31.3 G/DL (ref 31.5–36.5)
MCV RBC AUTO: 93 FL (ref 78–100)
MONOCYTES # BLD AUTO: 0.7 10E3/UL (ref 0–1.3)
MONOCYTES NFR BLD AUTO: 10 %
NEUTROPHILS # BLD AUTO: 5.3 10E3/UL (ref 1.6–8.3)
NEUTROPHILS NFR BLD AUTO: 80 %
NRBC # BLD AUTO: 0 10E3/UL
NRBC BLD AUTO-RTO: 0 /100
PLATELET # BLD AUTO: 226 10E3/UL (ref 150–450)
POTASSIUM SERPL-SCNC: 4.7 MMOL/L (ref 3.4–5.3)
PROCALCITONIN SERPL IA-MCNC: 0.08 NG/ML
PROT SERPL-MCNC: 6.4 G/DL (ref 6.4–8.3)
RBC # BLD AUTO: 4.46 10E6/UL (ref 3.8–5.2)
SODIUM SERPL-SCNC: 135 MMOL/L (ref 135–145)
TROPONIN T SERPL HS-MCNC: 200 NG/L
TROPONIN T SERPL HS-MCNC: 219 NG/L
WBC # BLD AUTO: 6.6 10E3/UL (ref 4–11)

## 2024-02-16 PROCEDURE — 258N000003 HC RX IP 258 OP 636: Performed by: EMERGENCY MEDICINE

## 2024-02-16 PROCEDURE — 84145 PROCALCITONIN (PCT): CPT | Performed by: HOSPITALIST

## 2024-02-16 PROCEDURE — 99291 CRITICAL CARE FIRST HOUR: CPT | Mod: 25

## 2024-02-16 PROCEDURE — 71045 X-RAY EXAM CHEST 1 VIEW: CPT

## 2024-02-16 PROCEDURE — 83880 ASSAY OF NATRIURETIC PEPTIDE: CPT | Performed by: HOSPITALIST

## 2024-02-16 PROCEDURE — 85379 FIBRIN DEGRADATION QUANT: CPT | Performed by: HOSPITALIST

## 2024-02-16 PROCEDURE — 93005 ELECTROCARDIOGRAM TRACING: CPT

## 2024-02-16 PROCEDURE — 36415 COLL VENOUS BLD VENIPUNCTURE: CPT | Performed by: EMERGENCY MEDICINE

## 2024-02-16 PROCEDURE — 84484 ASSAY OF TROPONIN QUANT: CPT | Performed by: EMERGENCY MEDICINE

## 2024-02-16 PROCEDURE — 83735 ASSAY OF MAGNESIUM: CPT | Performed by: HOSPITALIST

## 2024-02-16 PROCEDURE — 210N000002 HC R&B HEART CARE

## 2024-02-16 PROCEDURE — 80053 COMPREHEN METABOLIC PANEL: CPT | Performed by: EMERGENCY MEDICINE

## 2024-02-16 PROCEDURE — 96376 TX/PRO/DX INJ SAME DRUG ADON: CPT

## 2024-02-16 PROCEDURE — 84443 ASSAY THYROID STIM HORMONE: CPT | Performed by: HOSPITALIST

## 2024-02-16 PROCEDURE — 96365 THER/PROPH/DIAG IV INF INIT: CPT

## 2024-02-16 PROCEDURE — 250N000009 HC RX 250: Performed by: EMERGENCY MEDICINE

## 2024-02-16 PROCEDURE — 99223 1ST HOSP IP/OBS HIGH 75: CPT | Performed by: HOSPITALIST

## 2024-02-16 PROCEDURE — 250N000011 HC RX IP 250 OP 636: Performed by: EMERGENCY MEDICINE

## 2024-02-16 PROCEDURE — 96366 THER/PROPH/DIAG IV INF ADDON: CPT

## 2024-02-16 PROCEDURE — 85025 COMPLETE CBC W/AUTO DIFF WBC: CPT | Performed by: EMERGENCY MEDICINE

## 2024-02-16 RX ORDER — DILTIAZEM HYDROCHLORIDE 5 MG/ML
10 INJECTION INTRAVENOUS ONCE
Status: COMPLETED | OUTPATIENT
Start: 2024-02-16 | End: 2024-02-16

## 2024-02-16 RX ORDER — MICONAZOLE NITRATE 20 MG/G
CREAM TOPICAL 2 TIMES DAILY
Qty: 198 G | Refills: 11 | Status: SHIPPED | OUTPATIENT
Start: 2024-02-16 | End: 2024-03-19 | Stop reason: DRUGHIGH

## 2024-02-16 RX ORDER — MYCOPHENOLATE MOFETIL 250 MG/1
250 CAPSULE ORAL 2 TIMES DAILY
COMMUNITY

## 2024-02-16 RX ADMIN — DILTIAZEM HYDROCHLORIDE 10 MG: 5 INJECTION INTRAVENOUS at 19:52

## 2024-02-16 RX ADMIN — DILTIAZEM HYDROCHLORIDE 5 MG/HR: 5 INJECTION INTRAVENOUS at 20:03

## 2024-02-16 ASSESSMENT — ACTIVITIES OF DAILY LIVING (ADL)
ADLS_ACUITY_SCORE: 36
ADLS_ACUITY_SCORE: 36

## 2024-02-16 NOTE — LETTER
24      To Whom it may concern:    This is to certify that Tresa Stanton' mother Elaina Winn has been admitted to [facility] since 24 and is currently in Hospital. I note that Tresa is arranging for  of her dad who recently passed away. She is also substituted decision-maker for her mother (who has dementia) and will be helping to transition her mother to TCU (transitional care unit) from the hospital.      Sincerely,        Jaxson Morocho MD  Hospitalist  [facility]  [phone]

## 2024-02-16 NOTE — TELEPHONE ENCOUNTER
Elaina Winn    1945    Orders 2024:  Jessica antifungal cream, apply thin layer to buttocks and sacral area bid for dermatitis   Sent to pharmacy     CATY Kang CNP on 2024 at 4:53 PM

## 2024-02-17 ENCOUNTER — APPOINTMENT (OUTPATIENT)
Dept: CARDIOLOGY | Facility: CLINIC | Age: 79
DRG: 242 | End: 2024-02-17
Attending: HOSPITALIST
Payer: COMMERCIAL

## 2024-02-17 ENCOUNTER — APPOINTMENT (OUTPATIENT)
Dept: CT IMAGING | Facility: CLINIC | Age: 79
DRG: 242 | End: 2024-02-17
Attending: HOSPITALIST
Payer: COMMERCIAL

## 2024-02-17 LAB
ANION GAP SERPL CALCULATED.3IONS-SCNC: 18 MMOL/L (ref 7–15)
ATRIAL RATE - MUSE: NORMAL BPM
BUN SERPL-MCNC: 35.1 MG/DL (ref 8–23)
CALCIUM SERPL-MCNC: 10 MG/DL (ref 8.8–10.2)
CHLORIDE SERPL-SCNC: 98 MMOL/L (ref 98–107)
CREAT SERPL-MCNC: 1.22 MG/DL (ref 0.51–0.95)
D DIMER PPP FEU-MCNC: 0.91 UG/ML FEU (ref 0–0.5)
DEPRECATED HCO3 PLAS-SCNC: 19 MMOL/L (ref 22–29)
DIASTOLIC BLOOD PRESSURE - MUSE: NORMAL MMHG
EGFRCR SERPLBLD CKD-EPI 2021: 45 ML/MIN/1.73M2
ERYTHROCYTE [DISTWIDTH] IN BLOOD BY AUTOMATED COUNT: 15.6 % (ref 10–15)
ERYTHROCYTE [DISTWIDTH] IN BLOOD BY AUTOMATED COUNT: 15.6 % (ref 10–15)
GLUCOSE SERPL-MCNC: 108 MG/DL (ref 70–99)
HCT VFR BLD AUTO: 41.4 % (ref 35–47)
HCT VFR BLD AUTO: 42.8 % (ref 35–47)
HGB BLD-MCNC: 13 G/DL (ref 11.7–15.7)
HGB BLD-MCNC: 13.3 G/DL (ref 11.7–15.7)
HOLD SPECIMEN: NORMAL
INTERPRETATION ECG - MUSE: NORMAL
LVEF ECHO: NORMAL
MAGNESIUM SERPL-MCNC: 2 MG/DL (ref 1.7–2.3)
MCH RBC QN AUTO: 29.3 PG (ref 26.5–33)
MCH RBC QN AUTO: 29.7 PG (ref 26.5–33)
MCHC RBC AUTO-ENTMCNC: 31.1 G/DL (ref 31.5–36.5)
MCHC RBC AUTO-ENTMCNC: 31.4 G/DL (ref 31.5–36.5)
MCV RBC AUTO: 93 FL (ref 78–100)
MCV RBC AUTO: 96 FL (ref 78–100)
NT-PROBNP SERPL-MCNC: ABNORMAL PG/ML (ref 0–1800)
P AXIS - MUSE: NORMAL DEGREES
PLATELET # BLD AUTO: 230 10E3/UL (ref 150–450)
PLATELET # BLD AUTO: 250 10E3/UL (ref 150–450)
POTASSIUM SERPL-SCNC: 4.9 MMOL/L (ref 3.4–5.3)
PR INTERVAL - MUSE: NORMAL MS
QRS DURATION - MUSE: 72 MS
QT - MUSE: 294 MS
QTC - MUSE: 479 MS
R AXIS - MUSE: -22 DEGREES
RBC # BLD AUTO: 4.44 10E6/UL (ref 3.8–5.2)
RBC # BLD AUTO: 4.48 10E6/UL (ref 3.8–5.2)
SODIUM SERPL-SCNC: 135 MMOL/L (ref 135–145)
SYSTOLIC BLOOD PRESSURE - MUSE: NORMAL MMHG
T AXIS - MUSE: 14 DEGREES
TSH SERPL DL<=0.005 MIU/L-ACNC: 2.43 UIU/ML (ref 0.3–4.2)
UFH PPP CHRO-ACNC: >1.1 IU/ML
UFH PPP CHRO-ACNC: >1.1 IU/ML
VENTRICULAR RATE- MUSE: 160 BPM
WBC # BLD AUTO: 6.7 10E3/UL (ref 4–11)
WBC # BLD AUTO: 7.3 10E3/UL (ref 4–11)

## 2024-02-17 PROCEDURE — 85027 COMPLETE CBC AUTOMATED: CPT | Performed by: STUDENT IN AN ORGANIZED HEALTH CARE EDUCATION/TRAINING PROGRAM

## 2024-02-17 PROCEDURE — 99222 1ST HOSP IP/OBS MODERATE 55: CPT | Mod: 25 | Performed by: INTERNAL MEDICINE

## 2024-02-17 PROCEDURE — 94640 AIRWAY INHALATION TREATMENT: CPT | Mod: 76

## 2024-02-17 PROCEDURE — 250N000011 HC RX IP 250 OP 636: Performed by: STUDENT IN AN ORGANIZED HEALTH CARE EDUCATION/TRAINING PROGRAM

## 2024-02-17 PROCEDURE — 999N000157 HC STATISTIC RCP TIME EA 10 MIN

## 2024-02-17 PROCEDURE — 250N000009 HC RX 250: Performed by: STUDENT IN AN ORGANIZED HEALTH CARE EDUCATION/TRAINING PROGRAM

## 2024-02-17 PROCEDURE — 36415 COLL VENOUS BLD VENIPUNCTURE: CPT | Performed by: HOSPITALIST

## 2024-02-17 PROCEDURE — 250N000009 HC RX 250: Performed by: HOSPITALIST

## 2024-02-17 PROCEDURE — G0463 HOSPITAL OUTPT CLINIC VISIT: HCPCS | Mod: 25

## 2024-02-17 PROCEDURE — 71275 CT ANGIOGRAPHY CHEST: CPT

## 2024-02-17 PROCEDURE — 250N000011 HC RX IP 250 OP 636: Performed by: HOSPITALIST

## 2024-02-17 PROCEDURE — 250N000012 HC RX MED GY IP 250 OP 636 PS 637: Performed by: STUDENT IN AN ORGANIZED HEALTH CARE EDUCATION/TRAINING PROGRAM

## 2024-02-17 PROCEDURE — 85520 HEPARIN ASSAY: CPT | Performed by: STUDENT IN AN ORGANIZED HEALTH CARE EDUCATION/TRAINING PROGRAM

## 2024-02-17 PROCEDURE — 80048 BASIC METABOLIC PNL TOTAL CA: CPT | Performed by: HOSPITALIST

## 2024-02-17 PROCEDURE — 250N000013 HC RX MED GY IP 250 OP 250 PS 637: Performed by: STUDENT IN AN ORGANIZED HEALTH CARE EDUCATION/TRAINING PROGRAM

## 2024-02-17 PROCEDURE — 99232 SBSQ HOSP IP/OBS MODERATE 35: CPT | Performed by: STUDENT IN AN ORGANIZED HEALTH CARE EDUCATION/TRAINING PROGRAM

## 2024-02-17 PROCEDURE — 93306 TTE W/DOPPLER COMPLETE: CPT

## 2024-02-17 PROCEDURE — 94640 AIRWAY INHALATION TREATMENT: CPT

## 2024-02-17 PROCEDURE — 250N000013 HC RX MED GY IP 250 OP 250 PS 637: Performed by: HOSPITALIST

## 2024-02-17 PROCEDURE — 36415 COLL VENOUS BLD VENIPUNCTURE: CPT | Performed by: STUDENT IN AN ORGANIZED HEALTH CARE EDUCATION/TRAINING PROGRAM

## 2024-02-17 PROCEDURE — 250N000012 HC RX MED GY IP 250 OP 636 PS 637: Performed by: HOSPITALIST

## 2024-02-17 PROCEDURE — 210N000002 HC R&B HEART CARE

## 2024-02-17 PROCEDURE — 93306 TTE W/DOPPLER COMPLETE: CPT | Mod: 26 | Performed by: INTERNAL MEDICINE

## 2024-02-17 PROCEDURE — 85027 COMPLETE CBC AUTOMATED: CPT | Performed by: HOSPITALIST

## 2024-02-17 RX ORDER — MYCOPHENOLATE MOFETIL 250 MG/1
250 CAPSULE ORAL
Status: DISCONTINUED | OUTPATIENT
Start: 2024-02-17 | End: 2024-03-02 | Stop reason: HOSPADM

## 2024-02-17 RX ORDER — CITALOPRAM HYDROBROMIDE 10 MG/1
10 TABLET ORAL DAILY
Status: DISCONTINUED | OUTPATIENT
Start: 2024-02-17 | End: 2024-03-02 | Stop reason: HOSPADM

## 2024-02-17 RX ORDER — AMOXICILLIN 250 MG
2 CAPSULE ORAL 2 TIMES DAILY PRN
Status: DISCONTINUED | OUTPATIENT
Start: 2024-02-17 | End: 2024-03-02 | Stop reason: HOSPADM

## 2024-02-17 RX ORDER — METOPROLOL TARTRATE 1 MG/ML
5 INJECTION, SOLUTION INTRAVENOUS EVERY 5 MIN PRN
Status: DISCONTINUED | OUTPATIENT
Start: 2024-02-17 | End: 2024-02-24

## 2024-02-17 RX ORDER — FUROSEMIDE 10 MG/ML
20 INJECTION INTRAMUSCULAR; INTRAVENOUS ONCE
Status: COMPLETED | OUTPATIENT
Start: 2024-02-17 | End: 2024-02-17

## 2024-02-17 RX ORDER — LEVALBUTEROL INHALATION SOLUTION 1.25 MG/3ML
1.25 SOLUTION RESPIRATORY (INHALATION) EVERY 4 HOURS PRN
Status: DISCONTINUED | OUTPATIENT
Start: 2024-02-17 | End: 2024-03-02 | Stop reason: HOSPADM

## 2024-02-17 RX ORDER — PREDNISONE 20 MG/1
20 TABLET ORAL DAILY
Status: DISCONTINUED | OUTPATIENT
Start: 2024-02-18 | End: 2024-02-20

## 2024-02-17 RX ORDER — METOPROLOL TARTRATE 25 MG/1
25 TABLET, FILM COATED ORAL ONCE
Status: COMPLETED | OUTPATIENT
Start: 2024-02-17 | End: 2024-02-17

## 2024-02-17 RX ORDER — ONDANSETRON 4 MG/1
4 TABLET, ORALLY DISINTEGRATING ORAL EVERY 6 HOURS PRN
Status: DISCONTINUED | OUTPATIENT
Start: 2024-02-17 | End: 2024-03-02 | Stop reason: HOSPADM

## 2024-02-17 RX ORDER — NITROGLYCERIN 0.4 MG/1
0.4 TABLET SUBLINGUAL EVERY 5 MIN PRN
Status: DISCONTINUED | OUTPATIENT
Start: 2024-02-17 | End: 2024-02-21

## 2024-02-17 RX ORDER — ENOXAPARIN SODIUM 100 MG/ML
40 INJECTION SUBCUTANEOUS EVERY 24 HOURS
Status: DISCONTINUED | OUTPATIENT
Start: 2024-02-17 | End: 2024-02-17

## 2024-02-17 RX ORDER — AMOXICILLIN 250 MG
1 CAPSULE ORAL 2 TIMES DAILY PRN
Status: DISCONTINUED | OUTPATIENT
Start: 2024-02-17 | End: 2024-03-02 | Stop reason: HOSPADM

## 2024-02-17 RX ORDER — ONDANSETRON 2 MG/ML
4 INJECTION INTRAMUSCULAR; INTRAVENOUS EVERY 6 HOURS PRN
Status: DISCONTINUED | OUTPATIENT
Start: 2024-02-17 | End: 2024-03-02 | Stop reason: HOSPADM

## 2024-02-17 RX ORDER — PREDNISONE 20 MG/1
40 TABLET ORAL DAILY
Status: DISCONTINUED | OUTPATIENT
Start: 2024-02-17 | End: 2024-02-17

## 2024-02-17 RX ORDER — CALCIUM CARBONATE 500 MG/1
1000 TABLET, CHEWABLE ORAL 4 TIMES DAILY PRN
Status: DISCONTINUED | OUTPATIENT
Start: 2024-02-17 | End: 2024-03-02 | Stop reason: HOSPADM

## 2024-02-17 RX ORDER — METOPROLOL TARTRATE 25 MG/1
25 TABLET, FILM COATED ORAL 2 TIMES DAILY
Status: DISCONTINUED | OUTPATIENT
Start: 2024-02-17 | End: 2024-02-18

## 2024-02-17 RX ORDER — HEPARIN SODIUM 10000 [USP'U]/100ML
0-5000 INJECTION, SOLUTION INTRAVENOUS CONTINUOUS
Status: DISCONTINUED | OUTPATIENT
Start: 2024-02-17 | End: 2024-02-20

## 2024-02-17 RX ORDER — IOPAMIDOL 755 MG/ML
57 INJECTION, SOLUTION INTRAVASCULAR ONCE
Status: COMPLETED | OUTPATIENT
Start: 2024-02-17 | End: 2024-02-17

## 2024-02-17 RX ORDER — BRIMONIDINE TARTRATE AND TIMOLOL MALEATE 2; 5 MG/ML; MG/ML
1 SOLUTION OPHTHALMIC 2 TIMES DAILY
Status: DISCONTINUED | OUTPATIENT
Start: 2024-02-17 | End: 2024-03-02 | Stop reason: HOSPADM

## 2024-02-17 RX ORDER — LIDOCAINE 40 MG/G
CREAM TOPICAL
Status: DISCONTINUED | OUTPATIENT
Start: 2024-02-17 | End: 2024-02-17

## 2024-02-17 RX ORDER — LIDOCAINE 40 MG/G
CREAM TOPICAL
Status: DISCONTINUED | OUTPATIENT
Start: 2024-02-17 | End: 2024-02-21

## 2024-02-17 RX ADMIN — ENOXAPARIN SODIUM 40 MG: 40 INJECTION SUBCUTANEOUS at 09:43

## 2024-02-17 RX ADMIN — IPRATROPIUM BROMIDE 0.5 MG: 0.5 SOLUTION RESPIRATORY (INHALATION) at 08:33

## 2024-02-17 RX ADMIN — ANORECTAL OINTMENT: 15.7; .44; 24; 20.6 OINTMENT TOPICAL at 20:57

## 2024-02-17 RX ADMIN — IPRATROPIUM BROMIDE 0.5 MG: 0.5 SOLUTION RESPIRATORY (INHALATION) at 11:06

## 2024-02-17 RX ADMIN — BRIMONIDINE TARTRATE, TIMOLOL MALEATE 1 DROP: 2; 5 SOLUTION/ DROPS TOPICAL at 20:57

## 2024-02-17 RX ADMIN — METOPROLOL TARTRATE 25 MG: 25 TABLET, FILM COATED ORAL at 20:23

## 2024-02-17 RX ADMIN — METOPROLOL TARTRATE 5 MG: 5 INJECTION INTRAVENOUS at 13:04

## 2024-02-17 RX ADMIN — METOPROLOL TARTRATE 25 MG: 25 TABLET, FILM COATED ORAL at 06:10

## 2024-02-17 RX ADMIN — METOPROLOL TARTRATE 25 MG: 25 TABLET, FILM COATED ORAL at 09:42

## 2024-02-17 RX ADMIN — PREDNISONE 40 MG: 20 TABLET ORAL at 09:42

## 2024-02-17 RX ADMIN — METOPROLOL TARTRATE 5 MG: 5 INJECTION INTRAVENOUS at 16:27

## 2024-02-17 RX ADMIN — FUROSEMIDE 20 MG: 10 INJECTION, SOLUTION INTRAMUSCULAR; INTRAVENOUS at 06:11

## 2024-02-17 RX ADMIN — SODIUM CHLORIDE 84 ML: 9 INJECTION, SOLUTION INTRAVENOUS at 03:36

## 2024-02-17 RX ADMIN — IOPAMIDOL 57 ML: 755 INJECTION, SOLUTION INTRAVENOUS at 03:36

## 2024-02-17 RX ADMIN — CITALOPRAM HYDROBROMIDE 10 MG: 10 TABLET ORAL at 09:43

## 2024-02-17 RX ADMIN — HEPARIN SODIUM 750 UNITS/HR: 10000 INJECTION, SOLUTION INTRAVENOUS at 13:47

## 2024-02-17 RX ADMIN — BIMATOPROST 1 DROP: 0.1 SOLUTION/ DROPS OPHTHALMIC at 21:03

## 2024-02-17 RX ADMIN — MYCOPHENOLATE MOFETIL 250 MG: 250 CAPSULE ORAL at 18:21

## 2024-02-17 ASSESSMENT — ACTIVITIES OF DAILY LIVING (ADL)
ADLS_ACUITY_SCORE: 42
ADLS_ACUITY_SCORE: 43
ADLS_ACUITY_SCORE: 36
ADLS_ACUITY_SCORE: 43
ADLS_ACUITY_SCORE: 42
ADLS_ACUITY_SCORE: 42
ADLS_ACUITY_SCORE: 43
ADLS_ACUITY_SCORE: 43

## 2024-02-17 NOTE — ED NOTES
DATE/TIME OF CALL RECEIVED FROM LAB:  02/16/24 at 8:20 PM   LAB TEST:  Troponin  LAB VALUE:  219  PROVIDER NOTIFIED?: Yes  PROVIDER NAME: Dr. Wise  DATE/TIME LAB VALUE REPORTED TO PROVIDER: 2022  MECHANISM OF PROVIDER NOTIFICATION: Phone Call  PROVIDER RESPONSE: MD zuniga

## 2024-02-17 NOTE — PROVIDER NOTIFICATION
MD Notification    Notified Person: MD    Notified Person Name:Eliazar on call    Notification Date/Time:0133 2/17    Notification Interaction:amcom web page    Purpose of Notification: pt rm [address]- can there be a PRN order for Xopenex? Pt has high HR at times (A-fib RVR). breathing stable but may complain of SOB later. currently requiring 5L oxymask.    Thanks   Yasmin RN  [phone]    Orders Received: MD ordered PRN xopenex    Comments:

## 2024-02-17 NOTE — H&P
[facility]    History and Physical  Hospitalist     Date of Admission:  2/16/2024    Assessment & Plan   Elaina Winn is a 78 year old female with a past medical history of atrial fibrillation, dementia, autoimmune hepatitis, CKD stage III presents to hospital with A-fib RVR.    Afib RVR  Hx of pafib  Presented from her nursing home for hypertension, was found to be in A-fib RVR on evaluation by EMS.  Rate improving with diltiazem drip in the emergency room.  Not on anticoagulation due to previous liver dysfunction. On low-dose metoprolol for rate control.  Troponin elevated at 219, but trending down to 200 on repeat. The patient is chest pain free. ACS is unlikely and her troponin elevation most likely represents a demand ischemia in setting of her afib rvr.   Her bnp is markedly elevated which is concerning for new onset chf. I have discontinued the diltiazem drip and will try to treat her heart rate with metoprolol  -tele  -iv metoprolol prn for tachycardia  -po metoprolol 25 once followed by her regular dose of 12.5 bid  -Follow-up cardiology consult  -Lasix 20mg once  -strict I&Os, daily weights    Acute hypoxic respiratory failure  The patient was noted to be wheezing on my exam and is requiring supplemental O2. She does have a multi year history of smoking raising my suspicioun for undiagnosed copd. I have started her on nebulizer and steroids. Her ct did not reveal pulmonary edema, but the pulmonary arteries are at the upper normal caliber and her bnp is significantly elevated at 10K making me concerned that she has new heart failure. I will treat for both copd and heart failure. Management of heart failure is as mentioned above.  Atrovent scheduled  Levatbuterol prn  Prednisone 40 daily  Supplemental O2    Dementia  A&Ox1    Autoimmune hepatitis  -mycophenolate     CKD stage III  Stable  Monitor and avoid nephrotoxic medications    DVT ppx: Lovenox subcutaneous  Expected length of  stay greater than 2 days  Code Status: full code, according to [facility]    Primary Care Physician   Shlomo Munguia    Chief Complaint   Cough and Tachycardia    History obtained from the medical chart    History of Present Illness   Elaina Winn is a 78 year old female with a past medical history of dementia, CKD stage III, anxiety, autoimmune hepatitis, hypertension, dyslipidemia, paroxysmal atrial fibrillation presents to hospital with A-fib RVR.  The patient is pleasantly confused and oriented to self only.  History was obtained from the chart.  Reportedly EMS was called to her nursing home for hypertension.  On arrival they found the patient to be tachycardic with a heart rate in the 160s.  She was started on diltiazem in the emergency room for rate control with adequate response.  During my interview the patient informed me that she feels mildly short of breath.  She provides no other complaints.      Past Medical History    I have reviewed this patient's medical history and updated it with pertinent information if needed.   Past Medical History:   Diagnosis Date    Anxiety     Atrial fibrillation (H)     Chronic renal insufficiency     Dementia (H)     Glaucoma     Hepatitis     autoimmune    HTN (hypertension), benign     Hyperlipidemia LDL goal < 130     Paroxysmal A-fib (H)        Past Surgical History   I have reviewed this patient's surgical history and updated it with pertinent information if needed.  Past Surgical History:   Procedure Laterality Date    DILATION AND CURETTAGE      HYSTERECTOMY      with USO, not for cancer    S/p partial vaginectomy      SEPTOPLASTY      TONSILLECTOMY & ADENOIDECTOMY         Allergies   Allergies   Allergen Reactions    Adhesive Tape     Zocor [Simvastatin - High Dose]      Elevated LFTs       Social History   I have reviewed this patient's social history and updated it with pertinent information if needed. Elaina Winn  reports that she quit smoking about 34 years  ago. Her smoking use included cigarettes. She has never used smokeless tobacco. She reports that she does not drink alcohol and does not use drugs.    Family History   I have reviewed this patient's family history and updated it with pertinent information if needed.   Family History   Problem Relation Age of Onset    Hypertension Mother          age 95    Alzheimer Disease Father 75        also CHF    Cancer Brother          of lung ca age 69    Lipids Brother     Alzheimer Disease Paternal Uncle     Alzheimer Disease Paternal Uncle          Physical Exam   Temp: 97.7  F (36.5  C) Temp src: Temporal BP: 97/70 Pulse: 107   Resp: 27 SpO2: 92 %      Vital Signs with Ranges  Temp:  [97.7  F (36.5  C)] 97.7  F (36.5  C)  Pulse:  [] 107  Resp:  [16-41] 27  BP: ()/(70-84) 97/70  SpO2:  [91 %-95 %] 92 %  0 lbs 0 oz  Physical Exam  Vitals reviewed.   Constitutional:       Appearance: Normal appearance.      Comments: Pleasant elderly lady seen resting in bed comfortably in no apparent distress in the emergency room.   HENT:      Mouth/Throat:      Mouth: Mucous membranes are moist.      Pharynx: Oropharynx is clear.   Eyes:      Extraocular Movements: Extraocular movements intact.      Conjunctiva/sclera: Conjunctivae normal.      Pupils: Pupils are equal, round, and reactive to light.   Cardiovascular:      Rate and Rhythm: Tachycardia present. Rhythm irregular.      Pulses: Normal pulses.      Heart sounds: Normal heart sounds. No murmur heard.  Pulmonary:      Effort: Pulmonary effort is normal.      Breath sounds: Wheezing present. No rhonchi or rales.   Abdominal:      General: Abdomen is flat. Bowel sounds are normal.      Palpations: Abdomen is soft.   Musculoskeletal:         General: Swelling present.      Comments: 1+ pitting edema in bilateral lower extremities   Skin:     General: Skin is warm and dry.   Neurological:      General: No focal deficit present.      Mental Status: She is alert.  Mental status is at baseline. She is disoriented.       Medical Decision Making       75 MINUTES SPENT BY ME on the date of service doing chart review, history, exam, documentation & further activities per the note.

## 2024-02-17 NOTE — ED NOTES
Pt's daughter Tresa called for update. Tresa is requesting to be POC as the pt's  is just recently placed on hospice.

## 2024-02-17 NOTE — CONSULTS
[facility]    Cardiology Consultation     Date of Admission:  2/16/2024    Assessment & Plan   Elaina Winn is a 78 year old female who was admitted on 2/16/2024.      Atrial fibrillation with rapid ventricular rate.  Known history of paroxysmal atrial fibrillation.  CHADS2 Vascor of at least 3 likely 4.  Ventricular rate is somewhat better in 1 teens.  Was on diltiazem now switched to higher dose of oral metoprolol.  There was some concern about anticoagulant in the past because of liver disease which has been quite well-controlled with normal ALT AST and normal platelets.  No obvious bleeding issues.  No obvious contraindication for anticoagulation.  Given elevated CHADS2 Vascor discussed in detail with patient risk benefits of anticoagulation she is agreeable to start anticoagulation.  Recommend starting heparin drip.  Acute decompensated congestive heart failure with elevated NT proBNP, likely in the setting of atrial fibrillation with rapid ventricular rate.  Echocardiogram pending.  Feeling better with the diet improved and after receiving Lasix.  Dementia, nursing home resident however patient was quite appropriately conversant and able to follow conversation regarding her ongoing medical issues and appears to have competency for making medical decision at this time.  Elevated high-sensitivity troponin, downtrending.  Suspect this could be due to atrial fibrillation rapid ventricular rate and decompensated congestive heart failure.  Clinically does not appear to be acute coronary syndrome at this time.    Recommendations  Continue metoprolol  Start heparin drip  Agree with echocardiogram which is pending  Depending upon clinical course echocardiogram is possible that she may need JIM cardioversion (Monday) for restoration of sinus rhythm.    Discussed in detail with patient and also ICU care team.    Moderate complexity     Dez Oliveros MD, MD    Primary Care Physician   Shlomo  Sergo Munguia    Reason for Consult   Reason for consult: I was asked to evaluate this patient for atrial fibrillation.    History of Present Illness   Elaina Winn is a 78 year old female who presents with not feeling well some shortness of breath.  She was found to have atrial fibrillation with rapid ventricular rate.  She has history of paroxysmal atrial fibrillation and was seen by Dr. Pitt previously but was not on any anticoagulation due to ongoing liver disease hepatitis.  She has history of autoimmune hepatitis.  She denies any chest discomfort at this time.  EKG shows atrial fibrillation with rapid ventricular rate.  High sensitive troponin is mildly elevated.  Her heart rate was initially in the 140s with diltiazem which came down to low 1 teens she is now being placed on oral metoprolol.  Labs showed normal ALT AST and normal platelets.  She has underlying dementia but is able to have appropriate conversation regarding her ongoing medical issues.  She lives in a nursing home.    Past Medical History   Past Medical History:   Diagnosis Date    Anxiety     Atrial fibrillation (H)     Chronic renal insufficiency     Dementia (H)     Glaucoma     Hepatitis     autoimmune    HTN (hypertension), benign     Hyperlipidemia LDL goal < 130     Paroxysmal A-fib (H)          Past Surgical History   Past Surgical History:   Procedure Laterality Date    DILATION AND CURETTAGE      HYSTERECTOMY      with USO, not for cancer    S/p partial vaginectomy      SEPTOPLASTY      TONSILLECTOMY & ADENOIDECTOMY         Prior to Admission Medications   Prior to Admission Medications   Prescriptions Last Dose Informant Patient Reported? Taking?   Apoaequorin (PREVAGEN) 10 MG CAPS 2/16/2024 at 0746  No Yes   Sig: Take 1 capsule by mouth daily   bimatoprost (LUMIGAN) 0.01 % SOLN 2/16/2024 at 1622  Yes Yes   Sig: Place 1 drop into both eyes at bedtime   brimonidine-timolol (COMBIGAN) 0.2-0.5 % ophthalmic solution 2/16/2024 at 1822 Self  Yes Yes   Sig: Place 1 drop into both eyes 2 times daily   citalopram (CELEXA) 10 MG tablet 2024 at 0746  No Yes   Sig: Take 1 tablet (10 mg) by mouth daily   metoprolol tartrate (LOPRESSOR) 25 MG tablet 2024 at 1828  No Yes   Sig: Take 0.5 tablets (12.5 mg) by mouth 2 times daily Appointment required for further refills   miconazole with skin protectant (MARLON ANTIFUNGAL) 2 % CREA cream   No No   Sig: Apply topically 2 times daily To buttocks and sacral area   mycophenolate (GENERIC EQUIVALENT) 250 MG capsule 2024 at 1828  Yes Yes   Sig: Take 250 mg by mouth 2 times daily      Facility-Administered Medications: None     Current Facility-Administered Medications   Medication Dose Route Frequency    citalopram  10 mg Oral Daily    enoxaparin ANTICOAGULANT  40 mg Subcutaneous Q24H    ipratropium  0.5 mg Nebulization 4x daily    metoprolol tartrate  25 mg Oral BID    predniSONE  40 mg Oral Daily    sodium chloride (PF)  3 mL Intracatheter Q8H     Current Facility-Administered Medications   Medication Last Rate    Reason anticoagulant not prescribed for atrial fibrillation       Allergies   Allergies   Allergen Reactions    Adhesive Tape     Zocor [Simvastatin - High Dose]      Elevated LFTs       Social History    reports that she quit smoking about 34 years ago. Her smoking use included cigarettes. She has never used smokeless tobacco. She reports that she does not drink alcohol and does not use drugs.      Family History   I have reviewed this patient's family history and updated it with pertinent information if needed.  Family History   Problem Relation Age of Onset    Hypertension Mother          age 95    Alzheimer Disease Father 75        also CHF    Cancer Brother          of lung ca age 69    Lipids Brother     Alzheimer Disease Paternal Uncle     Alzheimer Disease Paternal Uncle           Review of Systems   A comprehensive review of system was performed and is negative other than that  "noted in the HPI or here.     Physical Exam   Vital Signs with Ranges  Temp:  [97.4  F (36.3  C)-97.7  F (36.5  C)] 97.4  F (36.3  C)  Pulse:  [] 134  Resp:  [7-41] 13  BP: ()/() 130/69  SpO2:  [91 %-100 %] 98 %  Wt Readings from Last 4 Encounters:   02/14/24 60.9 kg (134 lb 4.8 oz)   01/25/24 58.4 kg (128 lb 12.8 oz)   03/30/22 64 kg (141 lb)   11/04/19 62.9 kg (138 lb 9.6 oz)     I/O last 3 completed shifts:  In: 96.5 [I.V.:96.5]  Out: -       Vitals: /69   Pulse (!) 134   Temp 97.4  F (36.3  C) (Oral)   Resp 13   SpO2 98%     Physical Exam:   General - Alert and oriented to current year but incorrect month  Eyes - No scleral icterus  HEENT - Neck supple, moist mucous membranes  Cardiovascular -tachycardia, irregular, no murmur rub or gallop  Extremities - There is no pitting pedal edema  Respiratory -clear to auscultation      No lab results found in last 7 days.    Invalid input(s): \"TROPONINIES\"    Recent Labs   Lab 02/17/24 0630 02/16/24 1941   WBC 7.3 6.6   HGB 13.3 13.0   MCV 96 93    226    135   POTASSIUM 4.9 4.7   CHLORIDE 98 103   CO2 19* 19*   BUN 35.1* 32.4*   CR 1.22* 1.24*   GFRESTIMATED 45* 44*   ANIONGAP 18* 13   DANIELITO 10.0 9.9   * 96   ALBUMIN  --  3.5   PROTTOTAL  --  6.4   BILITOTAL  --  1.1   ALKPHOS  --  128   ALT  --  21   AST  --  30     Recent Labs   Lab Test 01/25/24  1619 11/09/17  1156   CHOL 192 252*   HDL 69 77   * 148*   TRIG 82 133     Recent Labs   Lab 02/17/24 0630 02/16/24 1941   WBC 7.3 6.6   HGB 13.3 13.0   HCT 42.8 41.5   MCV 96 93    226     No results for input(s): \"PH\", \"PHV\", \"PO2\", \"PO2V\", \"SAT\", \"PCO2\", \"PCO2V\", \"HCO3\", \"HCO3V\" in the last 168 hours.  Recent Labs   Lab 02/16/24 2305   NTBNPI 10,462*     Recent Labs   Lab 02/16/24 1941   DD 0.91*     No results for input(s): \"SED\", \"CRP\" in the last 168 hours.  Recent Labs   Lab 02/17/24  0630 02/16/24 1941    226     Recent Labs   Lab 02/16/24 2305 " "  TSH 2.43     No results for input(s): \"COLOR\", \"APPEARANCE\", \"URINEGLC\", \"URINEBILI\", \"URINEKETONE\", \"SG\", \"UBLD\", \"URINEPH\", \"PROTEIN\", \"UROBILINOGEN\", \"NITRITE\", \"LEUKEST\", \"RBCU\", \"WBCU\" in the last 168 hours.    Imaging:  Recent Results (from the past 48 hour(s))   XR Chest Port 1 View    Narrative    EXAM: XR CHEST PORT 1 VIEW  LOCATION: [facility]  DATE: 2/16/2024    INDICATION: afib rvr  COMPARISON: 12/7/2017      Impression    IMPRESSION: Patchy right infrahilar airspace opacities concerning for pneumonia. Possible tiny left pleural effusion. No pneumothorax. Normal cardiomediastinal silhouette.   CT Chest Pulmonary Embolism w Contrast    Narrative    EXAM: CT CHEST PULMONARY EMBOLISM W CONTRAST  LOCATION: [facility]  DATE: 2/17/2024    INDICATION: afib and hypoxia  COMPARISON: 11/13/2017  TECHNIQUE: CT chest pulmonary angiogram during arterial phase injection of IV contrast. Multiplanar reformats and MIP reconstructions were performed. Dose reduction techniques were used.   CONTRAST: 57 mL Isovue   370    FINDINGS:  ANGIOGRAM CHEST: Pulmonary arteries are upper normal caliber and negative for pulmonary emboli. Thoracic aorta is not well opacified and is  indeterminate for dissection. No CT evidence of right heart strain.    LUNGS AND PLEURA: Small pleural effusions. Basilar atelectasis and bronchial wall thickening. Mild scattered mucous plugging.    MEDIASTINUM/AXILLAE: No adenopathy or significant pericardial effusion.    CORONARY ARTERY CALCIFICATION: Mild to moderate.    UPPER ABDOMEN: Grossly within normal limits where seen.    MUSCULOSKELETAL: Degenerative change osseous structures. Lipomatous lesion right chest wall anterolaterally series 3 image 260 unchanged.      Impression    IMPRESSION:  1.  No pulmonary emboli.  2.  Bilateral pleural effusions.  3.  Mild scattered mucous plugging.       Echo:  No results found for this or any previous visit " (from the past 4320 hour(s)).    Clinically Significant Risk Factors Present on Admission                  # Hypertension: Noted on problem list

## 2024-02-17 NOTE — ED NOTES
Bed: ED26  Expected date:   Expected time:   Means of arrival:   Comments:  Ynes [age]F trouble breathing, eta 1920

## 2024-02-17 NOTE — PROVIDER NOTIFICATION
MD Notification    Notified Person: MD    Notified Person Name: salome    Notification Date/Time: 2/17 1440    Notification Interaction: vocera text     Purpose of Notification: conintued elevated HRs after PO metop and IV metop    Orders Received: no new orders at this time, update cardiology     Comments: Updated Cards, Dr Oliveros- ok for her to be a little tachy if pt is asymptomatic, give evening 25mg dose and monitor over night.

## 2024-02-17 NOTE — ED TRIAGE NOTES
Pt from [address] [place] [address] and EMS was told that the on call nurse told staff to call 911 for pt as her BP was systolic 160s. Pt has since had her BP medication, and BP now normal. Pt's only complaint is a slight cough.

## 2024-02-17 NOTE — PHARMACY-ADMISSION MEDICATION HISTORY
Pharmacist Admission Medication History    Admission medication history is complete. The information provided in this note is only as accurate as the sources available at the time of the update.    Information Source(s): Facility (U/NH/) medication list/MAR ([facility]) via in-person    Pertinent Information: None    Changes made to PTA medication list:  Added: None  Deleted: None  Changed: None    Allergies reviewed with patient and updates made in EHR: no    Medication History Completed By: Lauri Forte RPH 2/16/2024 8:46 PM    PTA Med List   Medication Sig Last Dose    Apoaequorin (PREVAGEN) 10 MG CAPS Take 1 capsule by mouth daily 2/16/2024 at 0746    bimatoprost (LUMIGAN) 0.01 % SOLN Place 1 drop into both eyes at bedtime 2/16/2024 at 1622    brimonidine-timolol (COMBIGAN) 0.2-0.5 % ophthalmic solution Place 1 drop into both eyes 2 times daily 2/16/2024 at 1822    citalopram (CELEXA) 10 MG tablet Take 1 tablet (10 mg) by mouth daily 2/16/2024 at 0746    metoprolol tartrate (LOPRESSOR) 25 MG tablet Take 0.5 tablets (12.5 mg) by mouth 2 times daily Appointment required for further refills 2/16/2024 at 1828    mycophenolate (GENERIC EQUIVALENT) 250 MG capsule Take 250 mg by mouth 2 times daily 2/16/2024 at 1828

## 2024-02-17 NOTE — CONSULTS
[facility] Nurse Inpatient Assessment     Consulted for: Coccyx    Summary: Pt with dementia and daughter reports poor hygiene with plans to have [facility] have more involvement. Tissue breakdown mild due to incontinence, no s/s of infection    Patient History (according to provider note(s):      Elaina Winn is a 78 year old female with a past medical history of atrial fibrillation, dementia, autoimmune hepatitis, CKD stage III presents to hospital with A-fib RVR.     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 Dtr plans to increase cares at [facility]. Found with mepilex in place  Wound base: 100 %  denuded tissue     Palpation of the wound bed: normal      Drainage: none     Description of drainage: none     Measurements (length x width x depth, in cm): 18  x 10  x  0 cm      Tunneling: N/A     Undermining: N/A  Periwound skin: Intact      Color: normal and consistent with surrounding tissue      Temperature: normal   Odor: none  Pain: denies , none  Pain interventions prior to dressing change: N/A  Treatment goal: Decrease moisture and Protection  STATUS: initial assessment  Supplies ordered: ordered Calmoseptine       Treatment Plan:     BL buttock wound(s): BID and PRN  After any incontinent episode cleanse with cherry cleanse and protect and odilon dry wipes/washcloths.   Ensure area is completely dry by blotting and using circular motions, do not wipe as this can cause trauma to the skin   Apply thin layer of Calmoseptine barrier paste (From Pharmacy). Remove only soiled paste, then reapply thin layer. If complete removal is needed use baby/mineral oil (located in pharmacy).   *Avoid pre moisten wipes.    *Avoid use of brief  *Use single covidien pad and limit number of linens underneath patient. Covidien pad can be used as incontinence pad and lift pad       Orders: Written    RECOMMEND PRIMARY TEAM ORDER: None, at this time  Education provided: importance of repositioning, plan of care, wound progress, Moisture management, Hygiene, and Off-loading pressure  Discussed plan of care with: Patient, Family, and Nurse  WOC nurse follow-up plan: weekly  Notify WOC if wound(s) deteriorate.  Nursing to notify the Provider(s) and re-consult the WOC Nurse if new skin concern.    DATA:     Current support surface: Standard  Standard gel/foam mattress (IsoFlex, Atmos air, etc)  Containment of urine/stool: Incontinence Protocol and Incontinent pad in bed  BMI: There is no height or weight on file to calculate BMI.   Active diet order: Orders Placed This Encounter      Combination Diet Regular Diet Adult     Output: I/O last 3 completed shifts:  In: 96.5 [I.V.:96.5]  Out: -      Labs:   Recent Labs   Lab 02/17/24  0630 02/16/24  1941   ALBUMIN  --  3.5   HGB 13.3 13.0   WBC 7.3 6.6     Pressure injury risk assessment:   Sensory Perception: 3-->slightly limited  Moisture: 4-->rarely moist  Activity: 3-->walks occasionally  Mobility: 3-->slightly limited  Nutrition: 3-->adequate  Friction and Shear: 2-->potential problem  Rodrigo Score: 18    Pritesh Ozuna RN CWOCN  -Securely message with [employer] (more info) - can reach individually by name or search 'WOC Nurse' (Asif) to reach all current WOCs on duty.  WOC Office Phone: [phone]

## 2024-02-17 NOTE — ED NOTES
[facility]    ED Nurse Handoff Report    ED Chief complaint: Cough and Tachycardia      ED Diagnosis:   Final diagnoses:   None       Code Status: Full Code    Allergies:   Allergies   Allergen Reactions    Adhesive Tape     Zocor [Simvastatin - High Dose]      Elevated LFTs       Patient Story:  Pt BIB[facility] from [facility] where EMS reports the following: Pt was getting vitals and found BP to be elevated, and told to call 911. When EMS arrived, they found BP to be normal but to be in Afib RVR, and a infrequent cough. Pt has no complaints other than a sore bottom where a mepilex is now placed. Pt is poor historian as she has baseline dementia.     Focused Assessment:    Pt is A&O to self and location, however forgetful, and disoriented to situation and time. Pt has been calm and cooperative. Denies chest pain/palipitations, SOB, or cough.   Pt arrived in Afib RVR, HRs in 160s. Now on Dilt drip, HR in 90s-120s.     Daughter Tresa was called for updates, she is requesting she be the POC ([phone]) as the pt's  is now on hospice.     Labs Ordered and Resulted from Time of ED Arrival to Time of ED Departure   COMPREHENSIVE METABOLIC PANEL - Abnormal       Result Value    Sodium 135      Potassium 4.7      Carbon Dioxide (CO2) 19 (*)     Anion Gap 13      Urea Nitrogen 32.4 (*)     Creatinine 1.24 (*)     GFR Estimate 44 (*)     Calcium 9.9      Chloride 103      Glucose 96      Alkaline Phosphatase 128      AST 30      ALT 21      Protein Total 6.4      Albumin 3.5      Bilirubin Total 1.1     CBC WITH PLATELETS AND DIFFERENTIAL - Abnormal    WBC Count 6.6      RBC Count 4.46      Hemoglobin 13.0      Hematocrit 41.5      MCV 93      MCH 29.1      MCHC 31.3 (*)     RDW 15.5 (*)     Platelet Count 226      % Neutrophils 80      % Lymphocytes 9      % Monocytes 10      % Eosinophils 0      % Basophils 1      % Immature Granulocytes 0      NRBCs per 100 WBC 0      Absolute Neutrophils 5.3      Absolute  Lymphocytes 0.6 (*)     Absolute Monocytes 0.7      Absolute Eosinophils 0.0      Absolute Basophils 0.1      Absolute Immature Granulocytes 0.0      Absolute NRBCs 0.0     TROPONIN T, HIGH SENSITIVITY - Abnormal    Troponin T, High Sensitivity 219 (*)    COMPREHENSIVE METABOLIC PANEL       XR Chest Port 1 View    (Results Pending)         Treatments and/or interventions provided:      Medications   diltiazem (CARDIZEM) 125 mg in sodium chloride 0.9 % 125 mL infusion (10 mg/hr Intravenous Rate/Dose Change 2/16/24 2028)   diltiazem (CARDIZEM) injection 10 mg (10 mg Intravenous $Given 2/16/24 1952)       Patient's response to treatments and/or interventions:    Pt's HR decreasing to normal. Rhythm continues to be a-fib    To be done/followed up on inpatient unit:   See any in-patient orders    Does this patient have any cognitive concerns?: Disoriented to time and Disoriented to situation    Activity level - Baseline/Home:    Independent    Activity Level - Current:    Independent    Patient's Preferred language: English     Needed?: No    Isolation: None  Infection: Not Applicable  Patient tested for COVID 19 prior to admission: NO    Bariatric?: No    Vital Signs:   Vitals:    02/16/24 2040 02/16/24 2045 02/16/24 2050 02/16/24 2055   BP:       Pulse: (!) 147 (!) 124 92 (!) 122   Resp: (!) 41 24 25 22   Temp:       TempSrc:       SpO2: 95% 91% 91% 92%       Cardiac Rhythm:Cardiac Rhythm: Atrial fibrillation    Was the PSS-3 completed:   No -Pt too confused to appropriately answer questions  What interventions are required if any?               Family Comments: Daughter Tresa requests to be main POC. She is currently in town from [employer] helping getting her dad situation in hospice.   OBS brochure/video discussed/provided to patient/family: N/A              Name of person given brochure if not patient:               Relationship to patient:     For the majority of the shift this patient's behavior was  Green.  Behavioral interventions performed were .    ED NURSE PHONE NUMBER: *[phone]

## 2024-02-17 NOTE — PLAN OF CARE
Alert to self only, HR continues to be in afib 100-140s at times-pt is asymptomatic with this, Hep gtt started, WOC for coccyx wound, plan for possible JIM/DCCV Monday, continue to monitor.

## 2024-02-17 NOTE — UTILIZATION REVIEW
Admission Status; Secondary Review Determination    Under the authority of the Utilization Management Committee, the utilization review process indicated a secondary review on the above patient. The review outcome is based on review of the medical records, discussions with staff, and applying clinical experience noted on the date of the review.    (x) Inpatient Status Appropriate - This patient's medical care is consistent with medical management for inpatient care and reasonable inpatient medical practice.    RATIONALE FOR DETERMINATION: 78-year-old female with mild dementia, history of paroxysmal atrial fibrillation, hypertension who presented to the hospital with elevated blood pressure and marked tachycardia with heart rates in the 150s along with cough and shortness of breath.  Patient found to have atrial fibrillation with rapid ventricular response initially ranging from 144-156 along with significant tachypnea, chest x-ray revealed patchy right infrahilar airspace opacities concerning for pneumonia but also tiny left pleural effusion possibly secondary to heart failure.  Patient initiated on intravenous diltiazem with difficult to adequately control rate as well as some intermittent hypotension and noted hypoxemia.  On hospital day 2 patient continues to have significant recurrent tachycardia with heart rates greater than 140 as well as episodes of marked tachypnea consistent with heart failure secondary to patient's rapid ventricular response.  Patient thus will require minimum of 2 nights in the hospital for adequate management of rate control and heart failure findings appropriate for inpatient care.    At the time of admission with the information available to the attending physician more than 2 nights Hospital complex care was anticipated, based on patient risk of adverse outcome if treated as outpatient and complex care required. Inpatient admission is appropriate based on the Medicare  guidelines.    This document was produced using voice recognition software    The information on this document is developed by the utilization review team in order for the business office to ensure compliance. This only denotes the appropriateness of proper admission status and does not reflect the quality of care rendered.    The definitions of Inpatient Status and Observation Status used in making the determination above are those provided in the CMS Coverage Manual, Chapter 1 and Chapter 6, section 70.4.    Sincerely,    Freddy Vargas MD  Utilization Review  Physician Advisor  [facility].

## 2024-02-17 NOTE — PROGRESS NOTES
[facility]    Medicine Progress Note - Hospitalist Service    Date of Admission:  2/16/2024    Assessment & Plan   Elaina Winn is a 78 year old female with a past medical history of atrial fibrillation, dementia, autoimmune hepatitis, CKD stage III presents to hospital with A-fib RVR.     Afib RVR  Hx of pafib  NSTEMI  Presented from her nursing home for hypertension, was found to be in A-fib RVR on evaluation by EMS.  Rate improving with diltiazem drip in the emergency room.  Not on anticoagulation due to previous liver dysfunction. On low-dose metoprolol for rate control.  Troponin elevated at 219, but trending down to 200 on repeat. The patient is chest pain free. ACS is unlikely and her troponin elevation most likely represents a demand ischemia in setting of her afib rvr.     - off oxygen follow TTE but hold further lasix  - increase oral metoprolol to 25mg  - cardiology consulted     Acute hypoxic respiratory failure  The patient was noted to be wheezing on my exam and is requiring supplemental O2. She does have a multi year history of smoking raising my suspicioun for undiagnosed copd. I have started her on nebulizer and steroids. Her ct did not reveal pulmonary edema, but the pulmonary arteries are at the upper normal caliber and her bnp is significantly elevated at 10K making me concerned that she has new heart failure. I will treat for both copd and heart failure. Management of heart failure is as mentioned above.    - continue steroids but wean to 20mg  - continue scheduled debs     Dementia  A&Ox1     Autoimmune hepatitis  -mycophenolate      CKD stage III  Stable  - monitor with diuresis    Coccyx pressure wound POA  - WOC consulted for monday       Diet: Combination Diet Regular Diet Adult    DVT Prophylaxis: lovenox  Scott Catheter: Not present  Lines: None     Cardiac Monitoring: ACTIVE order. Indication: c  Code Status: Full Code      Clinically Significant Risk Factors  Present on Admission                  # Hypertension: Noted on problem list                 Disposition Plan      Expected Discharge Date: 02/18/2024                    Yin Alonzo, DO  Hospitalist Service  [facility]  Securely message with [employer] (more info)  Text page via [employer] Paging/Directory   ______________________________________________________________________    Interval History   Patient reports feeling cold. She is not oriented to situation. She denies pain or Palpitations. Family updated over the phone    Physical Exam   Vital Signs: Temp: 97.4  F (36.3  C) Temp src: Oral BP: (!) 128/91 Pulse: 91   Resp: 28 SpO2: 98 % O2 Device: Nasal cannula Oxygen Delivery: 5 LPM  Weight: 0 lbs 0 oz    Constitutional: Awake, alert, cooperative, no apparent distress  Respiratory: expiratory wheezing throughout  Cardiovascular:irregular rate and rhythm, no murmur noted  GI: Normal bowel sounds, soft, non-distended, non-tender  Skin/Integumen: No rashes, no cyanosis, no edema  Other:      Medical Decision Making       45 MINUTES SPENT BY ME on the date of service doing chart review, history, exam, documentation & further activities per the note.      Data     I have personally reviewed the following data over the past 24 hrs:    7.3  \   13.3   / 250     135 98 35.1 (H) /  108 (H)   4.9 19 (L) 1.22 (H) \     ALT: 21 AST: 30 AP: 128 TBILI: 1.1   ALB: 3.5 TOT PROTEIN: 6.4 LIPASE: N/A     Trop: 200 (HH) BNP: 10,462 (H)     TSH: 2.43 T4: N/A A1C: N/A     Procal: 0.08 CRP: N/A Lactic Acid: N/A       INR:  N/A PTT:  N/A   D-dimer:  0.91 (H) Fibrinogen:  N/A       Imaging results reviewed over the past 24 hrs:   Recent Results (from the past 24 hour(s))   XR Chest Port 1 View    Narrative    EXAM: XR CHEST PORT 1 VIEW  LOCATION: [facility] [place]  DATE: 2/16/2024    INDICATION: afib rvr  COMPARISON: 12/7/2017      Impression    IMPRESSION: Patchy right infrahilar airspace  opacities concerning for pneumonia. Possible tiny left pleural effusion. No pneumothorax. Normal cardiomediastinal silhouette.   CT Chest Pulmonary Embolism w Contrast    Narrative    EXAM: CT CHEST PULMONARY EMBOLISM W CONTRAST  LOCATION: [facility]  DATE: 2/17/2024    INDICATION: afib and hypoxia  COMPARISON: 11/13/2017  TECHNIQUE: CT chest pulmonary angiogram during arterial phase injection of IV contrast. Multiplanar reformats and MIP reconstructions were performed. Dose reduction techniques were used.   CONTRAST: 57 mL Isovue   370    FINDINGS:  ANGIOGRAM CHEST: Pulmonary arteries are upper normal caliber and negative for pulmonary emboli. Thoracic aorta is not well opacified and is  indeterminate for dissection. No CT evidence of right heart strain.    LUNGS AND PLEURA: Small pleural effusions. Basilar atelectasis and bronchial wall thickening. Mild scattered mucous plugging.    MEDIASTINUM/AXILLAE: No adenopathy or significant pericardial effusion.    CORONARY ARTERY CALCIFICATION: Mild to moderate.    UPPER ABDOMEN: Grossly within normal limits where seen.    MUSCULOSKELETAL: Degenerative change osseous structures. Lipomatous lesion right chest wall anterolaterally series 3 image 260 unchanged.      Impression    IMPRESSION:  1.  No pulmonary emboli.  2.  Bilateral pleural effusions.  3.  Mild scattered mucous plugging.

## 2024-02-17 NOTE — PLAN OF CARE
Goal Outcome Evaluation:      Plan of Care Reviewed With: patient    Overall Patient Progress: no changeOverall Patient Progress: no change    Outcome Evaluation: Transfered from ED at 0000. Pt A/O to self, disoriented to place time and situation. Up w/ SBA/ 1 assist and GB. VSS ex requiring increased oxygen needs and c/o SOB, up to 8L oxymask, now on 5L NC. HR in A-fib RVR rates controlled w/ diltiazem. dilt stopped per MD d/t elevated BNP. oral metoprolol and lasix given. Pt pleasant at times, but can be very combative, angry and frustrated, and uncooperative. Pt became increasingly confused during night, requiring multiple cues to reorient to place and time. pt initially refused to wear tele and Bp cuff. x2 PIV's to Lt AC and hand.      Problem: Adult Inpatient Plan of Care  Goal: Absence of Hospital-Acquired Illness or Injury  Intervention: Prevent Infection  Recent Flowsheet Documentation  Taken 2/17/2024 0600 by Britni Steele RN  Infection Prevention:   rest/sleep promoted   single patient room provided   environmental surveillance performed  Taken 2/17/2024 0400 by Britni Steele RN  Infection Prevention:   rest/sleep promoted   single patient room provided   environmental surveillance performed  Taken 2/17/2024 0015 by Britni Steele RN  Infection Prevention:   rest/sleep promoted   single patient room provided   environmental surveillance performed     Problem: Risk for Delirium  Goal: Optimal Coping  Outcome: Not Progressing  Intervention: Optimize Psychosocial Adjustment to Delirium  Recent Flowsheet Documentation  Taken 2/17/2024 0600 by Britni Steele, RN  Supportive Measures: active listening utilized  Taken 2/17/2024 0400 by Britni Steele RN  Supportive Measures: active listening utilized  Taken 2/17/2024 0015 by Britni Steele, RN  Supportive Measures: active listening utilized  Goal: Improved Behavioral Control  Outcome: Not Progressing  Intervention: Prevent and Manage Agitation  Recent  Flowsheet Documentation  Taken 2/17/2024 0600 by Britni Steele RN  Environment Familiarity/Consistency: daily routine followed  Taken 2/17/2024 0400 by Britni Steele RN  Environment Familiarity/Consistency: daily routine followed  Taken 2/17/2024 0015 by Britni Steele RN  Environment Familiarity/Consistency: daily routine followed  Intervention: Minimize Safety Risk  Recent Flowsheet Documentation  Taken 2/17/2024 0600 by Britni Steele RN  Communication Enhancement Strategies:   call light answered in person   communication board used  Enhanced Safety Measures: room near unit station  Trust Relationship/Rapport:   care explained   choices provided   reassurance provided  Taken 2/17/2024 0400 by Britni Steele RN  Communication Enhancement Strategies:   call light answered in person   communication board used  Enhanced Safety Measures: room near unit station  Trust Relationship/Rapport:   care explained   choices provided   reassurance provided  Taken 2/17/2024 0015 by Britni Steele RN  Communication Enhancement Strategies:   call light answered in person   communication board used  Enhanced Safety Measures: room near unit station  Trust Relationship/Rapport:   care explained   choices provided   reassurance provided  Goal: Improved Attention and Thought Clarity  Outcome: Not Progressing  Intervention: Maximize Cognitive Function  Recent Flowsheet Documentation  Taken 2/17/2024 0600 by Britni Steele RN  Sensory Stimulation Regulation:   care clustered   lighting decreased  Reorientation Measures:   calendar in view   clock in view  Taken 2/17/2024 0400 by Britni Steele RN  Sensory Stimulation Regulation:   care clustered   lighting decreased  Reorientation Measures:   calendar in view   clock in view  Taken 2/17/2024 0015 by Britni Steele RN  Sensory Stimulation Regulation:   care clustered   lighting decreased  Reorientation Measures:   calendar in view   clock in view  Goal: Improved  Sleep  Outcome: Not Progressing

## 2024-02-17 NOTE — ED PROVIDER NOTES
History     Chief Complaint:  Cough and Tachycardia       History limited by: dementia.      Elaina Winn is a 78 year old female with history of dementia, AFIB, hypertension, and hyperlipidemia who presents with tachycardia and cough. EMS personnel reports that they brought the patient in from [address] [place] [address], who reported that the patient was tachycardic with heart rate in the 150s as well as hypertensive in the 160s systolic. She was given her nightly blood pressure medications which has resolved her blood pressure, but she is still tachycardic in the ED. The patient has no other complaints/illnesses other than cough and mild rectal pain.      Independent Historian:   EMS reports    Review of External Notes:   I reviewed a geriatrics assisted living visit from 2/14/2024      Medications:    Prevagen  Celexa  Lopressor  Cellcept  Lumigan  Combigan    Past Medical History:    Anxiety  AFIB  Chronic renal insufficiency  Dementia  Hepatitis  HTN  HLD  Pulmonary nodule  Memory loss    Past Surgical History:    D&C  Hysterectomy  Septoplasty  Tonsillectomy & adenoidectomy    Physical Exam   Patient Vitals for the past 24 hrs:   BP Temp Temp src Pulse Resp SpO2   02/16/24 2238 93/75 -- -- -- 29 --   02/16/24 2237 -- -- -- -- (!) 7 --   02/16/24 2236 (!) 83/66 -- -- -- 29 --   02/16/24 2215 94/77 -- -- -- 21 --   02/16/24 2200 100/75 -- -- -- 24 --   02/16/24 2149 97/70 -- -- 107 27 --   02/16/24 2147 97/70 -- -- (!) 133 28 --   02/16/24 2143 -- -- -- (!) 122 29 --   02/16/24 2142 -- -- -- 98 26 --   02/16/24 2141 -- -- -- 110 21 --   02/16/24 2140 -- -- -- 103 22 --   02/16/24 2139 -- -- -- 105 18 --   02/16/24 2138 -- -- -- 96 25 --   02/16/24 2137 -- -- -- 111 21 --   02/16/24 2136 -- -- -- (!) 128 23 --   02/16/24 2055 -- -- -- (!) 122 22 92 %   02/16/24 2050 -- -- -- 92 25 91 %   02/16/24 2045 -- -- -- (!) 124 24 91 %   02/16/24 2040 -- -- -- (!) 147 (!) 41 95 %   02/16/24 2035 -- -- -- (!) 123 23 92 %   02/16/24  2030 -- -- -- (!) 140 28 93 %   02/16/24 2028 -- -- -- 101 16 92 %   02/16/24 2025 -- -- -- (!) 125 26 91 %   02/16/24 2018 -- -- -- 101 21 92 %   02/16/24 2008 -- -- -- 114 (!) 33 93 %   02/16/24 1958 -- -- -- (!) 129 23 92 %   02/16/24 1948 -- -- -- (!) 156 28 94 %   02/16/24 1942 -- -- -- (!) 154 30 95 %   02/16/24 1933 124/84 97.7  F (36.5  C) Temporal (!) 144 18 --        Physical Exam  General: Alert and Interactive.  The patient has baseline dementia.  Head: No signs of trauma.   Mouth/Throat: Oropharynx is clear and moist.   Eyes: Conjunctivae are normal. Pupils are equal, round, and reactive to light.   Neck: Normal range of motion. No nuchal rigidity.   CV: Tachycardic and irregular rhythm.    Resp: Effort normal and breath sounds normal. No respiratory distress.   GI: Soft. There is no tenderness or guarding.   MSK: Normal range of motion. no edema.   Neuro: Awake, alert, and following commands. Slightly confused due to baseline dementia. PERRLA, EOMI, strength in upper/lower extremities normal and symmetrical.   Sensation normal. Speech normal.  GCS eye subscore is 4. GCS verbal subscore is 5. GCS motor subscore is 6.   Skin: Skin is warm and dry. No rash noted.   Psych: normal mood and affect. behavior is normal.     Emergency Department Course   ECG  ECG taken at 1929, ECG read at 1940  Critical test result: High HR  Atrial fibrillation with rapid ventricular response  Abnormal ECG   Rate 160 bpm. LA interval * ms. QRS duration 72 ms. QT/QTc 294/479 ms. P-R-T axes * -22 14.     Imaging:  XR Chest Port 1 View   Final Result   IMPRESSION: Patchy right infrahilar airspace opacities concerning for pneumonia. Possible tiny left pleural effusion. No pneumothorax. Normal cardiomediastinal silhouette.           Laboratory:  Labs Ordered and Resulted from Time of ED Arrival to Time of ED Departure   COMPREHENSIVE METABOLIC PANEL - Abnormal       Result Value    Sodium 135      Potassium 4.7      Carbon Dioxide  (CO2) 19 (*)     Anion Gap 13      Urea Nitrogen 32.4 (*)     Creatinine 1.24 (*)     GFR Estimate 44 (*)     Calcium 9.9      Chloride 103      Glucose 96      Alkaline Phosphatase 128      AST 30      ALT 21      Protein Total 6.4      Albumin 3.5      Bilirubin Total 1.1     CBC WITH PLATELETS AND DIFFERENTIAL - Abnormal    WBC Count 6.6      RBC Count 4.46      Hemoglobin 13.0      Hematocrit 41.5      MCV 93      MCH 29.1      MCHC 31.3 (*)     RDW 15.5 (*)     Platelet Count 226      % Neutrophils 80      % Lymphocytes 9      % Monocytes 10      % Eosinophils 0      % Basophils 1      % Immature Granulocytes 0      NRBCs per 100 WBC 0      Absolute Neutrophils 5.3      Absolute Lymphocytes 0.6 (*)     Absolute Monocytes 0.7      Absolute Eosinophils 0.0      Absolute Basophils 0.1      Absolute Immature Granulocytes 0.0      Absolute NRBCs 0.0     TROPONIN T, HIGH SENSITIVITY - Abnormal    Troponin T, High Sensitivity 219 (*)    TROPONIN T, HIGH SENSITIVITY - Abnormal    Troponin T, High Sensitivity 200 (*)    PROCALCITONIN - Normal    Procalcitonin 0.08          Emergency Department Course & Assessments:      Interventions:  Medications   diltiazem (CARDIZEM) 125 mg in sodium chloride 0.9 % 125 mL infusion (10 mg/hr Intravenous Rate/Dose Change 2/16/24 2200)   diltiazem (CARDIZEM) injection 10 mg (10 mg Intravenous $Given 2/16/24 1952)        Assessments:  1939 I obtained history and examined the patient as noted above    Independent Interpretation (X-rays, CTs, rhythm strip):  Chest x-ray shows no evidence of pneumothorax    Consultations/Discussion of Management or Tests:  2245 I spoke with Dr. Orantes, hospitalist, who accepts the patient        Social Determinants of Health affecting care:   Stress/Adjustment Disorders    Disposition:  The patient was admitted to the hospital under the care of Dr. Orantes.     Impression & Plan      Medical Decision Making:  Elaina Winn is a 78 year old female who  presents for evaluation of high blood pressure.  The evaluation in the ED is consistent with atrial fibrillation with rapid ventricular response.  Based on chronicity of symptoms and unclear nature of onset, elected to control rate instead of rhythm control with diltiazem bolus and drip. This was successful in controlling rate. I think less likely this is acute coronary syndrome (the elevation in her troponin is likely demand ischemia.), thyroid issues, PE, dissection, drug ingestion, acute electrolyte imbalance, etc.  Will admit to medicine, telemetry, for further cares.        Diagnosis:    ICD-10-CM    1. Atrial fibrillation with RVR (H)  I48.91       2. Demand ischemia  I24.89          Critical care time: 30 minutes.    Scribe Disclosure:  I, Gabino Guerrero, am serving as a scribe at 7:40 PM on 2/16/2024 to document services personally performed by Kurtis Wise MD based on my observations and the provider's statements to me.   2/16/2024   Kurtis Wise MD Joing, Todd Roger, MD  02/17/24 1747

## 2024-02-17 NOTE — PROGRESS NOTES
RT called for neb treatment. On arrival, pt sleeping, resting comfortably. SpO2 97% on 5 lpm oxymask. Spoke to RN, plan to let pt sleep for now, will call if pt wakes up and is short of breath. RN to request prn Xopenex as pt with significant tachycardia previously. Will continue to follow.     TRACIE Owen, RRT

## 2024-02-18 ENCOUNTER — APPOINTMENT (OUTPATIENT)
Dept: CT IMAGING | Facility: CLINIC | Age: 79
DRG: 242 | End: 2024-02-18
Payer: COMMERCIAL

## 2024-02-18 LAB
ANION GAP SERPL CALCULATED.3IONS-SCNC: 18 MMOL/L (ref 7–15)
BASOPHILS # BLD AUTO: 0 10E3/UL (ref 0–0.2)
BASOPHILS NFR BLD AUTO: 0 %
BUN SERPL-MCNC: 41.8 MG/DL (ref 8–23)
CALCIUM SERPL-MCNC: 9.7 MG/DL (ref 8.8–10.2)
CHLORIDE SERPL-SCNC: 101 MMOL/L (ref 98–107)
CREAT SERPL-MCNC: 1.41 MG/DL (ref 0.51–0.95)
DEPRECATED HCO3 PLAS-SCNC: 18 MMOL/L (ref 22–29)
EGFRCR SERPLBLD CKD-EPI 2021: 38 ML/MIN/1.73M2
EOSINOPHIL # BLD AUTO: 0 10E3/UL (ref 0–0.7)
EOSINOPHIL NFR BLD AUTO: 0 %
ERYTHROCYTE [DISTWIDTH] IN BLOOD BY AUTOMATED COUNT: 15.5 % (ref 10–15)
GLUCOSE BLDC GLUCOMTR-MCNC: 139 MG/DL (ref 70–99)
GLUCOSE SERPL-MCNC: 119 MG/DL (ref 70–99)
HCT VFR BLD AUTO: 38.8 % (ref 35–47)
HGB BLD-MCNC: 12.4 G/DL (ref 11.7–15.7)
IMM GRANULOCYTES # BLD: 0 10E3/UL
IMM GRANULOCYTES NFR BLD: 0 %
LYMPHOCYTES # BLD AUTO: 0.4 10E3/UL (ref 0.8–5.3)
LYMPHOCYTES NFR BLD AUTO: 8 %
MCH RBC QN AUTO: 29.4 PG (ref 26.5–33)
MCHC RBC AUTO-ENTMCNC: 32 G/DL (ref 31.5–36.5)
MCV RBC AUTO: 92 FL (ref 78–100)
MONOCYTES # BLD AUTO: 0.4 10E3/UL (ref 0–1.3)
MONOCYTES NFR BLD AUTO: 8 %
NEUTROPHILS # BLD AUTO: 4.2 10E3/UL (ref 1.6–8.3)
NEUTROPHILS NFR BLD AUTO: 84 %
NRBC # BLD AUTO: 0 10E3/UL
NRBC BLD AUTO-RTO: 0 /100
PLATELET # BLD AUTO: 216 10E3/UL (ref 150–450)
POTASSIUM SERPL-SCNC: 4.1 MMOL/L (ref 3.4–5.3)
RBC # BLD AUTO: 4.22 10E6/UL (ref 3.8–5.2)
SODIUM SERPL-SCNC: 137 MMOL/L (ref 135–145)
UFH PPP CHRO-ACNC: 0.15 IU/ML
UFH PPP CHRO-ACNC: 0.21 IU/ML
UFH PPP CHRO-ACNC: 0.37 IU/ML
WBC # BLD AUTO: 5 10E3/UL (ref 4–11)

## 2024-02-18 PROCEDURE — 999N000157 HC STATISTIC RCP TIME EA 10 MIN

## 2024-02-18 PROCEDURE — 258N000003 HC RX IP 258 OP 636: Performed by: STUDENT IN AN ORGANIZED HEALTH CARE EDUCATION/TRAINING PROGRAM

## 2024-02-18 PROCEDURE — 250N000009 HC RX 250: Performed by: STUDENT IN AN ORGANIZED HEALTH CARE EDUCATION/TRAINING PROGRAM

## 2024-02-18 PROCEDURE — 85025 COMPLETE CBC W/AUTO DIFF WBC: CPT | Performed by: STUDENT IN AN ORGANIZED HEALTH CARE EDUCATION/TRAINING PROGRAM

## 2024-02-18 PROCEDURE — 258N000003 HC RX IP 258 OP 636

## 2024-02-18 PROCEDURE — 250N000009 HC RX 250: Performed by: HOSPITALIST

## 2024-02-18 PROCEDURE — 250N000011 HC RX IP 250 OP 636: Mod: JZ | Performed by: INTERNAL MEDICINE

## 2024-02-18 PROCEDURE — 99418 PROLNG IP/OBS E/M EA 15 MIN: CPT | Performed by: STUDENT IN AN ORGANIZED HEALTH CARE EDUCATION/TRAINING PROGRAM

## 2024-02-18 PROCEDURE — 250N000013 HC RX MED GY IP 250 OP 250 PS 637: Performed by: STUDENT IN AN ORGANIZED HEALTH CARE EDUCATION/TRAINING PROGRAM

## 2024-02-18 PROCEDURE — 70450 CT HEAD/BRAIN W/O DYE: CPT

## 2024-02-18 PROCEDURE — 85520 HEPARIN ASSAY: CPT | Performed by: STUDENT IN AN ORGANIZED HEALTH CARE EDUCATION/TRAINING PROGRAM

## 2024-02-18 PROCEDURE — 80048 BASIC METABOLIC PNL TOTAL CA: CPT | Performed by: STUDENT IN AN ORGANIZED HEALTH CARE EDUCATION/TRAINING PROGRAM

## 2024-02-18 PROCEDURE — 99207 PR APP CREDIT; MD BILLING SHARED VISIT: CPT

## 2024-02-18 PROCEDURE — 99291 CRITICAL CARE FIRST HOUR: CPT | Performed by: INTERNAL MEDICINE

## 2024-02-18 PROCEDURE — 99233 SBSQ HOSP IP/OBS HIGH 50: CPT | Performed by: STUDENT IN AN ORGANIZED HEALTH CARE EDUCATION/TRAINING PROGRAM

## 2024-02-18 PROCEDURE — 36415 COLL VENOUS BLD VENIPUNCTURE: CPT | Performed by: STUDENT IN AN ORGANIZED HEALTH CARE EDUCATION/TRAINING PROGRAM

## 2024-02-18 PROCEDURE — 250N000012 HC RX MED GY IP 250 OP 636 PS 637: Performed by: STUDENT IN AN ORGANIZED HEALTH CARE EDUCATION/TRAINING PROGRAM

## 2024-02-18 PROCEDURE — 94640 AIRWAY INHALATION TREATMENT: CPT

## 2024-02-18 PROCEDURE — 250N000013 HC RX MED GY IP 250 OP 250 PS 637: Performed by: HOSPITALIST

## 2024-02-18 PROCEDURE — 210N000002 HC R&B HEART CARE

## 2024-02-18 RX ORDER — METOPROLOL TARTRATE 25 MG/1
25 TABLET, FILM COATED ORAL ONCE
Status: DISCONTINUED | OUTPATIENT
Start: 2024-02-18 | End: 2024-02-18

## 2024-02-18 RX ORDER — METOPROLOL TARTRATE 25 MG/1
25 TABLET, FILM COATED ORAL ONCE
Status: COMPLETED | OUTPATIENT
Start: 2024-02-18 | End: 2024-02-18

## 2024-02-18 RX ORDER — METOPROLOL TARTRATE 50 MG
50 TABLET ORAL 2 TIMES DAILY
Status: DISCONTINUED | OUTPATIENT
Start: 2024-02-19 | End: 2024-02-18

## 2024-02-18 RX ORDER — METOPROLOL TARTRATE 50 MG
50 TABLET ORAL 2 TIMES DAILY
Status: DISCONTINUED | OUTPATIENT
Start: 2024-02-18 | End: 2024-02-18

## 2024-02-18 RX ORDER — DIGOXIN 0.25 MG/ML
125 INJECTION INTRAMUSCULAR; INTRAVENOUS ONCE
Status: COMPLETED | OUTPATIENT
Start: 2024-02-18 | End: 2024-02-18

## 2024-02-18 RX ORDER — METOPROLOL TARTRATE 50 MG
50 TABLET ORAL 2 TIMES DAILY
Status: DISCONTINUED | OUTPATIENT
Start: 2024-02-19 | End: 2024-02-19

## 2024-02-18 RX ADMIN — BRIMONIDINE TARTRATE, TIMOLOL MALEATE 1 DROP: 2; 5 SOLUTION/ DROPS TOPICAL at 21:04

## 2024-02-18 RX ADMIN — MYCOPHENOLATE MOFETIL 250 MG: 250 CAPSULE ORAL at 08:40

## 2024-02-18 RX ADMIN — DIGOXIN 125 MCG: 0.25 INJECTION INTRAMUSCULAR; INTRAVENOUS at 11:50

## 2024-02-18 RX ADMIN — BRIMONIDINE TARTRATE, TIMOLOL MALEATE 1 DROP: 2; 5 SOLUTION/ DROPS TOPICAL at 08:44

## 2024-02-18 RX ADMIN — ANORECTAL OINTMENT: 15.7; .44; 24; 20.6 OINTMENT TOPICAL at 08:46

## 2024-02-18 RX ADMIN — CITALOPRAM HYDROBROMIDE 10 MG: 10 TABLET ORAL at 08:40

## 2024-02-18 RX ADMIN — SODIUM CHLORIDE, POTASSIUM CHLORIDE, SODIUM LACTATE AND CALCIUM CHLORIDE 500 ML: 600; 310; 30; 20 INJECTION, SOLUTION INTRAVENOUS at 11:43

## 2024-02-18 RX ADMIN — METOPROLOL TARTRATE 25 MG: 25 TABLET, FILM COATED ORAL at 08:40

## 2024-02-18 RX ADMIN — ANORECTAL OINTMENT: 15.7; .44; 24; 20.6 OINTMENT TOPICAL at 21:05

## 2024-02-18 RX ADMIN — PREDNISONE 20 MG: 20 TABLET ORAL at 08:40

## 2024-02-18 RX ADMIN — BIMATOPROST 1 DROP: 0.1 SOLUTION/ DROPS OPHTHALMIC at 21:05

## 2024-02-18 RX ADMIN — IPRATROPIUM BROMIDE 0.5 MG: 0.5 SOLUTION RESPIRATORY (INHALATION) at 07:48

## 2024-02-18 RX ADMIN — MYCOPHENOLATE MOFETIL 250 MG: 250 CAPSULE ORAL at 18:11

## 2024-02-18 RX ADMIN — METOPROLOL TARTRATE 25 MG: 25 TABLET, FILM COATED ORAL at 06:03

## 2024-02-18 RX ADMIN — DILTIAZEM HYDROCHLORIDE 5 MG/HR: 5 INJECTION INTRAVENOUS at 17:29

## 2024-02-18 ASSESSMENT — ACTIVITIES OF DAILY LIVING (ADL)
ADLS_ACUITY_SCORE: 48
ADLS_ACUITY_SCORE: 42
ADLS_ACUITY_SCORE: 42
ADLS_ACUITY_SCORE: 48
ADLS_ACUITY_SCORE: 46
ADLS_ACUITY_SCORE: 42
ADLS_ACUITY_SCORE: 48
ADLS_ACUITY_SCORE: 46
ADLS_ACUITY_SCORE: 47
ADLS_ACUITY_SCORE: 46

## 2024-02-18 NOTE — PLAN OF CARE
Shift Note [id number]:     Patient is alert and oriented x1-2, disoriented to time and situation.   Mobility: Ax1 GB/ Walker but currently encouraged to be on bedrest due to blood pressures and HR   Vitals: soft blood pressures and eleavted HR. Soft blood pressure have resolved since early morning  Tele: Afib RVR  Aggression Stop Light: green    Shift Summary: RRT called for patient due to blood pressures dropping, HR increasing. 1x dose of dig given. IVF bolus given. Coccyx wound care completed. Heparin gtt continues    Discharge Plan: TBD, possible procedure (cardioverison) for 2/19.     See Flow sheets for assessment

## 2024-02-18 NOTE — PLAN OF CARE
Neuro- oriented to self only  Most Recent Vitals- Temp: 97.9  F (36.6  C) Temp src: Axillary BP: 118/85 Pulse: (!) 147   Resp: 19 SpO2: 95 % O2 Device: None (Room air)   Tele/Cardiac- Afib RVR- rates sustaining 130-150 NOC, asymptomatic  Resp- LS dim on RA  Activity- A1  Pain- denies  Drips- hep, hep XA due at 1200  Drains/Tubes- PIV  Skin- wound to coccyx  GI/- voiding adequately BSC  Aggression Color- Green  COVID status- Negative  Plan- EP consult monday  Misc- oral metoprolol given early due to high HR    Cady Zhang, RN Goal Outcome Evaluation:

## 2024-02-18 NOTE — PROGRESS NOTES
[facility] Progress Note     Dez Oliveros MD  02/18/2024         Interval History:      Continues to have rapid ventricular rate essentially asymptomatic she received 50 mg of oral metoprolol this morning and had some low blood pressure of systolic in the 80s although asymptomatic this responded to IV fluids.  Overall patient seems to have significant dementia although she is oriented to x 1 but on discussion with patient's daughter Tresa who is her medical decision-maker tells me that patient has significant memory issues.  Patient  is in hospice.  On discussion with Tresa patient's daughter the overall goal is conservative management and avoid procedures as much as possible.  Discussed with the with patient and her daughter that we may not be having enough options to control her ventricular rate without dropping her blood pressure.  We will try digoxin.  If this fails we should consider JIM cardioversion this procedure was discussed with patient and her daughter but at this time they would like to try conservative medical management as much as possible.       Assessment and Plan:      Atrial fibrillation with rapid ventricular rate.  She has known history of paroxysmal atrial fibrillation.  CHADS2 Vascor of at least 3.  Previously she was not on anticoagulation due to concern for liver disease which has been now quite well-controlled with normal ALT AST normal platelets.  She was started on heparin yesterday and has done well on that.  Echocardiogram showed normal LV systolic function with mild mitral regurgitation and severe biatrial enlargement.  In response to diltiazem and metoprolol she dropped the blood pressure.  Will try IV digoxin.  Ideally rhythm control strategy may be more optimal although chances of that being successful is not very high given severe enlarged left atrium.  As noted above on discussion with patient's daughter who helps make medical decisions for patient, they would like to try as  much conservative medical management as possible.  JIM cardioversion procedure were discussed with them.  For now we will try IV digoxin to see if this can help control the rate.  Continue heparin.  Keep n.p.o. since midnight in case rate control strategy fails and we may have to try rhythm control strategy with JIM cardioversion  Acute decompensated congestive heart failure with preserved ejection fraction at admission.  She received Lasix feeling better.  Appears euvolemic on examination.  Dementia, memory care unit resident.  She is appropriate in conversation but has been oriented x 1 and intermittently confused with significant short-term memory issue.  It looks like patient's daughter helps make medical decision for patient.  There is a unanimous concern by her medical care team that patient may not be completely competent in making medical decisions for herself given significant underlying dementia.    Recommendations  For now we will try rate control strategy with IV digoxin  IV digoxin 125 mcg once today  Continue heparin drip  Keep n.p.o. since midnight in case rate control strategy fail and if patient and her daughter are agreeable may need to adopt rhythm control strategy with JIM cardioversion.    40 minutes of critical care time spent including discussion with the patient's daughter           Physical Exam:       , Blood pressure 101/78, pulse 118, temperature 98.1  F (36.7  C), temperature source Oral, resp. rate 18, weight 57.5 kg (126 lb 12.2 oz), SpO2 95%, not currently breastfeeding.  Vitals:    02/18/24 0603   Weight: 57.5 kg (126 lb 12.2 oz)     Vital Signs with Ranges  Temp:  [97.9  F (36.6  C)-98.1  F (36.7  C)] 98.1  F (36.7  C)  Pulse:  [118-160] 118  Resp:  [12-33] 18  BP: ()/(52-87) 101/78  SpO2:  [93 %-96 %] 95 %  I/O's Last 24 hours  I/O last 3 completed shifts:  In: 351.63 [P.O.:350; I.V.:1.63]  Out: 800 [Urine:800]  General patient appears comfortable  Neck normal  JVP  Cardiovascular system irregular, tachycardia, no murmur rub or gallop  Respiratory system no significant crackles or wheezing  Extremities no edema         Medications:         bimatoprost  1 drop Both Eyes At Bedtime    brimonidine-timolol  1 drop Both Eyes BID    citalopram  10 mg Oral Daily    heparin ANTICOAGULANT Loading dose  30 Units/kg Intravenous Once    ipratropium  0.5 mg Nebulization 4x daily    menthol-zinc oxide   Topical BID    [Held by provider] metoprolol tartrate  50 mg Oral BID    mycophenolate  250 mg Oral BID IS    predniSONE  20 mg Oral Daily    sodium chloride (PF)  3 mL Intracatheter Q8H     PRN Meds: calcium carbonate, levalbuterol, lidocaine 4%, lidocaine (buffered or not buffered), metoprolol, nitroGLYcerin, Reason anticoagulant not prescribed for atrial fibrillation, ondansetron **OR** ondansetron, senna-docusate **OR** senna-docusate, sodium chloride (PF)         Data:      All new lab and imaging data was reviewed.   Recent Labs   Lab Test 02/18/24  0446 02/17/24  1231 02/17/24  0630 01/01/18  2035 12/21/17  0746 11/15/17  0726 11/13/17  0735   WBC 5.0 6.7 7.3   < >  --    < > 5.1   HGB 12.4 13.0 13.3   < >  --    < > 13.4   MCV 92 93 96   < >  --    < > 96    230 250   < >  --    < > 203   INR  --   --   --   --  1.02  --  1.01    < > = values in this interval not displayed.      Recent Labs   Lab Test 02/18/24  1103 02/18/24  0446 02/17/24  0630 02/16/24  1941   NA  --  137 135 135   POTASSIUM  --  4.1 4.9 4.7   CHLORIDE  --  101 98 103   CO2  --  18* 19* 19*   BUN  --  41.8* 35.1* 32.4*   CR  --  1.41* 1.22* 1.24*   ANIONGAP  --  18* 18* 13   DANIELITO  --  9.7 10.0 9.9   * 119* 108* 96     Recent Labs   Lab Test 12/07/17  0950 04/26/16  1125   TROPI <0.015 <0.015  The 99th percentile for upper reference range is 0.045 ug/L.  Troponin values in   the range of 0.045 - 0.120 ug/L may be associated with risks of adverse   clinical events.          Dez Oliveros MD   2/18/2024  Pager:  [phone]

## 2024-02-18 NOTE — PROGRESS NOTES
"Notified Person Name: MD Clinton     Notification Date/Time: 2/18 1247     Purpose of Notification or Copy of Page: Family at bedside to discuss care goals     Comments: \" Message:family is at bedside, daughter clint and son kwan. I know clint would like to talk to you and figure out care goals if youre available to see them. \"    "

## 2024-02-18 NOTE — PROGRESS NOTES
"Notified Person Name: MD Clinton     Notification Date/Time: 1011 2/18     Purpose of Notification or Copy of Page: patient change in status     Comments: \" FYI: Patient's HR still in 120's-140's. B/P is now 87/65 and 85/58 (recheck). she got the 50mg of Lopressor. Tried to find cards and cannot find them currently. \"    Paged Cards:  \"patient in 267 \"D.B.\" blood pressure is dropping significantly. asymptomatic still but most recent b/p was 82/54.\"  "

## 2024-02-18 NOTE — PROGRESS NOTES
[facility]    Medicine Progress Note - Hospitalist Service    Date of Admission:  2/16/2024    Assessment & Plan   Elaina Winn is a 78 year old female with a past medical history of atrial fibrillation, dementia, autoimmune hepatitis, CKD stage III presents to hospital with A-fib RVR.     Afib RVR  Hx of pafib  NSTEMI  Presented from her nursing home for hypertension, was found to be in A-fib RVR on evaluation by EMS.  Rate improving with diltiazem drip in the emergency room.  Not on anticoagulation due to previous liver dysfunction. On low-dose metoprolol for rate control.  Troponin elevated at 219, but trending down to 200 on repeat. The patient is chest pain free. ACS is unlikely and her troponin elevation most likely represents a demand ischemia in setting of her afib rvr.   *TTE with LVEF 55-60%, severe biatrial enlargement  *Patient with history of Afib s/p DCCV in 2017    - rate quite a bit more elevated today. Will give extra metoprolol to reach 50mg and monitor closely on tele. Blood pressure may become limiting factor in rate control. Appreciate cardiology's assistance. Possible plan for JIM/DCCV tomorrow.  - on heparin gtt for now and daughter aware of risk of stroke and need for future AC. She has not yet decided to continue AC on discharge given her dementia etc. (Patient's  had a bleed history on anticoagulation)    Update  Heart rates persistently elevated and BP low this AM after metoprolol. RRT provider and cardiology involved. Plan to give IV digoxin and follow rates. May consider restarting dilt gtt later today vs amiodarone if needed. Patient had good response to dilt in the ED.  Family would like to avoid being overly aggressive in her care but are interesting in titration of different medicines and even DCCV if needed to get better heart rate control. No change in code status currently    Also CT head reviewed and negative. Facial droop mostly  resolved    Acute hypoxic respiratory failure  Possible undx COPD  Acute on chronic diastolic heart failure exacerbation  The patient was noted to be wheezing in the ED requiring supplemental O2. She does have a multi year history of smoking raising suspicioun for undiagnosed copd. Furthermore her ct did not reveal significant pulmonary edema    - given IV lasix on admission and started steroid burst  - off oxygen this AM with mild expiratory wheezing still present  - trace edema also still present however given bump in Cr hold further diuresis, control heart rate and continue steroid burst with 20mg of prednisone with nebulizers     Dementia  A&Ox1  - lives in memory care and daughter is decision maker     Autoimmune hepatitis  -mycophenolate restarted     CKD stage III  Stable  - Cr bump today  - hold further diuresis given TTE normal and patient off oxygen. Need to get heart rate under better control  - follow BMP tomorrow    Coccyx pressure wound POA  - incontinence associated dermatitis  - WOC consulted       Diet: Combination Diet Regular Diet Adult  Room Service    DVT Prophylaxis: heparin  Scott Catheter: Not present  Lines: None     Cardiac Monitoring: ACTIVE order. Indication: imc  Code Status: Full Code      Clinically Significant Risk Factors                  # Hypertension: Noted on problem list                   Disposition Plan     Expected Discharge Date: 02/18/2024                    Yin Alonzo DO  Hospitalist Service  [facility]  Securely message with [employer] (more info)  Text page via [employer] Paging/Directory   ______________________________________________________________________    Interval History   Patient resting comfortably in  bed. HR noted to be into the 150s. She denies any complaints seems more oriented to her situation today but still unsure she is located. Discussed with nursing staff    Physical Exam   Vital Signs: Temp: 98.1  F (36.7  C) Temp src: Oral  BP: 106/77 Pulse: (!) 137   Resp: 22 SpO2: 95 % O2 Device: None (Room air)    Weight: 126 lbs 12.23 oz    Constitutional: Awake, alert, cooperative, no apparent distress  Respiratory: trace expiratory wheezing  Cardiovascular:irregular rate and rhythm, no murmur noted, trace edema in LE  GI: Normal bowel sounds, soft, non-distended, non-tender  Skin/Integumen: No rashes,  Other:      Medical Decision Making       50 MINUTES SPENT BY ME on the date of service doing chart review, history, exam, documentation & further activities per the note.      Data     I have personally reviewed the following data over the past 24 hrs:    5.0  \   12.4   / 216     137 101 41.8 (H) /  119 (H)   4.1 18 (L) 1.41 (H) \       Imaging results reviewed over the past 24 hrs:   Recent Results (from the past 24 hour(s))   Echocardiogram Complete   Result Value    LVEF  55-60%    Narrative    [id number]  [id number]  [id number]  [id number]^SUNITA^COLBY^JULI     [facility]  Echocardiography Laboratory  [address] [place] [address]     Name: ABDULLAHI BLACKWOOD  MRN: [id number]  : 1945  Study Date: 2024 02:56 PM  Age: 78 yrs  Gender: Female  Patient Location: [facility]  Reason For Study: Atrial Fibrillation  Ordering Physician: COLBY DORSEY  Referring Physician: Shlomo Munguia  Performed By: Bryce Anaya     BSA: 1.6 m2  Height: 63 in  Weight: 134 lb  HR: 133  BP: 114/70 mmHg  ______________________________________________________________________________  Procedure  Complete Portable Echo Adult.  ______________________________________________________________________________  Interpretation Summary     The rhythm was rapid atrial fibrillation.  The visual ejection fraction is 55-60%.  The left ventricle is normal in size.  Left ventricular systolic function is normal.  The right ventricle is normal in structure, function and size.  There is severe biatrial enlargement.  There is mild (1+) mitral  regurgitation.     Since the last study 11/14/2017 the pateient has developed atrial fibrillation  iwth a rapid ventricular response rate/.  ______________________________________________________________________________  Left Ventricle  The left ventricle is normal in size. There is normal left ventricular wall  thickness. The visual ejection fraction is 55-60%. Left ventricular systolic  function is normal. Left ventricular diastolic function is indeterminate. No  regional wall motion abnormalities noted. There is no thrombus seen in the  left ventricle.     Right Ventricle  The right ventricle is normal in structure, function and size. There is no  mass or thrombus in the right ventricle.     Atria  There is severe biatrial enlargement. A patent foramen ovale is present. Left  to right atrial shunt, small. No thrombus is detected in the left atrial  appendage.     Mitral Valve  The mitral valve leaflets appear normal. There is no evidence of stenosis,  fluttering, or prolapse. There is mild (1+) mitral regurgitation.     Tricuspid Valve  Normal tricuspid valve. The right ventricular systolic pressure is  approximated at 28.6 mmHg plus the right atrial pressure. Right ventricle  systolic pressure estimate normal. There is mild (1+) tricuspid regurgitation.  There is no tricuspid stenosis.     Aortic Valve  The aortic valve is trileaflet. No aortic regurgitation is present. No aortic  stenosis is present.     Pulmonic Valve  Normal pulmonic valve. There is no pulmonic valvular regurgitation. There is  no pulmonic valvular stenosis.     Vessels  The aortic root is normal size. Normal size ascending aorta. The inferior vena  cava is normal. The pulmonary artery is normal size.     Pericardium  The pericardium appears normal. Moderate left pleural effusion.     Rhythm  The rhythm was rapid atrial fibrillation.  ______________________________________________________________________________  MMode/2D Measurements &  Calculations  IVSd: 1.00 cm     LVIDd: 3.8 cm  LVIDs: 2.8 cm  LVPWd: 1.0 cm  FS: 26.9 %  LV mass(C)d: 121.9 grams  LV mass(C)dI: 74.7 grams/m2  Ao root diam: 2.7 cm  LA dimension: 4.8 cm  asc Aorta Diam: 3.2 cm  LA/Ao: 1.8  LVOT diam: 2.0 cm  LVOT area: 3.1 cm2  Ao root diam index Ht(cm/m): 1.7  Ao root diam index BSA (cm/m2): 1.6  Asc Ao diam index BSA (cm/m2): 2.0  Asc Ao diam index Ht(cm/m): 2.0  LA Volume (BP): 83.2 ml     LA Volume Index (BP): 51.0 ml/m2  RWT: 0.55  TAPSE: 1.5 cm     Doppler Measurements & Calculations  MV E max antonino: 113.0 cm/sec  MV dec slope: 1112 cm/sec2  MV dec time: 0.10 sec  PA acc time: 0.09 sec  TR max antonino: 267.4 cm/sec  TR max P.6 mmHg  RV S Antonino: 8.6 cm/sec     ______________________________________________________________________________  Report approved by: Dr. Zion Pryor 2024 04:00 PM

## 2024-02-18 NOTE — PLAN OF CARE
Goal Outcome Evaluation: Pt is &O to self, disoriented to time, place and situation, calm, VSS except -150s and pt is asymptomatic, IV Metoprolol 5 given w/o help, PO scheduled Metoprolol 25 mg given, on RA and bases are fine crackles, Heparin at 750 and Xa result pending, up with SBA, poor appetite and encouraged to drink water. Bilateral buttocks blanchable red - care per WOC.  Plan to monitor HR and possible cardioversion om Monday.      Plan of Care Reviewed With: patient    Overall Patient Progress: no changeOverall Patient Progress: no change

## 2024-02-18 NOTE — CODE/RAPID RESPONSE
Brief House NP Note   Time: 11:01 AM    Atrial Fibrillation with RVR  Left Facial Droop  RRT called after patient became hypotensive with AFib with RVR, HR in 140-150s. Upon arrival patient is non-toxic appearing not in acute distress. RN reports RRT called after unable to reach Cardiology. Dr. Oliveros has gotten back to her with recommendations for IVF bolus and holding PO metoprolol however orders not placed. Patient is alert, oriented to self. She does appear to have a subtle left facial droop not previously seen. I discussed with primary Hospitalist who came to the bedside. Dr. Alonzo also appreciates facial droop, which she did not appreciate this morning. She reports patient's daughter expressed she would not want to pursue aggressive interventions. Patient is on heparin infusion for Afib with RVR. Dr. Alonzo confirmed we will proceed with CT w/o contrast, but will not initiate code stroke. Can consider MRI if ongoing deficit and no acute intracranial pathology on CT. Dr. Oliveros also came to bedside, and is planning to perform JIM and cardioversion tomorrow.     Interventions:  -  ml bolus  - PO metoprolol held  - IV digoxin initiated by Dr. Oliveros  - Head CT w/o contrast    Patient will remain on CCU on IMC status. Defer further cares to primary Hospitalist Dr. Alonzo.    Pritesh Ortega NP  [facility]  Securely message with the Voc[employer] Web Console (learn more here)  Text page via [facility] Paging/Directory    Medical Decision Making       30 MINUTES SPENT BY ME on the date of service doing chart review, history, exam, documentation & further activities per the note.

## 2024-02-19 VITALS
WEIGHT: 134.3 LBS | SYSTOLIC BLOOD PRESSURE: 105 MMHG | DIASTOLIC BLOOD PRESSURE: 85 MMHG | BODY MASS INDEX: 24.01 KG/M2 | HEART RATE: 80 BPM

## 2024-02-19 LAB
ANION GAP SERPL CALCULATED.3IONS-SCNC: 16 MMOL/L (ref 7–15)
BASOPHILS # BLD AUTO: 0 10E3/UL (ref 0–0.2)
BASOPHILS NFR BLD AUTO: 0 %
BUN SERPL-MCNC: 38.8 MG/DL (ref 8–23)
CALCIUM SERPL-MCNC: 9.9 MG/DL (ref 8.8–10.2)
CHLORIDE SERPL-SCNC: 99 MMOL/L (ref 98–107)
CREAT SERPL-MCNC: 1.17 MG/DL (ref 0.51–0.95)
DEPRECATED HCO3 PLAS-SCNC: 20 MMOL/L (ref 22–29)
EGFRCR SERPLBLD CKD-EPI 2021: 47 ML/MIN/1.73M2
EOSINOPHIL # BLD AUTO: 0 10E3/UL (ref 0–0.7)
EOSINOPHIL NFR BLD AUTO: 0 %
ERYTHROCYTE [DISTWIDTH] IN BLOOD BY AUTOMATED COUNT: 15.8 % (ref 10–15)
GLUCOSE SERPL-MCNC: 105 MG/DL (ref 70–99)
HCT VFR BLD AUTO: 44.5 % (ref 35–47)
HGB BLD-MCNC: 14 G/DL (ref 11.7–15.7)
IMM GRANULOCYTES # BLD: 0.1 10E3/UL
IMM GRANULOCYTES NFR BLD: 1 %
LYMPHOCYTES # BLD AUTO: 0.7 10E3/UL (ref 0.8–5.3)
LYMPHOCYTES NFR BLD AUTO: 6 %
MCH RBC QN AUTO: 29 PG (ref 26.5–33)
MCHC RBC AUTO-ENTMCNC: 31.5 G/DL (ref 31.5–36.5)
MCV RBC AUTO: 92 FL (ref 78–100)
MONOCYTES # BLD AUTO: 0.9 10E3/UL (ref 0–1.3)
MONOCYTES NFR BLD AUTO: 8 %
NEUTROPHILS # BLD AUTO: 9.6 10E3/UL (ref 1.6–8.3)
NEUTROPHILS NFR BLD AUTO: 85 %
NRBC # BLD AUTO: 0 10E3/UL
NRBC BLD AUTO-RTO: 0 /100
PLATELET # BLD AUTO: 225 10E3/UL (ref 150–450)
POTASSIUM SERPL-SCNC: 4.2 MMOL/L (ref 3.4–5.3)
RBC # BLD AUTO: 4.83 10E6/UL (ref 3.8–5.2)
SODIUM SERPL-SCNC: 135 MMOL/L (ref 135–145)
UFH PPP CHRO-ACNC: 0.29 IU/ML
UFH PPP CHRO-ACNC: 0.96 IU/ML
UFH PPP CHRO-ACNC: <0.1 IU/ML
WBC # BLD AUTO: 11.3 10E3/UL (ref 4–11)

## 2024-02-19 PROCEDURE — 250N000009 HC RX 250: Performed by: HOSPITALIST

## 2024-02-19 PROCEDURE — 250N000013 HC RX MED GY IP 250 OP 250 PS 637: Performed by: HOSPITALIST

## 2024-02-19 PROCEDURE — 94640 AIRWAY INHALATION TREATMENT: CPT | Mod: 76

## 2024-02-19 PROCEDURE — 999N000157 HC STATISTIC RCP TIME EA 10 MIN

## 2024-02-19 PROCEDURE — 250N000009 HC RX 250: Performed by: STUDENT IN AN ORGANIZED HEALTH CARE EDUCATION/TRAINING PROGRAM

## 2024-02-19 PROCEDURE — 99232 SBSQ HOSP IP/OBS MODERATE 35: CPT | Performed by: HOSPITALIST

## 2024-02-19 PROCEDURE — 210N000002 HC R&B HEART CARE

## 2024-02-19 PROCEDURE — 85025 COMPLETE CBC W/AUTO DIFF WBC: CPT | Performed by: STUDENT IN AN ORGANIZED HEALTH CARE EDUCATION/TRAINING PROGRAM

## 2024-02-19 PROCEDURE — 258N000003 HC RX IP 258 OP 636: Performed by: PHYSICIAN ASSISTANT

## 2024-02-19 PROCEDURE — 85520 HEPARIN ASSAY: CPT | Performed by: HOSPITALIST

## 2024-02-19 PROCEDURE — 250N000013 HC RX MED GY IP 250 OP 250 PS 637: Performed by: PHYSICIAN ASSISTANT

## 2024-02-19 PROCEDURE — 250N000011 HC RX IP 250 OP 636: Performed by: STUDENT IN AN ORGANIZED HEALTH CARE EDUCATION/TRAINING PROGRAM

## 2024-02-19 PROCEDURE — 36415 COLL VENOUS BLD VENIPUNCTURE: CPT | Performed by: STUDENT IN AN ORGANIZED HEALTH CARE EDUCATION/TRAINING PROGRAM

## 2024-02-19 PROCEDURE — 99233 SBSQ HOSP IP/OBS HIGH 50: CPT | Mod: FS | Performed by: PHYSICIAN ASSISTANT

## 2024-02-19 PROCEDURE — 250N000011 HC RX IP 250 OP 636: Mod: JZ | Performed by: PHYSICIAN ASSISTANT

## 2024-02-19 PROCEDURE — 250N000012 HC RX MED GY IP 250 OP 636 PS 637: Performed by: STUDENT IN AN ORGANIZED HEALTH CARE EDUCATION/TRAINING PROGRAM

## 2024-02-19 PROCEDURE — 80048 BASIC METABOLIC PNL TOTAL CA: CPT | Performed by: STUDENT IN AN ORGANIZED HEALTH CARE EDUCATION/TRAINING PROGRAM

## 2024-02-19 PROCEDURE — 258N000003 HC RX IP 258 OP 636: Performed by: STUDENT IN AN ORGANIZED HEALTH CARE EDUCATION/TRAINING PROGRAM

## 2024-02-19 PROCEDURE — 36415 COLL VENOUS BLD VENIPUNCTURE: CPT | Performed by: HOSPITALIST

## 2024-02-19 PROCEDURE — 94640 AIRWAY INHALATION TREATMENT: CPT

## 2024-02-19 RX ORDER — GUAIFENESIN 600 MG/1
600 TABLET, EXTENDED RELEASE ORAL 2 TIMES DAILY
Status: DISCONTINUED | OUTPATIENT
Start: 2024-02-19 | End: 2024-03-02 | Stop reason: HOSPADM

## 2024-02-19 RX ORDER — METOPROLOL TARTRATE 25 MG/1
25 TABLET, FILM COATED ORAL 2 TIMES DAILY
Status: DISCONTINUED | OUTPATIENT
Start: 2024-02-19 | End: 2024-02-20

## 2024-02-19 RX ORDER — DIGOXIN 125 MCG
125 TABLET ORAL DAILY
Status: DISCONTINUED | OUTPATIENT
Start: 2024-02-19 | End: 2024-02-20

## 2024-02-19 RX ADMIN — HEPARIN SODIUM 550 UNITS/HR: 10000 INJECTION, SOLUTION INTRAVENOUS at 01:30

## 2024-02-19 RX ADMIN — PREDNISONE 20 MG: 20 TABLET ORAL at 08:14

## 2024-02-19 RX ADMIN — DIGOXIN 125 MCG: 125 TABLET ORAL at 10:49

## 2024-02-19 RX ADMIN — GUAIFENESIN 600 MG: 600 TABLET, EXTENDED RELEASE ORAL at 20:02

## 2024-02-19 RX ADMIN — ANORECTAL OINTMENT: 15.7; .44; 24; 20.6 OINTMENT TOPICAL at 20:03

## 2024-02-19 RX ADMIN — AMIODARONE HYDROCHLORIDE 1 MG/MIN: 50 INJECTION, SOLUTION INTRAVENOUS at 17:26

## 2024-02-19 RX ADMIN — MYCOPHENOLATE MOFETIL 250 MG: 250 CAPSULE ORAL at 08:14

## 2024-02-19 RX ADMIN — HEPARIN SODIUM 850 UNITS/HR: 10000 INJECTION, SOLUTION INTRAVENOUS at 13:38

## 2024-02-19 RX ADMIN — AMIODARONE HYDROCHLORIDE 150 MG: 1.5 INJECTION, SOLUTION INTRAVENOUS at 17:18

## 2024-02-19 RX ADMIN — BRIMONIDINE TARTRATE, TIMOLOL MALEATE 1 DROP: 2; 5 SOLUTION/ DROPS TOPICAL at 08:15

## 2024-02-19 RX ADMIN — CITALOPRAM HYDROBROMIDE 10 MG: 10 TABLET ORAL at 08:14

## 2024-02-19 RX ADMIN — IPRATROPIUM BROMIDE 0.5 MG: 0.5 SOLUTION RESPIRATORY (INHALATION) at 07:14

## 2024-02-19 RX ADMIN — BIMATOPROST 1 DROP: 0.1 SOLUTION/ DROPS OPHTHALMIC at 20:03

## 2024-02-19 RX ADMIN — MYCOPHENOLATE MOFETIL 250 MG: 250 CAPSULE ORAL at 17:21

## 2024-02-19 RX ADMIN — IPRATROPIUM BROMIDE 0.5 MG: 0.5 SOLUTION RESPIRATORY (INHALATION) at 15:23

## 2024-02-19 RX ADMIN — METOPROLOL TARTRATE 25 MG: 25 TABLET, FILM COATED ORAL at 16:26

## 2024-02-19 RX ADMIN — BRIMONIDINE TARTRATE, TIMOLOL MALEATE 1 DROP: 2; 5 SOLUTION/ DROPS TOPICAL at 20:03

## 2024-02-19 RX ADMIN — DILTIAZEM HYDROCHLORIDE 10 MG/HR: 5 INJECTION INTRAVENOUS at 06:26

## 2024-02-19 RX ADMIN — METOPROLOL TARTRATE 25 MG: 25 TABLET, FILM COATED ORAL at 20:02

## 2024-02-19 RX ADMIN — GUAIFENESIN 600 MG: 600 TABLET, EXTENDED RELEASE ORAL at 10:49

## 2024-02-19 RX ADMIN — ANORECTAL OINTMENT: 15.7; .44; 24; 20.6 OINTMENT TOPICAL at 08:14

## 2024-02-19 ASSESSMENT — ACTIVITIES OF DAILY LIVING (ADL)
ADLS_ACUITY_SCORE: 47

## 2024-02-19 NOTE — PROGRESS NOTES
[facility]    Medicine Progress Note - Hospitalist Service    Date of Admission:  2/16/2024    Assessment & Plan   Elaina Winn is a 78 year old female with a past medical history of atrial fibrillation, dementia, autoimmune hepatitis, CKD stage III presents to hospital with A-fib RVR.     Afib RVR  Hx of pafib  NSTEMI, type 2 secondary to demand  Presented from her nursing home for hypertension, was found to be in A-fib RVR on evaluation by EMS.  Rate improving with diltiazem drip in the emergency room.  Not on anticoagulation due to previous liver dysfunction. On low-dose metoprolol for rate control.  Troponin elevated at 219, but trending down to 200 on repeat. The patient is chest pain free. ACS is unlikely and her troponin elevation most likely represents a demand ischemia in setting of her afib rvr.   *TTE with LVEF 55-60%, severe biatrial enlargement  *Patient with history of Afib s/p DCCV in 2017  - ongoing difficulty with rate control. Diltiazem gtt discontinued 2/19  - cardiology following  - digoxin 125mcg daily added 2/19  - amiodarone gtt  - metoprolol tartrate 25mg BID  - plan for JIM/DCCV tomorrow.  - on heparin gtt, plan AC for 1mo minimum on discharge  - NPO at midnight    Acute hypoxic respiratory failure  Possible undx COPD  Acute on chronic diastolic heart failure exacerbation  The patient was noted to be wheezing in the ED requiring supplemental O2. She does have a multi year history of smoking raising suspicion for undiagnosed copd. Furthermore her ct did not reveal significant pulmonary edema  - given IV lasix on admission and started steroid burst  - continue nebulizers  - flutter valve  - mucinex BID     Dementia  A&Ox1  - lives in memory care and daughter is decision maker     Autoimmune hepatitis  -mycophenolate restarted     CKD stage III  Stable, did have bump up to 1.41 on 2/18 after diuresis  - follow BMP daily    Coccyx pressure wound POA  - incontinence  associated dermatitis  - WOC consulted       Diet: Combination Diet Regular Diet Adult  Room Service    DVT Prophylaxis: heparin  Scott Catheter: Not present  Lines: None     Cardiac Monitoring: ACTIVE order. Indication: imc  Code Status: Full Code            Diet: Room Service  Combination Diet Regular Diet Adult  NPO for Medical/Clinical Reasons Except for: Meds    DVT Prophylaxis: heparin gtt  Scott Catheter: Not present  Lines: None     Cardiac Monitoring: ACTIVE order. Indication: imc  Code Status: Full Code      Clinically Significant Risk Factors                  # Hypertension: Noted on problem list                   Disposition Plan      Expected Discharge Date: 02/21/2024                    Fatou Moyer DO  Hospitalist Service  [facility]  Securely message with [employer] (more info)  Text page via [employer] Paging/Directory   ______________________________________________________________________    Interval History   Patient seen and examined. She is doing okay. Has a cough this morning, but likely needs some help expectorating her sputum. Discussed plan with rate control and possible cardioversion, but she does not seem to understand/remember what we are talking about even slightly later in the conversation.     Physical Exam   Vital Signs: Temp: 97.9  F (36.6  C) Temp src: Oral BP: (!) 132/97 Pulse: (!) 154   Resp: (!) 35 SpO2: 93 % O2 Device: None (Room air)    Weight: 126 lbs 12.23 oz    Constitutional: Awake, alert, cooperative, no apparent distress  Respiratory: occasional wheeze, coarse cough trying to mobilize sputum  Cardiovascular: irregularly irregular, rates 130s normal S1 and S2, and no murmur noted  GI: Normal bowel sounds, soft, non-distended, non-tender  Skin/Integumen: No rashes, no cyanosis, no edema  Other:      Medical Decision Making       35 MINUTES SPENT BY ME on the date of service doing chart review, history, exam, documentation & further activities per the  note.      Data     I have personally reviewed the following data over the past 24 hrs:    11.3 (H)  \   14.0   / 225     135 99 38.8 (H) /  105 (H)   4.2 20 (L) 1.17 (H) \       Imaging results reviewed over the past 24 hrs:   No results found for this or any previous visit (from the past 24 hour(s)).

## 2024-02-19 NOTE — PLAN OF CARE
Goal Outcome Evaluation: Pt is &O to self, disoriented to time, place and situation, calm, VSS except HR 80-90s on Diltiazem gtt 10 mg/hr, on RA and bases are fine crackles, with often productive cough, Heparin at 550 and Xa at 0300, up with SBA, poor appetite and encouraged to drink water. Bilateral buttocks blanchable red - treated per WOC order.  Plan to monitor HR and keep NPO for possible cardioversion onMonday.       Plan of Care Reviewed With: patient     Overall Patient Progress: no changeOverall Patient Progress: no change

## 2024-02-19 NOTE — PROGRESS NOTES
Notified provider about indwelling winters catheter discussed removal or continued need.    Did provider choose to remove indwelling winters catheter? Yes    Provider's winters indication for keeping indwelling winters catheter: Retention.    Is there an order for indwelling winters catheter? Yes    *If there is a plan to keep winters catheter in place at discharge daily notification with provider is not necessary, but please add a notation in the treatment team sticky note that the patient will be discharging with the catheter.

## 2024-02-19 NOTE — PROGRESS NOTES
[facility]  Cardiology Progress Note  Date of Service: 02/19/2024  Primary Cardiologist: Dr. Erwin    Assessment & Plan    Elaina Winn is a 78 year old female with past medical history significant for paroxysmal atrial fibrillation not on anticoagulation, autoimmune hepatitis, dementia, CKD admitted on 2/16/2024 with hypertension and acute respiratory failure and found to have A-fib with RVR.    Assessment:  1.  A-fib with RVR, patient became hypotensive with metoprolol.  RRT on 12/18.  50 mg of metoprolol.  There was some concern for stroke but CT of the head was negative.  Family is wanting conservative management if possible.  Has not been on anticoagulation historically due to liver disease.   - one time dose dig 125 mg IV 12/18   -Echo shows severe biatrial enlargement, making it less likely for her to maintain sinus rhythm without antiarrhythmic.   - rates remain intermittently elevated up to the 170s with exertion, she has had many pauses but none greater than 2 seconds.    2.  Hypertension, markedly elevated on admission.    3.  Dementia, patient lives in memory care and daughter is decision-maker    4.  Stage III CKD, baseline creatinine 1-1.2, currently at 1.17.    Plan:   Dig 125 mcg daily with add'l dose now.   Stop dilt gtt given hypotension  Later this pm if BP >100 will add lopressor 25 mg bid  If we must would consider MEG guided DCCV tomorrow, given severe biatrial enlargement unlikely that the patient would maintain sinus rhythm.  In addition, patient will require anticoagulation for minimum of 1 month.    Will continue to follow with you  Dispo likely 1 to 2 days.    ADDENDUM  Additional 25 mg lopressor  Pt still tachycardic in the 150s at rest in spite of dig.   Daughter at bedside, agreeable to anticoagulation, meg and dccv  Agreeable to short term amio.   Plan for meg guided dccv tomorrow.    Npo at midnight      Amy Rasheed PA-C  [facility]  Pager: [phone]      Interval History   She denies chest pain, shortness of breath, palpitations.  She states she feels fine she is just a little tired.    Physical Exam   Temp: 98.1  F (36.7  C) Temp src: Oral BP: 92/59 Pulse: 89   Resp: 16 SpO2: 92 % O2 Device: None (Room air)    Vitals:    02/18/24 0603 02/19/24 0600   Weight: 57.5 kg (126 lb 12.2 oz) 57.5 kg (126 lb 12.2 oz)     Elderly woman in no acute distress.  Normocephalic atraumatic.  Heart is irregularly irregular and tachycardic with a 2 out of 6 systolic murmur best lower left sternal border.  Lungs are diminished in the bases but otherwise clear anteriorly.  1+ pitting edema to mid shin.  JVP to near jaw at 45 degrees.  Skin is warm and dry but pale.    Clinically Significant Risk Factors                  # Hypertension: Noted on problem list                  Cardiac Arrhythmia: Atrial fibrillation: Paroxysmal    Fluid overload, unspecified    Acute kidney failure, unspecified    Dementia: Unspecified dementia without behavioral disturbance    Chronic Fatigue and Other Debilities: Age-related physical debility        Medications    dilTIAZem 10 mg/hr (02/19/24 0931)    heparin 550 Units/hr (02/19/24 0822)    Reason anticoagulant not prescribed for atrial fibrillation        bimatoprost  1 drop Both Eyes At Bedtime    brimonidine-timolol  1 drop Both Eyes BID    citalopram  10 mg Oral Daily    guaiFENesin  600 mg Oral BID    ipratropium  0.5 mg Nebulization 4x daily    menthol-zinc oxide   Topical BID    [Held by provider] metoprolol tartrate  50 mg Oral BID    mycophenolate  250 mg Oral BID IS    predniSONE  20 mg Oral Daily    sodium chloride (PF)  3 mL Intracatheter Q8H       Data   Last 24 hours labs reviewed     Imaging: CT PE study shows bilateral pleural effusions mild scattered mucous plugging.    Tele: A-fib with heart rates ranging from 90s at rest up to 170s with movement.

## 2024-02-19 NOTE — PROVIDER NOTIFICATION
Provider notified: Sarah Orantes  Reason for notification:[id number]   Pt. pulled out both IV's and VS monitors. RN put mits on her. Paging to request order for mits.   Thanks, Hannah RN *[phone]  Orders received: Non-violent restraints

## 2024-02-19 NOTE — PLAN OF CARE
Pt here with Afib RVR. A&O to self. Neuros include severe dementia. VSS on RA. Tele Afib CVR/RVR. NPO diet. Up with SBA to commode if HR not too high. Purewick in OVN. Denies pain. Heparin @ 550 units/hr. Dilt @ 10mg/hr. Pt scoring green/yellow on the Aggression Stop Light Tool. Pt. Had episode of confusion OVN where she wanted to  get out of bed. Pt. Pulled out both IV's and vital sign cords. IV replaced, bed changed and mits placed per order.  Plan for JIM and cardioversion on 2/19. Discharge pending.

## 2024-02-19 NOTE — PLAN OF CARE
Care plan note:      Recent Vitals:  Temp: 98.1  F (36.7  C) Temp src: Oral BP: 109/58 Pulse: (!) 141   Resp: 24 SpO2: 90 % O2 Device: None (Room air)      Orientation/Neuro: Disoriented to, Place , Time, Situation, Easily re-directed, Moves all extremities equally- hx of dementia  Pain: The patient is not having any pain.   Tele: Atrial fibrillation - rapid   IV medications: Heparin, dilt stopped per cards   Mobility: St. by assist   Skin: With in normal limits   Resp: tachypnea at times  GI: WDL  : WDL     Diet: Tolerating diet:   NPO  Orders Placed This Encounter      NPO per Anesthesia Guidelines for Procedure/Surgery Except for: Meds      Safety/Concerns:  Fall Risk and Rodrigo Risk  Aggression Color: Green    Plan: cards and hospitalist following. Added digoxin today. NPO at midnight for possible meg/dccv if unable to rate control. Continue to monitor.    Continue to monitor.      Maritza Hager RN

## 2024-02-19 NOTE — PROGRESS NOTES
1500 - 1900    Patient was administered metoprolol p.o. and Amiodaron bolus, followed by Amiodarone drip.   HR is 120's.  Shift was otherwise unremarkable.

## 2024-02-20 ENCOUNTER — ANESTHESIA (OUTPATIENT)
Dept: SURGERY | Facility: CLINIC | Age: 79
DRG: 242 | End: 2024-02-20
Payer: COMMERCIAL

## 2024-02-20 ENCOUNTER — APPOINTMENT (OUTPATIENT)
Dept: GENERAL RADIOLOGY | Facility: CLINIC | Age: 79
DRG: 242 | End: 2024-02-20
Attending: PHYSICIAN ASSISTANT
Payer: COMMERCIAL

## 2024-02-20 ENCOUNTER — APPOINTMENT (OUTPATIENT)
Dept: CARDIOLOGY | Facility: CLINIC | Age: 79
DRG: 242 | End: 2024-02-20
Attending: PHYSICIAN ASSISTANT
Payer: COMMERCIAL

## 2024-02-20 ENCOUNTER — ANESTHESIA EVENT (OUTPATIENT)
Dept: SURGERY | Facility: CLINIC | Age: 79
DRG: 242 | End: 2024-02-20
Payer: COMMERCIAL

## 2024-02-20 LAB
ANION GAP SERPL CALCULATED.3IONS-SCNC: 13 MMOL/L (ref 7–15)
BUN SERPL-MCNC: 33.6 MG/DL (ref 8–23)
CALCIUM SERPL-MCNC: 9.9 MG/DL (ref 8.8–10.2)
CHLORIDE SERPL-SCNC: 102 MMOL/L (ref 98–107)
CREAT SERPL-MCNC: 1.06 MG/DL (ref 0.51–0.95)
DEPRECATED HCO3 PLAS-SCNC: 22 MMOL/L (ref 22–29)
DIGOXIN SERPL-MCNC: 0.5 NG/ML (ref 0.6–2)
DIGOXIN SERPL-MCNC: 0.6 NG/ML (ref 0.6–2)
EGFRCR SERPLBLD CKD-EPI 2021: 53 ML/MIN/1.73M2
ERYTHROCYTE [DISTWIDTH] IN BLOOD BY AUTOMATED COUNT: 15.6 % (ref 10–15)
GLUCOSE SERPL-MCNC: 107 MG/DL (ref 70–99)
HCT VFR BLD AUTO: 41.7 % (ref 35–47)
HGB BLD-MCNC: 13.4 G/DL (ref 11.7–15.7)
HOLD SPECIMEN: NORMAL
INR PPP: 1.16 (ref 0.85–1.15)
INR PPP: 1.19 (ref 0.85–1.15)
LVEF ECHO: NORMAL
MAGNESIUM SERPL-MCNC: 2.1 MG/DL (ref 1.7–2.3)
MAGNESIUM SERPL-MCNC: 2.1 MG/DL (ref 1.7–2.3)
MCH RBC QN AUTO: 29.2 PG (ref 26.5–33)
MCHC RBC AUTO-ENTMCNC: 32.1 G/DL (ref 31.5–36.5)
MCV RBC AUTO: 91 FL (ref 78–100)
PHOSPHATE SERPL-MCNC: 2.6 MG/DL (ref 2.5–4.5)
PLATELET # BLD AUTO: 239 10E3/UL (ref 150–450)
POTASSIUM SERPL-SCNC: 4.2 MMOL/L (ref 3.4–5.3)
POTASSIUM SERPL-SCNC: 4.4 MMOL/L (ref 3.4–5.3)
RBC # BLD AUTO: 4.59 10E6/UL (ref 3.8–5.2)
SODIUM SERPL-SCNC: 137 MMOL/L (ref 135–145)
UFH PPP CHRO-ACNC: 0.22 IU/ML
UFH PPP CHRO-ACNC: 0.35 IU/ML
UFH PPP CHRO-ACNC: 0.42 IU/ML
WBC # BLD AUTO: 7.8 10E3/UL (ref 4–11)

## 2024-02-20 PROCEDURE — 258N000003 HC RX IP 258 OP 636: Performed by: HOSPITALIST

## 2024-02-20 PROCEDURE — 5A2204Z RESTORATION OF CARDIAC RHYTHM, SINGLE: ICD-10-PCS | Performed by: INTERNAL MEDICINE

## 2024-02-20 PROCEDURE — 85027 COMPLETE CBC AUTOMATED: CPT | Performed by: HOSPITALIST

## 2024-02-20 PROCEDURE — 210N000002 HC R&B HEART CARE

## 2024-02-20 PROCEDURE — 80162 ASSAY OF DIGOXIN TOTAL: CPT | Performed by: INTERNAL MEDICINE

## 2024-02-20 PROCEDURE — 94640 AIRWAY INHALATION TREATMENT: CPT

## 2024-02-20 PROCEDURE — 94640 AIRWAY INHALATION TREATMENT: CPT | Mod: 76

## 2024-02-20 PROCEDURE — 250N000009 HC RX 250: Performed by: HOSPITALIST

## 2024-02-20 PROCEDURE — 250N000013 HC RX MED GY IP 250 OP 250 PS 637: Performed by: HOSPITALIST

## 2024-02-20 PROCEDURE — 250N000012 HC RX MED GY IP 250 OP 636 PS 637: Performed by: STUDENT IN AN ORGANIZED HEALTH CARE EDUCATION/TRAINING PROGRAM

## 2024-02-20 PROCEDURE — 370N000017 HC ANESTHESIA TECHNICAL FEE, PER MIN

## 2024-02-20 PROCEDURE — 250N000009 HC RX 250: Performed by: NURSE ANESTHETIST, CERTIFIED REGISTERED

## 2024-02-20 PROCEDURE — 85610 PROTHROMBIN TIME: CPT | Performed by: HOSPITALIST

## 2024-02-20 PROCEDURE — 80162 ASSAY OF DIGOXIN TOTAL: CPT | Performed by: HOSPITALIST

## 2024-02-20 PROCEDURE — 93005 ELECTROCARDIOGRAM TRACING: CPT

## 2024-02-20 PROCEDURE — 92960 CARDIOVERSION ELECTRIC EXT: CPT | Performed by: INTERNAL MEDICINE

## 2024-02-20 PROCEDURE — 92960 CARDIOVERSION ELECTRIC EXT: CPT

## 2024-02-20 PROCEDURE — 85520 HEPARIN ASSAY: CPT | Performed by: HOSPITALIST

## 2024-02-20 PROCEDURE — 84132 ASSAY OF SERUM POTASSIUM: CPT | Performed by: HOSPITALIST

## 2024-02-20 PROCEDURE — 258N000003 HC RX IP 258 OP 636: Performed by: NURSE ANESTHETIST, CERTIFIED REGISTERED

## 2024-02-20 PROCEDURE — 80048 BASIC METABOLIC PNL TOTAL CA: CPT | Performed by: HOSPITALIST

## 2024-02-20 PROCEDURE — 83735 ASSAY OF MAGNESIUM: CPT | Performed by: HOSPITALIST

## 2024-02-20 PROCEDURE — 92960 CARDIOVERSION ELECTRIC EXT: CPT | Performed by: ANESTHESIOLOGY

## 2024-02-20 PROCEDURE — 250N000011 HC RX IP 250 OP 636: Performed by: NURSE ANESTHETIST, CERTIFIED REGISTERED

## 2024-02-20 PROCEDURE — 93312 ECHO TRANSESOPHAGEAL: CPT | Mod: 26 | Performed by: INTERNAL MEDICINE

## 2024-02-20 PROCEDURE — 84100 ASSAY OF PHOSPHORUS: CPT | Performed by: HOSPITALIST

## 2024-02-20 PROCEDURE — 99233 SBSQ HOSP IP/OBS HIGH 50: CPT | Mod: 25 | Performed by: PHYSICIAN ASSISTANT

## 2024-02-20 PROCEDURE — 999N000183 HC STATISTIC TEE INCLUDES SEDATION

## 2024-02-20 PROCEDURE — 99100 ANES PT EXTEME AGE<1 YR&>70: CPT | Performed by: NURSE ANESTHETIST, CERTIFIED REGISTERED

## 2024-02-20 PROCEDURE — 71045 X-RAY EXAM CHEST 1 VIEW: CPT

## 2024-02-20 PROCEDURE — 250N000011 HC RX IP 250 OP 636: Performed by: HOSPITALIST

## 2024-02-20 PROCEDURE — 99232 SBSQ HOSP IP/OBS MODERATE 35: CPT | Performed by: HOSPITALIST

## 2024-02-20 PROCEDURE — 999N000157 HC STATISTIC RCP TIME EA 10 MIN

## 2024-02-20 PROCEDURE — 36415 COLL VENOUS BLD VENIPUNCTURE: CPT | Performed by: HOSPITALIST

## 2024-02-20 PROCEDURE — 93320 DOPPLER ECHO COMPLETE: CPT

## 2024-02-20 PROCEDURE — 250N000011 HC RX IP 250 OP 636: Performed by: PHYSICIAN ASSISTANT

## 2024-02-20 PROCEDURE — 999N000010 HC STATISTIC ANES STAT CODE-CRNA PER MINUTE

## 2024-02-20 PROCEDURE — 92960 CARDIOVERSION ELECTRIC EXT: CPT | Performed by: NURSE ANESTHETIST, CERTIFIED REGISTERED

## 2024-02-20 PROCEDURE — 93320 DOPPLER ECHO COMPLETE: CPT | Mod: 26 | Performed by: INTERNAL MEDICINE

## 2024-02-20 PROCEDURE — 93325 DOPPLER ECHO COLOR FLOW MAPG: CPT

## 2024-02-20 PROCEDURE — 93325 DOPPLER ECHO COLOR FLOW MAPG: CPT | Mod: 26 | Performed by: INTERNAL MEDICINE

## 2024-02-20 PROCEDURE — 250N000011 HC RX IP 250 OP 636: Performed by: INTERNAL MEDICINE

## 2024-02-20 PROCEDURE — 250N000013 HC RX MED GY IP 250 OP 250 PS 637: Performed by: PHYSICIAN ASSISTANT

## 2024-02-20 RX ORDER — FUROSEMIDE 10 MG/ML
40 INJECTION INTRAMUSCULAR; INTRAVENOUS
Status: DISCONTINUED | OUTPATIENT
Start: 2024-02-20 | End: 2024-02-21

## 2024-02-20 RX ORDER — POTASSIUM CHLORIDE 1500 MG/1
20 TABLET, EXTENDED RELEASE ORAL
Status: DISCONTINUED | OUTPATIENT
Start: 2024-02-20 | End: 2024-02-23

## 2024-02-20 RX ORDER — NALOXONE HYDROCHLORIDE 0.4 MG/ML
0.2 INJECTION, SOLUTION INTRAMUSCULAR; INTRAVENOUS; SUBCUTANEOUS
Status: DISCONTINUED | OUTPATIENT
Start: 2024-02-20 | End: 2024-02-20

## 2024-02-20 RX ORDER — FENTANYL CITRATE 50 UG/ML
25 INJECTION, SOLUTION INTRAMUSCULAR; INTRAVENOUS
Status: DISCONTINUED | OUTPATIENT
Start: 2024-02-20 | End: 2024-02-20

## 2024-02-20 RX ORDER — PREDNISONE 20 MG/1
20 TABLET ORAL DAILY
Status: COMPLETED | OUTPATIENT
Start: 2024-02-21 | End: 2024-02-21

## 2024-02-20 RX ORDER — MAGNESIUM SULFATE HEPTAHYDRATE 40 MG/ML
2 INJECTION, SOLUTION INTRAVENOUS
Status: DISCONTINUED | OUTPATIENT
Start: 2024-02-20 | End: 2024-02-20

## 2024-02-20 RX ORDER — POTASSIUM CHLORIDE 1500 MG/1
20 TABLET, EXTENDED RELEASE ORAL
Status: DISCONTINUED | OUTPATIENT
Start: 2024-02-20 | End: 2024-02-20

## 2024-02-20 RX ORDER — GLYCOPYRROLATE 0.2 MG/ML
0.1 INJECTION, SOLUTION INTRAMUSCULAR; INTRAVENOUS ONCE
Status: COMPLETED | OUTPATIENT
Start: 2024-02-20 | End: 2024-02-20

## 2024-02-20 RX ORDER — PROPOFOL 10 MG/ML
INJECTION, EMULSION INTRAVENOUS PRN
Status: DISCONTINUED | OUTPATIENT
Start: 2024-02-20 | End: 2024-02-20

## 2024-02-20 RX ORDER — DEXTROSE MONOHYDRATE 25 G/50ML
9.5 INJECTION, SOLUTION INTRAVENOUS
Status: DISCONTINUED | OUTPATIENT
Start: 2024-02-20 | End: 2024-02-20

## 2024-02-20 RX ORDER — EPHEDRINE SULFATE 50 MG/ML
INJECTION, SOLUTION INTRAMUSCULAR; INTRAVENOUS; SUBCUTANEOUS PRN
Status: DISCONTINUED | OUTPATIENT
Start: 2024-02-20 | End: 2024-02-20

## 2024-02-20 RX ORDER — LIDOCAINE 50 MG/G
OINTMENT TOPICAL ONCE
Status: COMPLETED | OUTPATIENT
Start: 2024-02-20 | End: 2024-02-20

## 2024-02-20 RX ORDER — SODIUM CHLORIDE 9 MG/ML
1000 INJECTION, SOLUTION INTRAVENOUS CONTINUOUS
Status: DISCONTINUED | OUTPATIENT
Start: 2024-02-20 | End: 2024-02-20

## 2024-02-20 RX ORDER — METOPROLOL TARTRATE 1 MG/ML
2.5-5 INJECTION, SOLUTION INTRAVENOUS
Status: DISCONTINUED | OUTPATIENT
Start: 2024-02-20 | End: 2024-02-20

## 2024-02-20 RX ORDER — LIDOCAINE HYDROCHLORIDE 40 MG/ML
1.5 SOLUTION TOPICAL ONCE
Status: COMPLETED | OUTPATIENT
Start: 2024-02-20 | End: 2024-02-20

## 2024-02-20 RX ORDER — ATROPINE SULFATE 0.1 MG/ML
.5-1 INJECTION INTRAVENOUS
Status: DISCONTINUED | OUTPATIENT
Start: 2024-02-20 | End: 2024-02-20

## 2024-02-20 RX ORDER — METOPROLOL SUCCINATE 25 MG/1
25 TABLET, EXTENDED RELEASE ORAL 2 TIMES DAILY
Status: DISCONTINUED | OUTPATIENT
Start: 2024-02-20 | End: 2024-02-21

## 2024-02-20 RX ORDER — POTASSIUM CHLORIDE 1500 MG/1
40 TABLET, EXTENDED RELEASE ORAL
Status: DISCONTINUED | OUTPATIENT
Start: 2024-02-20 | End: 2024-02-23

## 2024-02-20 RX ORDER — POTASSIUM CHLORIDE 1500 MG/1
40 TABLET, EXTENDED RELEASE ORAL
Status: DISCONTINUED | OUTPATIENT
Start: 2024-02-20 | End: 2024-02-20

## 2024-02-20 RX ORDER — MAGNESIUM SULFATE HEPTAHYDRATE 40 MG/ML
2 INJECTION, SOLUTION INTRAVENOUS
Status: DISCONTINUED | OUTPATIENT
Start: 2024-02-20 | End: 2024-02-23

## 2024-02-20 RX ORDER — FLUMAZENIL 0.1 MG/ML
0.2 INJECTION, SOLUTION INTRAVENOUS
Status: DISCONTINUED | OUTPATIENT
Start: 2024-02-20 | End: 2024-02-20

## 2024-02-20 RX ORDER — NALOXONE HYDROCHLORIDE 0.4 MG/ML
0.4 INJECTION, SOLUTION INTRAMUSCULAR; INTRAVENOUS; SUBCUTANEOUS
Status: DISCONTINUED | OUTPATIENT
Start: 2024-02-20 | End: 2024-02-20

## 2024-02-20 RX ORDER — AMIODARONE HYDROCHLORIDE 200 MG/1
200 TABLET ORAL 2 TIMES DAILY
Status: DISCONTINUED | OUTPATIENT
Start: 2024-02-20 | End: 2024-02-24

## 2024-02-20 RX ADMIN — ANORECTAL OINTMENT: 15.7; .44; 24; 20.6 OINTMENT TOPICAL at 08:51

## 2024-02-20 RX ADMIN — FENTANYL CITRATE 50 MCG: 50 INJECTION, SOLUTION INTRAMUSCULAR; INTRAVENOUS at 15:11

## 2024-02-20 RX ADMIN — FUROSEMIDE 40 MG: 10 INJECTION, SOLUTION INTRAMUSCULAR; INTRAVENOUS at 21:35

## 2024-02-20 RX ADMIN — IPRATROPIUM BROMIDE 0.5 MG: 0.5 SOLUTION RESPIRATORY (INHALATION) at 19:14

## 2024-02-20 RX ADMIN — BRIMONIDINE TARTRATE, TIMOLOL MALEATE 1 DROP: 2; 5 SOLUTION/ DROPS TOPICAL at 20:07

## 2024-02-20 RX ADMIN — ANORECTAL OINTMENT: 15.7; .44; 24; 20.6 OINTMENT TOPICAL at 20:08

## 2024-02-20 RX ADMIN — HEPARIN SODIUM 700 UNITS/HR: 10000 INJECTION, SOLUTION INTRAVENOUS at 14:02

## 2024-02-20 RX ADMIN — PREDNISONE 20 MG: 20 TABLET ORAL at 08:51

## 2024-02-20 RX ADMIN — GLYCOPYRROLATE 0.1 MG: 0.2 INJECTION, SOLUTION INTRAMUSCULAR; INTRAVENOUS at 14:52

## 2024-02-20 RX ADMIN — PROPOFOL 30 MG: 10 INJECTION, EMULSION INTRAVENOUS at 15:33

## 2024-02-20 RX ADMIN — CITALOPRAM HYDROBROMIDE 10 MG: 10 TABLET ORAL at 08:51

## 2024-02-20 RX ADMIN — SODIUM CHLORIDE 300 ML: 0.9 INJECTION, SOLUTION INTRAVENOUS at 15:15

## 2024-02-20 RX ADMIN — GUAIFENESIN 600 MG: 600 TABLET, EXTENDED RELEASE ORAL at 08:51

## 2024-02-20 RX ADMIN — Medication 5 MG: at 15:41

## 2024-02-20 RX ADMIN — METOPROLOL SUCCINATE 25 MG: 25 TABLET, EXTENDED RELEASE ORAL at 20:07

## 2024-02-20 RX ADMIN — MYCOPHENOLATE MOFETIL 250 MG: 250 CAPSULE ORAL at 18:05

## 2024-02-20 RX ADMIN — METOPROLOL TARTRATE 25 MG: 25 TABLET, FILM COATED ORAL at 08:51

## 2024-02-20 RX ADMIN — IPRATROPIUM BROMIDE 0.5 MG: 0.5 SOLUTION RESPIRATORY (INHALATION) at 11:21

## 2024-02-20 RX ADMIN — BIMATOPROST 1 DROP: 0.1 SOLUTION/ DROPS OPHTHALMIC at 20:07

## 2024-02-20 RX ADMIN — GUAIFENESIN 600 MG: 600 TABLET, EXTENDED RELEASE ORAL at 20:07

## 2024-02-20 RX ADMIN — Medication 5 MG: at 15:45

## 2024-02-20 RX ADMIN — MIDAZOLAM 1.5 MG: 1 INJECTION INTRAMUSCULAR; INTRAVENOUS at 15:11

## 2024-02-20 RX ADMIN — MYCOPHENOLATE MOFETIL 250 MG: 250 CAPSULE ORAL at 08:50

## 2024-02-20 RX ADMIN — TOPICAL ANESTHETIC 1 ML: 200 SPRAY DENTAL; PERIODONTAL at 14:53

## 2024-02-20 RX ADMIN — LIDOCAINE HYDROCHLORIDE 1.5 ML: 40 SOLUTION TOPICAL at 14:53

## 2024-02-20 RX ADMIN — IPRATROPIUM BROMIDE 0.5 MG: 0.5 SOLUTION RESPIRATORY (INHALATION) at 08:07

## 2024-02-20 RX ADMIN — APIXABAN 2.5 MG: 2.5 TABLET, FILM COATED ORAL at 20:06

## 2024-02-20 RX ADMIN — AMIODARONE HYDROCHLORIDE 200 MG: 200 TABLET ORAL at 20:07

## 2024-02-20 RX ADMIN — PHENYLEPHRINE HYDROCHLORIDE 100 MCG: 10 INJECTION INTRAVENOUS at 15:45

## 2024-02-20 RX ADMIN — BRIMONIDINE TARTRATE, TIMOLOL MALEATE 1 DROP: 2; 5 SOLUTION/ DROPS TOPICAL at 09:00

## 2024-02-20 ASSESSMENT — ACTIVITIES OF DAILY LIVING (ADL)
ADLS_ACUITY_SCORE: 51
ADLS_ACUITY_SCORE: 48
ADLS_ACUITY_SCORE: 51

## 2024-02-20 ASSESSMENT — ENCOUNTER SYMPTOMS: DYSRHYTHMIAS: 1

## 2024-02-20 NOTE — ANESTHESIA POSTPROCEDURE EVALUATION
Patient: Elaina Winn    Procedure: Procedure(s):  Anesthesia cardioversion       Anesthesia Type:  General    Note:     Postop Pain Control: Uneventful            Sign Out: Well controlled pain   PONV: No   Neuro/Psych: Uneventful            Sign Out: Acceptable/Baseline neuro status   Airway/Respiratory: Uneventful            Sign Out: Acceptable/Baseline resp. status   CV/Hemodynamics: Uneventful            Sign Out: Acceptable CV status; No obvious hypovolemia; No obvious fluid overload   Other NRE: NONE   DID A NON-ROUTINE EVENT OCCUR? No           Last vitals:  Vitals:    02/20/24 1635 02/20/24 1643 02/20/24 1703   BP: 101/64  108/78   Pulse: 72  70   Resp:  16 17   Temp:   36.6  C (97.8  F)   SpO2: 93%  94%       Electronically Signed By: Zion Sorto MD  February 20, 2024  5:40 PM

## 2024-02-20 NOTE — PROGRESS NOTES
Cross cover    Called about amio infiltration, likely in the last 60-90 minutes. Patient has some erythema and swelling, see media tab. No pain, blistering. Reviewed protocol, recs warm compress and local cares. Could consider hyaluronidase for refractory cases. Protocol entered and monitor closely for progression.

## 2024-02-20 NOTE — PROCEDURES
[facility]    Procedure: JIM, cardioversion    Date/Time: 2/20/2024 3:37 PM    Performed by: Thai Feliciano MD  Authorized by: Amy Rasheed PA-C      UNIVERSAL PROTOCOL   Site Marked: NA  Prior Images Obtained and Reviewed:  Yes  Required items: Required blood products, implants, devices and special equipment available    Patient identity confirmed:  Verbally with patient  Patient was reevaluated immediately before administering moderate or deep sedation or anesthesia  Confirmation Checklist:  Patient's identity using two indicators  Time out: Immediately prior to the procedure a time out was called    Universal Protocol: the Joint Commission Universal Protocol was followed    Preparation: Patient was prepped and draped in usual sterile fashion       ANESTHESIA    Anesthesia was administered and monitored by anesthesiology.  See anesthesia documentation for details.    SEDATION  Patient Sedated: Yes    Sedation:  Fentanyl and midazolam  Vital signs: Vital signs monitored during sedation      PROCEDURE DETAILS  Cardioversion basis: elective  Pre-procedure rhythm: atrial fibrillation  Electrodes: pads  Electrodes placed: anterior-posterior  Number of attempts: 1  Post-procedure rhythm: normal sinus rhythm      PROCEDURE  Describe Procedure: JIM & Cardioversion Note    Indication: afib    Informed consent was signed by the patient.  A timeout was taken.    Sedation for JIM:  Versed 1.5mg  Fentanyl 50mcg    Anesthesia was present for cardioversion, see separate documentation for their sedation.    Findings:  LV: EF 35%  RV: mild dysfunction  LA: no thrombus  Mitral valve: 3+ MR, likely functional  Aortic valve: normal  Tricuspid valve: 2+ TR  Atrial septum: positive bubble study  Aorta: normal    Cardioversion:  See anesthesia documentation for sedation during cardioversion.    Baseline rhythm: afib, rate 130  Synchronized cardioversion performed at 120J, x1  Post-DCCV  rhythm: sinus, rate 70    No complications.    Thai Feliciano MD  Cardiology - [facility]  Pager: [phone]  Text Page  February 20, 2024        Patient Tolerance:  Patient tolerated the procedure well with no immediate complications

## 2024-02-20 NOTE — PROGRESS NOTES
[id number]  Alert and oriented to self only  Room air  Denies pain  Tele :Afib RVR  Amio gtt at 0.5 mg/min  - Amio gtt IV left upper site arm infiltrated. Red, puffy, swollen. Denies pain. Elevate and warm compress. No antidote yet. Will continue to monitor     Heparin gtt at 550 units/hr, Xa 1125  NPO  SBA to BSC  Straight cath x1 for 550  Plan: continue to monitor and possible cardioversion

## 2024-02-20 NOTE — PROGRESS NOTES
Delivered - 3:27 am  FYI pt amio gtt has infiltrated left AC. will take picture to document after sending this message    EE  Delivered - 3:36 am  Red, puffy, swollen. no pain. currently elevating. Do you want antidote given?    Vocera message Anderson Alcaraz

## 2024-02-20 NOTE — PRE-PROCEDURE
GENERAL PRE-PROCEDURE:   Procedure:  JIM, cardioversion  Date/Time:  2/20/2024 2:55 PM    Verbal consent obtained?: Yes    Written consent obtained?: Yes    Risks and benefits: Risks, benefits and alternatives were discussed    Consent given by:  Patient  Patient states understanding of procedure being performed: Yes    Patient's understanding of procedure matches consent: Yes    Procedure consent matches procedure scheduled: Yes    Expected level of sedation:  Moderate  Appropriately NPO:  Yes  ASA Class:  1  Mallampati  :  Grade 1- soft palate, uvula, tonsillar pillars, and posterior pharyngeal wall visible  Lungs:  Lungs clear with good breath sounds bilaterally  Heart:  A-fib  History & Physical reviewed:  History and physical reviewed and no updates needed  Statement of review:  I have reviewed the lab findings, diagnostic data, medications, and the plan for sedation    
COUGH/FEVER

## 2024-02-20 NOTE — PROGRESS NOTES
[facility]  Cardiology Progress Note  Date of Service: 02/20/2024  Primary Cardiologist: Dr. Erwin    Assessment & Plan    Elaina Winn is a 78 year old female with past medical history significant for paroxysmal atrial fibrillation not on anticoagulation, autoimmune hepatitis, dementia, CKD admitted on 2/16/2024 with hypertension and acute respiratory failure and found to have A-fib with RVR.    Assessment:  1.  A-fib with RVR, ? flutter, patient became hypotensive with metoprolol.  RRT on 12/18.  50 mg of metoprolol.  There was some concern for stroke but CT of the head was negative.  Family is wanting conservative management if possible.  Has not been on anticoagulation historically due to liver disease.   - CHADS VASC 4 (age, HTN, female) has previously not wanted anticoagulation, now agreeable   -Echo shows severe biatrial enlargement, making it less likely for her to maintain sinus rhythm without antiarrhythmic.   - rates remain elevated to 150s at rest in spite of amio gtt, metoprolol and po dig    2.  Hypertension, markedly elevated on admission with episodes of hypotension and RRT here.  Mildly elev this am    3.  Dementia, patient lives in memory care and daughter is decision-maker    4.  Stage III CKD, baseline creatinine 1-1.2, currently at 1.17.    5.  Autoimmune hepatitis, normal lfts for the last several years    Plan:    JIM guided DCCV today, pt and family agreeable to anticoagulation min 1 month, and amio short term. Discussed with daughter 2/19 who is in agreement.  Will discontinue dig if she converts and keep her on amio and bb alone  Amio load will be 200 mg bid for 2 weeks then 200 mg daily  Eliquis 2.5 mg bid starting this evening, discontinue heparin when eliquis given  Dispo, potential discharge tomorrow  We'll continue to follow with you       Amy Rasheed PA-C  [facility]  Pager: [phone]     Interval History   She denies chest pain, shortness of breath,  palpitations.  Occasional cough, recalls cardioversion went well before.      Physical Exam   Temp: 97.8  F (36.6  C) Temp src: Oral BP: (!) 113/99 Pulse: (!) 131   Resp: 24 SpO2: 95 % O2 Device: None (Room air)    Vitals:    02/18/24 0603 02/19/24 0600 02/20/24 0605   Weight: 57.5 kg (126 lb 12.2 oz) 57.5 kg (126 lb 12.2 oz) 57.2 kg (126 lb)     Elderly woman in no acute distress.  Normocephalic atraumatic.  Heart is irregularly irregular and tachycardic with a 2 out of 6 systolic murmur best lower left sternal border.  Lungs are diminished in the bases but otherwise clear anteriorly.  1+ pitting edema to mid shin.  Skin is warm and dry but pale.    Clinically Significant Risk Factors               # Coagulation Defect: INR = 1.16 (Ref range: 0.85 - 1.15) and/or PTT = N/A, will monitor for bleeding    # Hypertension: Noted on problem list                  Cardiac Arrhythmia: Atrial fibrillation: Paroxysmal    Fluid overload, unspecified    Acute kidney failure, unspecified    Dementia: Unspecified dementia without behavioral disturbance    Chronic Fatigue and Other Debilities: Age-related physical debility        Medications    amiodarone 0.5 mg/min (02/20/24 0856)    [Held by provider] dilTIAZem Stopped (02/19/24 1019)    - MEDICATION INSTRUCTIONS -      heparin 550 Units/hr (02/20/24 0856)    - MEDICATION INSTRUCTIONS -      Reason anticoagulant not prescribed for atrial fibrillation        bimatoprost  1 drop Both Eyes At Bedtime    brimonidine-timolol  1 drop Both Eyes BID    citalopram  10 mg Oral Daily    digoxin  125 mcg Oral Daily    guaiFENesin  600 mg Oral BID    ipratropium  0.5 mg Nebulization 4x daily    menthol-zinc oxide   Topical BID    metoprolol tartrate  25 mg Oral BID    mycophenolate  250 mg Oral BID IS    predniSONE  20 mg Oral Daily    sodium chloride (PF)  3 mL Intracatheter Q8H       Data   Last 24 hours labs reviewed     Imaging: CT PE study shows bilateral pleural effusions mild scattered  mucous plugging.    Tele: A-fib with heart rates ranging from 90s at rest up to 170s with movement.

## 2024-02-20 NOTE — PROGRESS NOTES
Care plan note:      Recent Vitals:  Temp: 97.8  F (36.6  C) Temp src: Oral BP: 107/87 Pulse: (!) 126   Resp: 14 SpO2: 94 % O2 Device: None (Room air)      Orientation/Neuro: Disoriented to, Place , Time, Situation, Easily re-directed, Moves all extremities equally- hx of dementia  Pain: The patient is not having any pain.              Tele: Atrial fibrillation - rapid              IV medications: Heparin, and amio gtt              Mobility: St. by assist              Skin: With in normal limits              Resp: RA  GI: WDL- no retention this shift.   : WDL                 Diet: Tolerating diet:   NPO  Orders Placed This Encounter      NPO per Anesthesia Guidelines for Procedure/Surgery Except for: Meds                 Safety/Concerns:  Fall Risk and Rodrigo Risk  Aggression Color: Green     Plan: cards and hospitalist following. NPO for meg/dccv at 2:30 pm. Planing to stop heparin and start eliquis tonight, along with PO amio.                Continue to monitor.        Maritza Hager RN

## 2024-02-20 NOTE — PROGRESS NOTES
[facility]    Medicine Progress Note - Hospitalist Service    Date of Admission:  2/16/2024    Assessment & Plan   Elaina Winn is a 78 year old female with a past medical history of atrial fibrillation, dementia, autoimmune hepatitis, CKD stage III presents to hospital with A-fib RVR.     Afib RVR s/p successful JIM cardioversion 2/20  Hx of pafib  NSTEMI, type 2 secondary to demand  Presented from her nursing home for hypertension, was found to be in A-fib RVR on evaluation by EMS. Rate improving with diltiazem drip in the emergency room. Not on anticoagulation due to previous liver dysfunction. On low-dose metoprolol for rate control.  Troponin elevated at 219, but trending down to 200 on repeat (elevated due to demand). Chest pain free.   *TTE with LVEF 55-60%, severe biatrial enlargement  *Patient with history of Afib s/p DCCV in 2017  *2/20 JIM cardioversion:  - cardiology following  - digoxin added 2/19, discontinued after successful cardioversion 2/20  - amiodarone gtt, transitioning to oral amiodarone evening 2/20  - metoprolol succinate 25mg BID  - on heparin gtt, transition to eliquis BID 2/20 evening for 1mo minimum on discharge  - cardiac monitor on discharge    Acute hypoxic respiratory failure  Possible undx COPD  Acute on chronic diastolic heart failure exacerbation  The patient was noted to be wheezing in the ED requiring supplemental O2. She does have a multi year history of smoking raising suspicion for undiagnosed copd. Furthermore her ct did not reveal significant pulmonary edema  - given IV lasix on admission and started steroid burst, last dose prednisone on 2/21 AM (day 5)  - continue nebulizers (getting ~2 per day)  - flutter valve  - mucinex BID     Amiodarone infiltrate 2/19  LUE had infiltrate of amiodarone. Ecchymosis noted, not painful per patient  - Monitor    Dementia  A&Ox1  - lives in memory care and daughter is decision maker     Autoimmune  hepatitis  -mycophenolate restarted     CKD stage III  Stable, did have bump up to 1.41 on 2/18 after diuresis  - follow BMP daily- improving on 2/20    Coccyx pressure wound POA  - incontinence associated dermatitis       Diet: Combination Diet Regular Diet Adult  Room Service    DVT Prophylaxis: heparin  Scott Catheter: Not present  Lines: None     Cardiac Monitoring: ACTIVE order. Indication: imc  Code Status: Full Code            Diet: Room Service  NPO for Medical/Clinical Reasons Except for: Other; Specify: NPO for 2 hours after JIM then Advance diet as tolerated to Adult Basic Diet    DVT Prophylaxis: heparin gtt  Scott Catheter: Not present  Lines: None     Cardiac Monitoring: ACTIVE order. Indication: imc  Code Status: Full Code      Clinically Significant Risk Factors               # Coagulation Defect: INR = 1.16 (Ref range: 0.85 - 1.15) and/or PTT = N/A, will monitor for bleeding    # Hypertension: Noted on problem list  # Chronic heart failure with reduced ejection fraction: last echo with EF <40%                  Disposition Plan      Expected Discharge Date: 02/22/2024                    Fatou Moyer DO  Hospitalist Service  [facility]  Securely message with [employer] (more info)  Text page via [employer] Paging/Directory   ______________________________________________________________________    Interval History   Patient seen and examined. Seen prior to cardioversion. She is doing okay, has no complaints. Has been NPO    Physical Exam   Vital Signs: Temp: 97.8  F (36.6  C) Temp src: Oral BP: 108/78 Pulse: 70   Resp: 17 SpO2: 94 % O2 Device: None (Room air) Oxygen Delivery: 2 LPM  Weight: 126 lbs 0 oz    Constitutional: Awake, alert, cooperative, no apparent distress  Respiratory: clear to auscultation, no wheeze  Cardiovascular: irregularly irregular, rates 120s, normal S1 and S2, and no murmur noted  GI: Normal bowel sounds, soft, non-distended, non-tender  Skin/Integumen: No  rashes, no cyanosis, no edema. Left upper arm with ecchymosis from amiodarone infiltrate  Other:      Medical Decision Making       30 MINUTES SPENT BY ME on the date of service doing chart review, history, exam, documentation & further activities per the note.      Data     I have personally reviewed the following data over the past 24 hrs:    7.8  \   13.4   / 239     137 102 33.6 (H) /  107 (H)   4.2 22 1.06 (H) \     INR:  1.16 (H) PTT:  N/A   D-dimer:  N/A Fibrinogen:  N/A       Imaging results reviewed over the past 24 hrs:   Recent Results (from the past 24 hour(s))   Transesophageal Echocardiogram   Result Value    LVEF  35%    Narrative    [id number]  [id number]  [id number]^MARION^ALESSIA^BILL BISHOP     [facility]  Echocardiography Laboratory  [address] [place] [address]     Name: ABDULLAHI BLACKWOOD  MRN: [id number]  : 1945  Study Date: 2024 02:55 PM  Age: 78 yrs  Gender: Female  Patient Location: [facility]  Reason For Study: Afib  Ordering Physician: ALESSIA SCHULTZ  Referring Physician: Shlomo Munguia,  Performed By: Madison Gayle     BSA: 1.6 m2  Height: 62 in  Weight: 126 lb  HR: 149  BP: 118/99 mmHg  ______________________________________________________________________________  Procedure  Complete Portable JIM Adult. JIM Probe #[id number] was also used during the  procedure.  ______________________________________________________________________________  Interpretation Summary     1. The left ventricle is normal in size. The visual ejection fraction is  estimated at 35%. There is moderate global hypokinesia of the left ventricle.  2. The right ventricle is normal size. Mildly decreased right ventricular  systolic function  3. There is moderately severe (3+) mitral regurgitation. MV appears  structurally normal. Suspect this is functional MR from rapid afib and low EF.     Echo 24 showed EF 55%, 1+  MR.  ______________________________________________________________________________  JIM  I determined this patient to be an appropriate candidate for the planned  sedation and procedure and have reassessed the patient immediately prior to  sedation and procedure. Total sedation time: 13mins minutes of continuous  bedside 1:1 monitoring. Versed (50mg) was given intravenously. Fentanyl  (1.5mcg) was given intravenously. Consent to the procedure was obtained prior  to sedation. The transesophageal probe was passed without difficulty. There  were no complications associated with this procedure.     Left Ventricle  The left ventricle is normal in size. The visual ejection fraction is  estimated at 35%. There is moderate global hypokinesia of the left ventricle.     Right Ventricle  The right ventricle is normal size. Mildly decreased right ventricular  systolic function.     Atria  There is severe biatrial enlargement. A patent foramen ovale is present. A  contrast injection (Bubble Study) was performed that was mildly positive for  flow across the interatrial septum. No thrombus is detected in the left atrial  appendage.     Mitral Valve  There is moderately severe (3+) mitral regurgitation. MV appears structurally  normal. Suspect this is functional MR from rapid afib and low EF.     Tricuspid Valve  There is mild (1+) tricuspid regurgitation.     Aortic Valve  The aortic valve is trileaflet. There is trace aortic regurgitation.     Pulmonic Valve  The pulmonic valve is normal in structure and function.     Vessels  Normal ascending, transverse (arch), and descending aorta.     Pericardial/Pleural  There is no pericardial effusion.     Rhythm  The rhythm was rapid atrial fibrillation.  ______________________________________________________________________________  Doppler Measurements & Calculations  MV max P.9 mmHg  MV mean P.4 mmHg  MV V2 VTI: 11.8 cm  MR PISA: 2.7 cm2  MR ERO: 0.20 cm2  MR volume: 21.4  ml  TR max mendoza: 269.7 cm/sec  TR max P.1 mmHg     ______________________________________________________________________________  Report approved by: Varinder Abarca 2024 04:26 PM         JIM, cardioversion    Narrative    Thai Feliciano MD     2024  3:39 PM  [facility]    Procedure: JIM, cardioversion    Date/Time: 2024 3:37 PM    Performed by: Thai Feliciano MD  Authorized by: Amy Rasheed PA-C      UNIVERSAL PROTOCOL   Site Marked: NA  Prior Images Obtained and Reviewed:  Yes  Required items: Required blood products, implants, devices and special   equipment available    Patient identity confirmed:  Verbally with patient  Patient was reevaluated immediately before administering moderate or deep   sedation or anesthesia  Confirmation Checklist:  Patient's identity using two indicators  Time out: Immediately prior to the procedure a time out was called    Universal Protocol: the Joint Commission Universal Protocol was followed    Preparation: Patient was prepped and draped in usual sterile fashion       ANESTHESIA    Anesthesia was administered and monitored by anesthesiology.  See   anesthesia documentation for details.    SEDATION  Patient Sedated: Yes    Sedation:  Fentanyl and midazolam  Vital signs: Vital signs monitored during sedation      PROCEDURE DETAILS  Cardioversion basis: elective  Pre-procedure rhythm: atrial fibrillation  Electrodes: pads  Electrodes placed: anterior-posterior  Number of attempts: 1  Post-procedure rhythm: normal sinus rhythm      PROCEDURE  Describe Procedure: JIM & Cardioversion Note    Indication: afib    Informed consent was signed by the patient.  A timeout was taken.    Sedation for JIM:  Versed 1.5mg  Fentanyl 50mcg    Anesthesia was present for cardioversion, see separate documentation for   their sedation.    Findings:  LV: EF 35%  RV: mild dysfunction  LA: no thrombus  Mitral valve:  3+ MR, likely functional  Aortic valve: normal  Tricuspid valve: 2+ TR  Atrial septum: positive bubble study  Aorta: normal    Cardioversion:  See anesthesia documentation for sedation during cardioversion.    Baseline rhythm: afib, rate 130  Synchronized cardioversion performed at 120J, x1  Post-DCCV rhythm: sinus, rate 70    No complications.    Thai Feliciano MD  Cardiology - [facility]  Pager: [phone]  Text Page  February 20, 2024        Patient Tolerance:  Patient tolerated the procedure well with no immediate   complications

## 2024-02-20 NOTE — ANESTHESIA CARE TRANSFER NOTE
Patient: Elaina Winn    Procedure: Procedure(s):  Anesthesia cardioversion       Diagnosis: Heart abnormality [Q24.9]  Diagnosis Additional Information: No value filed.    Anesthesia Type:   General     Note:    Oropharynx: oropharynx clear of all foreign objects and spontaneously breathing  Level of Consciousness: drowsy  Oxygen Supplementation: face mask  Level of Supplemental Oxygen (L/min / FiO2): 8  Independent Airway: airway patency satisfactory and stable  Dentition: dentition unchanged  Vital Signs Stable: post-procedure vital signs reviewed and stable  Report to RN Given: handoff report given  Destination:  20.  Comments: VSS. Airway patent. RN comfortable at bedside.    Vitals:  Vitals Value Taken Time   /75 02/20/24 1555   Temp     Pulse 75 02/20/24 1555   Resp     SpO2 96 % 02/20/24 1550       Electronically Signed By: CATY Hurley CRNA  February 20, 2024  4:03 PM

## 2024-02-20 NOTE — ANESTHESIA PREPROCEDURE EVALUATION
Anesthesia Pre-Procedure Evaluation    Patient: Elaina Winn   MRN: [id number] : 1945        Procedure : Procedure(s):  Anesthesia cardioversion          Past Medical History:   Diagnosis Date    Anxiety     Atrial fibrillation (H)     Chronic renal insufficiency     Dementia (H)     Glaucoma     Hepatitis     autoimmune    HTN (hypertension), benign     Hyperlipidemia LDL goal < 130     Paroxysmal A-fib (H)       Past Surgical History:   Procedure Laterality Date    DILATION AND CURETTAGE      HYSTERECTOMY      with USO, not for cancer    S/p partial vaginectomy      SEPTOPLASTY      TONSILLECTOMY & ADENOIDECTOMY        Allergies   Allergen Reactions    Adhesive Tape     Zocor [Simvastatin - High Dose]      Elevated LFTs      Social History     Tobacco Use    Smoking status: Former     Types: Cigarettes     Quit date: 1990     Years since quittin.1    Smokeless tobacco: Never    Tobacco comments:     quit    Substance Use Topics    Alcohol use: No     Alcohol/week: 0.0 standard drinks of alcohol      Wt Readings from Last 1 Encounters:   24 57.2 kg (126 lb)        Anesthesia Evaluation            ROS/MED HX  ENT/Pulmonary: Comment: glaucoma      Neurologic: Comment: Lives on memory care unit    (+)   dementia,                             Cardiovascular: Comment: EF 35 % with significant MR    (+)  hypertension- -   -  - -                        dysrhythmias, a-fib,  valvular problems/murmurs type: MR          METS/Exercise Tolerance:     Hematologic:       Musculoskeletal:   (+)  arthritis,             GI/Hepatic:     (+)           hepatitis (autoimmune hepatitis) type Other, liver disease,       Renal/Genitourinary:     (+) renal disease, type: CRI,            Endo:       Psychiatric/Substance Use:     (+) psychiatric history anxiety       Infectious Disease:       Malignancy:       Other:            Physical Exam    Airway   unable to assess          Respiratory Devices and Support          Dental           Cardiovascular          Rhythm and rate: irregular and tachycardia     Pulmonary           (+) rhonchi           OUTSIDE LABS:  CBC:   Lab Results   Component Value Date    WBC 7.8 02/20/2024    WBC 11.3 (H) 02/19/2024    HGB 13.4 02/20/2024    HGB 14.0 02/19/2024    HCT 41.7 02/20/2024    HCT 44.5 02/19/2024     02/20/2024     02/19/2024     BMP:   Lab Results   Component Value Date     02/20/2024     02/19/2024    POTASSIUM 4.2 02/20/2024    POTASSIUM 4.2 02/19/2024    CHLORIDE 102 02/20/2024    CHLORIDE 99 02/19/2024    CO2 22 02/20/2024    CO2 20 (L) 02/19/2024    BUN 33.6 (H) 02/20/2024    BUN 38.8 (H) 02/19/2024    CR 1.06 (H) 02/20/2024    CR 1.17 (H) 02/19/2024     (H) 02/20/2024     (H) 02/19/2024     COAGS:   Lab Results   Component Value Date    INR 1.16 (H) 02/20/2024     POC:   Lab Results   Component Value Date     (H) 01/04/2018     HEPATIC:   Lab Results   Component Value Date    ALBUMIN 3.5 02/16/2024    PROTTOTAL 6.4 02/16/2024    ALT 21 02/16/2024    AST 30 02/16/2024     (H) 11/13/2017    ALKPHOS 128 02/16/2024    BILITOTAL 1.1 02/16/2024    MARY <10 (L) 11/15/2017     OTHER:   Lab Results   Component Value Date    DANIELITO 9.9 02/20/2024    PHOS 2.6 02/20/2024    MAG 2.1 02/20/2024    LIPASE 233 12/07/2017    TSH 2.43 02/16/2024    CRP <2.9 11/09/2017    SED 18 02/08/2012       Anesthesia Plan    ASA Status:  3    NPO Status:  NPO Appropriate    Anesthesia Type: General.              Consents    Anesthesia Plan(s) and associated risks, benefits, and realistic alternatives discussed. Questions answered and patient/representative(s) expressed understanding.     - Discussed:     - Discussed with:  Patient, Implied consent/emergency            Postoperative Care            Comments:    Other Comments: Patient had received 1.5 mg of midazolam and 50 mcg fentanyl for the JIM. On arrival to care suits, patient arousable, but would  not answer any questions.  Hx obtained from chart and cardiologist.            Zion Sorto MD    I have reviewed the pertinent notes and labs in the chart from the past 30 days and (re)examined the patient.  Any updates or changes from those notes are reflected in this note.

## 2024-02-20 NOTE — PROGRESS NOTES
A/O. Pt denies difficulty swallowing or sleep apnea. No dentures or loose teeth. NPO x 12 hrs.  All questions & concerns addressed. Family at bedside.     JIM: Pt tolerated  well. VSS. Total sedation given - 1.5 mg Versed & 50 mcg Fentanyl.     CDV: Pt tolerated well. CDV x 1 @ 120 joules. See rhythm strips. Total sedation given by anesthesia 30 propofol

## 2024-02-21 ENCOUNTER — APPOINTMENT (OUTPATIENT)
Dept: PHYSICAL THERAPY | Facility: CLINIC | Age: 79
DRG: 242 | End: 2024-02-21
Attending: PHYSICIAN ASSISTANT
Payer: COMMERCIAL

## 2024-02-21 LAB
ANION GAP SERPL CALCULATED.3IONS-SCNC: 12 MMOL/L (ref 7–15)
BUN SERPL-MCNC: 33.6 MG/DL (ref 8–23)
CALCIUM SERPL-MCNC: 10.2 MG/DL (ref 8.8–10.2)
CHLORIDE SERPL-SCNC: 97 MMOL/L (ref 98–107)
CREAT SERPL-MCNC: 1.28 MG/DL (ref 0.51–0.95)
DEPRECATED HCO3 PLAS-SCNC: 29 MMOL/L (ref 22–29)
EGFRCR SERPLBLD CKD-EPI 2021: 42 ML/MIN/1.73M2
ERYTHROCYTE [DISTWIDTH] IN BLOOD BY AUTOMATED COUNT: 15.1 % (ref 10–15)
GLUCOSE SERPL-MCNC: 98 MG/DL (ref 70–99)
HCT VFR BLD AUTO: 42.5 % (ref 35–47)
HGB BLD-MCNC: 13.6 G/DL (ref 11.7–15.7)
MAGNESIUM SERPL-MCNC: 2 MG/DL (ref 1.7–2.3)
MCH RBC QN AUTO: 29.4 PG (ref 26.5–33)
MCHC RBC AUTO-ENTMCNC: 32 G/DL (ref 31.5–36.5)
MCV RBC AUTO: 92 FL (ref 78–100)
PHOSPHATE SERPL-MCNC: 2.9 MG/DL (ref 2.5–4.5)
PLATELET # BLD AUTO: 214 10E3/UL (ref 150–450)
POTASSIUM SERPL-SCNC: 4.1 MMOL/L (ref 3.4–5.3)
RBC # BLD AUTO: 4.62 10E6/UL (ref 3.8–5.2)
SODIUM SERPL-SCNC: 138 MMOL/L (ref 135–145)
WBC # BLD AUTO: 6.9 10E3/UL (ref 4–11)

## 2024-02-21 PROCEDURE — 250N000009 HC RX 250: Performed by: HOSPITALIST

## 2024-02-21 PROCEDURE — 94640 AIRWAY INHALATION TREATMENT: CPT | Mod: 76

## 2024-02-21 PROCEDURE — 83735 ASSAY OF MAGNESIUM: CPT | Performed by: HOSPITALIST

## 2024-02-21 PROCEDURE — 250N000013 HC RX MED GY IP 250 OP 250 PS 637: Performed by: HOSPITALIST

## 2024-02-21 PROCEDURE — 250N000012 HC RX MED GY IP 250 OP 636 PS 637: Performed by: HOSPITALIST

## 2024-02-21 PROCEDURE — 999N000157 HC STATISTIC RCP TIME EA 10 MIN

## 2024-02-21 PROCEDURE — 250N000012 HC RX MED GY IP 250 OP 636 PS 637: Performed by: STUDENT IN AN ORGANIZED HEALTH CARE EDUCATION/TRAINING PROGRAM

## 2024-02-21 PROCEDURE — 84100 ASSAY OF PHOSPHORUS: CPT | Performed by: HOSPITALIST

## 2024-02-21 PROCEDURE — 93010 ELECTROCARDIOGRAM REPORT: CPT | Performed by: INTERNAL MEDICINE

## 2024-02-21 PROCEDURE — 94640 AIRWAY INHALATION TREATMENT: CPT

## 2024-02-21 PROCEDURE — 99233 SBSQ HOSP IP/OBS HIGH 50: CPT | Mod: FS | Performed by: PHYSICIAN ASSISTANT

## 2024-02-21 PROCEDURE — 80048 BASIC METABOLIC PNL TOTAL CA: CPT | Performed by: HOSPITALIST

## 2024-02-21 PROCEDURE — 93005 ELECTROCARDIOGRAM TRACING: CPT

## 2024-02-21 PROCEDURE — 97161 PT EVAL LOW COMPLEX 20 MIN: CPT | Mod: GP | Performed by: PHYSICAL THERAPIST

## 2024-02-21 PROCEDURE — 250N000013 HC RX MED GY IP 250 OP 250 PS 637: Performed by: PHYSICIAN ASSISTANT

## 2024-02-21 PROCEDURE — 36415 COLL VENOUS BLD VENIPUNCTURE: CPT | Performed by: HOSPITALIST

## 2024-02-21 PROCEDURE — 99232 SBSQ HOSP IP/OBS MODERATE 35: CPT | Performed by: INTERNAL MEDICINE

## 2024-02-21 PROCEDURE — 85027 COMPLETE CBC AUTOMATED: CPT | Performed by: HOSPITALIST

## 2024-02-21 PROCEDURE — 210N000002 HC R&B HEART CARE

## 2024-02-21 PROCEDURE — 250N000011 HC RX IP 250 OP 636: Performed by: INTERNAL MEDICINE

## 2024-02-21 RX ORDER — METOPROLOL SUCCINATE 50 MG/1
50 TABLET, EXTENDED RELEASE ORAL 2 TIMES DAILY
Status: DISCONTINUED | OUTPATIENT
Start: 2024-02-21 | End: 2024-02-24

## 2024-02-21 RX ADMIN — IPRATROPIUM BROMIDE 0.5 MG: 0.5 SOLUTION RESPIRATORY (INHALATION) at 07:04

## 2024-02-21 RX ADMIN — FUROSEMIDE 40 MG: 10 INJECTION, SOLUTION INTRAMUSCULAR; INTRAVENOUS at 08:57

## 2024-02-21 RX ADMIN — IPRATROPIUM BROMIDE 0.5 MG: 0.5 SOLUTION RESPIRATORY (INHALATION) at 19:14

## 2024-02-21 RX ADMIN — CITALOPRAM HYDROBROMIDE 10 MG: 10 TABLET ORAL at 08:58

## 2024-02-21 RX ADMIN — GUAIFENESIN 600 MG: 600 TABLET, EXTENDED RELEASE ORAL at 20:08

## 2024-02-21 RX ADMIN — AMIODARONE HYDROCHLORIDE 200 MG: 200 TABLET ORAL at 20:08

## 2024-02-21 RX ADMIN — GUAIFENESIN 600 MG: 600 TABLET, EXTENDED RELEASE ORAL at 08:59

## 2024-02-21 RX ADMIN — BIMATOPROST 1 DROP: 0.1 SOLUTION/ DROPS OPHTHALMIC at 23:53

## 2024-02-21 RX ADMIN — METOPROLOL SUCCINATE 25 MG: 25 TABLET, EXTENDED RELEASE ORAL at 08:59

## 2024-02-21 RX ADMIN — BRIMONIDINE TARTRATE, TIMOLOL MALEATE 1 DROP: 2; 5 SOLUTION/ DROPS TOPICAL at 20:09

## 2024-02-21 RX ADMIN — PREDNISONE 20 MG: 20 TABLET ORAL at 09:01

## 2024-02-21 RX ADMIN — AMIODARONE HYDROCHLORIDE 200 MG: 200 TABLET ORAL at 08:58

## 2024-02-21 RX ADMIN — APIXABAN 2.5 MG: 2.5 TABLET, FILM COATED ORAL at 08:59

## 2024-02-21 RX ADMIN — MYCOPHENOLATE MOFETIL 250 MG: 250 CAPSULE ORAL at 08:59

## 2024-02-21 RX ADMIN — MYCOPHENOLATE MOFETIL 250 MG: 250 CAPSULE ORAL at 17:54

## 2024-02-21 RX ADMIN — BRIMONIDINE TARTRATE, TIMOLOL MALEATE 1 DROP: 2; 5 SOLUTION/ DROPS TOPICAL at 08:56

## 2024-02-21 RX ADMIN — IPRATROPIUM BROMIDE 0.5 MG: 0.5 SOLUTION RESPIRATORY (INHALATION) at 13:21

## 2024-02-21 RX ADMIN — METOPROLOL SUCCINATE 50 MG: 50 TABLET, FILM COATED, EXTENDED RELEASE ORAL at 20:08

## 2024-02-21 RX ADMIN — ANORECTAL OINTMENT: 15.7; .44; 24; 20.6 OINTMENT TOPICAL at 09:01

## 2024-02-21 RX ADMIN — METOPROLOL TARTRATE 5 MG: 5 INJECTION INTRAVENOUS at 18:10

## 2024-02-21 RX ADMIN — APIXABAN 2.5 MG: 2.5 TABLET, FILM COATED ORAL at 20:08

## 2024-02-21 ASSESSMENT — ACTIVITIES OF DAILY LIVING (ADL)
ADLS_ACUITY_SCORE: 48
ADLS_ACUITY_SCORE: 44
ADLS_ACUITY_SCORE: 47
ADLS_ACUITY_SCORE: 44
ADLS_ACUITY_SCORE: 48
ADLS_ACUITY_SCORE: 44
ADLS_ACUITY_SCORE: 51
ADLS_ACUITY_SCORE: 51
ADLS_ACUITY_SCORE: 47
ADLS_ACUITY_SCORE: 48
ADLS_ACUITY_SCORE: 48
ADLS_ACUITY_SCORE: 47

## 2024-02-21 NOTE — PROGRESS NOTES
MD Notification    Notified Person: On-call Cardiology, MD Dhillon  Notification Date/Time: 2-20-24 1930  Notification Interaction: Page  Purpose of Notification:     634-5 Elaina Winn: Patient had cardioversion today at 1500 for Afib RVR, now SR 60s. Patient is still on amio gtt, starting  mg at 2000. Would you like amio gtt stopped? Segundo [phone]    Orders Received: Stop amio gtt when PO given  Comments:

## 2024-02-21 NOTE — PROGRESS NOTES
Confused. Amio and heparin gtts stopped after initiating PO Amio and Eliquis. CXR recorded at HS. Congestive nonproductive cough. IV lasix at HS with good output, patient voiding frequently throughout the night up SBA to bedside commode. Bed alarms on. Tele SR. IMC status discontinued.

## 2024-02-21 NOTE — PROGRESS NOTES
[facility]    Medicine Progress Note - Hospitalist Service    Date of Admission:  2/16/2024    Assessment & Plan   78 year old female with a past medical history of atrial fibrillation, dementia, autoimmune hepatitis, CKD stage III presents to hospital with A-fib RVR.   Now s/p cardioversion.      Afib RVR s/p successful JIM cardioversion 2/20/24  NSTEMI, type 2 secondary to demand:  Presented from her care center, was found to be in Afib RVR on evaluation by EMS. Rate improving with diltiazem drip in the emergency room. Not on anticoagulation due to previous liver dysfunction. On low-dose metoprolol for rate control PTA.  Troponin elevated at 219, but trending down to 200 on repeat (elevated due to demand). Chest pain free.   *Recent TTE LVEF 55-60%, severe biatrial enlargement.  Repeat after rate issues was EF 35%.    *Patient with history of Afib s/p DCCV in 2017 prior to current episode.    -  Had successful 2/20 JIM cardioversion  -  cardiology assistance appreciated  -  digoxin added 2/19, discontinued after successful cardioversion 2/20  - amiodarone gtt prior, now on oral  - Continue metoprolol 50mg BID  - Eliquis BID, for 1mo minimum on discharge  - cardiac monitor on discharge recommended by cardiology     Acute hypoxic respiratory failure  Possible undx COPD  Acute on chronic diastolic heart failure exacerbation:  The patient was noted to be wheezing in the ED requiring supplemental O2. She does have a multi year history of smoking raising suspicion for undiagnosed copd. Furthermore her ct did not reveal significant pulmonary edema  - given IV lasix on admission and started steroid burst, last dose prednisone on 2/21 AM (day 5).  Continue nebulizers (getting ~2 per day).  - flutter valve  - mucinex BID     Amiodarone skin infiltrate 2/19  LUE had infiltrate of amiodarone. Ecchymosis noted, not painful per patient and stable since incident.  - Monitor     Dementia  A&Ox1-2  baseline, lives in memory care and daughter is decision maker     Autoimmune hepatitis:  -mycophenolate restarted     CKD stage III  Stable, did have bump up to 1.41 on 2/18 after diuresis  - follow BMP daily- improving on 2/20     Coccyx pressure wound POA  - incontinence associated dermatitis          Medical Decision Making       40 MINUTES SPENT BY ME on the date of service doing chart review, history, exam, documentation & further activities per the note.      Labs/Imaging Reviewed:  See Information above and Data section below    PPE Used:  Mask       Diet: Room Service  Advance Diet as Tolerated: Clear Liquid Diet; Regular Diet Adult  Regular Diet Adult    DVT Prophylaxis: DOAC  Scott Catheter: Not present  Lines: None     Cardiac Monitoring: None  Code Status: Full Code      Clinically Significant Risk Factors           # Hypercalcemia: Highest Ca = 10.2 mg/dL in last 2 days, will monitor as appropriate     # Coagulation Defect: INR = 1.19 (Ref range: 0.85 - 1.15) and/or PTT = N/A, will monitor for bleeding    # Hypertension: Noted on problem list  # Acute heart failure with reduced ejection fraction: last echo with EF <40% and receiving IV diuretics                  Disposition Plan      Expected Discharge Date: 02/22/2024                  Gabo Grimaldo DO  Hospitalist Service  [facility]  Securely message with [employer] (more info)  Text page via [employer] Paging/Directory   ______________________________________________________________________    Interval History   Assumed care, history reviewed.  Ms. Winn without new complaints.  Denies pain, worsening SOB, nausea, other new complaints.    Physical Exam   Vital Signs: Temp: 97.6  F (36.4  C) Temp src: Oral BP: 105/65 Pulse: 75   Resp: 20 SpO2: 94 % O2 Device: None (Room air) Oxygen Delivery: 2 LPM  Weight: 122 lbs 0 oz    GEN:  Alert, oriented x 1, answers basic questions well, appears comfortable, no overt distress.  HEENT:   Normocephalic/atraumatic, no scleral icterus, no nasal discharge, mouth moist.  CV:  Regular rate and rhythm, distant.  No rub.  LUNGS:  Clear to auscultation bilaterally without rales/rhonchi/wheezing/retractions.  Breath sounds somewhat diminished bases.  Symmetric chest rise on inhalation noted.  ABD:  Active bowel sounds, soft, non-tender/non-distended.  No guarding/rigidity.  EXT:  Trace edema.  No cyanosis.   SKIN:  Dry to touch, no exanthems noted in the visualized areas.    Medications    - MEDICATION INSTRUCTIONS -      - MEDICATION INSTRUCTIONS -      Reason anticoagulant not prescribed for atrial fibrillation        amiodarone  200 mg Oral BID    apixaban ANTICOAGULANT  2.5 mg Oral BID    bimatoprost  1 drop Both Eyes At Bedtime    brimonidine-timolol  1 drop Both Eyes BID    citalopram  10 mg Oral Daily    guaiFENesin  600 mg Oral BID    ipratropium  0.5 mg Nebulization 3 times daily    menthol-zinc oxide   Topical BID    metoprolol succinate ER  50 mg Oral BID    mycophenolate  250 mg Oral BID IS       Data     Labs and Imaging results below reviewed today.    I have personally reviewed the following data over the past 24 hrs:    6.9  \   13.6   / 214     138 97 (L) 33.6 (H) /  98   4.1 29 1.28 (H) \     INR:  1.19 (H) PTT:  N/A   D-dimer:  N/A Fibrinogen:  N/A           Recent Results (from the past 24 hour(s))   Transesophageal Echocardiogram   Result Value    LVEF  35%    Narrative    [id number]  [id number]  [id number]^MARION^ALESSIA^BILL BISHOP     [facility]  Echocardiography Laboratory  [address] [place] [address]     Name: ABDULLAHI BLACKWOOD  MRN: [id number]  : 1945  Study Date: 2024 02:55 PM  Age: 78 yrs  Gender: Female  Patient Location: [facility]  Reason For Study: Afib  Ordering Physician: ALESSIA SCHULTZ  Referring Physician: Shlomo Munguia,  Performed By: Madison Gayle     BSA: 1.6 m2  Height: 62 in  Weight: 126 lb  HR: 149  BP: 118/99  mmHg  ______________________________________________________________________________  Procedure  Complete Portable JIM Adult. JIM Probe #[id number] was also used during the  procedure.  ______________________________________________________________________________  Interpretation Summary     1. The left ventricle is normal in size. The visual ejection fraction is  estimated at 35%. There is moderate global hypokinesia of the left ventricle.  2. The right ventricle is normal size. Mildly decreased right ventricular  systolic function  3. There is moderately severe (3+) mitral regurgitation. MV appears  structurally normal. Suspect this is functional MR from rapid afib and low EF.     Echo 2-17-24 showed EF 55%, 1+ MR.  ______________________________________________________________________________  JIM  I determined this patient to be an appropriate candidate for the planned  sedation and procedure and have reassessed the patient immediately prior to  sedation and procedure. Total sedation time: 13mins minutes of continuous  bedside 1:1 monitoring. Versed (50mg) was given intravenously. Fentanyl  (1.5mcg) was given intravenously. Consent to the procedure was obtained prior  to sedation. The transesophageal probe was passed without difficulty. There  were no complications associated with this procedure.     Left Ventricle  The left ventricle is normal in size. The visual ejection fraction is  estimated at 35%. There is moderate global hypokinesia of the left ventricle.     Right Ventricle  The right ventricle is normal size. Mildly decreased right ventricular  systolic function.     Atria  There is severe biatrial enlargement. A patent foramen ovale is present. A  contrast injection (Bubble Study) was performed that was mildly positive for  flow across the interatrial septum. No thrombus is detected in the left atrial  appendage.     Mitral Valve  There is moderately severe (3+) mitral regurgitation. MV appears  structurally  normal. Suspect this is functional MR from rapid afib and low EF.     Tricuspid Valve  There is mild (1+) tricuspid regurgitation.     Aortic Valve  The aortic valve is trileaflet. There is trace aortic regurgitation.     Pulmonic Valve  The pulmonic valve is normal in structure and function.     Vessels  Normal ascending, transverse (arch), and descending aorta.     Pericardial/Pleural  There is no pericardial effusion.     Rhythm  The rhythm was rapid atrial fibrillation.  ______________________________________________________________________________  Doppler Measurements & Calculations  MV max P.9 mmHg  MV mean P.4 mmHg  MV V2 VTI: 11.8 cm  MR PISA: 2.7 cm2  MR ERO: 0.20 cm2  MR volume: 21.4 ml  TR max mendoza: 269.7 cm/sec  TR max P.1 mmHg     ______________________________________________________________________________  Report approved by: Varinder Abarca 2024 04:26 PM         JIM, cardioversion    Narrative    Thai Feliciano MD     2024  3:39 PM  [facility]    Procedure: JIM, cardioversion    Date/Time: 2024 3:37 PM    Performed by: Thai Feliciano MD  Authorized by: Amy Rasheed PA-C      UNIVERSAL PROTOCOL   Site Marked: NA  Prior Images Obtained and Reviewed:  Yes  Required items: Required blood products, implants, devices and special   equipment available    Patient identity confirmed:  Verbally with patient  Patient was reevaluated immediately before administering moderate or deep   sedation or anesthesia  Confirmation Checklist:  Patient's identity using two indicators  Time out: Immediately prior to the procedure a time out was called    Universal Protocol: the Joint Commission Universal Protocol was followed    Preparation: Patient was prepped and draped in usual sterile fashion       ANESTHESIA    Anesthesia was administered and monitored by anesthesiology.  See   anesthesia documentation for  details.    SEDATION  Patient Sedated: Yes    Sedation:  Fentanyl and midazolam  Vital signs: Vital signs monitored during sedation      PROCEDURE DETAILS  Cardioversion basis: elective  Pre-procedure rhythm: atrial fibrillation  Electrodes: pads  Electrodes placed: anterior-posterior  Number of attempts: 1  Post-procedure rhythm: normal sinus rhythm      PROCEDURE  Describe Procedure: JIM & Cardioversion Note    Indication: afib    Informed consent was signed by the patient.  A timeout was taken.    Sedation for JIM:  Versed 1.5mg  Fentanyl 50mcg    Anesthesia was present for cardioversion, see separate documentation for   their sedation.    Findings:  LV: EF 35%  RV: mild dysfunction  LA: no thrombus  Mitral valve: 3+ MR, likely functional  Aortic valve: normal  Tricuspid valve: 2+ TR  Atrial septum: positive bubble study  Aorta: normal    Cardioversion:  See anesthesia documentation for sedation during cardioversion.    Baseline rhythm: afib, rate 130  Synchronized cardioversion performed at 120J, x1  Post-DCCV rhythm: sinus, rate 70    No complications.    Thai Feliciano MD  Cardiology - [facility]  Pager: [phone]  Text Page  February 20, 2024        Patient Tolerance:  Patient tolerated the procedure well with no immediate   complications   XR Chest 1 View    Narrative    EXAM: XR CHEST 1 VIEW  LOCATION: [facility]  DATE: 2/20/2024    INDICATION: Wheezing; heart failure; status post cardioversion.  COMPARISON: 02/16/2024.      Impression    IMPRESSION: Mild pulmonary interstitial edema, with associated small to moderate-sized right and small left pleural effusions. No pneumothorax.    Upper limits of normal heart size. Atherosclerosis of the thoracic aorta.

## 2024-02-21 NOTE — PROGRESS NOTES
PT: PT evaluation completed, patient is at baseline from a mobility standpoint. Pt is mobilizing without use of an AD with SBA for safety d/t lines. Vitals stable pre/post activity, pt denied dizziness and SOB.     02/21/24 1101   Appointment Info   Signing Clinician's Name / Credentials (PT) Ariana Subramanian DPT   Living Environment   People in Home facility resident  ([address])   Current Living Arrangements assisted living   Living Environment Comments Pt is unable to provide subjective history d/t dementia.   Self-Care   Usual Activity Tolerance good   Current Activity Tolerance moderate   Activity/Exercise/Self-Care Comment Pt unable to report baseline mobility.   General Information   Onset of Illness/Injury or Date of Surgery 02/16/24   Referring Physician Gaurang Orantes MD   Pertinent History of Current Problem (include personal factors and/or comorbidities that impact the POC) 79 y/o female admitted with tachycardia and cough, noted to have HTN, acute respiratory failure and atrial fibrillation wtih RVR. Pt is now POD # 1 JIM and cardioversion. PMH including atrial fibrillation, dementia, autoimmune hepatitis, CKD stage III.   Existing Precautions/Restrictions fall   General Observations Pt in supine upon arrival of therapist.   Cognition   Affect/Mental Status (Cognition) confused   Orientation Status (Cognition) person   Follows Commands (Cognition) 50-74% accuracy   Cognitive Status Comments Per chart, pt has dementia at baseilne.   Pain Assessment   Patient Currently in Pain No   Posture    Posture Comments Noted forward head and shoulder posture sitting EOB and standing.   Range of Motion (ROM)   ROM Comment B LEs WFL.   Strength (Manual Muscle Testing)   Strength Comments Not formally assessed, pt demonstrates at least 3/5 grossly in B LEs with functional mobility.   Bed Mobility   Comment, (Bed Mobility) Supine-sit with SBA.   Transfers   Comment, (Transfers) Sit <> stand from EOB  and toilet with SBA.   Gait/Stairs (Locomotion)   Comment, (Gait/Stairs) Pt amb 30' x 2 with no AD and SBA.   Balance   Balance Comments Noted good seated and standing balance without UE support.   Sensory Examination   Sensory Perception Comments Pt denied numbness/tingling in B LEs.   Clinical Impression   Criteria for Skilled Therapeutic Intervention Evaluation only   Clinical Presentation (PT Evaluation Complexity) stable   Clinical Presentation Rationale Based on PMH, current presentation, and social support.   Clinical Decision Making (Complexity) low complexity   Risk & Benefits of therapy have been explained patient   PT Total Evaluation Time   PT Melany Low Complexity Minutes (54075) 20   Total Session Time   Total Session Time (sum of timed and untimed services) 20

## 2024-02-21 NOTE — PROGRESS NOTES
[facility]  Cardiology Progress Note  Date of Service: 02/21/2024  Primary Cardiologist: Dr. Erwin    Assessment & Plan    Elaina Winn is a 78 year old female with past medical history significant for paroxysmal atrial fibrillation not on anticoagulation, autoimmune hepatitis, dementia, CKD admitted on 2/16/2024 with hypertension and acute respiratory failure and found to have A-fib with RVR.    Assessment:  1.  A-fib with RVR, ? flutter,  with trop 200 which is likely type II demand infarct.    -Family is wanting conservative management if possible.     - CHADS VASC 4 (age, HTN, female) has previously not wanted anticoagulation, now agreeable   -Initial echo showed EF 55%, on JIM yesterday EF had dropped down to 35%, likely tachycardia mediated   -Status post JIM guided cardioversion 2/20, now maintaining sinus rhythm, will require amnio and anticoagulation.    2.  Hypertension, markedly elevated on admission now well-controlled.  3.  Dementia, patient lives in memory care and daughter is decision-maker    4.  Stage III CKD, baseline creatinine 1-1.2, stable at 1.2 1:08 dose of IV Lasix    5.  Autoimmune hepatitis, normal lfts for the last several years    6.  Likely tachycardia mediated cardiomyopathy with EF now 35% and likely functional moderate-severe MR.  -Chest x-ray 2/20 demonstrated some pulmonary edema has gotten IV Lasix x 1 with good results  -Admit weight 126, current weight 122, dry weight likely 120    Plan:   One additional dose of IV Lasix today, then 20 mg daily  Increase Toprol to 50 mg twice daily given cardiomyopathy.  Continue Amio, load will be 200 mg bid for 2 weeks then 200 mg daily  Eliquis 2.5 mg bid starting this evening, discontinue heparin when eliquis given  Anticipate cardiomyopathy will improve with rate control  Mucomyst and pulmonary rehab for mucous plugging please  Cardiac rehab  Discharge on Zio patch, please do not mail out, patient lives in memory  care.  Will arrange follow-up in the next 4 to 6 weeks  Dispo, potential discharge tomorrow    Amy Rasheed PA-C  [facility]  Pager: [phone]     Interval History   She feels a bit confused this morning.  She is not sure when she is can go home.  She denies chest pain or shortness of breath.    Physical Exam   Temp: 97.6  F (36.4  C) Temp src: Oral BP: 122/74 Pulse: 75   Resp: 20 SpO2: 93 % O2 Device: None (Room air) Oxygen Delivery: 2 LPM  Vitals:    02/19/24 0600 02/20/24 0605 02/21/24 0448   Weight: 57.5 kg (126 lb 12.2 oz) 57.2 kg (126 lb) 55.3 kg (122 lb)     Elderly woman in no acute distress.  Normocephalic atraumatic.  Regular in rate and rhythm with 2 out of 6 systolic murmur heard best at left sternal border.  Lungs are diminished in the bases but otherwise clear anteriorly.  1+ pitting edema to mid shin.  Skin is warm and dry but pale.    Clinically Significant Risk Factors           # Hypercalcemia: Highest Ca = 10.2 mg/dL in last 2 days, will monitor as appropriate     # Coagulation Defect: INR = 1.19 (Ref range: 0.85 - 1.15) and/or PTT = N/A, will monitor for bleeding    # Hypertension: Noted on problem list  # Acute heart failure with reduced ejection fraction: last echo with EF <40% and receiving IV diuretics                 Cardiac Arrhythmia: Atrial fibrillation: Paroxysmal    Fluid overload, unspecified    Acute kidney failure, unspecified    Dementia: Unspecified dementia without behavioral disturbance    Chronic Fatigue and Other Debilities: Age-related physical debility        Medications    - MEDICATION INSTRUCTIONS -      - MEDICATION INSTRUCTIONS -      Reason anticoagulant not prescribed for atrial fibrillation        amiodarone  200 mg Oral BID    apixaban ANTICOAGULANT  2.5 mg Oral BID    bimatoprost  1 drop Both Eyes At Bedtime    brimonidine-timolol  1 drop Both Eyes BID    citalopram  10 mg Oral Daily    furosemide  40 mg Intravenous BID    guaiFENesin  600 mg Oral BID    ipratropium   0.5 mg Nebulization 3 times daily    menthol-zinc oxide   Topical BID    metoprolol succinate ER  25 mg Oral BID    mycophenolate  250 mg Oral BID IS       Data   Last 24 hours labs reviewed     Imaging: CT PE study shows bilateral pleural effusions mild scattered mucous plugging.    Tele: A-fib with heart rates ranging from 90s at rest up to 170s with movement.

## 2024-02-21 NOTE — PROGRESS NOTES
"MD Notification    Notified Person: MD Alcaraz  Notification Date/Time: 2-21-24 0039  Notification Interaction: Page  Purpose of Notification:     Hospitalist MD Moyer signed a sticky note stating patient \"Can be removed from IMC status after amiodarone gtt discontinued\". Patient had cardioversion to sinus rhythm today, amio gtt DC'ed per Cardiology and PO amio has been started. Can I DC IMC status? Segundo [phone].    Orders Received:  Comments:    "

## 2024-02-22 LAB
ANION GAP SERPL CALCULATED.3IONS-SCNC: 10 MMOL/L (ref 7–15)
ATRIAL RATE - MUSE: 182 BPM
BUN SERPL-MCNC: 31.2 MG/DL (ref 8–23)
CALCIUM SERPL-MCNC: 9.9 MG/DL (ref 8.8–10.2)
CHLORIDE SERPL-SCNC: 97 MMOL/L (ref 98–107)
CREAT SERPL-MCNC: 1.23 MG/DL (ref 0.51–0.95)
DEPRECATED HCO3 PLAS-SCNC: 32 MMOL/L (ref 22–29)
DIASTOLIC BLOOD PRESSURE - MUSE: NORMAL MMHG
EGFRCR SERPLBLD CKD-EPI 2021: 44 ML/MIN/1.73M2
GLUCOSE SERPL-MCNC: 99 MG/DL (ref 70–99)
INTERPRETATION ECG - MUSE: NORMAL
P AXIS - MUSE: NORMAL DEGREES
POTASSIUM SERPL-SCNC: 3.5 MMOL/L (ref 3.4–5.3)
PR INTERVAL - MUSE: NORMAL MS
QRS DURATION - MUSE: 82 MS
QT - MUSE: 318 MS
QTC - MUSE: 458 MS
R AXIS - MUSE: 74 DEGREES
SODIUM SERPL-SCNC: 139 MMOL/L (ref 135–145)
SYSTOLIC BLOOD PRESSURE - MUSE: NORMAL MMHG
T AXIS - MUSE: 50 DEGREES
VENTRICULAR RATE- MUSE: 125 BPM

## 2024-02-22 PROCEDURE — 94640 AIRWAY INHALATION TREATMENT: CPT

## 2024-02-22 PROCEDURE — 250N000009 HC RX 250: Performed by: INTERNAL MEDICINE

## 2024-02-22 PROCEDURE — G0463 HOSPITAL OUTPT CLINIC VISIT: HCPCS

## 2024-02-22 PROCEDURE — 250N000013 HC RX MED GY IP 250 OP 250 PS 637: Performed by: HOSPITALIST

## 2024-02-22 PROCEDURE — 250N000009 HC RX 250: Performed by: HOSPITALIST

## 2024-02-22 PROCEDURE — 94640 AIRWAY INHALATION TREATMENT: CPT | Mod: 76

## 2024-02-22 PROCEDURE — 250N000013 HC RX MED GY IP 250 OP 250 PS 637: Performed by: PHYSICIAN ASSISTANT

## 2024-02-22 PROCEDURE — 250N000012 HC RX MED GY IP 250 OP 636 PS 637: Performed by: STUDENT IN AN ORGANIZED HEALTH CARE EDUCATION/TRAINING PROGRAM

## 2024-02-22 PROCEDURE — 80048 BASIC METABOLIC PNL TOTAL CA: CPT | Performed by: INTERNAL MEDICINE

## 2024-02-22 PROCEDURE — 250N000011 HC RX IP 250 OP 636: Mod: JZ | Performed by: INTERNAL MEDICINE

## 2024-02-22 PROCEDURE — 99232 SBSQ HOSP IP/OBS MODERATE 35: CPT | Performed by: INTERNAL MEDICINE

## 2024-02-22 PROCEDURE — 36415 COLL VENOUS BLD VENIPUNCTURE: CPT | Performed by: INTERNAL MEDICINE

## 2024-02-22 PROCEDURE — 99222 1ST HOSP IP/OBS MODERATE 55: CPT | Mod: FS | Performed by: NURSE PRACTITIONER

## 2024-02-22 PROCEDURE — 258N000003 HC RX IP 258 OP 636: Performed by: INTERNAL MEDICINE

## 2024-02-22 PROCEDURE — 210N000002 HC R&B HEART CARE

## 2024-02-22 PROCEDURE — 999N000157 HC STATISTIC RCP TIME EA 10 MIN

## 2024-02-22 PROCEDURE — 250N000012 HC RX MED GY IP 250 OP 636 PS 637: Performed by: INTERNAL MEDICINE

## 2024-02-22 RX ORDER — PREDNISONE 10 MG/1
10 TABLET ORAL DAILY
Status: COMPLETED | OUTPATIENT
Start: 2024-02-22 | End: 2024-02-25

## 2024-02-22 RX ORDER — LEVALBUTEROL INHALATION SOLUTION 1.25 MG/3ML
0.63 SOLUTION RESPIRATORY (INHALATION)
Status: DISCONTINUED | OUTPATIENT
Start: 2024-02-22 | End: 2024-03-02 | Stop reason: HOSPADM

## 2024-02-22 RX ADMIN — MYCOPHENOLATE MOFETIL 250 MG: 250 CAPSULE ORAL at 08:22

## 2024-02-22 RX ADMIN — METOPROLOL SUCCINATE 50 MG: 50 TABLET, FILM COATED, EXTENDED RELEASE ORAL at 08:22

## 2024-02-22 RX ADMIN — GUAIFENESIN 600 MG: 600 TABLET, EXTENDED RELEASE ORAL at 08:22

## 2024-02-22 RX ADMIN — IPRATROPIUM BROMIDE 0.5 MG: 0.5 SOLUTION RESPIRATORY (INHALATION) at 19:43

## 2024-02-22 RX ADMIN — BRIMONIDINE TARTRATE, TIMOLOL MALEATE 1 DROP: 2; 5 SOLUTION/ DROPS TOPICAL at 22:16

## 2024-02-22 RX ADMIN — BRIMONIDINE TARTRATE, TIMOLOL MALEATE 1 DROP: 2; 5 SOLUTION/ DROPS TOPICAL at 08:22

## 2024-02-22 RX ADMIN — APIXABAN 2.5 MG: 2.5 TABLET, FILM COATED ORAL at 08:22

## 2024-02-22 RX ADMIN — METOPROLOL SUCCINATE 50 MG: 50 TABLET, FILM COATED, EXTENDED RELEASE ORAL at 22:17

## 2024-02-22 RX ADMIN — METOPROLOL TARTRATE 5 MG: 5 INJECTION INTRAVENOUS at 05:00

## 2024-02-22 RX ADMIN — Medication 1 MG/MIN: at 15:10

## 2024-02-22 RX ADMIN — IPRATROPIUM BROMIDE 0.5 MG: 0.5 SOLUTION RESPIRATORY (INHALATION) at 07:15

## 2024-02-22 RX ADMIN — LEVALBUTEROL HYDROCHLORIDE 0.63 MG: 1.25 SOLUTION RESPIRATORY (INHALATION) at 19:43

## 2024-02-22 RX ADMIN — ANORECTAL OINTMENT: 15.7; .44; 24; 20.6 OINTMENT TOPICAL at 08:22

## 2024-02-22 RX ADMIN — PREDNISONE 10 MG: 10 TABLET ORAL at 18:10

## 2024-02-22 RX ADMIN — CITALOPRAM HYDROBROMIDE 10 MG: 10 TABLET ORAL at 08:22

## 2024-02-22 RX ADMIN — MYCOPHENOLATE MOFETIL 250 MG: 250 CAPSULE ORAL at 18:11

## 2024-02-22 RX ADMIN — Medication 1 MG/MIN: at 13:52

## 2024-02-22 RX ADMIN — METOPROLOL TARTRATE 5 MG: 5 INJECTION INTRAVENOUS at 05:52

## 2024-02-22 RX ADMIN — BIMATOPROST 1 DROP: 0.1 SOLUTION/ DROPS OPHTHALMIC at 22:22

## 2024-02-22 RX ADMIN — APIXABAN 2.5 MG: 2.5 TABLET, FILM COATED ORAL at 22:17

## 2024-02-22 RX ADMIN — GUAIFENESIN 600 MG: 600 TABLET, EXTENDED RELEASE ORAL at 22:17

## 2024-02-22 RX ADMIN — AMIODARONE HYDROCHLORIDE 200 MG: 200 TABLET ORAL at 08:22

## 2024-02-22 RX ADMIN — ANORECTAL OINTMENT: 15.7; .44; 24; 20.6 OINTMENT TOPICAL at 22:19

## 2024-02-22 RX ADMIN — AMIODARONE HYDROCHLORIDE 150 MG: 1.5 INJECTION, SOLUTION INTRAVENOUS at 09:59

## 2024-02-22 ASSESSMENT — ACTIVITIES OF DAILY LIVING (ADL)
ADLS_ACUITY_SCORE: 47

## 2024-02-22 NOTE — PROGRESS NOTES
"[facility] Nurse Inpatient Assessment     Consulted for: Coccyx    Summary: Pt with dementia and daughter reports poor hygiene with plans to have senior living have more involvement. Tissue breakdown mild due to incontinence, no s/s of infection    Patient History (according to provider note(s):      Elaina Winn is a 78 year old female with a past medical history of atrial fibrillation, dementia, autoimmune hepatitis, CKD stage III presents to hospital with A-fib RVR.     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 Calmoseptine has shown great improvement for incontinence, however concern of pressure injury today with focused erythema over coccyx. Area remains blanchable today 2/22.   Wound base: 100 % blanchable erythema     Palpation of the wound bed: normal      Drainage: none     Description of drainage: none     Measurements (length x width x depth, in cm): 5  x 4.5  x  0 cm      Tunneling: N/A     Undermining: N/A  Periwound skin: Intact      Color: normal and consistent with surrounding tissue      Temperature: normal   Odor: none  Pain: denies , none  Pain interventions prior to dressing change: N/A  Treatment goal: Decrease moisture and Protection  STATUS: healing  Supplies ordered: supplies stored on unit, switching to Mepilex for pressure prevention       Treatment Plan:     Coccyx: Every other day and PRN  1. Clean wound with saline or MicroKlenz Spray, pat dry  2. Wipe / \"clean\" the surrounding periwound tissue with skin prep (Cavilon No Sting Skin Prep #233175) and allow to dry. This will help protect periwound and help dressing adherence  3. Press a Mepilex  Sacral " "Dressing (PS#[id number])  to the area, making sure to conform nicely to skin curvatures  - begin placing the Mepilex \"upside down\"   - as high up along the gluteal cleft or buttocks as you can  - place the most distal aspect of the dressing onto skin then smooth into place in an upward motion, then side to side.    to the area, making sure to conform nicely to skin curvatures.   4. Time and date dressing change  NOTE  -Reposition pt side to side lupe when in bed, every 2 hours-get the pt way over on side to completely offload pressure. This will benefit skin and respiratory function   -Keep heels elevated and floating on pillows at all times. Try using at least 2 pillows under each calf.  -When up to the chair pt needs to fully offload every 2 hours and use a chair cushion if needed     Pressure Injury Prevention (PIP) Plan:  If patient is declining pressure injury prevention interventions: Explore reason why and address patient's concerns, Educate on pressure injury risk and prevention intervention(s), and If patient is still declining, document \"informed refusal\"   Mattress: Follow bed algorithm, reassess daily and order specialty mattress, if indicated.  HOB: Maintain at or below 30 degrees, unless contraindicated  Repositioning in bed: Every 1-2 hours , Left/right positioning; avoid supine, and Raise foot of bed prior to raising head of bed, to reduce patient sliding down (shear)  Heels: Keep elevated off mattress and Pillows under calves  Protective Dressing: Sacral Mepilex for prevention (#237254),  especially for the agitated patient   Positioning Equipment: None  Chair positioning: Chair cushion (#[id number])    If patient has a buttock pressure injury, or high risk for PI use chair cushion or SPS.  Moisture Management: Perineal cleansing /protection: Follow Incontinence Protocol, Avoid brief in bed, and Clean and dry skin folds with bathing   Under Devices: Inspect skin under all medical devices during skin " inspection , Ensure tubes are stabilized without tension, and Ensure patient is not lying on medical devices or equipment when repositioned  Ask provider to discontinue device when no longer needed.           Orders: Written    RECOMMEND PRIMARY TEAM ORDER: None, at this time  Education provided: importance of repositioning, plan of care, wound progress, Moisture management, Hygiene, and Off-loading pressure  Discussed plan of care with: Patient and Nurse  WOC nurse follow-up plan: weekly  Notify WOC if wound(s) deteriorate.  Nursing to notify the Provider(s) and re-consult the WOC Nurse if new skin concern.    DATA:     Current support surface: Standard  Standard gel/foam mattress (IsoFlex, Atmos air, etc)  Containment of urine/stool: Incontinence Protocol and Incontinent pad in bed  BMI: Body mass index is 20.64 kg/m .   Active diet order: Orders Placed This Encounter      Advance Diet as Tolerated: Clear Liquid Diet; Regular Diet Adult      Regular Diet Adult     Output: I/O last 3 completed shifts:  In: 200 [P.O.:200]  Out: 4325 [Urine:4325]     Labs:   Recent Labs   Lab 02/21/24  0536 02/20/24  1829 02/17/24  0630 02/16/24  1941   ALBUMIN  --   --   --  3.5   HGB 13.6  --    < > 13.0   INR  --  1.19*   < >  --    WBC 6.9  --    < > 6.6    < > = values in this interval not displayed.     Pressure injury risk assessment:   Sensory Perception: 3-->slightly limited  Moisture: 3-->occasionally moist  Activity: 3-->walks occasionally  Mobility: 3-->slightly limited  Nutrition: 2-->probably inadequate  Friction and Shear: 3-->no apparent problem  Rodrigo Score: 17    Pritesh Ozuna, RN CWOCN  -Securely message with [employer] (more info) - can reach individually by name or search 'WO Nurse' (Asif) to reach all current WOCs on duty.  [facility] Office Phone: [phone]

## 2024-02-22 NOTE — CONSULTS
"[facility]  Electrophysiology Consultation     Date of Admission:  2/16/2024  Primary Cardiologist:   Date of Consult 2/22/2024  Reason for consult: afib with RVR    History of Present Illness   Elaina Winn is a 78 year old female who has a past medical history significant for dementia, CKD III, autoimmune hepatitis, HTN, congested heart failure, and paroxysmal atrial fibrillation with RVR (not on OAC).     Elaina Winn presented with hypertension and acute respiratory failure was found to be in atrial fibrillation with RVR.  She was admitted on 2/16/2024.  Currently being followed by general cardiology.  Underwent a JIM/cardioversion on 2/20/2024.  Unfortunately, 2/21 patient reverted in the afternoon back to A-fib with RVR.  Troponin elevation up to 219 (2/16) likely type II demand ischemia.    Transesophageal echo 2/20 showed EF of 35% with moderate global hypokinesis and mild RV dysfunction.  3+ MR noted was thought to be secondary to A-fib with RVR.    Echocardiogram 2/17/2024: EF 55 to 60%.  1+ MR.  Normal RV function.    EKG 2/21/2024: Sinus rhythm at 93 bpm with PACs.  QTc prolonged at 527 ms    Currently telemetry shows A-fib with rates up to 145 bpm.  Patient appears asymptomatic.  Does have a history of dementia, and when asked questions states that her memory is \" foggy\" at this time.      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  LFTs have been normal for several years.  Hypertension stable    Plan:   Patient's daughter (medical power of ) is documented saying that she wants conservative therapy for her mom.  Currently not at bedside.  Reviewed the case with Dr. Sheffield (general cardiology).  IV amiodarone has already been ordered to rebolus the " patient.  I agree with this plan.  We could consider outpatient cardioversion down the road once heart rate is <110 bpm.  We could also discuss AV node ablation and pacemaker implantation with patient's daughter.  Oral amiodarone on hold (200 mg twice daily), on metoprolol ER 50 mg twice daily.  Amio bolus completed.  Pressures are soft, but stable.  Will plan on continuing with IV infusion for 23 hours and resume po dose of amiodarone tomorrow morning.  EP will continue to follow.  Thank you for including us in her care.      This assessment and plan was formulated under the guidance of .    A total of 45 minutes was spent face-to-face or coordinating care of Elaina FRANSISCA Winn. Over 50% of our time was spent counseling the patient and/or coordinating care.  --------------------------------------------------------------------------------------------  Tresa Redd NP, APRN CNP  Pager:  [phone]      Code Status    Full Code    Reason for Consult   Reason for consult: atrial fibrillation     Primary Care Physician   Shlomo Munguia    Chief Complaint   Atrial fibrillation with RVR secondary cardiomyopathy      History is obtained from the patient, electronic health record, and patient's family    Past Medical History   I have reviewed this patient's medical history and updated it with pertinent information if needed.   Past Medical History:   Diagnosis Date    Anxiety     Atrial fibrillation (H)     Chronic renal insufficiency     Dementia (H)     Glaucoma     Hepatitis     autoimmune    HTN (hypertension), benign     Hyperlipidemia LDL goal < 130     Paroxysmal A-fib (H)        Past Surgical History   I have reviewed this patient's surgical history and updated it with pertinent information if needed.  Past Surgical History:   Procedure Laterality Date    ANESTHESIA CARDIOVERSION N/A 2/20/2024    Procedure: Anesthesia cardioversion;  Surgeon: GENERIC ANESTHESIA PROVIDER;  Location: [facility] OR    DILATION AND  CURETTAGE      HYSTERECTOMY      with USO, not for cancer    S/p partial vaginectomy      SEPTOPLASTY      TONSILLECTOMY & ADENOIDECTOMY         Prior to Admission Medications   Prior to Admission Medications   Prescriptions Last Dose Informant Patient Reported? Taking?   Apoaequorin (PREVAGEN) 10 MG CAPS 2024 at 0746  No Yes   Sig: Take 1 capsule by mouth daily   bimatoprost (LUMIGAN) 0.01 % SOLN 2024 at 1622  Yes Yes   Sig: Place 1 drop into both eyes at bedtime   brimonidine-timolol (COMBIGAN) 0.2-0.5 % ophthalmic solution 2024 at 1822 Self Yes Yes   Sig: Place 1 drop into both eyes 2 times daily   citalopram (CELEXA) 10 MG tablet 2024 at 0746  No Yes   Sig: Take 1 tablet (10 mg) by mouth daily   metoprolol tartrate (LOPRESSOR) 25 MG tablet 2024 at 1828  No Yes   Sig: Take 0.5 tablets (12.5 mg) by mouth 2 times daily Appointment required for further refills   miconazole with skin protectant (MARLON ANTIFUNGAL) 2 % CREA cream   No No   Sig: Apply topically 2 times daily To buttocks and sacral area   mycophenolate (GENERIC EQUIVALENT) 250 MG capsule 2024 at 1828  Yes Yes   Sig: Take 250 mg by mouth 2 times daily      Facility-Administered Medications: None     Allergies   Allergies   Allergen Reactions    Adhesive Tape     Zocor [Simvastatin - High Dose]      Elevated LFTs       Social History   I have reviewed this patient's social history and updated it with pertinent information if needed. Elaina GONGORA Pa  reports that she quit smoking about 34 years ago. Her smoking use included cigarettes. She has never used smokeless tobacco. She reports that she does not drink alcohol and does not use drugs.    Family History   I have reviewed this patient's family history and updated it with pertinent information if needed.   Family History   Problem Relation Age of Onset    Hypertension Mother          age 95    Alzheimer Disease Father 75        also CHF    Cancer Brother          of lung  ca age 69    Lipids Brother     Alzheimer Disease Paternal Uncle     Alzheimer Disease Paternal Uncle        Review of Systems   The 5 point Review of Systems is negative other than noted in the HPI or here.  Offers no concerns, but patient does have dementia.    --------------------------------------------------------------------------------------------    Temp:  [97.4  F (36.3  C)-98.1  F (36.7  C)] 98.1  F (36.7  C)  Pulse:  [] 131  Resp:  [16-18] 18  BP: (103-128)/(60-88) 108/88  SpO2:  [90 %-95 %] 90 %  115 lbs 8 oz    Constitutional:   NAD   Skin:   Warm and dry   Head:   Nontraumatic   Neck:   no JVD   Lungs:   symmetric, clear to auscultation   Cardiovascular:   irregularly irregular rhythm   Abdomen:   Benign   Extremities and Back:   Trace edema   Neurological:   Grossly nonfocal, able to converse, but limited r/t dementia   Data   Recent Labs   Lab Test 02/21/24  0536 02/20/24 1829 02/20/24 0452   WBC 6.9  --  7.8   HGB 13.6  --  13.4   MCV 92  --  91     --  239   INR  --  1.19* 1.16*      Recent Labs   Lab Test 02/22/24  0531 02/21/24  0536    138   POTASSIUM 3.5 4.1   CHLORIDE 97* 97*   BUN 31.2* 33.6*   CR 1.23* 1.28*     Recent Labs   Lab 02/22/24  0531 02/21/24  0536 02/20/24  0452 02/19/24  0538   GLC 99 98 107* 105*     Recent Labs   Lab Test 02/16/24  1941 01/25/24  1619   ALT 21 16   AST 30 19     Troponin I ES   Date Value Ref Range Status   12/07/2017 <0.015 0.000 - 0.045 ug/L Final     Comment:     The 99th percentile for upper reference range is 0.045 ug/L.  Troponin values   in the range of 0.045 - 0.120 ug/L may be associated with risks of adverse   clinical events.     04/26/2016  0.000 - 0.045 ug/L Final    <0.015  The 99th percentile for upper reference range is 0.045 ug/L.  Troponin values in   the range of 0.045 - 0.120 ug/L may be associated with risks of adverse   clinical events.         Recent Labs   Lab Test 02/21/24  0536 02/20/24  1829 02/20/24  0452   MAG  2.0 2.1 2.1       Lab Results   Component Value Date    CHOL 192 01/25/2024    CHOL 252 11/09/2017     Lab Results   Component Value Date    HDL 69 01/25/2024    HDL 77 11/09/2017     Lab Results   Component Value Date     01/25/2024     11/09/2017     Lab Results   Component Value Date    TRIG 82 01/25/2024    TRIG 133 11/09/2017     Lab Results   Component Value Date    CHOLHDLRATIO 2.4 01/27/2014          TSH   Date Value Ref Range Status   02/16/2024 2.43 0.30 - 4.20 uIU/mL Final   05/03/2019 1.34 0.40 - 4.00 mU/L Final

## 2024-02-22 NOTE — PROGRESS NOTES
[facility]    Medicine Progress Note - Hospitalist Service    Date of Admission:  2/16/2024    Assessment & Plan   78 year old female with a past medical history of atrial fibrillation, dementia, autoimmune hepatitis, CKD stage III presents to hospital with A-fib RVR.   Treated for both COPD exac and Afib this admission.  Now s/p cardioversion.    Update 2/22:  Redeveloped afib with RVR overnight with high rates and some lower BP's.  Cardiology adjusting medications.    Afib RVR s/p JIM cardioversion 2/20/24 with later recurrence of Afib with RVR  NSTEMI, type 2 secondary to demand:  Presented from her care center, was found to be in Afib RVR on evaluation by EMS. Rate improving with diltiazem drip in the emergency room. Not on anticoagulation due to previous liver dysfunction. On low-dose metoprolol for rate control PTA.  Troponin elevated at 219, but trending down to 200 on repeat (elevated due to demand). Chest pain free.   *Recent TTE LVEF 55-60%, severe biatrial enlargement.  Repeat after rate issues was EF 35%.    *Patient with history of Afib s/p DCCV in 2017 prior to current episode.      -  digoxin added 2/19, discontinued after successful CV.  Will defer to cardiology if they want to resume with afib returning.  - Continue metoprolol 50mg BID  - Eliquis BID, for 1mo minimum on discharge  - cardiac monitor was planned on discharge, plans can be reassessed depending on EP plans/procedures.  -  Had successful 2/20 JIM cardioversion, unfortunately redeveloped Afib with RVR evening 2/21.  Cardiology now changing to amiodarone IV and EP consulted.   Needs to stay in the hospital given further afib with RVR in the setting of recent declining EF with accelerated rate.  Will defer management of afib to cardiology.    ADDENDUM 1600:  Given cell phone system issues today patients daughter has not spoken with cardiology.  She came by now for a visit and I reviewed current issues with her.  I  explained she received additional IV amiodarone today.  If she doesn't respond to recent meds, will need to consider ablation.  Cardiology/EP is following and appreciate their assistance.  Patients daughter is spending most of the time right now with her father who is dying in hospice.   Daughter is available to help with consents in any procedures needed and she would like to talk with cardiology about their plan tomorrow.    Acute hypoxic respiratory failure  Possible undx COPD  Acute on chronic diastolic heart failure exacerbation:  The patient was noted to be wheezing in the ED requiring supplemental O2. She does have a multi year history of smoking raising suspicion for undiagnosed copd. Furthermore her ct did not reveal significant pulmonary edema  - given IV lasix on admission and started steroid burst, last dose prednisone prior ordered was on 2/21 AM (day 5).  Extending prednisone taper at 10 mg a few more days given ongoing pulm issues.  - Atrovent neb TID, also has been getting occasional xopenex nebs.  - flutter valve  - mucinex BID  - No lasix for now with soft BP but if more hypoxia/worsening resp status consider more diuretic.    ADDENDUM 1600:  Discussed cardiac/resp status with patients daughter this afternoon.  She had a lot of concerns with her pulm status even before the cardiac issues.   Explained it is very difficult now to determine how much of this is afib/cardiac vs how much is possible underlying COPD with recent decline in EF and ongoing afib with RVR.  Given ongoing pulm issues I will also change the xopenex to TID scheduled given her intermittent use prior.  I would avoid albuterol with her tachycardia and continue with the prior ordered xopenex.   Long term considered inhaled steroid.  Once over acute cardiac issues, consider complete outpatient PFT's.        Amiodarone skin infiltrate 2/19  LUE had infiltrate of amiodarone. Ecchymosis noted prior, now seems resolving.  Patient says not  painful.  - Monitor     Dementia  A&Ox1-2 baseline, lives in memory care and daughter is decision maker.     Autoimmune hepatitis:  -mycophenolate continued, no acute issue now noted     CKD stage III  Stable, did have bump up to 1.41 on 2/18 after diuresis  - follow BMP daily- improving on 2/20     Coccyx pressure wound PTA  - incontinence associated dermatitis, monitor with local cares.        Medical Decision Making       45 MINUTES SPENT BY ME on the date of service doing chart review, history, exam, documentation & further activities per the note.      Labs/Imaging Reviewed:  See Information above and Data section below    PPE Used:  Mask       Diet: Room Service  Advance Diet as Tolerated: Clear Liquid Diet; Regular Diet Adult  Regular Diet Adult    DVT Prophylaxis: DOAC  Scott Catheter: Not present  Lines: None     Cardiac Monitoring: None  Code Status: Full Code      Clinically Significant Risk Factors          # Coagulation Defect: INR = 1.19 (Ref range: 0.85 - 1.15) and/or PTT = N/A, will monitor for bleeding    # Hypertension: Noted on problem list  # Acute heart failure with reduced ejection fraction: last echo with EF <40% and receiving IV diuretics                  Disposition Plan      Expected Discharge Date: 02/24/2024        Discharge Comments: Unclear, needs improved HR control and resp status.  Suspect at least 2 nights or more needed still in the hospital.          Gabo Grimaldo, DO  Hospitalist Service  [facility]  Securely message with [employer] (more info)  Text page via [employer] Paging/Directory   ______________________________________________________________________    Interval History   Ms. Winn without new complaints.  She feels the same but notably has had several hours of afib with rates 120-140 per staff.   Denies current chest pain, worsening SOB, nausea, other new complaints.    Physical Exam   Vital Signs: Temp: 98.1  F (36.7  C) Temp src: Oral BP: 108/88  Pulse: 106   Resp: 16 SpO2: 97 % O2 Device: Nasal cannula Oxygen Delivery: 2 LPM  Weight: 115 lbs 8 oz    GEN:  Alert, oriented x 1, answers basic questions well, appears comfortable, no overt distress.  HEENT:  Normocephalic/atraumatic, no scleral icterus, no nasal discharge, mouth moist.  CV:  Irreg Irreg with rate 130's, distant.  No rub.  LUNGS:  Clear to auscultation bilaterally without rales/rhonchi/wheezing/retractions.  Breath sounds somewhat diminished bases.  Symmetric chest rise on inhalation noted.  ABD:  Active bowel sounds, soft, non-tender/non-distended.  No guarding/rigidity.  EXT:  Trace edema.  No cyanosis.   SKIN:  Dry to touch, no exanthems noted in the visualized areas.    Medications    amiodarone      amiodarone      - MEDICATION INSTRUCTIONS -      - MEDICATION INSTRUCTIONS -      Reason anticoagulant not prescribed for atrial fibrillation        [Held by provider] amiodarone  200 mg Oral BID    apixaban ANTICOAGULANT  2.5 mg Oral BID    bimatoprost  1 drop Both Eyes At Bedtime    brimonidine-timolol  1 drop Both Eyes BID    citalopram  10 mg Oral Daily    guaiFENesin  600 mg Oral BID    ipratropium  0.5 mg Nebulization 3 times daily    menthol-zinc oxide   Topical BID    metoprolol succinate ER  50 mg Oral BID    mycophenolate  250 mg Oral BID IS       Data     Labs and Imaging results below reviewed today.    I have personally reviewed the following data over the past 24 hrs:    N/A  \   N/A   / N/A     139 97 (L) 31.2 (H) /  99   3.5 32 (H) 1.23 (H) \           No results found for this or any previous visit (from the past 24 hour(s)).

## 2024-02-22 NOTE — PROGRESS NOTES
Patient is alert to self, h/o dementia, up to bathroom with SBA, voiding frequently, diuresing well with lasix, tele a-fib rvr, patient went into a-fib at 1700, confirmed by EKG, metoprolol 5 mg iv given x 1, patient has a congested non productive cough, RT following, plan to discharge back to her assisted living facility tomorrow.

## 2024-02-23 LAB
ANION GAP SERPL CALCULATED.3IONS-SCNC: 10 MMOL/L (ref 7–15)
ATRIAL RATE - MUSE: 138 BPM
ATRIAL RATE - MUSE: 74 BPM
BUN SERPL-MCNC: 25.5 MG/DL (ref 8–23)
CALCIUM SERPL-MCNC: 9.5 MG/DL (ref 8.8–10.2)
CHLORIDE SERPL-SCNC: 95 MMOL/L (ref 98–107)
CREAT SERPL-MCNC: 1.17 MG/DL (ref 0.51–0.95)
DEPRECATED HCO3 PLAS-SCNC: 30 MMOL/L (ref 22–29)
DIASTOLIC BLOOD PRESSURE - MUSE: NORMAL MMHG
DIASTOLIC BLOOD PRESSURE - MUSE: NORMAL MMHG
EGFRCR SERPLBLD CKD-EPI 2021: 47 ML/MIN/1.73M2
GLUCOSE SERPL-MCNC: 118 MG/DL (ref 70–99)
INTERPRETATION ECG - MUSE: NORMAL
INTERPRETATION ECG - MUSE: NORMAL
P AXIS - MUSE: 66 DEGREES
P AXIS - MUSE: NORMAL DEGREES
POTASSIUM SERPL-SCNC: 3.5 MMOL/L (ref 3.4–5.3)
PR INTERVAL - MUSE: 144 MS
PR INTERVAL - MUSE: NORMAL MS
QRS DURATION - MUSE: 88 MS
QRS DURATION - MUSE: 92 MS
QT - MUSE: 372 MS
QT - MUSE: 424 MS
QTC - MUSE: 527 MS
QTC - MUSE: 528 MS
R AXIS - MUSE: 78 DEGREES
R AXIS - MUSE: 8 DEGREES
SODIUM SERPL-SCNC: 135 MMOL/L (ref 135–145)
SYSTOLIC BLOOD PRESSURE - MUSE: NORMAL MMHG
SYSTOLIC BLOOD PRESSURE - MUSE: NORMAL MMHG
T AXIS - MUSE: -19 DEGREES
T AXIS - MUSE: 66 DEGREES
VENTRICULAR RATE- MUSE: 121 BPM
VENTRICULAR RATE- MUSE: 93 BPM

## 2024-02-23 PROCEDURE — 250N000013 HC RX MED GY IP 250 OP 250 PS 637: Performed by: INTERNAL MEDICINE

## 2024-02-23 PROCEDURE — 33207 INSERT HEART PM VENTRICULAR: CPT | Mod: KX | Performed by: INTERNAL MEDICINE

## 2024-02-23 PROCEDURE — 02583ZZ DESTRUCTION OF CONDUCTION MECHANISM, PERCUTANEOUS APPROACH: ICD-10-PCS | Performed by: INTERNAL MEDICINE

## 2024-02-23 PROCEDURE — 999N000157 HC STATISTIC RCP TIME EA 10 MIN

## 2024-02-23 PROCEDURE — 250N000011 HC RX IP 250 OP 636: Performed by: INTERNAL MEDICINE

## 2024-02-23 PROCEDURE — 250N000009 HC RX 250: Performed by: INTERNAL MEDICINE

## 2024-02-23 PROCEDURE — 99233 SBSQ HOSP IP/OBS HIGH 50: CPT | Performed by: INTERNAL MEDICINE

## 2024-02-23 PROCEDURE — 272N000001 HC OR GENERAL SUPPLY STERILE: Performed by: INTERNAL MEDICINE

## 2024-02-23 PROCEDURE — 36415 COLL VENOUS BLD VENIPUNCTURE: CPT | Performed by: INTERNAL MEDICINE

## 2024-02-23 PROCEDURE — 0JH604Z INSERTION OF PACEMAKER, SINGLE CHAMBER INTO CHEST SUBCUTANEOUS TISSUE AND FASCIA, OPEN APPROACH: ICD-10-PCS | Performed by: INTERNAL MEDICINE

## 2024-02-23 PROCEDURE — 258N000002 HC RX IP 258 OP 250: Performed by: NURSE PRACTITIONER

## 2024-02-23 PROCEDURE — C1786 PMKR, SINGLE, RATE-RESP: HCPCS | Performed by: INTERNAL MEDICINE

## 2024-02-23 PROCEDURE — 94640 AIRWAY INHALATION TREATMENT: CPT

## 2024-02-23 PROCEDURE — 250N000013 HC RX MED GY IP 250 OP 250 PS 637: Performed by: HOSPITALIST

## 2024-02-23 PROCEDURE — 94640 AIRWAY INHALATION TREATMENT: CPT | Mod: 76

## 2024-02-23 PROCEDURE — C1894 INTRO/SHEATH, NON-LASER: HCPCS | Performed by: INTERNAL MEDICINE

## 2024-02-23 PROCEDURE — 80048 BASIC METABOLIC PNL TOTAL CA: CPT | Performed by: INTERNAL MEDICINE

## 2024-02-23 PROCEDURE — 02HK3JZ INSERTION OF PACEMAKER LEAD INTO RIGHT VENTRICLE, PERCUTANEOUS APPROACH: ICD-10-PCS | Performed by: INTERNAL MEDICINE

## 2024-02-23 PROCEDURE — C1887 CATHETER, GUIDING: HCPCS | Performed by: INTERNAL MEDICINE

## 2024-02-23 PROCEDURE — C1883 ADAPT/EXT, PACING/NEURO LEAD: HCPCS | Performed by: INTERNAL MEDICINE

## 2024-02-23 PROCEDURE — 250N000013 HC RX MED GY IP 250 OP 250 PS 637: Performed by: PHYSICIAN ASSISTANT

## 2024-02-23 PROCEDURE — 99152 MOD SED SAME PHYS/QHP 5/>YRS: CPT | Performed by: INTERNAL MEDICINE

## 2024-02-23 PROCEDURE — 250N000011 HC RX IP 250 OP 636: Performed by: NURSE PRACTITIONER

## 2024-02-23 PROCEDURE — 210N000001 HC R&B IMCU HEART CARE

## 2024-02-23 PROCEDURE — 250N000012 HC RX MED GY IP 250 OP 636 PS 637: Performed by: STUDENT IN AN ORGANIZED HEALTH CARE EDUCATION/TRAINING PROGRAM

## 2024-02-23 PROCEDURE — 250N000012 HC RX MED GY IP 250 OP 636 PS 637: Performed by: INTERNAL MEDICINE

## 2024-02-23 PROCEDURE — 250N000009 HC RX 250: Performed by: HOSPITALIST

## 2024-02-23 PROCEDURE — 99232 SBSQ HOSP IP/OBS MODERATE 35: CPT | Mod: 25 | Performed by: NURSE PRACTITIONER

## 2024-02-23 PROCEDURE — 33207 INSERT HEART PM VENTRICULAR: CPT | Performed by: INTERNAL MEDICINE

## 2024-02-23 PROCEDURE — 93650 ICAR CATH ABLTJ AV NODE FUNC: CPT | Performed by: INTERNAL MEDICINE

## 2024-02-23 PROCEDURE — C1733 CATH, EP, OTHR THAN COOL-TIP: HCPCS | Performed by: INTERNAL MEDICINE

## 2024-02-23 PROCEDURE — C1898 LEAD, PMKR, OTHER THAN TRANS: HCPCS | Performed by: INTERNAL MEDICINE

## 2024-02-23 PROCEDURE — 99153 MOD SED SAME PHYS/QHP EA: CPT | Performed by: INTERNAL MEDICINE

## 2024-02-23 DEVICE — PACEMAKER AZURE XT SR MRI SYS: Type: IMPLANTABLE DEVICE | Status: FUNCTIONAL

## 2024-02-23 DEVICE — LEAD PACEMAKER SELECTSECURE 69CM MD  383069: Type: IMPLANTABLE DEVICE | Status: FUNCTIONAL

## 2024-02-23 RX ORDER — SODIUM CHLORIDE 450 MG/100ML
INJECTION, SOLUTION INTRAVENOUS CONTINUOUS
Status: DISCONTINUED | OUTPATIENT
Start: 2024-02-23 | End: 2024-02-23 | Stop reason: HOSPADM

## 2024-02-23 RX ORDER — CEFAZOLIN SODIUM 2 G/100ML
2 INJECTION, SOLUTION INTRAVENOUS
Status: COMPLETED | OUTPATIENT
Start: 2024-02-23 | End: 2024-02-23

## 2024-02-23 RX ORDER — BUPIVACAINE HYDROCHLORIDE 2.5 MG/ML
INJECTION, SOLUTION EPIDURAL; INFILTRATION; INTRACAUDAL
Status: DISCONTINUED | OUTPATIENT
Start: 2024-02-23 | End: 2024-02-23 | Stop reason: HOSPADM

## 2024-02-23 RX ORDER — FENTANYL CITRATE 50 UG/ML
INJECTION, SOLUTION INTRAMUSCULAR; INTRAVENOUS
Status: DISCONTINUED | OUTPATIENT
Start: 2024-02-23 | End: 2024-02-23 | Stop reason: HOSPADM

## 2024-02-23 RX ORDER — LIDOCAINE 40 MG/G
CREAM TOPICAL
Status: DISCONTINUED | OUTPATIENT
Start: 2024-02-23 | End: 2024-02-23 | Stop reason: HOSPADM

## 2024-02-23 RX ORDER — ACETAMINOPHEN 325 MG/1
650 TABLET ORAL EVERY 4 HOURS PRN
Status: DISCONTINUED | OUTPATIENT
Start: 2024-02-23 | End: 2024-03-02 | Stop reason: HOSPADM

## 2024-02-23 RX ADMIN — ANORECTAL OINTMENT: 15.7; .44; 24; 20.6 OINTMENT TOPICAL at 10:30

## 2024-02-23 RX ADMIN — GUAIFENESIN 600 MG: 600 TABLET, EXTENDED RELEASE ORAL at 10:12

## 2024-02-23 RX ADMIN — SODIUM CHLORIDE: 4.5 INJECTION, SOLUTION INTRAVENOUS at 14:38

## 2024-02-23 RX ADMIN — LEVALBUTEROL HYDROCHLORIDE 0.63 MG: 1.25 SOLUTION RESPIRATORY (INHALATION) at 13:51

## 2024-02-23 RX ADMIN — MYCOPHENOLATE MOFETIL 250 MG: 250 CAPSULE ORAL at 10:12

## 2024-02-23 RX ADMIN — MYCOPHENOLATE MOFETIL 250 MG: 250 CAPSULE ORAL at 21:59

## 2024-02-23 RX ADMIN — METOPROLOL SUCCINATE 50 MG: 50 TABLET, FILM COATED, EXTENDED RELEASE ORAL at 10:12

## 2024-02-23 RX ADMIN — CEFAZOLIN SODIUM 2 G: 2 INJECTION, SOLUTION INTRAVENOUS at 16:12

## 2024-02-23 RX ADMIN — ACETAMINOPHEN 650 MG: 325 TABLET, FILM COATED ORAL at 21:59

## 2024-02-23 RX ADMIN — BRIMONIDINE TARTRATE, TIMOLOL MALEATE 1 DROP: 2; 5 SOLUTION/ DROPS TOPICAL at 10:31

## 2024-02-23 RX ADMIN — GUAIFENESIN 600 MG: 600 TABLET, EXTENDED RELEASE ORAL at 21:59

## 2024-02-23 RX ADMIN — METOPROLOL SUCCINATE 50 MG: 50 TABLET, FILM COATED, EXTENDED RELEASE ORAL at 21:59

## 2024-02-23 RX ADMIN — BIMATOPROST 1 DROP: 0.1 SOLUTION/ DROPS OPHTHALMIC at 22:00

## 2024-02-23 RX ADMIN — IPRATROPIUM BROMIDE 0.5 MG: 0.5 SOLUTION RESPIRATORY (INHALATION) at 13:51

## 2024-02-23 RX ADMIN — BRIMONIDINE TARTRATE, TIMOLOL MALEATE 1 DROP: 2; 5 SOLUTION/ DROPS TOPICAL at 22:00

## 2024-02-23 RX ADMIN — ANORECTAL OINTMENT: 15.7; .44; 24; 20.6 OINTMENT TOPICAL at 22:00

## 2024-02-23 RX ADMIN — LEVALBUTEROL HYDROCHLORIDE 0.63 MG: 1.25 SOLUTION RESPIRATORY (INHALATION) at 07:38

## 2024-02-23 RX ADMIN — IPRATROPIUM BROMIDE 0.5 MG: 0.5 SOLUTION RESPIRATORY (INHALATION) at 07:38

## 2024-02-23 RX ADMIN — PREDNISONE 10 MG: 10 TABLET ORAL at 10:12

## 2024-02-23 RX ADMIN — CITALOPRAM HYDROBROMIDE 10 MG: 10 TABLET ORAL at 10:12

## 2024-02-23 ASSESSMENT — ACTIVITIES OF DAILY LIVING (ADL)
ADLS_ACUITY_SCORE: 49
ADLS_ACUITY_SCORE: 47
ADLS_ACUITY_SCORE: 49
ADLS_ACUITY_SCORE: 47
ADLS_ACUITY_SCORE: 49
DEPENDENT_IADLS:: MEDICATION MANAGEMENT
ADLS_ACUITY_SCORE: 47
ADLS_ACUITY_SCORE: 49
ADLS_ACUITY_SCORE: 47
ADLS_ACUITY_SCORE: 47
ADLS_ACUITY_SCORE: 49
ADLS_ACUITY_SCORE: 47
ADLS_ACUITY_SCORE: 49
ADLS_ACUITY_SCORE: 47

## 2024-02-23 NOTE — PROGRESS NOTES
"EP Progress Note          Assessment and Plan:   Elaina Winn is an 78 year old female with a past medical history of atrial fibrillation, dementia, COPD exacerbation, autoimmune hepatitis, CKD stage III presented to hospital with A-fib RVR. Failed cardioversion despite oral amiodarone. EP was consulted to assist in her care.    Atrial fibrillation with RVR  Last EKG of sinus rhythm was 3/2022  Patient underwent a JIM and cardioversion 2/20/2024 and unfortunately the following day reverted back to A-fib with RVR.    Digoxin was stopped after cardioversion.  She was bolused with IV amiodarone was slightly hypotensive after bolus and remains on a drip.  Rates are still in the 115-130's.  Patient asymptomatic.    Discussed options with patient's daughter, Tresa.    #1 continue with amiodarone loading with possibility of better rate control, but no guarantee that patient will convert.      #2  AV node ablation and pacemaker implantation and stop IV amiodarone.    Explained to her daughter that her EF has dropped, and patient's rates are still up to 140 bpm despite continuous drip.  Patient is comfortable with proceeding \" if it needs to be done\".  Patient's daughter, also prefers to proceed with AV node ablation and pacemaker implantation.    Risk and benefits of pacemaker implantation was reviewed with the patient and her daughter via phone.  Patient would be dependent on her pacemaker.  We have arm lifting restrictions for 2 to 3 weeks.  Risk include but are not limited to; bruising, bleeding, pocket hematoma, pocket infection, pneumothorax, pericardial effusion, and a rare incidence of stroke or MI.    Verbal/phone consent was given.  Patient's daughter plans to come in later this morning.  Sadly, her dad (patient's ) is in hospice and has declined significantly.    Patient ate breakfast this morning.  Is now n.p.o. for procedure this afternoon.    Will discuss with Dr. Murillo about LBBB pacer implant.  Patient's " EF on transthoracic echo last week was normal, but on JIM was down to 35%.  Severe biatrial enlargement is noted on TTE.  Single-chamber device was ordered.    2. 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  [id number] score 5 on Eliquis 2.5 mg twice daily    4.  Dementia  Patient is alert, but unable to answer questions regarding her medical history        Will stop amiodarone at noon just so rates will stay less than 140 bpm while awaiting procedure.  Continue NPO.    Hold Eliquis this morning and tonight to minimize hematoma risk.      Patient's daughter concerned with the restrictions of lifting left arm post implant being discharged over the weekend.  She will discuss this further with the hospitalist tomorrow.      Tresa Redd, NP, APRN CNP  Beeper [phone]  Last documented EKG in sinus rhythm was 3/2022.      Interval History:   Patient denies any concerns.  Completely asymptomatic racing heart rate.  Resting comfortably.  Pressures are soft, but stable.  Telemetry shows A-fib with RVR at 132 bpm         Medications:      [Held by provider] amiodarone  200 mg Oral BID    apixaban ANTICOAGULANT  2.5 mg Oral BID    bimatoprost  1 drop Both Eyes At Bedtime    brimonidine-timolol  1 drop Both Eyes BID    citalopram  10 mg Oral Daily    guaiFENesin  600 mg Oral BID    ipratropium  0.5 mg Nebulization 3 times daily    levalbuterol  0.63 mg Nebulization 3 times daily    menthol-zinc oxide   Topical BID    metoprolol succinate ER  50 mg Oral BID    mycophenolate  250 mg Oral BID IS    predniSONE  10 mg Oral Daily             Review of Systems: If done, described below  The Review of Systems is negative other than noted in the HPI             Physical Exam:   Patient Vitals for the past 24 hrs:   BP Temp Temp src Pulse Resp SpO2 Weight   02/23/24 0738 -- -- -- -- -- 94 % --   02/23/24 0725 113/80 98.2  F (36.8  C) Oral (!) 132 16 95 % --   02/23/24  0646 -- -- -- -- -- -- 52.7 kg (116 lb 3.2 oz)   02/23/24 0400 99/75 -- -- (!) 134 -- 94 % --   02/23/24 0300 101/73 -- -- (!) 134 -- 95 % --   02/23/24 0200 109/82 -- -- (!) 129 -- 92 % --   02/23/24 0100 103/84 -- -- (!) 128 -- -- --   02/23/24 0000 99/66 -- -- (!) 128 -- -- --   02/22/24 2300 95/64 -- -- (!) 130 -- -- --   02/22/24 2100 97/66 -- -- (!) 124 -- 91 % --   02/22/24 2000 99/73 -- -- (!) 125 -- 95 % --   02/22/24 1900 103/75 -- -- (!) 124 -- 96 % --   02/22/24 1800 104/77 -- -- (!) 128 16 95 % --   02/22/24 1700 91/67 -- -- 120 16 95 % --   02/22/24 1630 -- -- -- -- 16 -- --   02/22/24 1600 95/67 -- -- (!) 122 18 94 % --   02/22/24 1535 -- -- -- -- -- 95 % --   02/22/24 1530 97/65 97.6  F (36.4  C) Oral (!) 127 20 96 % --   02/22/24 1525 -- -- -- -- -- 95 % --   02/22/24 1520 -- -- -- -- -- 96 % --   02/22/24 1515 -- -- -- -- -- 96 % --   02/22/24 1510 -- -- -- -- -- 96 % --   02/22/24 1506 (!) 88/55 -- -- (!) 121 16 -- --   02/22/24 1505 (!) 88/55 -- -- 120 -- 96 % --   02/22/24 1500 (!) 84/56 -- -- 119 16 95 % --   02/22/24 1455 -- -- -- -- -- 95 % --   02/22/24 1450 -- -- -- -- -- 94 % --   02/22/24 1445 -- -- -- -- -- 94 % --   02/22/24 1440 -- -- -- -- -- 94 % --   02/22/24 1435 -- -- -- -- -- 94 % --   02/22/24 1430 92/66 -- -- (!) 130 -- 94 % --   02/22/24 1425 -- -- -- -- -- 94 % --   02/22/24 1420 -- -- -- -- -- 93 % --   02/22/24 1415 -- -- -- -- -- 94 % --   02/22/24 1410 -- -- -- -- -- 95 % --   02/22/24 1405 -- -- -- -- -- 94 % --   02/22/24 1400 106/76 -- -- (!) 126 16 94 % --   02/22/24 1355 101/76 -- -- 117 -- 95 % --   02/22/24 1350 -- -- -- -- -- 94 % --   02/22/24 1345 -- -- -- -- -- 95 % --   02/22/24 1340 -- -- -- -- -- 94 % --   02/22/24 1335 -- -- -- -- -- 94 % --   02/22/24 1330 102/63 -- -- (!) 133 16 94 % --   02/22/24 1325 -- -- -- -- -- 93 % --   02/22/24 1320 -- -- -- -- -- 93 % --   02/22/24 1315 -- -- -- -- -- 93 % --   02/22/24 1310 -- -- -- -- -- 94 % --   02/22/24 1305  -- -- -- -- -- 93 % --   02/22/24 1300 91/54 -- -- (!) 127 16 93 % --   02/22/24 1255 -- -- -- -- -- 93 % --   02/22/24 1250 -- -- -- -- -- 93 % --   02/22/24 1245 -- -- -- -- -- 93 % --   02/22/24 1240 -- -- -- -- -- 94 % --   02/22/24 1235 -- -- -- -- -- 92 % --   02/22/24 1230 (!) 82/58 -- -- 117 16 94 % --   02/22/24 1229 -- -- -- -- -- 94 % --   02/22/24 1228 -- -- -- -- -- 93 % --   02/22/24 1227 -- -- -- -- -- 94 % --   02/22/24 1225 -- -- -- -- -- 95 % --   02/22/24 1224 -- -- -- -- -- 94 % --   02/22/24 1222 -- -- -- -- -- 94 % --   02/22/24 1221 -- -- -- -- -- 93 % --   02/22/24 1220 -- -- -- -- -- 94 % --   02/22/24 1219 -- -- -- -- -- 94 % --   02/22/24 1217 -- -- -- -- -- 93 % --   02/22/24 1216 -- -- -- -- -- 93 % --   02/22/24 1215 -- -- -- -- -- 93 % --   02/22/24 1213 -- -- -- -- -- 93 % --   02/22/24 1212 -- -- -- -- -- 94 % --   02/22/24 1210 (!) 87/62 -- -- (!) 122 16 93 % --   02/22/24 1209 -- -- -- -- -- 93 % --   02/22/24 1207 -- -- -- -- -- 92 % --   02/22/24 1206 -- -- -- -- -- 92 % --   02/22/24 1205 -- -- -- -- -- 92 % --   02/22/24 1204 -- -- -- -- -- 93 % --   02/22/24 1202 -- -- -- -- -- 93 % --   02/22/24 1201 -- -- -- -- -- 93 % --   02/22/24 1200 (!) 83/55 -- -- (!) 126 16 93 % --   02/22/24 1159 -- -- -- -- -- 94 % --   02/22/24 1158 -- -- -- -- -- 94 % --   02/22/24 1156 -- -- -- -- -- 93 % --   02/22/24 1155 -- -- -- -- -- 93 % --   02/22/24 1154 -- -- -- -- -- 94 % --   02/22/24 1153 -- -- -- -- -- 94 % --   02/22/24 1151 -- -- -- -- -- 93 % --   02/22/24 1150 (!) 84/57 -- -- (!) 132 -- 94 % --   02/22/24 1149 -- -- -- -- -- 94 % --   02/22/24 1148 -- -- -- -- -- 94 % --   02/22/24 1146 -- -- -- -- -- 93 % --   02/22/24 1145 (!) 84/57 -- -- (!) 125 16 94 % --   02/22/24 1144 -- -- -- -- -- 94 % --   02/22/24 1143 -- -- -- -- -- 93 % --   02/22/24 1142 -- -- -- -- -- 94 % --   02/22/24 1140 (!) 83/57 -- -- (!) 122 -- 94 % --   02/22/24 1139 -- -- -- -- -- 94 % --   02/22/24 1135  -- -- -- -- -- 94 % --   02/22/24 1137 -- -- -- -- -- 95 % --   02/22/24 1135 -- -- -- -- -- 94 % --   02/22/24 1134 -- -- -- -- -- 95 % --   02/22/24 1133 -- -- -- -- -- 96 % --   02/22/24 1131 -- -- -- -- -- 94 % --   02/22/24 1130 (!) 87/56 -- -- 113 -- 95 % --   02/22/24 1129 -- -- -- -- -- 95 % --   02/22/24 1128 -- -- -- -- -- 95 % --   02/22/24 1127 -- -- -- -- -- 94 % --   02/22/24 1125 -- -- -- -- -- 95 % --   02/22/24 1124 -- -- -- -- -- 95 % --   02/22/24 1123 -- -- -- -- -- 95 % --   02/22/24 1122 -- -- -- -- -- 94 % --   02/22/24 1120 (!) 76/60 -- -- 112 -- 94 % --   02/22/24 1119 -- -- -- -- -- 94 % --   02/22/24 1118 -- -- -- -- -- 94 % --   02/22/24 1117 -- -- -- -- -- 94 % --   02/22/24 1116 -- -- -- -- -- 94 % --   02/22/24 1115 -- -- -- -- -- 94 % --   02/22/24 1114 -- -- -- -- -- 94 % --   02/22/24 1112 -- -- -- -- -- 94 % --   02/22/24 1110 (!) 75/55 -- -- (!) 123 -- 93 % --   02/22/24 1109 -- -- -- -- -- 93 % --   02/22/24 1108 -- -- -- -- -- 93 % --   02/22/24 1106 -- -- -- -- -- 93 % --   02/22/24 1105 -- -- -- -- -- 93 % --   02/22/24 1104 -- -- -- -- -- 93 % --   02/22/24 1103 -- -- -- -- -- 94 % --   02/22/24 1102 -- -- -- -- -- 94 % --   02/22/24 1100 (!) 72/53 -- -- (!) 126 -- 93 % --   02/22/24 1059 -- -- -- -- -- 93 % --   02/22/24 1058 -- -- -- -- -- 93 % --   02/22/24 1057 -- -- -- -- -- 93 % --   02/22/24 1055 -- -- -- -- -- 92 % --   02/22/24 1054 -- -- -- -- -- 93 % --   02/22/24 1053 -- -- -- -- -- 93 % --   02/22/24 1051 -- -- -- -- -- 93 % --   02/22/24 1050 (!) 68/55 -- -- (!) 155 -- 92 % --   02/22/24 1049 -- -- -- -- -- 92 % --   02/22/24 1048 (!) 71/59 -- -- (!) 132 -- 92 % --   02/22/24 1047 -- -- -- -- -- 93 % --   02/22/24 1046 -- -- -- -- -- 92 % --   02/22/24 1045 -- -- -- -- -- 92 % --   02/22/24 1044 -- -- -- -- -- 92 % --   02/22/24 1043 -- -- -- -- -- 92 % --   02/22/24 1041 -- -- -- -- -- 92 % --   02/22/24 1040 (!) 65/51 -- -- 63 -- 92 % --   02/22/24 1039 -- --  -- -- -- 93 % --   24 1038 -- -- -- -- -- 92 % --   24 1036 -- -- -- -- -- 93 % --   24 1035 -- -- -- -- -- 93 % --   24 1034 -- -- -- -- -- 92 % --   24 1033 -- -- -- -- -- 92 % --   24 1031 -- -- -- -- -- 92 % --   24 1030 (!) 69/55 -- -- (!) 139 -- 92 % --   24 1029 -- -- -- -- -- 92 % --   24 1028 -- -- -- -- -- 92 % --   24 1027 -- -- -- -- -- 92 % --   24 1026 -- -- -- -- -- 92 % --   24 1025 -- -- -- -- -- 91 % --   24 1023 -- -- -- -- -- 93 % --   24 1022 -- -- -- -- 16 92 % --   24 1021 -- -- -- -- -- 92 % --   24 1020 (!) 84/58 -- -- (!) 122 -- 97 % --   24 1018 -- -- -- -- -- (!) 79 % --   24 1017 -- -- -- -- -- (!) 83 % --   24 1015 -- -- -- -- -- 97 % --   24 1014 -- -- -- -- -- 97 % --   24 1013 -- -- -- 106 16 97 % --   24 1010 (!) 79/46 -- -- (!) 136 -- -- --   24 1008 (!) 80/51 -- -- (!) 137 -- -- --   24 1007 -- -- -- -- 16 96 % --   24 1003 (!) 64/46 -- -- (!) 129 -- -- --   24 1000 (!) 23 -- -- -- -- -- --         Vital Sign Ranges  Temperature Temp  Av.9  F (36.6  C)  Min: 97.6  F (36.4  C)  Max: 98.2  F (36.8  C)   Blood pressure Systolic (24hrs), Av , Min:43 , Max:113        Diastolic (24hrs), Av, Min:23, Max:84      Pulse Pulse  Av.8  Min: 63  Max: 155   Respirations Resp  Av.3  Min: 16  Max: 20   Pulse oximetry SpO2  Av.5 %  Min: 79 %  Max: 97 %         Intake/Output Summary (Last 24 hours) at 2024 0803  Last data filed at 2024 0230  Gross per 24 hour   Intake 60 ml   Output 1150 ml   Net -1090 ml       Constitutional:   in no apparent distress, dementia.     Lungs:   symmetric, clear to auscultation     Cardiovascular:   irregularly irregular rhythm with RVR no apparent murmur     Extremities and Back:   No edema              Data:     Lab Results   Component Value Date    WBC 6.9 2024     HGB 13.6 02/21/2024    HCT 42.5 02/21/2024     02/21/2024     02/23/2024    POTASSIUM 3.5 02/23/2024    CHLORIDE 95 (L) 02/23/2024    CO2 30 (H) 02/23/2024    BUN 25.5 (H) 02/23/2024    CR 1.17 (H) 02/23/2024     (H) 02/23/2024    SED 18 02/08/2012    DD 0.91 (H) 02/16/2024    NTBNPI 10,462 (H) 02/16/2024    TROPI <0.015 12/07/2017    AST 30 02/16/2024    ALT 21 02/16/2024     (H) 11/13/2017    ALKPHOS 128 02/16/2024    BILITOTAL 1.1 02/16/2024    MARY <10 (L) 11/15/2017    INR 1.19 (H) 02/20/2024

## 2024-02-23 NOTE — PROGRESS NOTES
[facility]    Medicine Progress Note - Hospitalist Service    Date of Admission:  2/16/2024    Assessment & Plan   78 year old female with a past medical history of atrial fibrillation, dementia, autoimmune hepatitis, CKD stage III presents to hospital with A-fib RVR.   Treated for both COPD exac and Afib this admission.  Now s/p cardioversion.     Update 2/22:  Redeveloped afib with RVR overnight with high rates and some lower BP's.  Cardiology adjusting medications    2/23 - still with persistent a fib with RVR despite IV amiodarone     Afib RVR s/p JIM cardioversion 2/20/24 with later recurrence of Afib with RVR  NSTEMI, type 2 secondary to demand:  Presented from her care center, was found to be in Afib RVR on evaluation by EMS. Rate improving with diltiazem drip in the emergency room. Not on anticoagulation due to previous liver dysfunction. On low-dose metoprolol for rate control PTA.  Troponin elevated at 219, but trending down to 200 on repeat (elevated due to demand). Chest pain free.   *Recent TTE LVEF 55-60%, severe biatrial enlargement.  Repeat after rate issues was EF 35%.    *Patient with history of Afib s/p DCCV in 2017 prior to current episode.     -  digoxin added 2/19, discontinued after successful CV.  Will defer to cardiology if they want to resume with afib returning.    - Continue metoprolol 50mg BID  - Eliquis BID    -  Had successful 2/20 JIM cardioversion, unfortunately redeveloped Afib with RVR evening 2/21.    2/22 -Cardiology changed amiodarone to IV and EP consulted.   Continues to receive toprol xl bid    2/23 - EP planning on ablation later today with PPM placed.  Daughter states that she has a few more ? Prior to signing the consent for this procedure     2. Acute hypoxic respiratory failure - resolved  Possible undx COPD  Acute on chronic diastolic heart failure exacerbation  New systolic CHF - suspect d/t rate control issues    The patient was noted to be  wheezing in the ED requiring supplemental O2. She does have a multi year history of smoking raising suspicion for undiagnosed copd. Furthermore her ct did not reveal significant pulmonary edema    - given IV lasix on admission and started steroid burst, last dose prednisone prior ordered was on 2/21 AM (day 5).  Extending prednisone taper at 10 mg a few more days given ongoing pulm issues.    2/23 - Continue on atrovent and xopenex nebs today  - mucinex BID  - No lasix for now with soft BP but if more hypoxia/worsening resp status consider more diuretic (reviewed with daughter today that she did receive 2 IV doses of lasix on 2/20, 2/21)    Will be able to reassess need for diuresis post-ablation and when BP stabilizes    Noted - Long term inhaled steroid to be considered. Once over acute cardiac issues, consider complete outpatient PFT's.         3. Amiodarone skin infiltrate 2/19  LUE had infiltrate of amiodarone. Ecchymosis noted prior, now seems resolving.  Patient says not painful.  - Monitor     4. Dementia  A&Ox1-2 baseline, lives in memory care and daughter is decision maker.     5. Autoimmune hepatitis:  -mycophenolate continued, no acute issue now noted     6. CKD stage III  Stable, did have bump up to 1.41 on 2/18 after diuresis  - creat improved further on lab review today at 1.17     7. Coccyx pressure wound PTA  - incontinence associated dermatitis, monitor with local cares.          Medical Decision Making       60 MINUTES SPENT BY ME on the date of service doing chart review, history, exam, documentation & further activities per the note.        PPE Worn:  Mask, gloves     Diet: Room Service  Advance Diet as Tolerated: Clear Liquid Diet; Regular Diet Adult  Regular Diet Adult    DVT Prophylaxis: DOAC  Scott Catheter: Not present  Lines: None     Cardiac Monitoring: ACTIVE order. Indication: Tachyarrhythmias, acute (48 hours)  Code Status: Full Code      Clinically Significant Risk Factors              "     # Hypertension: Noted on problem list  # Acute heart failure with reduced ejection fraction: last echo with EF <40% and receiving IV diuretics                  Disposition Plan      Expected Discharge Date: 02/24/2024        Discharge Comments: Unclear, needs improved HR control and resp status.  Suspect at least 2 nights or more needed still in the hospital.          Eryn Sanford,   Hospitalist Service  [facility]  Securely message with [employer] (more info)  Text page via [employer] Paging/Directory   ______________________________________________________________________    Interval History   Seen x 2---first seen alone in room and second after talking with daughter in the zamora for an update.    Earlier - she denies palpitations or current SOB at rest.  Waiting for procedure later today.  \"How long will it take?\"  No N/V, F/C or HA reported    Later - daughter notes that her Mom is still complaining of SOB often.  No specific light-headedness or dizziness    Physical Exam   Vital Signs: Temp: 97.6  F (36.4  C) Temp src: Oral BP: 99/75 Pulse: (!) 134   Resp: 16 SpO2: 94 % O2 Device: None (Room air) Oxygen Delivery: 2 LPM  Weight: 116 lbs 3.2 oz    GEN:  Alert, awake, appears comfortable at rest this am, no overt respiratory distress or costal retractions.  HEENT:  Normocephalic/atraumatic, no scleral icterus, no nasal discharge, mouth moist.  CV:  somewhat distant, irregular rate and rhythm, no clear loud murmur ausc this am  S1 + S2 ausc  LUNGS:  Clear to auscultation ant/lat bilaterally without rales/rhonchi/wheezing/retractions.  Symmetric chest rise on inhalation noted.  ABD:  Active bowel sounds, soft, non-tender/not really distended on exam this am. No rebound/guarding/rigidity.  EXT:  trace pretibial edema bilaterally.  No cyanosis.  No acute joint synovitis noted.  SKIN:  Dry to touch, no exanthems noted in the visualized areas.    Medications    [Auto Hold] amiodarone " 0.5 mg/min (02/22/24 2033)    - MEDICATION INSTRUCTIONS -      - MEDICATION INSTRUCTIONS -      Reason anticoagulant not prescribed for atrial fibrillation        [Held by provider] amiodarone  200 mg Oral BID    apixaban ANTICOAGULANT  2.5 mg Oral BID    bimatoprost  1 drop Both Eyes At Bedtime    brimonidine-timolol  1 drop Both Eyes BID    citalopram  10 mg Oral Daily    guaiFENesin  600 mg Oral BID    ipratropium  0.5 mg Nebulization 3 times daily    levalbuterol  0.63 mg Nebulization 3 times daily    menthol-zinc oxide   Topical BID    metoprolol succinate ER  50 mg Oral BID    mycophenolate  250 mg Oral BID IS    predniSONE  10 mg Oral Daily       Data     Labs and Imaging results below reviewed today.  Recent Labs   Lab 02/21/24  0536 02/20/24  0452 02/19/24  0538   WBC 6.9 7.8 11.3*   HGB 13.6 13.4 14.0   HCT 42.5 41.7 44.5   MCV 92 91 92    239 225     Recent Labs   Lab 02/23/24  0550 02/22/24  0531 02/21/24  0536    139 138   POTASSIUM 3.5 3.5 4.1   CHLORIDE 95* 97* 97*   CO2 30* 32* 29   ANIONGAP 10 10 12   * 99 98   BUN 25.5* 31.2* 33.6*   CR 1.17* 1.23* 1.28*   GFRESTIMATED 47* 44* 42*   DANIELITO 9.5 9.9 10.2     Recent Labs   Lab 02/23/24  0550 02/22/24  0531 02/21/24  0536 02/20/24  1829 02/20/24  0452 02/16/24  2305 02/16/24  1941    139 138  --  137   < > 135   POTASSIUM 3.5 3.5 4.1 4.4 4.2   < > 4.7   CHLORIDE 95* 97* 97*  --  102   < > 103   CO2 30* 32* 29  --  22   < > 19*   ANIONGAP 10 10 12  --  13   < > 13   * 99 98  --  107*   < > 96   BUN 25.5* 31.2* 33.6*  --  33.6*   < > 32.4*   CR 1.17* 1.23* 1.28*  --  1.06*   < > 1.24*   GFRESTIMATED 47* 44* 42*  --  53*   < > 44*   DANIELITO 9.5 9.9 10.2  --  9.9   < > 9.9   MAG  --   --  2.0 2.1 2.1   < >  --    PHOS  --   --  2.9  --  2.6  --   --    PROTTOTAL  --   --   --   --   --   --  6.4   ALBUMIN  --   --   --   --   --   --  3.5   BILITOTAL  --   --   --   --   --   --  1.1   ALKPHOS  --   --   --   --   --   --  128    AST  --   --   --   --   --   --  30   ALT  --   --   --   --   --   --  21    < > = values in this interval not displayed.     Recent Labs   Lab 02/16/24 2305   NTBNPI 10,462*     Recent Labs   Lab 02/16/24 2305   TSH 2.43       No results found for this or any previous visit (from the past 24 hour(s)).

## 2024-02-23 NOTE — PROGRESS NOTES
Dictated.  Elevated CVP.    Successful single-chamber pacemaker implantation with LBB area RV lead. ([employer], VVIR 80/120 ppm).  Successful AV node ablation ----> complete AVB with no escape over 30.    No apparent complication.  EBL 20-25 ml.      Plan:  -pressure dressing x 24 hrs  -bedrest for 4 hours  -resume Eliquis on Sunday morning  -chest x-ray device interrogation in the a.m.  -the patient is volume overloaded

## 2024-02-23 NOTE — PROGRESS NOTES
Received call from CICU RN that patient's daughter had more questions.  I did speak to the daughter, Tresa this morning on the phone about the procedure, but Tresa had another phone call she had to take.    Sadly, her father, the patient's  is actively dying in hospice.  Daughter stopped to see her mom earlier this afternoon, but did not feel comfortable signing consent without having more questions answered.  By the time I walked back to [facility] from our clinic the daughter was gone.    I have updated general cardiology.  If the procedure needs to be postponed until Monday I would recommend resuming oral amiodarone in addition to the drip.    Tresa Redd, HENRY, APRN CNP

## 2024-02-23 NOTE — PROGRESS NOTES
CLINICAL NUTRITION SERVICES  -  ASSESSMENT NOTE      Recommendations Ordered by Registered Dietitian (RD):   Continue w/ room service w/ assist   Will trial various oral nutrition supplements when diet advances  RD introduced self to pt, discussed role in care. Encouraged PO intake      Malnutrition:   % Weight Loss:  ANNE-MARIE  % Intake:  </= 50% for >/= 5 days (severe malnutrition)-- x7 days   Subcutaneous Fat Loss:  Orbital region severe depletion-- suspect partially age-related  Muscle Loss:  Temporal region mild depletion, Clavicle bone region severe depletion, Acromion bone region moderate depletion, and Scapular bone region moderate depletion-- suspect partially age-related  Fluid Retention:  Does not meet criteria-- trace BLE edema    Malnutrition Diagnosis: Severe malnutrition in the context of --  Acute illness or injury  Chronic illness or disease         REASON FOR ASSESSMENT  Elaina Winn is a 78 year old female seen by Registered Dietitian for LOS.      NUTRITION HISTORY  Information obtained from chart review  Unable to obtain nutrition history from pt d/t dementia     PMH of HTN, HLD, Anxiety, A. FIB, CKD stage 3, Dementia, autoimmune Hepatitis    Social hx-- [facility]     Unable to obtain any hx from pt today       CURRENT NUTRITION ORDERS  Diet: NPO for Medical/Clinical Reasons Except for: Other; Specify: May take medications with a drink of water prior to Electrophysiology study      Previously: Regular diet   Room service: w/ assist    2/16: NPO  2/17: Regular diet   2/19: NPO --> Regular diet --> NPO  2/20: Regular diet   2/24: NPO    Current Intake/Tolerance:  Pt reported that she is going through a lot of changes right now, in a new stage of life. Believes she is eating well. Likes to drink 2% milk.   Various NPO diet orders impacting PO intake   Pt has had 3 meals/day ordered per health touch.   25-50% intakes documented prior to NPO diet per nursing flow  "sheet.        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   STATUS: healing       ANTHROPOMETRICS  Height: 5' 2.717\"  Weight: 116 lbs 3.2 oz (52 kg )  Body mass index is 20.77 kg/m .  Weight Status:  Normal BMI  IBW: 51.6 kg  % IBW: 102%  Weight History:   Inadequate wt hx prior to 2024 on file     Net I/O Since Admission: -8,678.13 mL [02/23/24 1314]  Admit wt 57.5 kg -- bed scale  Most recent wt 52.7 kg -- standing scale  Unclear if true wt loss or fluid-related    02/23/24 52.7 kg (116 lb 3.2 oz)   02/14/24 60.9 kg (134 lb 4.8 oz)   01/25/24 58.4 kg (128 lb 12.8 oz)   03/30/22 64 kg (141 lb)   11/04/19 62.9 kg (138 lb 9.6 oz)   05/03/19 65.3 kg (144 lb)   10/03/18 68.5 kg (151 lb)   04/26/18 62.6 kg (138 lb)   04/09/18 63.5 kg (140 lb)   03/13/18 62.6 kg (138 lb)   02/20/18 64 kg (141 lb)   01/18/18 64.1 kg (141 lb 6.4 oz)   01/11/18 63 kg (139 lb)   01/04/18 61.5 kg (135 lb 9.6 oz)   12/31/17 61.2 kg (135 lb)       No recent wt hx per care everywhere        LABS  Component Value Date   BUN 25.5 (H) 02/23/2024   CR 1.17 (H) 02/23/2024     Lab 02/23/24  0550 02/22/24  0531 02/21/24  0536 02/20/24  0452 02/19/24  0538 02/18/24  1103   * 99 98 107* 105* 139*        MEDICATIONS   citalopram  10 mg Oral Daily    predniSONE  10 mg Oral Daily         ASSESSED NUTRITION NEEDS PER " APPROVED PRACTICE GUIDELINES:  Dosing Weight: 52.7 kg (2/23)  Estimated Energy Needs: 4153-3666 kcal (25-30 Kcal/Kg)  Justification: maintenance  Estimated Protein Needs: 63-79 grams protein (1.2-1.5 g pro/Kg)  Justification: hypercatabolism with acute illness  Estimated Fluid Needs: 1 mL/kcal  Justification: maintenance       MALNUTRITION:  % Weight Loss:  ANNE-MARIE  % Intake:  </= 50% for >/= 5 days (severe malnutrition)-- x7 days   Subcutaneous Fat Loss:  Orbital region severe depletion-- suspect partially age-related  Muscle Loss:  Temporal region mild depletion, Clavicle bone region severe depletion, Acromion bone region moderate depletion, and Scapular bone region moderate depletion-- suspect partially age-related  Fluid Retention:  Does not meet criteria-- trace BLE edema    Malnutrition Diagnosis: Severe malnutrition in the context of --  Acute illness or injury  Chronic illness or disease        NUTRITION DIAGNOSIS:  Inadequate oral intake related to suspected poor appetite 2/2 dementia as evidenced by 25-50% intakes documented this admission x7 days, mild-severe fat and muscle loss         NUTRITION INTERVENTIONS  Recommendations / Nutrition Prescription  Continue w/ room service w/ assist   Will trial various oral nutrition supplements when diet advances  RD introduced self to pt, discussed role in care. Encouraged PO intake       Implementation  Medical food supplement therapy  Nutrition education     Nutrition Goals  Pt to consume >50% intakes of 2 meals/day         MONITORING AND EVALUATION:  Progress towards goals will be monitored and evaluated per protocol and Practice Guidelines      Sandra Garcia RD, LD   Pager: [phone]

## 2024-02-23 NOTE — PLAN OF CARE
Care plan note:      Recent Vitals:  Temp: 97.6  F (36.4  C) Temp src: Oral BP: 103/84 Pulse: (!) 128   Resp: 16 SpO2: 91 % O2 Device: None (Room air)      Orientation/Neuro: Alert, Forgetful, Confused, Disoriented to, Place , Time, Situation, Easily re-directed  Pain: The patient is not having any pain.   Tele: Atrial fibrillation - rapid   IV medications: Amiodarone   Mobility: Assist of 1 and Gait belt   Skin: Pressure Ulcer: blanchable redness under IAD on sacrum , scattered bruising   GI: no complaints  : WDL     Diet: Tolerating diet:  Needs encouragement to eat  Orders Placed This Encounter      Advance Diet as Tolerated: Clear Liquid Diet; Regular Diet Adult      Regular Diet Adult      Safety/Concerns:  Fall Risk and Rodrigo Risk  Aggression Color: Green    Plan: Cardiology following, discharge pending adequate heart rate control.   Continue to monitor.      Piedad Weiss RN

## 2024-02-23 NOTE — PROGRESS NOTES
Brief Cardiology Note  Pt: Elaina Winn    1945      Please see previous cardiology note for full details.    Brief update:  Greatly appreciate our electrophysiology colleagues assistance in getting this patient set up for AV node ablation and permanent pacemaker.  The patient's  is actively dying and this is manage a little bit more difficult to get a hold of the daughter Tresa who is working on caring for both of them during this difficult time.    I was able to speak with the daughter, Tresa, by phone, we reviewed risks and benefits of AV node ablation and permanent pacemaker in detail including infection, pneumothorax, cardiac perforation, recalls on either pacemaker or leads, pacemaker malfunction, up to and including death.  Daughters are agreeable to proceed.  She would prefer MRI compatible device if possible.  Consent signed.    Amy Rasheed PA-C  2:44 PM 2024   [facility]  Pager: [phone]

## 2024-02-23 NOTE — CONSULTS
Care Management Initial Consult    General Information  Assessment completed with: Other (Pt is disoriented; per nursing team pt's  is actively passing on hospice and daughter is with him; unable to gather information from family at this time and pt is not oriented.),    Type of CM/SW Visit: Initial Assessment    Primary Care Provider verified and updated as needed:     Readmission within the last 30 days: unable to assess      Reason for Consult: discharge planning  Advance Care Planning:            Communication Assessment  Patient's communication style: spoken language (English or Bilingual)             Cognitive  Cognitive/Neuro/Behavioral: .WDL except  Level of Consciousness: confused  Arousal Level: opens eyes spontaneously  Orientation: disoriented to, place, time, situation  Mood/Behavior: calm, cooperative  Best Language: 0 - No aphasia  Speech: clear    Living Environment:   People in home: alone     Current living Arrangements: assisted living      Able to return to prior arrangements: other (see comments)       Family/Social Support:  Care provided by:  (some services provided by staff)  Provides care for: no one                Description of Support System:           Current Resources:   Patient receiving home care services:       Community Resources:    Equipment currently used at home:    Supplies currently used at home:      Employment/Financial:  Employment Status:          Financial Concerns:             Does the patient's insurance plan have a 3 day qualifying hospital stay waiver?  No    Lifestyle & Psychosocial Needs:  Social Determinants of Health     Food Insecurity: Low Risk  (1/25/2024)    Food Insecurity     Within the past 12 months, did you worry that your food would run out before you got money to buy more?: No     Within the past 12 months, did the food you bought just not last and you didn t have money to get more?: No   Depression: Not at risk (1/25/2024)    PHQ-2     PHQ-2  Score: 2   Housing Stability: Low Risk  (1/25/2024)    Housing Stability     Do you have housing? : Yes     Are you worried about losing your housing?: No   Tobacco Use: Medium Risk (2/14/2024)    Patient History     Smoking Tobacco Use: Former     Smokeless Tobacco Use: Never     Passive Exposure: Not on file   Financial Resource Strain: Low Risk  (1/25/2024)    Financial Resource Strain     Within the past 12 months, have you or your family members you live with been unable to get utilities (heat, electricity) when it was really needed?: No   Alcohol Use: Not on file   Transportation Needs: Low Risk  (1/25/2024)    Transportation Needs     Within the past 12 months, has lack of transportation kept you from medical appointments, getting your medicines, non-medical meetings or appointments, work, or from getting things that you need?: No   Physical Activity: Not on file   Interpersonal Safety: Not on file   Stress: Not on file   Social Connections: Not on file       Functional Status:  Prior to admission patient needed assistance:   Dependent ADLs:: Independent  Dependent IADLs:: Medication Management (vital checks; and safety checks)       Mental Health Status:          Chemical Dependency Status:                Values/Beliefs:  Spiritual, Cultural Beliefs, Uatsdin Practices, Values that affect care:                 Additional Information:  Pt is an 78 year old female who was admitted to the hospital with concerns of tachycardic per  note on 2/16.    Writer stopped by pt's room. Pt is asleep and disoriented per nursing note.   Writer able to read recent nurse practitioner note on 2/23 that states that pt's  is actively dying in hospice and and pt's daughter is spending time with him therefore not in hospital long.    Writer placed [facility] call to [facility] and was transferred to nursing station. No answer. Writer left voicemail asking for call back regarding questions about pt's services  and so forth at AL.    Writer later received a voicemail from Nubia SEGUNDO from [address] [place] [address]. She states that pt was recently  admitted to the AL on Feb 8th. She states that pt receives services with them through medication management, safety checks, vital checks and is independent in ADL's.  Nubia states that they use [facility] pharmacy. Nubia states that they do have a nurse from 8am - 430pm available during the weekend for pt to return if medically stable. She states care management would have to reach out to weekend nurse at [phone]. She states fax number is [phone].    LINDY Gipson  Social Work  [facility]

## 2024-02-23 NOTE — PROGRESS NOTES
Patient is alert to self, h/o dementia, up with A1 to bathroom, tele a-fib rvr, patient on amio gtt at 1 mg, blood pressure has been soft, dropped BP to the 70s  after bolus, amio had to be restarted 2 hrs later, patient is on room air, with intermittent c/o shortness of breath, discharge on hold.

## 2024-02-24 ENCOUNTER — APPOINTMENT (OUTPATIENT)
Dept: GENERAL RADIOLOGY | Facility: CLINIC | Age: 79
DRG: 242 | End: 2024-02-24
Attending: INTERNAL MEDICINE
Payer: COMMERCIAL

## 2024-02-24 ENCOUNTER — APPOINTMENT (OUTPATIENT)
Dept: PHYSICAL THERAPY | Facility: CLINIC | Age: 79
DRG: 242 | End: 2024-02-24
Attending: INTERNAL MEDICINE
Payer: COMMERCIAL

## 2024-02-24 PROCEDURE — 250N000013 HC RX MED GY IP 250 OP 250 PS 637: Performed by: HOSPITALIST

## 2024-02-24 PROCEDURE — 94640 AIRWAY INHALATION TREATMENT: CPT

## 2024-02-24 PROCEDURE — 94640 AIRWAY INHALATION TREATMENT: CPT | Mod: 76

## 2024-02-24 PROCEDURE — 99233 SBSQ HOSP IP/OBS HIGH 50: CPT | Performed by: INTERNAL MEDICINE

## 2024-02-24 PROCEDURE — 93005 ELECTROCARDIOGRAM TRACING: CPT

## 2024-02-24 PROCEDURE — 250N000009 HC RX 250: Performed by: HOSPITALIST

## 2024-02-24 PROCEDURE — 97116 GAIT TRAINING THERAPY: CPT | Mod: GP

## 2024-02-24 PROCEDURE — 999N000065 XR CHEST 2 VIEWS

## 2024-02-24 PROCEDURE — 97164 PT RE-EVAL EST PLAN CARE: CPT | Mod: GP

## 2024-02-24 PROCEDURE — 97530 THERAPEUTIC ACTIVITIES: CPT | Mod: GP

## 2024-02-24 PROCEDURE — 250N000012 HC RX MED GY IP 250 OP 636 PS 637: Performed by: STUDENT IN AN ORGANIZED HEALTH CARE EDUCATION/TRAINING PROGRAM

## 2024-02-24 PROCEDURE — 210N000001 HC R&B IMCU HEART CARE

## 2024-02-24 PROCEDURE — 250N000013 HC RX MED GY IP 250 OP 250 PS 637: Performed by: INTERNAL MEDICINE

## 2024-02-24 PROCEDURE — 250N000009 HC RX 250: Performed by: INTERNAL MEDICINE

## 2024-02-24 PROCEDURE — 250N000012 HC RX MED GY IP 250 OP 636 PS 637: Performed by: INTERNAL MEDICINE

## 2024-02-24 PROCEDURE — 250N000011 HC RX IP 250 OP 636: Performed by: INTERNAL MEDICINE

## 2024-02-24 PROCEDURE — 999N000157 HC STATISTIC RCP TIME EA 10 MIN

## 2024-02-24 RX ORDER — FUROSEMIDE 10 MG/ML
60 INJECTION INTRAMUSCULAR; INTRAVENOUS ONCE
Status: COMPLETED | OUTPATIENT
Start: 2024-02-24 | End: 2024-02-24

## 2024-02-24 RX ORDER — METOPROLOL SUCCINATE 25 MG/1
25 TABLET, EXTENDED RELEASE ORAL 2 TIMES DAILY
Status: DISCONTINUED | OUTPATIENT
Start: 2024-02-24 | End: 2024-02-24

## 2024-02-24 RX ORDER — METOPROLOL SUCCINATE 50 MG/1
50 TABLET, EXTENDED RELEASE ORAL DAILY
Status: DISCONTINUED | OUTPATIENT
Start: 2024-02-25 | End: 2024-02-25

## 2024-02-24 RX ORDER — GUAIFENESIN 600 MG/1
600 TABLET, EXTENDED RELEASE ORAL 2 TIMES DAILY
Status: DISCONTINUED | OUTPATIENT
Start: 2024-02-24 | End: 2024-02-24

## 2024-02-24 RX ORDER — FUROSEMIDE 10 MG/ML
20 INJECTION INTRAMUSCULAR; INTRAVENOUS ONCE
Status: COMPLETED | OUTPATIENT
Start: 2024-02-24 | End: 2024-02-24

## 2024-02-24 RX ADMIN — IPRATROPIUM BROMIDE 0.5 MG: 0.5 SOLUTION RESPIRATORY (INHALATION) at 08:33

## 2024-02-24 RX ADMIN — CITALOPRAM HYDROBROMIDE 10 MG: 10 TABLET ORAL at 09:48

## 2024-02-24 RX ADMIN — BRIMONIDINE TARTRATE, TIMOLOL MALEATE 1 DROP: 2; 5 SOLUTION/ DROPS TOPICAL at 21:27

## 2024-02-24 RX ADMIN — METOPROLOL SUCCINATE 25 MG: 25 TABLET, EXTENDED RELEASE ORAL at 09:48

## 2024-02-24 RX ADMIN — BRIMONIDINE TARTRATE, TIMOLOL MALEATE 1 DROP: 2; 5 SOLUTION/ DROPS TOPICAL at 09:48

## 2024-02-24 RX ADMIN — FUROSEMIDE 20 MG: 10 INJECTION, SOLUTION INTRAMUSCULAR; INTRAVENOUS at 09:48

## 2024-02-24 RX ADMIN — PREDNISONE 10 MG: 10 TABLET ORAL at 09:48

## 2024-02-24 RX ADMIN — ANORECTAL OINTMENT: 15.7; .44; 24; 20.6 OINTMENT TOPICAL at 21:28

## 2024-02-24 RX ADMIN — MYCOPHENOLATE MOFETIL 250 MG: 250 CAPSULE ORAL at 09:48

## 2024-02-24 RX ADMIN — LEVALBUTEROL HYDROCHLORIDE 0.63 MG: 1.25 SOLUTION RESPIRATORY (INHALATION) at 12:41

## 2024-02-24 RX ADMIN — IPRATROPIUM BROMIDE 0.5 MG: 0.5 SOLUTION RESPIRATORY (INHALATION) at 19:20

## 2024-02-24 RX ADMIN — LEVALBUTEROL HYDROCHLORIDE 0.63 MG: 1.25 SOLUTION RESPIRATORY (INHALATION) at 19:20

## 2024-02-24 RX ADMIN — GUAIFENESIN 600 MG: 600 TABLET, EXTENDED RELEASE ORAL at 21:28

## 2024-02-24 RX ADMIN — IPRATROPIUM BROMIDE 0.5 MG: 0.5 SOLUTION RESPIRATORY (INHALATION) at 12:41

## 2024-02-24 RX ADMIN — LEVALBUTEROL HYDROCHLORIDE 0.63 MG: 1.25 SOLUTION RESPIRATORY (INHALATION) at 08:33

## 2024-02-24 RX ADMIN — ANORECTAL OINTMENT: 15.7; .44; 24; 20.6 OINTMENT TOPICAL at 09:48

## 2024-02-24 RX ADMIN — MYCOPHENOLATE MOFETIL 250 MG: 250 CAPSULE ORAL at 19:06

## 2024-02-24 RX ADMIN — GUAIFENESIN 600 MG: 600 TABLET, EXTENDED RELEASE ORAL at 09:48

## 2024-02-24 RX ADMIN — BIMATOPROST 1 DROP: 0.1 SOLUTION/ DROPS OPHTHALMIC at 21:27

## 2024-02-24 ASSESSMENT — ACTIVITIES OF DAILY LIVING (ADL)
ADLS_ACUITY_SCORE: 47
ADLS_ACUITY_SCORE: 47
ADLS_ACUITY_SCORE: 49
ADLS_ACUITY_SCORE: 51
ADLS_ACUITY_SCORE: 49
ADLS_ACUITY_SCORE: 47
ADLS_ACUITY_SCORE: 49
ADLS_ACUITY_SCORE: 51
ADLS_ACUITY_SCORE: 49
ADLS_ACUITY_SCORE: 51
ADLS_ACUITY_SCORE: 49
ADLS_ACUITY_SCORE: 47
ADLS_ACUITY_SCORE: 49
ADLS_ACUITY_SCORE: 49
ADLS_ACUITY_SCORE: 51
ADLS_ACUITY_SCORE: 49
ADLS_ACUITY_SCORE: 47
ADLS_ACUITY_SCORE: 49

## 2024-02-24 NOTE — PROGRESS NOTES
EE  Delivered - 12:11 am  Pt midnight BP 84/61, 81/54, and 84/56 right arm. Room air. Got 50 mg Metoprolol at 2200. Patient asymptomatic and trying to sleep. Please advise accordingly    Vocera message Lissa Barron - 12:13 am  continue to monitor, she may need dose adjustment of metoprolol in the morning

## 2024-02-24 NOTE — PROGRESS NOTES
Care Management Follow Up    Length of Stay (days): 8    Expected Discharge Date: 02/24/2024     Concerns to be Addressed:    Discharge Planning   Patient plan of care discussed at interdisciplinary rounds: Yes    Anticipated Discharge Disposition:  (To be determined;)     Anticipated Discharge Services:  TBD  Anticipated Discharge DME:  N/A    Patient/family educated on Medicare website which has current facility and service quality ratings:  N/A  Education Provided on the Discharge Plan:  N/A  Patient/Family in Agreement with the Plan:  TBD    Referrals Placed by CM/SW:  [address] [place] [address]  Private pay costs discussed: Not applicable    Additional Information:  Received a call from Damon, [phone], one of the nurses from [address] [place] [address].  She was asking for an update and if there was any plans to discharge patient over the weekend.  She also asked if there were plans to have patient discharge to a TCU.  She wanted to make sure we knew that patient is in the regular assisted living and not the memory care so she doesn't have as much help as she she would if she was in the memory care unit.  Damon states that patient's  was her primary caregiver and this no longer an option.  Explained what the therapy recommendations are.  Explained that with her insurance she will need pre-authorization and she will likely not get pre-auth for TCU.  Explained that we will follow up with her when we have more information from the MD.    Will continue to follow.      NICKO Fatima, [facility]    [phone]  [facility]

## 2024-02-24 NOTE — PLAN OF CARE
Goal Outcome Evaluation:      Plan of Care Reviewed With: patient  Pt alert to name, SBA prior to pacemaker. Continent. Returned from ablation/pacer. Left chest wnl. Right groin with stopcock wnl. % pacing. Dtr was here, updated on condition. Denies pain.

## 2024-02-24 NOTE — PROGRESS NOTES
[facility]    Medicine Progress Note - Hospitalist Service    Date of Admission:  2/16/2024    Assessment & Plan   78 year old female with a past medical history of atrial fibrillation, dementia, autoimmune hepatitis, CKD stage III presents to hospital with A-fib RVR.   Treated for both COPD exac and Afib this admission.  S/p cardioversion with re-development of a fib with RVR that persisted despite IV amiodarone bolus and gtt.  Underwent successful ablation and PPM placement on 2/23/24.        Afib RVR s/p JIM cardioversion 2/20/24 with later recurrence of Afib with RVR  NSTEMI, type 2 secondary to demand    Presented from her care center, was found to be in Afib RVR on evaluation by EMS.   Initially rate improved with diltiazem drip in the emergency room and was continued on her PTA metoprolol for rate control.    Cardiology consulted; started on digoxin, underwent JIM cardioversion on 2/20/24 that was initially successful but then she redeveloped a fib with RVR with hypotension that persisted despite IV amiodarone.    EP consulted; underwent AV node ablation and placement of single chamber PM on 2/23/24.    Noted to have mild hypotension overnight  Will decrease her scheduled metoprolol this am to 25mg po bid and continue to monitor closely    Sbp improved to >100    Has not recently been PTA on anticoagulation due to previous liver dysfunction  Started on Eliquis BID this admission (currently on hold for PPM and ablation)    2. Acute hypoxic respiratory failure - resolved  Possible undx COPD  Acute on chronic diastolic heart failure exacerbation  New systolic CHF - suspect d/t rate control issues    The patient was noted to be wheezing in the ED requiring supplemental O2. She does have a multi year history of smoking raising suspicion for undiagnosed copd. Furthermore her ct did not reveal significant pulmonary edema    - given IV lasix on admission and started steroid burst, last dose  prednisone prior ordered was on 2/21 AM (day 5).  Extending prednisone taper at 10 mg a few more days given ongoing pulm issues.    2/23 - Continue on atrovent and xopenex nebs today  - mucinex BID    2/24 -Will give lasix 20mg IV dose this am if BP tolerates    Noted - Long term inhaled steroid to be considered. Once over acute cardiac issues, consider complete outpatient PFT's.         3. Amiodarone skin infiltrate 2/19  LUE had infiltrate of amiodarone. Ecchymosis noted prior, now seems resolving.  Patient says not painful.  - Monitor     4. Dementia  A&Ox1-2 baseline, lives in memory care and daughter is decision maker.  Not agitated     5. Autoimmune hepatitis:  -mycophenolate continued, no acute issue now noted     6. CKD stage III  Stable, did have bump up to 1.41 on 2/18 after diuresis  - creat improved to 1.17 (2/23/24)  BMP in the am     7. Coccyx pressure wound PTA  - incontinence associated dermatitis, monitor with local cares.     Medical Decision Making       51 MINUTES SPENT BY ME on the date of service doing chart review, history, exam, documentation & further activities per the note.        PPE Worn:  Mask, gloves     Diet: Room Service  Snacks/Supplements Adult: Other; trials started (RD); Between Meals  Advance Diet as Tolerated: Clear Liquid Diet    DVT Prophylaxis: DOAC  Scott Catheter: Not present  Lines: None     Cardiac Monitoring: ACTIVE order. Indication: Post- EP procedure (48 hours)  Code Status: Full Code      Clinically Significant Risk Factors                  # Hypertension: Noted on problem list  # Acute heart failure with reduced ejection fraction: last echo with EF <40% and receiving IV diuretics        # Severe Malnutrition: based on nutrition assessment     # Pacemaker present       Disposition Plan   Return to  [facility] care  Awaiting cardiology recs regarding medication adjustments and diuresis  PT/OT to eval and treat  Possible discharge 2/25/24 depending on clinical course  "today         Eryn Sanford DO  Hospitalist Service  [facility]  Securely message with [employer] (more info)  Text page via [employer] Paging/Directory   ______________________________________________________________________    Interval History     Seen alone this am in the CCU.  Events of overnight noted--lower BP while patient was sleeping (around midnight), but is improved to SBP>100 this am.   Overnight RN noted that she was c/o PPM site discomfort overnight and was given tylenol with relief.  Shaking head \"yes\" when asked if PPM site sore; \"no\" to midsternal CP, HA or abd pain.     Physical Exam   Vital Signs: Temp: 98.2  F (36.8  C) Temp src: Oral BP: 106/81 Pulse: 81   Resp: 16 SpO2: 92 % O2 Device: None (Room air) Oxygen Delivery: 2 LPM  Weight: 116 lbs 3.2 oz    GEN:  sleeping comfortably when I enter the room; not verbal yet this am but is opening eyes and shaking head \"yes\" and \"no\"  HEENT:  Normocephalic/atraumatic, no scleral icterus, no nasal discharge, mouth and membranes appear mosit  CV:  Regular rate and rhythm, no clear loud murmur ausc this am  S1 + S2 ausc  CHEST WALL:  dry gauze dressing in place over pacemaker site in the left upper chest wall area  LUNGS:  Clear to auscultation ant/lat bilaterally without rales/rhonchi/wheezing/retractions.  Post exam limited this am. Symmetric chest rise on inhalation noted.  ABD:  Active bowel sounds, soft, non-tender/not really distended on exam this am. No rebound/guarding/rigidity.  EXT:  no significant pretibial edema bilaterally.  No cyanosis.  No acute joint synovitis noted.  SKIN:  Dry to touch, no exanthems noted in the visualized areas.    Medications        [Held by provider] amiodarone  200 mg Oral BID    [Held by provider] apixaban ANTICOAGULANT  2.5 mg Oral BID    bimatoprost  1 drop Both Eyes At Bedtime    brimonidine-timolol  1 drop Both Eyes BID    citalopram  10 mg Oral Daily    furosemide  20 mg Intravenous " "Once    guaiFENesin  600 mg Oral BID    ipratropium  0.5 mg Nebulization 3 times daily    levalbuterol  0.63 mg Nebulization 3 times daily    menthol-zinc oxide   Topical BID    metoprolol succinate ER  25 mg Oral BID    mycophenolate  250 mg Oral BID IS    predniSONE  10 mg Oral Daily       Data     Labs and Imaging results below reviewed today.  Recent Labs   Lab 02/21/24  0536 02/20/24  0452 02/19/24  0538   WBC 6.9 7.8 11.3*   HGB 13.6 13.4 14.0   HCT 42.5 41.7 44.5   MCV 92 91 92    239 225     Recent Labs   Lab 02/23/24  0550 02/22/24  0531 02/21/24  0536    139 138   POTASSIUM 3.5 3.5 4.1   CHLORIDE 95* 97* 97*   CO2 30* 32* 29   ANIONGAP 10 10 12   * 99 98   BUN 25.5* 31.2* 33.6*   CR 1.17* 1.23* 1.28*   GFRESTIMATED 47* 44* 42*   DANIELITO 9.5 9.9 10.2     Recent Labs   Lab 02/23/24  0550 02/22/24  0531 02/21/24  0536 02/20/24  1829 02/20/24  0452    139 138  --  137   POTASSIUM 3.5 3.5 4.1 4.4 4.2   CHLORIDE 95* 97* 97*  --  102   CO2 30* 32* 29  --  22   ANIONGAP 10 10 12  --  13   * 99 98  --  107*   BUN 25.5* 31.2* 33.6*  --  33.6*   CR 1.17* 1.23* 1.28*  --  1.06*   GFRESTIMATED 47* 44* 42*  --  53*   DANIELITO 9.5 9.9 10.2  --  9.9   MAG  --   --  2.0 2.1 2.1   PHOS  --   --  2.9  --  2.6     No results for input(s): \"NTBNPI\", \"NTBNP\" in the last 168 hours.    No results for input(s): \"TSH\" in the last 168 hours.      Recent Results (from the past 24 hour(s))   EP Device    Narrative    PROCEDURES PERFORMED:  1. Implantation of single-chamber permanent pacemaker (LBB area lead).  2. Transcatheter AV node ablation.   3. Cardiac fluoroscopy (6 minutes, radiation dose 18 mGy)  4. Conscious sedation, mild-moderate level.        CLINICAL HISTORY:  78-year-old female with history of atrial fibrillation with RVR who   presents with rapid AF, associated cardiomyopathy. Pharmacologic rate   control has not been possible. AV node ablation with pacemaker   implantation was recommended. LBB " area RV lead is planned.  The risks and benefits of the procedure were discussed in detail; the   patient expressed understanding and provided consent.       PROCEDURE:  (A) PACEMAKER IMPLANTATION  The patient was brought to the cardiac electrophysiology laboratory at   [facility] on 2/23/2024.  I determined this patient to be   an appropriate candidate for the planned sedation and procedure and have   reassessed the patient immediately prior to sedation and procedure.  The   patient was in the fasting nonsedated state.  Informed consent had been   obtained.  The presenting rhythm was atrial fibrillation/flutter with RVR.    The patient was placed on the procedure table and the anterior chest was   prepped and draped in the usual sterile fashion.  We continuously   monitored vital signs, oxygenation and level of sedation.  Antibiotic   prophylaxis was administered.  Intravenous sedation was given to achieve   patient comfort.      Using a fluoroscopic guided technique the left subclavian vein was   cannulated without difficulty.  A single guidewire was introduced and   retained.  Under local anesthesia an incision was created in the left   pectoral region. The incision was taken down to the pectoralis muscle and   fascia and a pocket was created for the pacemaker generator.    Using a peel-away introducer sheath and a long Medtronic steerable guiding   sheath the right ventricular Medtronic lead model 3830 was introduced.    Under fluoroscopy, the tip of the active fixation lead was brought at the   mid proximal RV septum. Under continuous ECG and impedance monitoring, the   body of the lead was rotated clockwise. Favorable QRS morphology was   obtained. Good sensing and pacing parameters were confirmed. 10 V pacing   did not stimulate the diaphragm. The guiding sheath was carefully split.   The lead was secured using interrupted Ethibond sutures.    The device pocket was then irrigated with  antibiotic solution. The lead   was connected to the pacemaker generator which was placed in the pocket.    It was secured using silk.  The wound was closed in 2 layers, using 2.0   and 4.0 Vicryl.  Steri-Strips, sterile gauze and a pressure dressing were   placed over the wound.     Device implant results:  I.  Implanted lead: Medtronic lead model number 3830 - 69 cm, serial   [id number]. R-wave 4.8 mV. Pacing threshold 0.75 V at 0.4 ms. Pacing   impedance 1008 ohms.  II.  Pulse generator: Medtronic Preakness XT SR MRI, serial [id number].  III.  Programming: VVIR 80/120 ppm.       (B) AV NODE ABLATION  The patient remained in the EP laboratory following pacemaker   implantation.  Additional i.v. sedation was administered. The groin areas   were prepped and draped in the usual sterile manner.  The permanent   pacemaker was programmed at VVI 30 to assess the acute outcome of   ablation.    Under 1% lidocaine for local anesthesia a single 8 Slovak introducer   sheath was placed in the right femoral vein.  Under fluoroscopy a standard   mapping/ablation catheter with 4 mm tip was brought in the right atrium   (AV node/His bundle area).  Mapping was performed until a clear His   deflection was recorded.  The catheter was slightly withdrawn to record a   sizable atrial signal in association with the His electrogram.    Radiofrequency energy delivery at 50 Downing resulted in complete AV block   with no escape rhythm >30 bpm. Actual energy delivered at the site of   successful ablation was about 12 to 18 W.    Complete AV block persisted for 20 minutes.  At that time the procedure   was concluded.  The catheter and sheath were withdrawn and hemostasis was   obtained with manual pressure.  The pacemaker was reprogrammed to its   final settings.  There were no apparent complications.  The patient was   taken back to the Hospital room in stable condition.      Estimated blood loss was 20 - 25 ml.      CONCLUSIONS:  1.  Successful  implantation of single-chamber permanent pacemaker.  2.  Successful AV node ablation with induction of complete AV block.   3.  No apparent in-lab complication.          EP Ablation    Narrative    PROCEDURES PERFORMED:  1. Implantation of single-chamber permanent pacemaker (LBB area lead).  2. Transcatheter AV node ablation.   3. Cardiac fluoroscopy (6 minutes, radiation dose 18 mGy)  4. Conscious sedation, mild-moderate level.        CLINICAL HISTORY:  78-year-old female with history of atrial fibrillation with RVR who   presents with rapid AF, associated cardiomyopathy. Pharmacologic rate   control has not been possible. AV node ablation with pacemaker   implantation was recommended. LBB area RV lead is planned.  The risks and benefits of the procedure were discussed in detail; the   patient expressed understanding and provided consent.       PROCEDURE:  (A) PACEMAKER IMPLANTATION  The patient was brought to the cardiac electrophysiology laboratory at   [facility] on 2/23/2024.  I determined this patient to be   an appropriate candidate for the planned sedation and procedure and have   reassessed the patient immediately prior to sedation and procedure.  The   patient was in the fasting nonsedated state.  Informed consent had been   obtained.  The presenting rhythm was atrial fibrillation/flutter with RVR.    The patient was placed on the procedure table and the anterior chest was   prepped and draped in the usual sterile fashion.  We continuously   monitored vital signs, oxygenation and level of sedation.  Antibiotic   prophylaxis was administered.  Intravenous sedation was given to achieve   patient comfort.      Using a fluoroscopic guided technique the left subclavian vein was   cannulated without difficulty.  A single guidewire was introduced and   retained.  Under local anesthesia an incision was created in the left   pectoral region. The incision was taken down to the pectoralis muscle and    fascia and a pocket was created for the pacemaker generator.    Using a peel-away introducer sheath and a long Medtronic steerable guiding   sheath the right ventricular Medtronic lead model 3830 was introduced.    Under fluoroscopy, the tip of the active fixation lead was brought at the   mid proximal RV septum. Under continuous ECG and impedance monitoring, the   body of the lead was rotated clockwise. Favorable QRS morphology was   obtained. Good sensing and pacing parameters were confirmed. 10 V pacing   did not stimulate the diaphragm. The guiding sheath was carefully split.   The lead was secured using interrupted Ethibond sutures.    The device pocket was then irrigated with antibiotic solution. The lead   was connected to the pacemaker generator which was placed in the pocket.    It was secured using silk.  The wound was closed in 2 layers, using 2.0   and 4.0 Vicryl.  Steri-Strips, sterile gauze and a pressure dressing were   placed over the wound.     Device implant results:  I.  Implanted lead: Medtronic lead model number 3830 - 69 cm, serial   [id number]. R-wave 4.8 mV. Pacing threshold 0.75 V at 0.4 ms. Pacing   impedance 1008 ohms.  II.  Pulse generator: Medtronic Olivia XT SR MRI, serial [id number].  III.  Programming: VVIR 80/120 ppm.       (B) AV NODE ABLATION  The patient remained in the EP laboratory following pacemaker   implantation.  Additional i.v. sedation was administered. The groin areas   were prepped and draped in the usual sterile manner.  The permanent   pacemaker was programmed at VVI 30 to assess the acute outcome of   ablation.    Under 1% lidocaine for local anesthesia a single 8 Kinyarwanda introducer   sheath was placed in the right femoral vein.  Under fluoroscopy a standard   mapping/ablation catheter with 4 mm tip was brought in the right atrium   (AV node/His bundle area).  Mapping was performed until a clear His   deflection was recorded.  The catheter was slightly withdrawn to  record a   sizable atrial signal in association with the His electrogram.    Radiofrequency energy delivery at 50 Downing resulted in complete AV block   with no escape rhythm >30 bpm. Actual energy delivered at the site of   successful ablation was about 12 to 18 W.    Complete AV block persisted for 20 minutes.  At that time the procedure   was concluded.  The catheter and sheath were withdrawn and hemostasis was   obtained with manual pressure.  The pacemaker was reprogrammed to its   final settings.  There were no apparent complications.  The patient was   taken back to the Hospital room in stable condition.      Estimated blood loss was 20 - 25 ml.      CONCLUSIONS:  1.  Successful implantation of single-chamber permanent pacemaker.  2.  Successful AV node ablation with induction of complete AV block.   3.  No apparent in-lab complication.

## 2024-02-24 NOTE — PROGRESS NOTES
[id number]  Alert and oriented to self  Right groin site WDL  PPM site WDL  Room air  Tylenol given for PPM site pain  SBA  Tele: Vpaced   Plan :continue to monitor

## 2024-02-24 NOTE — PROGRESS NOTES
EP Progress Note          Assessment and Plan:   Elaina Winn is an 78 year old female with a past medical history of atrial fibrillation, dementia, COPD exacerbation, autoimmune hepatitis, CKD stage III presented to hospital with A-fib RVR. Failed cardioversion despite oral amiodarone. Underwent successful single-chamber pacemaker implantation with LBB area RV lead. (Medtronic, VVIR 80/120 ppm) and successful AV node ablation ----> complete AVB with no escape over 30 on 2/23.    Atrial fibrillation with RVR  Last EKG of sinus rhythm was 3/2022  Patient underwent a JIM and cardioversion 2/20/2024 and the following day reverted back to A-fib with RVR.    Digoxin was stopped after cardioversion.  She was bolused with IV amiodarone was slightly hypotensive after bolus.    On 2/23 she underwent successful single-chamber pacemaker implantation with LBB area RV lead. (Medtronic, VVIR 80/120 ppm) and successful AV node ablation ----> complete AVB with no escape over 30.    ECG, device site and CXR satisfactory.    2. 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  [id number] score 5 on Eliquis 2.5 mg twice daily  - restart Apixaban tomorrow    4.  Dementia  Patient is alert, but unable to answer questions regarding her medical history          Interval History:   Reporting some shortness of breath and cough this morning.  Otherwise comfortable in bed.         Medications:      [Held by provider] apixaban ANTICOAGULANT  2.5 mg Oral BID    bimatoprost  1 drop Both Eyes At Bedtime    brimonidine-timolol  1 drop Both Eyes BID    citalopram  10 mg Oral Daily    furosemide  60 mg Intravenous Once    guaiFENesin   600 mg Oral BID    ipratropium  0.5 mg Nebulization 3 times daily    levalbuterol  0.63 mg Nebulization 3 times daily    menthol-zinc oxide   Topical BID    metoprolol succinate ER  25 mg Oral BID    mycophenolate  250 mg Oral BID IS    predniSONE  10 mg Oral Daily             Review of Systems: If done, described below  The Review of Systems is negative other than noted in the HPI           Physical Exam:   Patient Vitals for the past 24 hrs:   BP Temp Temp src Pulse Resp SpO2 Height Weight   02/24/24 1111 108/79 97.3  F (36.3  C) Oral 79 19 93 % -- --   02/24/24 1000 110/64 -- -- 82 -- -- -- --   02/24/24 0948 106/68 -- -- 79 -- -- -- --   02/24/24 0800 121/73 -- -- 81 -- -- -- --   02/24/24 0733 -- 98.1  F (36.7  C) Oral 79 18 95 % -- --   02/24/24 0629 -- -- -- -- -- -- -- 53.2 kg (117 lb 4.6 oz)   02/24/24 0603 108/69 -- -- 82 -- 92 % -- --   02/24/24 0400 106/81 -- -- 81 -- -- -- --   02/24/24 0200 90/56 -- -- 79 -- 92 % -- --   02/24/24 0010 -- -- -- -- -- 93 % -- --   02/24/24 0007 (!) 84/56 98.2  F (36.8  C) Oral 79 -- -- -- --   02/24/24 0004 (!) 81/54 -- -- 81 -- -- -- --   02/24/24 0000 (!) 84/61 -- -- 82 -- 92 % -- --   02/23/24 2100 111/69 -- -- 81 -- 95 % -- --   02/23/24 2030 108/74 -- -- 80 -- -- -- --   02/23/24 1940 -- -- -- -- -- 93 % -- --   02/23/24 1935 106/75 98.2  F (36.8  C) Oral 80 -- -- -- --   02/23/24 1900 108/76 -- -- 79 16 91 % -- --   02/23/24 1845 108/72 -- -- 80 -- 95 % -- --   02/23/24 1830 104/75 -- -- 81 -- 95 % -- --   02/23/24 1821 108/87 -- -- 79 20 95 % -- --   02/23/24 1743 -- -- -- -- 16 -- -- --   02/23/24 1718 -- -- -- -- 16 -- -- --   02/23/24 1709 -- -- -- -- 16 -- -- --   02/23/24 1705 -- -- -- -- 16 -- -- --   02/23/24 1700 -- -- -- -- 16 -- -- --   02/23/24 1655 -- -- -- -- 16 -- -- --   02/23/24 1600 106/83 -- -- (!) 130 -- 93 % -- --   02/23/24 1500 117/84 -- -- (!) 131 -- 92 % -- --   02/23/24 1400 116/86 -- -- (!) 128 -- 99 % -- --   02/23/24 1300 115/80 -- --  "(!) 121 -- 94 % 1.593 m (5' 2.72\") --         Vital Sign Ranges  Temperature Temp  Av.9  F (36.6  C)  Min: 97.6  F (36.4  C)  Max: 98.2  F (36.8  C)   Blood pressure Systolic (24hrs), Av , Min:43 , Max:113        Diastolic (24hrs), Av, Min:23, Max:84      Pulse Pulse  Av.8  Min: 63  Max: 155   Respirations Resp  Av.3  Min: 16  Max: 20   Pulse oximetry SpO2  Av.5 %  Min: 79 %  Max: 97 %         Intake/Output Summary (Last 24 hours) at 2024 0803  Last data filed at 2024 0230  Gross per 24 hour   Intake 60 ml   Output 1150 ml   Net -1090 ml       Constitutional:  Tachypneic, otherwise not in acute distress   Lungs:  Bilateral wheezing audible   Cardiovascular:  Paced rhythm, JVP not visible, no murmurs  Extremities and Back:  1+lower extremity edema           Data:     Lab Results   Component Value Date    WBC 6.9 2024    HGB 13.6 2024    HCT 42.5 2024     2024     2024    POTASSIUM 3.5 2024    CHLORIDE 95 (L) 2024    CO2 30 (H) 2024    BUN 25.5 (H) 2024    CR 1.17 (H) 2024     (H) 2024    SED 18 2012    DD 0.91 (H) 2024    NTBNPI 10,462 (H) 2024    TROPI <0.015 2017    AST 30 2024    ALT 21 2024     (H) 2017    ALKPHOS 128 2024    BILITOTAL 1.1 2024    MARY <10 (L) 11/15/2017    INR 1.19 (H) 2024              "

## 2024-02-25 ENCOUNTER — APPOINTMENT (OUTPATIENT)
Dept: OCCUPATIONAL THERAPY | Facility: CLINIC | Age: 79
DRG: 242 | End: 2024-02-25
Attending: INTERNAL MEDICINE
Payer: COMMERCIAL

## 2024-02-25 LAB
ANION GAP SERPL CALCULATED.3IONS-SCNC: 11 MMOL/L (ref 7–15)
BUN SERPL-MCNC: 31 MG/DL (ref 8–23)
CALCIUM SERPL-MCNC: 9.6 MG/DL (ref 8.8–10.2)
CHLORIDE SERPL-SCNC: 96 MMOL/L (ref 98–107)
CREAT SERPL-MCNC: 1.25 MG/DL (ref 0.51–0.95)
DEPRECATED HCO3 PLAS-SCNC: 30 MMOL/L (ref 22–29)
EGFRCR SERPLBLD CKD-EPI 2021: 43 ML/MIN/1.73M2
GLUCOSE SERPL-MCNC: 94 MG/DL (ref 70–99)
HGB BLD-MCNC: 13.7 G/DL (ref 11.7–15.7)
POTASSIUM SERPL-SCNC: 3.6 MMOL/L (ref 3.4–5.3)
SODIUM SERPL-SCNC: 137 MMOL/L (ref 135–145)

## 2024-02-25 PROCEDURE — 999N000157 HC STATISTIC RCP TIME EA 10 MIN

## 2024-02-25 PROCEDURE — 94640 AIRWAY INHALATION TREATMENT: CPT

## 2024-02-25 PROCEDURE — 250N000013 HC RX MED GY IP 250 OP 250 PS 637: Performed by: HOSPITALIST

## 2024-02-25 PROCEDURE — 250N000013 HC RX MED GY IP 250 OP 250 PS 637: Performed by: INTERNAL MEDICINE

## 2024-02-25 PROCEDURE — 80048 BASIC METABOLIC PNL TOTAL CA: CPT | Performed by: INTERNAL MEDICINE

## 2024-02-25 PROCEDURE — 250N000009 HC RX 250: Performed by: HOSPITALIST

## 2024-02-25 PROCEDURE — 250N000012 HC RX MED GY IP 250 OP 636 PS 637: Performed by: INTERNAL MEDICINE

## 2024-02-25 PROCEDURE — 85018 HEMOGLOBIN: CPT | Performed by: INTERNAL MEDICINE

## 2024-02-25 PROCEDURE — 97165 OT EVAL LOW COMPLEX 30 MIN: CPT | Mod: GO

## 2024-02-25 PROCEDURE — 94640 AIRWAY INHALATION TREATMENT: CPT | Mod: 76

## 2024-02-25 PROCEDURE — 210N000001 HC R&B IMCU HEART CARE

## 2024-02-25 PROCEDURE — 250N000009 HC RX 250: Performed by: INTERNAL MEDICINE

## 2024-02-25 PROCEDURE — 250N000012 HC RX MED GY IP 250 OP 636 PS 637: Performed by: STUDENT IN AN ORGANIZED HEALTH CARE EDUCATION/TRAINING PROGRAM

## 2024-02-25 PROCEDURE — 99232 SBSQ HOSP IP/OBS MODERATE 35: CPT | Performed by: HOSPITALIST

## 2024-02-25 PROCEDURE — 36415 COLL VENOUS BLD VENIPUNCTURE: CPT | Performed by: INTERNAL MEDICINE

## 2024-02-25 PROCEDURE — 97535 SELF CARE MNGMENT TRAINING: CPT | Mod: GO

## 2024-02-25 RX ORDER — METOPROLOL SUCCINATE 25 MG/1
25 TABLET, EXTENDED RELEASE ORAL DAILY
Status: DISCONTINUED | OUTPATIENT
Start: 2024-02-26 | End: 2024-02-27

## 2024-02-25 RX ORDER — LOSARTAN POTASSIUM 25 MG/1
25 TABLET ORAL DAILY
Status: DISCONTINUED | OUTPATIENT
Start: 2024-02-25 | End: 2024-02-26

## 2024-02-25 RX ADMIN — CITALOPRAM HYDROBROMIDE 10 MG: 10 TABLET ORAL at 10:35

## 2024-02-25 RX ADMIN — ANORECTAL OINTMENT: 15.7; .44; 24; 20.6 OINTMENT TOPICAL at 10:34

## 2024-02-25 RX ADMIN — GUAIFENESIN 600 MG: 600 TABLET, EXTENDED RELEASE ORAL at 21:19

## 2024-02-25 RX ADMIN — APIXABAN 2.5 MG: 2.5 TABLET, FILM COATED ORAL at 21:19

## 2024-02-25 RX ADMIN — BRIMONIDINE TARTRATE, TIMOLOL MALEATE 1 DROP: 2; 5 SOLUTION/ DROPS TOPICAL at 21:19

## 2024-02-25 RX ADMIN — IPRATROPIUM BROMIDE 0.5 MG: 0.5 SOLUTION RESPIRATORY (INHALATION) at 19:53

## 2024-02-25 RX ADMIN — IPRATROPIUM BROMIDE 0.5 MG: 0.5 SOLUTION RESPIRATORY (INHALATION) at 14:13

## 2024-02-25 RX ADMIN — MYCOPHENOLATE MOFETIL 250 MG: 250 CAPSULE ORAL at 10:35

## 2024-02-25 RX ADMIN — PREDNISONE 10 MG: 10 TABLET ORAL at 10:35

## 2024-02-25 RX ADMIN — LEVALBUTEROL HYDROCHLORIDE 0.63 MG: 1.25 SOLUTION RESPIRATORY (INHALATION) at 07:32

## 2024-02-25 RX ADMIN — MYCOPHENOLATE MOFETIL 250 MG: 250 CAPSULE ORAL at 19:27

## 2024-02-25 RX ADMIN — ANORECTAL OINTMENT 1 G: 15.7; .44; 24; 20.6 OINTMENT TOPICAL at 21:20

## 2024-02-25 RX ADMIN — BIMATOPROST 1 DROP: 0.1 SOLUTION/ DROPS OPHTHALMIC at 21:20

## 2024-02-25 RX ADMIN — LEVALBUTEROL HYDROCHLORIDE 0.63 MG: 1.25 SOLUTION RESPIRATORY (INHALATION) at 19:54

## 2024-02-25 RX ADMIN — GUAIFENESIN 600 MG: 600 TABLET, EXTENDED RELEASE ORAL at 10:34

## 2024-02-25 RX ADMIN — BRIMONIDINE TARTRATE, TIMOLOL MALEATE 1 DROP: 2; 5 SOLUTION/ DROPS TOPICAL at 10:34

## 2024-02-25 RX ADMIN — IPRATROPIUM BROMIDE 0.5 MG: 0.5 SOLUTION RESPIRATORY (INHALATION) at 07:32

## 2024-02-25 RX ADMIN — LEVALBUTEROL HYDROCHLORIDE 0.63 MG: 1.25 SOLUTION RESPIRATORY (INHALATION) at 14:13

## 2024-02-25 RX ADMIN — APIXABAN 2.5 MG: 2.5 TABLET, FILM COATED ORAL at 12:19

## 2024-02-25 ASSESSMENT — ACTIVITIES OF DAILY LIVING (ADL)
ADLS_ACUITY_SCORE: 53
ADLS_ACUITY_SCORE: 53
ADLS_ACUITY_SCORE: 51
ADLS_ACUITY_SCORE: 51
ADLS_ACUITY_SCORE: 53
ADLS_ACUITY_SCORE: 51
ADLS_ACUITY_SCORE: 53
ADLS_ACUITY_SCORE: 51
ADLS_ACUITY_SCORE: 53
ADLS_ACUITY_SCORE: 53
ADLS_ACUITY_SCORE: 51
ADLS_ACUITY_SCORE: 53
ADLS_ACUITY_SCORE: 51
ADLS_ACUITY_SCORE: 51

## 2024-02-25 NOTE — PLAN OF CARE
Neuro- oriented to self only  Most Recent Vitals- Temp: 97.7  F (36.5  C) Temp src: Oral BP: 90/53 Pulse: 80   Resp: 19 SpO2: 97 % O2 Device: None (Room air)   Tele/Cardiac- 100% v paced  Resp- LS dim   Activity- SBA w GB  Pain- to coccyx  Drips- none  Drains/Tubes- PIV  Skin- PPM incision site CDI, groin site CDI, CMS intact  GI/- voiding adequately through purewick  Aggression Color- Green  COVID status- Negative  Plan- pending improvement  Misc- Pts   this AM, pt aware.    Cady Zhang, RN Goal Outcome Evaluation:

## 2024-02-25 NOTE — PROGRESS NOTES
Care Management Follow Up    Length of Stay (days): 9    Expected Discharge Date: 02/26/2024     Concerns to be Addressed:     Discharge Planning  Patient plan of care discussed at interdisciplinary rounds: Yes    Anticipated Discharge Disposition:  Return to [address] [place] [address]     Anticipated Discharge Services:  possible homecare  Anticipated Discharge DME:  N/A    Patient/family educated on Medicare website which has current facility and service quality ratings:  No  Education Provided on the Discharge Plan:  N/A  Patient/Family in Agreement with the Plan:  TBD    Referrals Placed by CM/SW:  [address] [place] [address]  Private pay costs discussed: Not applicable    Additional Information:  Call placed and message left for Damon, the nurse from [address] [place] [address], to update her as to the potential discharge tomorrow.  Explained that the MD felt that patient may be better served in the memory care assisted living vs the regular assisted living.  Again explained that she doesn't have a skilled PT need to go to TCU and that she would need pre-auth from insurance to go to TCU.    Will continue to follow.      NICKO Fatima, [facility]    [phone]  [facility]

## 2024-02-25 NOTE — PROGRESS NOTES
[facility]  Hospitalist Progress Note        Jaxson Morocho MD   02/25/2024        Interval History:        - PT rec home;  following for disposition; patient resides at [facility] part of [address] [place] [address] and not the memory care unit but might need to move to Memory Care  - OT evaluation pending  - cardiology following, noted EF on JIM at 35% (55 to 60% on TTE 2/17/2024); was given a total of 80 mg IV Lasix on 2/24  - overnight SBP in 80s, clinically appears euvolumic; will hold off on further IV diuresis at this time  -Toprol-XL decreased to 25 mg daily  - Hb stable around 13; renal function stable around creatinine 1.2    -On 1 to 2 L oxygen; has completed a short course of prednisone; will encourage incentive spirometry and wean oxygen as able         Assessment and Plan:        78 year old female with a PMH of atrial fibrillation, dementia, autoimmune hepatitis, CKD stage III who presented with A-fib RVR and also noted with likely COPD exacerbation, admitted inpatient 2/16/24.     Paroxysmal Afib RVR s/p JIM cardioversion 2/20/24 with later recurrence of Afib with RVR  S/p AV haile ablation and PPM placement 2/23/24  NSTEMI, type 2 secondary to demand  - initially had cardioversion on 2/20/24 with re-development of a fib with RVR that persisted despite IV amiodarone bolus and gtt  - then, underwent successful ablation and PPM placement on 2/23/24  - device integration noted with normal function; post PPM chest x-ray with no pneumothorax  - cardiology following; noted EF on JIM at 35% (55 to 60% on TTE 2/17/2024); was given a total of 80 mg IV Lasix on 2/24  - 2/24 overnight SBP in 80s, clinically appears euvolumic; will hold off on further IV diuresis at this time  - Toprol-XL decreased to 25 mg daily  - Had not recently been PTA on anticoagulation due to previous liver dysfunction  Started on Eliquis this admission, continue Eliquis 2.5 mg BID  - continue telemetry    Acute hypoxic  respiratory failure  Possible undiagnosed COPD  Acute on chronic diastolic heart failure exacerbation  New systolic CHF - suspect d/t rate control issues  - CT chest PE protocol 2/17 noted No pulmonary emboli, Bilateral pleural effusions and Mild scattered mucous plugging  -  was noted to be wheezing in the ED requiring supplemental O2. She does have a multi year history of smoking raising suspicion for undiagnosed copd.   -Has been getting intermittent diuresis as noted above  -completed a short course of PO prednisone (2/25/24)  -continue with Atrovent and xopenex nebs; mucinex BID  -On 1 to 2 L oxygen; has completed a short course of prednisone; will encourage incentive spirometry and wean oxygen as able      Amiodarone skin infiltrate 2/19  LUE had infiltrate of amiodarone. Ecchymosis noted prior, now seems resolving.   - Monitor clinically     Dementia without behavioral disturbance  -  A&Ox1-2 baseline, daughter is decision maker  - PT rec home;  following for disposition; patient resides at [facility] part [address] and not the memory care unit but might need to move to Memory Care  - OT evaluation pending     Autoimmune hepatitis:  -mycophenolate continued, no acute issue now noted     CKD stage III  Stable, did have bump up to 1.41 on 2/18 after diuresis  - creat improved to 1.17 (2/23/24); stable around 1.2     Coccyx pressure wound PTA  - incontinence associated dermatitis, monitor with local cares.     DVT Prophylaxis: DOAC    Code Status: Full Code          Clinically Significant Risk Factors                   # Hypertension: Noted on problem list  # Acute heart failure with reduced ejection fraction: last echo with EF <40% and receiving IV diuretics        # Severe Malnutrition: based on nutrition assessment     # Pacemaker present     Disposition Plan  - likely 2/26/24 if remains clinically stable and off oxygen   - PT rec home;  following for disposition; patient resides at  "senior living part of [address] [place] and not the memory care unit but might need to move to Memory Care  - OT evaluation pending    Diet:   Room Service  Snacks/Supplements Adult: Other; trials started (RD); Between Meals  Combination Diet Regular Diet; Low Saturated Fat Na <2400mg Diet      Page Me (7 am to 6 pm)    Cable discussed with patient and               Physical Exam:      Blood pressure 119/79, pulse 82, temperature 98  F (36.7  C), temperature source Oral, resp. rate 24, height 1.593 m (5' 2.72\"), weight 53.2 kg (117 lb 4.6 oz), SpO2 99%, not currently breastfeeding.  Vitals:    02/23/24 0646 02/24/24 0629 02/25/24 0430   Weight: 52.7 kg (116 lb 3.2 oz) 53.2 kg (117 lb 4.6 oz) 53.2 kg (117 lb 4.6 oz)     Vital Signs with Ranges  Temp:  [97.3  F (36.3  C)-98.1  F (36.7  C)] 98  F (36.7  C)  Pulse:  [77-84] 82  Resp:  [18-24] 24  BP: ()/(45-79) 119/79  SpO2:  [88 %-99 %] 99 %  I/O's Last 24 hours  I/O last 3 completed shifts:  In: 60 [P.O.:60]  Out: 600 [Urine:600]    Constitutional: Alert, awake and oriented X 1-2 (baseline); resting comfortably in no apparent distress       Oral cavity: Moist mucosa   Cardiovascular: Normal s1 s2, regular rate and rhythm, no murmur; left chest wall pacemaker in place   Lungs: B/l clear to auscultation, no wheezes or crepitations   Abdomen: Soft, nt, nd, no guarding, rigidity or rebound; BS +   LE : No edema   Musculoskeletal/Neuro Power 5/5 in all extremities; No focal neurological deficits noted   Psychiatry: normal mood and affect                Medications:         [Held by provider] apixaban ANTICOAGULANT  2.5 mg Oral BID    bimatoprost  1 drop Both Eyes At Bedtime    brimonidine-timolol  1 drop Both Eyes BID    citalopram  10 mg Oral Daily    guaiFENesin  600 mg Oral BID    ipratropium  0.5 mg Nebulization 3 times daily    levalbuterol  0.63 mg Nebulization 3 times daily    menthol-zinc oxide   Topical BID    metoprolol succinate ER  50 mg Oral Daily    " mycophenolate  250 mg Oral BID IS    predniSONE  10 mg Oral Daily     PRN Meds: acetaminophen, calcium carbonate, HOLD MEDICATION, HOLD MEDICATION, HOLD MEDICATION, levalbuterol, ondansetron **OR** ondansetron, senna-docusate **OR** senna-docusate         Data:      All new lab and imaging data was reviewed.   Recent Labs   Lab Test 02/25/24  0557 02/21/24  0536 02/20/24  1829 02/20/24  0452 02/19/24  0538 01/01/18  2035 12/21/17  0746   WBC  --  6.9  --  7.8 11.3*   < >  --    HGB 13.7 13.6  --  13.4 14.0   < >  --    MCV  --  92  --  91 92   < >  --    PLT  --  214  --  239 225   < >  --    INR  --   --  1.19* 1.16*  --   --  1.02    < > = values in this interval not displayed.      Recent Labs   Lab Test 02/25/24  0557 02/23/24  0550 02/22/24  0531    135 139   POTASSIUM 3.6 3.5 3.5   CHLORIDE 96* 95* 97*   CO2 30* 30* 32*   BUN 31.0* 25.5* 31.2*   CR 1.25* 1.17* 1.23*   ANIONGAP 11 10 10   DANIELITO 9.6 9.5 9.9   GLC 94 118* 99     Recent Labs   Lab Test 12/07/17  0950 04/26/16  1125   TROPI <0.015 <0.015  The 99th percentile for upper reference range is 0.045 ug/L.  Troponin values in   the range of 0.045 - 0.120 ug/L may be associated with risks of adverse   clinical events.

## 2024-02-25 NOTE — PLAN OF CARE
Neuro- oriented to self only  Tele/Cardiac- 100% v paced  Resp- LS clear. 3L NC  Activity- Ax2 T&R q2h   Pain- denies  Drips- none  Drains/Tubes- PIV, purewick  Skin- PPM incision dressing CDI, groin site dressing CDI, CMS intact  GI/- Retaining urine. No BM  Plan- pending improvement

## 2024-02-25 NOTE — PROVIDER NOTIFICATION
MD Notification    Notified Person: MD    Notified Person Name:  Grady    Notification Date/Time: 02/24/2024 2000    Notification Interaction: Talked with MD    Purpose of Notification: Pts BP has been in 80s systolic x 1 hour. Rechecks same. Asymptomatic. Do you want to make changes to POC    Orders Received: continue to monitor, keep MAP > 60    Comments:

## 2024-02-25 NOTE — PROGRESS NOTES
EP Progress Note          Assessment and Plan:   Elaina Winn is an 78 year old female with a past medical history of atrial fibrillation, dementia, COPD exacerbation, autoimmune hepatitis, CKD stage III presented to hospital with A-fib RVR. Failed cardioversion despite oral amiodarone. Underwent successful single-chamber pacemaker implantation with LBB area RV lead. (Medtronic, VVIR 80/120 ppm) and successful AV node ablation ----> complete AVB with no escape over 30 on 2/23.    Atrial fibrillation with RVR  Last EKG of sinus rhythm was 3/2022  Patient underwent a JIM and cardioversion 2/20/2024 and the following day reverted back to A-fib with RVR.    Digoxin was stopped after cardioversion.  She was bolused with IV amiodarone was slightly hypotensive after bolus.    - on 2/23 she underwent successful single-chamber pacemaker implantation with LBB area RV lead. (Medtronic, VVIR 80/120 ppm) and successful AV node ablation ----> complete AVB with no escape over 30.  - ECG, device site and CXR satisfactory  - currently on metoprolol succinate, dose reduced to 25 mg once daily today (to allow for introduction of ARB)  - restarted apixaban 2.5 mg twice daily today    2. 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  [id number] score 5 on Eliquis 2.5 mg twice daily  - continue Apixaban tomorrow    4.  Dementia  Patient is alert, but unable to answer questions regarding her medical history          Interval History:   Feels well today, occasional shortness of breath but otherwise comfortable and has no complaints.  Sat out in her  chair.          Medications:      apixaban ANTICOAGULANT  2.5 mg Oral BID    bimatoprost  1 drop Both Eyes At Bedtime    brimonidine-timolol  1 drop Both Eyes BID    citalopram  10 mg Oral Daily    guaiFENesin  600 mg Oral BID    ipratropium  0.5 mg Nebulization 3 times daily    levalbuterol  0.63 mg Nebulization 3 times daily    losartan  25 mg Oral Daily    menthol-zinc oxide   Topical BID    [START ON 2/26/2024] metoprolol succinate ER  25 mg Oral Daily    mycophenolate  250 mg Oral BID IS     Review of Systems: If done, described below  The Review of Systems is negative other than noted in the HPI           Physical Exam:   Patient Vitals for the past 24 hrs:   BP Temp Temp src Pulse Resp SpO2 Weight   02/25/24 1200 114/58 -- -- -- -- -- --   02/25/24 1000 108/61 -- -- 80 -- -- --   02/25/24 0900 97/61 -- -- 80 -- 97 % --   02/25/24 0804 97/59 98  F (36.7  C) Oral -- -- -- --   02/25/24 0800 97/59 -- -- 81 -- -- --   02/25/24 0700 99/59 -- -- 81 -- 97 % --   02/25/24 0600 119/79 -- -- 82 -- 99 % --   02/25/24 0521 -- -- -- -- -- 90 % --   02/25/24 0500 (!) 89/57 -- -- 80 -- (!) 88 % --   02/25/24 0430 98/63 -- -- 80 -- 95 % 53.2 kg (117 lb 4.6 oz)   02/25/24 0400 104/62 98  F (36.7  C) Oral 81 -- 97 % --   02/25/24 0300 (!) 89/54 -- -- 82 -- 99 % --   02/25/24 0200 92/53 -- -- 81 -- 96 % --   02/25/24 0130 92/58 -- -- 80 -- 96 % --   02/25/24 0100 (!) 85/53 -- -- 84 -- 95 % --   02/25/24 0030 95/59 -- -- 80 -- 95 % --   02/25/24 0000 90/53 97.7  F (36.5  C) Oral 80 24 98 % --   02/24/24 2300 92/55 -- -- 79 -- -- --   02/24/24 2200 92/61 -- -- 80 -- -- --   02/24/24 2100 92/58 -- -- 80 -- -- --   02/24/24 2024 (!) 81/49 -- -- 81 -- -- --   02/24/24 2012 (!) 83/54 -- -- 80 -- -- --   02/24/24 2009 (!) 80/47 -- -- 81 -- 97 % --   02/24/24 2005 (!) 84/48 -- -- 81 -- 97 % --   02/24/24 2000 (!) 84/45 -- -- 82 -- 98 % --   02/24/24 1800 100/59 -- -- 81 -- -- --   02/24/24 1600 97/66 -- -- 81 -- -- --   02/24/24 1523  95/60 97.7  F (36.5  C) Oral -- -- -- --   24 1517 95/60 -- -- 81 -- -- --   24 1515 (!) 86/60 -- -- 80 -- -- --   24 1510 (!) 83/56 -- -- 77 -- -- --   24 1400 (!) 89/50 -- -- 80 -- -- --         Vital Sign Ranges  Temperature Temp  Av.9  F (36.6  C)  Min: 97.6  F (36.4  C)  Max: 98.2  F (36.8  C)   Blood pressure Systolic (24hrs), Av , Min:43 , Max:113        Diastolic (24hrs), Av, Min:23, Max:84      Pulse Pulse  Av.8  Min: 63  Max: 155   Respirations Resp  Av.3  Min: 16  Max: 20   Pulse oximetry SpO2  Av.5 %  Min: 79 %  Max: 97 %         Intake/Output Summary (Last 24 hours) at 2024 0803  Last data filed at 2024 0230  Gross per 24 hour   Intake 60 ml   Output 1150 ml   Net -1090 ml       Constitutional:  Not in acute distress   Lungs:  Scattered wheezing audible throughout chest, no crackles  Cardiovascular:  Paced rhythm, JVP not visible, no murmurs  Extremities and Back:  No lower extremity edema           Data:     Lab Results   Component Value Date    WBC 6.9 2024    HGB 13.7 2024    HCT 42.5 2024     2024     2024    POTASSIUM 3.6 2024    CHLORIDE 96 (L) 2024    CO2 30 (H) 2024    BUN 31.0 (H) 2024    CR 1.25 (H) 2024    GLC 94 2024    SED 18 2012    DD 0.91 (H) 2024    NTBNPI 10,462 (H) 2024    TROPI <0.015 2017    AST 30 2024    ALT 21 2024     (H) 2017    ALKPHOS 128 2024    BILITOTAL 1.1 2024    MARY <10 (L) 11/15/2017    INR 1.19 (H) 2024

## 2024-02-25 NOTE — PROGRESS NOTES
02/25/24 1140   Appointment Info   Signing Clinician's Name / Credentials (OT) Kathy Falconbhanu, OTR/L   Living Environment   People in Home facility resident   Current Living Arrangements assisted living   Home Accessibility no concerns   Living Environment Comments Pt lives at [facility], not in memory care.   Self-Care   Usual Activity Tolerance good   Current Activity Tolerance moderate   Equipment Currently Used at Home none   Fall history within last six months no   Activity/Exercise/Self-Care Comment Pt unreliable historian, per chart was independent w/ ADL tasks though spouse was helping as needed. Spouse has since passed away. Has safety checks from her facility. Pt ambulates w/out an AD   Instrumental Activities of Daily Living (IADL)   IADL Comments Pt has help w/ medication management, safety checks   General Information   Onset of Illness/Injury or Date of Surgery 02/16/24   Referring Physician Eryn Sanford, DO   Additional Occupational Profile Info/Pertinent History of Current Problem Elaina Winn is an 78 year old female with a past medical history of atrial fibrillation, dementia, COPD exacerbation, autoimmune hepatitis, CKD stage III presented to hospital with A-fib RVR. Failed cardioversion despite oral amiodarone. Underwent successful single-chamber pacemaker implantation with LBB area RV lead. (Medtronic, VVIR 80/120 ppm) and successful AV node ablation ----> complete AVB with no escape over 30 on 2/23.   Existing Precautions/Restrictions fall;cardiac   Limitations/Impairments safety/cognitive  (baseline dementia)   Cognitive Status Examination   Orientation Status person   Follows Commands over 90% accuracy   Cognitive Status Comments Pt has baseline dementia, from [facility] but not memory care per chart. Follows commands consistently, oriented x1. Appears near baseline   Visual Perception   Visual Impairment/Limitations WFL;corrective lenses full-time   Pain Assessment   Patient Currently in Pain  No   Range of Motion Comprehensive   Comment, General Range of Motion BUE WFL   Strength Comprehensive (MMT)   Comment, General Manual Muscle Testing (MMT) Assessment BUE WFL   Transfers   Transfers toilet transfer;sit-stand transfer   Sit-Stand Transfer   Sit-Stand [place] (Transfers) contact guard;verbal cues   Toilet Transfer   Type (Toilet Transfer) stand-sit;sit-stand   [place] Level (Toilet Transfer) contact guard;verbal cues   Balance   Balance Comments SBA in room   Activities of Daily Living   BADL Assessment/Intervention bathing;upper body dressing;lower body dressing;grooming;toileting   Bathing Assessment/Intervention   [place] Level (Bathing) minimum assist (75% patient effort);verbal cues   Comment, (Bathing) per clinical judgement   Upper Body Dressing Assessment/Training   Comment, (Upper Body Dressing) per clinical judgement   [place] Level (Upper Body Dressing) minimum assist (75% patient effort)   Lower Body Dressing Assessment/Training   [place] Level (Lower Body Dressing) minimum assist (75% patient effort);verbal cues   Grooming Assessment/Training   [place] Level (Grooming) contact guard assist   Toileting   [place] Level (Toileting) verbal cues;contact guard assist   Clinical Impression   Criteria for Skilled Therapeutic Interventions Met (OT) Yes, treatment indicated   OT Diagnosis decreased I/ADL independence   OT Problem List-Impairments impacting ADL problems related to;activity tolerance impaired;cognition;post-surgical precautions   Assessment of Occupational Performance 3-5 Performance Deficits   Identified Performance Deficits decreased independence w/ dressing, functional mobility, toileting and bathing   Planned Therapy Interventions (OT) ADL retraining;cognition;strengthening;transfer training;home program guidelines;progressive activity/exercise;risk factor education   Clinical Decision Making Complexity (OT) problem focused assessment/low  complexity   Risk & Benefits of therapy have been explained evaluation/treatment results reviewed;care plan/treatment goals reviewed;participants included;patient   OT Total Evaluation Time   OT Eval, Low Complexity Minutes (39042) 10   OT Goals   Therapy Frequency (OT) 4 times/week   OT Predicted Duration/Target Date for Goal Attainment 03/01/24   OT Goals Hygiene/Grooming;Upper Body Dressing;Lower Body Dressing;Toilet Transfer/Toileting   OT: Hygiene/Grooming modified independent;within precautions;while standing   OT: Upper Body Dressing Modified independent;within precautions;including set-up/clothing retrieval   OT: Lower Body Dressing Modified independent;including set-up/clothing retrieval;within precautions   OT: Toilet Transfer/Toileting Modified independent;toilet transfer;cleaning and garment management;using adaptive equipment;within precautions   Interventions   Interventions Quick Adds Self-Care/Home Management   Self-Care/Home Management   Self-Care/Home Mgmt/ADL, Compensatory, Meal Prep Minutes (09035) 15   Symptoms Noted During/After Treatment (Meal Preparation/Planning Training) fatigue;shortness of breath   Treatment Detail/Skilled Intervention Pt in chair upon arrival, agreeable to OT. Oriented to self only which is baseline per chart. VSS at rest on RA. Pt needing encouragement to participate. Ed on post-op pacer precautions, pt unable to recall during session and needs frequent reminders. Stood from chair w/ CGA and no AD, VC to avoid pushing through LUE. Pt amb to bathroom w/ SBA, VC for hand placement w/ toilet transfer. She voided, needs assist w/ managing brief and VC for wiping- pt attempting to use L hand. Walked to sink and completed 2 g/h tasks w/ SBA . pt appearing SOB but SpO2 90%. Returned to chair w/ SBA. Left w/ all needs met, alarm on and RN updated. VSS at exit.   OT Discharge Planning   OT Plan ADL tasks w/in pacer precautions, progress activity tolerance   OT Discharge  Recommendation (DC Rec) home with assist;home with home care occupational therapy;Transitional Care Facility   OT Rationale for DC Rec Pt below baseline, limited by decreased activity tolerance and post-op precautions. Pt admitted from group home where spouse was providing care as needed. Spouse recently passed away. Pt currently needing Ax1 for ADL tasks, needs reminders to maintain post-op pace maker precautions. High risk to break precautions if she was comlpleting these tasks alone. If group home unable to provide this level of assist, pt may need short TCU stay.   OT Brief overview of current status SBA in room w/out AD, needs frequent reminders to maintain pacer precautions, Min A LB dressing   Total Session Time   Timed Code Treatment Minutes 15   Total Session Time (sum of timed and untimed services) 25

## 2024-02-26 ENCOUNTER — APPOINTMENT (OUTPATIENT)
Dept: PHYSICAL THERAPY | Facility: CLINIC | Age: 79
DRG: 242 | End: 2024-02-26
Payer: COMMERCIAL

## 2024-02-26 ENCOUNTER — APPOINTMENT (OUTPATIENT)
Dept: OCCUPATIONAL THERAPY | Facility: CLINIC | Age: 79
DRG: 242 | End: 2024-02-26
Payer: COMMERCIAL

## 2024-02-26 ENCOUNTER — APPOINTMENT (OUTPATIENT)
Dept: GENERAL RADIOLOGY | Facility: CLINIC | Age: 79
DRG: 242 | End: 2024-02-26
Attending: NURSE PRACTITIONER
Payer: COMMERCIAL

## 2024-02-26 DIAGNOSIS — Z95.0 CARDIAC PACEMAKER IN SITU: Primary | ICD-10-CM

## 2024-02-26 PROCEDURE — 250N000013 HC RX MED GY IP 250 OP 250 PS 637: Performed by: HOSPITALIST

## 2024-02-26 PROCEDURE — 250N000009 HC RX 250: Performed by: INTERNAL MEDICINE

## 2024-02-26 PROCEDURE — 250N000012 HC RX MED GY IP 250 OP 636 PS 637: Performed by: STUDENT IN AN ORGANIZED HEALTH CARE EDUCATION/TRAINING PROGRAM

## 2024-02-26 PROCEDURE — 210N000002 HC R&B HEART CARE

## 2024-02-26 PROCEDURE — 250N000013 HC RX MED GY IP 250 OP 250 PS 637: Performed by: INTERNAL MEDICINE

## 2024-02-26 PROCEDURE — 999N000157 HC STATISTIC RCP TIME EA 10 MIN

## 2024-02-26 PROCEDURE — 97535 SELF CARE MNGMENT TRAINING: CPT | Mod: GO | Performed by: OCCUPATIONAL THERAPIST

## 2024-02-26 PROCEDURE — 71046 X-RAY EXAM CHEST 2 VIEWS: CPT

## 2024-02-26 PROCEDURE — 99232 SBSQ HOSP IP/OBS MODERATE 35: CPT | Performed by: HOSPITALIST

## 2024-02-26 PROCEDURE — 94640 AIRWAY INHALATION TREATMENT: CPT

## 2024-02-26 PROCEDURE — 97530 THERAPEUTIC ACTIVITIES: CPT | Mod: GP

## 2024-02-26 PROCEDURE — 250N000009 HC RX 250: Performed by: HOSPITALIST

## 2024-02-26 PROCEDURE — 94640 AIRWAY INHALATION TREATMENT: CPT | Mod: 76

## 2024-02-26 PROCEDURE — 97116 GAIT TRAINING THERAPY: CPT | Mod: GP

## 2024-02-26 PROCEDURE — 99232 SBSQ HOSP IP/OBS MODERATE 35: CPT | Mod: 24 | Performed by: NURSE PRACTITIONER

## 2024-02-26 RX ADMIN — ANORECTAL OINTMENT: 15.7; .44; 24; 20.6 OINTMENT TOPICAL at 21:22

## 2024-02-26 RX ADMIN — IPRATROPIUM BROMIDE 0.5 MG: 0.5 SOLUTION RESPIRATORY (INHALATION) at 19:17

## 2024-02-26 RX ADMIN — MYCOPHENOLATE MOFETIL 250 MG: 250 CAPSULE ORAL at 09:14

## 2024-02-26 RX ADMIN — CITALOPRAM HYDROBROMIDE 10 MG: 10 TABLET ORAL at 09:15

## 2024-02-26 RX ADMIN — GUAIFENESIN 600 MG: 600 TABLET, EXTENDED RELEASE ORAL at 21:17

## 2024-02-26 RX ADMIN — MYCOPHENOLATE MOFETIL 250 MG: 250 CAPSULE ORAL at 19:21

## 2024-02-26 RX ADMIN — IPRATROPIUM BROMIDE 0.5 MG: 0.5 SOLUTION RESPIRATORY (INHALATION) at 13:55

## 2024-02-26 RX ADMIN — LEVALBUTEROL HYDROCHLORIDE 0.63 MG: 1.25 SOLUTION RESPIRATORY (INHALATION) at 19:17

## 2024-02-26 RX ADMIN — BRIMONIDINE TARTRATE, TIMOLOL MALEATE 1 DROP: 2; 5 SOLUTION/ DROPS TOPICAL at 21:28

## 2024-02-26 RX ADMIN — LEVALBUTEROL HYDROCHLORIDE 0.63 MG: 1.25 SOLUTION RESPIRATORY (INHALATION) at 13:54

## 2024-02-26 RX ADMIN — ANORECTAL OINTMENT: 15.7; .44; 24; 20.6 OINTMENT TOPICAL at 09:15

## 2024-02-26 RX ADMIN — BIMATOPROST 1 DROP: 0.1 SOLUTION/ DROPS OPHTHALMIC at 21:28

## 2024-02-26 RX ADMIN — APIXABAN 2.5 MG: 2.5 TABLET, FILM COATED ORAL at 09:14

## 2024-02-26 RX ADMIN — BRIMONIDINE TARTRATE, TIMOLOL MALEATE 1 DROP: 2; 5 SOLUTION/ DROPS TOPICAL at 09:15

## 2024-02-26 RX ADMIN — GUAIFENESIN 600 MG: 600 TABLET, EXTENDED RELEASE ORAL at 09:15

## 2024-02-26 RX ADMIN — METOPROLOL SUCCINATE 25 MG: 25 TABLET, EXTENDED RELEASE ORAL at 09:14

## 2024-02-26 RX ADMIN — APIXABAN 2.5 MG: 2.5 TABLET, FILM COATED ORAL at 21:17

## 2024-02-26 RX ADMIN — LOSARTAN POTASSIUM 25 MG: 25 TABLET, FILM COATED ORAL at 09:14

## 2024-02-26 ASSESSMENT — ACTIVITIES OF DAILY LIVING (ADL)
ADLS_ACUITY_SCORE: 51
ADLS_ACUITY_SCORE: 39
DIFFICULTY_COMMUNICATING: NO
ADLS_ACUITY_SCORE: 51
ADLS_ACUITY_SCORE: 51
ADLS_ACUITY_SCORE: 35
ADLS_ACUITY_SCORE: 39
WEAR_GLASSES_OR_BLIND: YES
ADLS_ACUITY_SCORE: 35
TOILETING_ISSUES: NO
ADLS_ACUITY_SCORE: 51
ADLS_ACUITY_SCORE: 39
ADLS_ACUITY_SCORE: 51
HEARING_DIFFICULTY_OR_DEAF: NO
DIFFICULTY_EATING/SWALLOWING: NO
ADLS_ACUITY_SCORE: 51
ADLS_ACUITY_SCORE: 39
ADLS_ACUITY_SCORE: 51
CONCENTRATING,_REMEMBERING_OR_MAKING_DECISIONS_DIFFICULTY: YES
WALKING_OR_CLIMBING_STAIRS_DIFFICULTY: NO
ADLS_ACUITY_SCORE: 39
DRESSING/BATHING_DIFFICULTY: NO
DOING_ERRANDS_INDEPENDENTLY_DIFFICULTY: YES
ADLS_ACUITY_SCORE: 35
ADLS_ACUITY_SCORE: 51
ADLS_ACUITY_SCORE: 35
ADLS_ACUITY_SCORE: 35

## 2024-02-26 NOTE — PROGRESS NOTES
Care Management Follow Up    Length of Stay (days): 10    Expected Discharge Date: 02/26/2024     Concerns to be Addressed:       Patient plan of care discussed at interdisciplinary rounds: Yes    Anticipated Discharge Disposition:  (To be determined;)     Anticipated Discharge Services:    Anticipated Discharge DME:      Patient/family educated on Medicare website which has current facility and service quality ratings:    Education Provided on the Discharge Plan:    Patient/Family in Agreement with the Plan:      Referrals Placed by CM/SW:    Private pay costs discussed: Not applicable    Additional Information:  Spoke with Damon, [phone], nurse at [address] [place] [address] regarding discharge planning.  Per Damon, pt was living with her spouse that was her primary care giver.  Pt is in the assisted living but not memory care.  Discussed therapy recommending assist of 1 with ADL's and Damon stated if daughter increased pt services they could provide this but can not be with patient all of the time. Discussed pacemaker precautions and pt is not to lift her affected arm above shoulder level until 3/8/24.  Damon concerned that pt will not be able to follow this restriction.  CC to discuss with pt daughter regarding additional support options she can provide for patient.     Hospitalist updated CM that he requested to have PT work with patient again today and await recommendations, as pt may need TCU.    Addendum 11:25-  Received call from Damon, nurse at senior living, wanting update on discharge plan today.  Updated that therapy recommending TCU and daughter looking into memory care senior living for after TCU stay.  Damon stated pt is on the list for memory care senior living at [address] [place] [address] but they currently have no openings.    SW sending referrals for TCU after discussion with pt daughter.    Adela Short RN, BS  Care Coordinator  [email or web address]

## 2024-02-26 NOTE — DISCHARGE INSTRUCTIONS
Discharge Instructions for Pacemaker Implantation    You have had a procedure to insert a pacemaker. Once inside your body, this small electronic device helps keep your heart from beating too slowly. A pacemaker can t fix existing heart problems. But it can help you feel better and have more energy. As you recover, follow all of the instructions you are given, including those below.    Activity  Follow the instructions you are given about limiting your activity.  Do not raise your affected arm above shoulder level for 2 weeks (until 3/8/24). This gives the device lead wires time to attach securely inside your heart.  For strenuous sports such as Tennis-Pickle paat-vyviusoirf-ltvf-weight lifting wait 4 weeks to ensure stable lead healing.  Ask your doctor when you can expect to return to work.  You can still exercise. It s good for your body and your heart. Talk with your doctor about an exercise plan.    Other Precautions  Follow your doctor's directions carefully for wound care. You may remove the outer dressing in 3 days (on 2/26/24). Leave the steri-strips in place; these will be removed at your 1 week follow-up. Never put any creams, lotions, or products like peroxide on an incision unless your doctor tells you to.   You may shower once the outer dressing has been removed.   Before you receive any treatment, tell all health care providers (including your dentist) that you have a pacemaker.  You will be given an ID card that contains information about your pacemaker. Always carry this card with you. You can show this card if your pacemaker sets off a metal detector. You should also show it to avoid screening with a hand-held security wand.  Keep your cell phone away from your pacemaker. Don t carry the phone in your shirt pocket, even when it s turned off.  Avoid strong magnets. Examples are those used in MRIs or in hand-held security wands.  Avoid strong electrical fields. Examples are those made by radio  transmitting towers,  ham  radios, and heavy-duty electrical equipment.  Avoid leaning over the open barrietnos of a running car. A running engine creates an electrical field. Most household and yard appliances will not cause any problems. If you use any large power tools, such as an industrial , talk with your doctor.     Follow-Up  Follow up in the device clinic as scheduled.   You have an appointment scheduled on 3/6/24 at 2:00pm at [facility] in [place], [address] [place] [address], [address], [place] [address]   Make regular follow-up appointments with your device clinic. They will check the pacemaker to make sure it s working properly.    When to Call Your Device Clinic [phone]  Call your Device Clinic if you have any of the following:  Dizziness  Chest pain  Lack of energy  Fainting spells  Rapid pulse or pounding heartbeat  Shortness of breath  Increased pain around your pacemaker  Fever above 100.4 F (38 C) or other signs of infection (redness, swelling, drainage, or warmth at the incision site)     0121-2382 [employer]. [address] [place] [address]. All rights reserved. This information is not intended as a substitute for professional medical care. Always follow your healthcare professional's instructions.    [facility] in [place]: [phone]  Device Clinic (Monday to Friday, 8am-4pm): [phone]  *The device clinic is closed on weekends and holidays.  Any calls received during this time will be answered on the next business  day. For any urgent questions after hours, please call the main clinic number and you will be put in contact with the cardiologist on call.

## 2024-02-26 NOTE — PROGRESS NOTES
[facility]  Hospitalist Progress Note        Jaxson Morocho MD   02/26/2024        Interval History:        - Cardiology following, added losartan 25 mg daily; will need repeat ECHO in 3 to 6 months to reevaluate her ejection fraction   - OT rec home cares PT but patient appears very fatigued/weak and given her dementia concern for her ability to take care of herself at [facility]  - will get PT reevaluation; SW/CC following for disposition planning  - oxygen weaned down to room air but has rattling cough, on Mucinex BID. Will order for Respiratory therapy assess/treat  -soft BP improved         Assessment and Plan:        78 year old female with a PMH of atrial fibrillation, dementia, autoimmune hepatitis, CKD stage III who presented with A-fib RVR and also noted with likely COPD exacerbation, admitted inpatient 2/16/24.     Paroxysmal Afib RVR s/p JIM cardioversion 2/20/24 with later recurrence of Afib with RVR  S/p AV haile ablation and PPM placement 2/23/24  NSTEMI, type 2 secondary to demand  - initially had cardioversion on 2/20/24 with re-development of a fib with RVR that persisted despite IV amiodarone bolus and gtt  - then, underwent successful ablation and PPM placement on 2/23/24  - device integration noted with normal function; post PPM chest x-ray with no pneumothorax  - cardiology following; noted EF on JIM at 35% (55 to 60% on TTE 2/17/2024); was given a total of 80 mg IV Lasix on 2/24  - 2/24 overnight SBP in 80s, clinically appears euvolumic; hold off on further IV diuresis at this time  - Toprol-XL decreased to 25 mg daily, added losartan 25 mg daily-- continue with hold parameters; will need repeat ECHO in 3 to 6 months to reevaluate her ejection fraction  - Had not recently been PTA on anticoagulation due to previous liver dysfunction  Started on Eliquis this admission, continue Eliquis 2.5 mg BID  - continue telemetry    Acute hypoxic respiratory failure  Possible undiagnosed  COPD  Acute on chronic diastolic heart failure exacerbation  New systolic CHF - suspect d/t rate control issues  - CT chest PE protocol 2/17 noted No pulmonary emboli, Bilateral pleural effusions and Mild scattered mucous plugging  -  was noted to be wheezing in the ED requiring supplemental O2. She does have a multi year history of smoking raising suspicion for undiagnosed copd.   - Has been getting intermittent diuresis as noted above  - completed a short course of PO prednisone (2/25/24)  - continue with Atrovent and xopenex nebs; mucinex BID  - oxygen weaned down to room air (2/26) but has rattling cough, on Mucinex BID. Will order for Respiratory therapy assess/treat (2/26)  - encourage incentive spirometry     Amiodarone skin infiltrate 2/19  LUE had infiltrate of amiodarone. Ecchymosis noted prior, now seems resolving.   - Monitor clinically     Dementia without behavioral disturbance  -  A&Ox1-2 baseline, daughter is decision maker  - PT rec home;  following for disposition; patient resides at [facility] [address] [facility] and not the memory care unit but might need to move to Memory Care; her  was her primary care giver    - OT rec home cares but patient appears very fatigued/weak and given her dementia concern for her ability to take care of herself at [facility]  - will get PT reevaluation; SW/CC following for disposition planning     Autoimmune hepatitis:  -mycophenolate continued, no acute issue now noted     CKD stage III  Stable, did have bump up to 1.41 on 2/18 after diuresis  - creat improved to 1.17 (2/23/24); stable around 1.2     Coccyx pressure wound PTA  - incontinence associated dermatitis, monitor with local cares.     DVT Prophylaxis: DOAC    Code Status: Full Code          Clinically Significant Risk Factors                   # Hypertension: Noted on problem list  # Acute heart failure with reduced ejection fraction: last echo with EF <40% and receiving IV diuretics        # Severe  "Malnutrition: based on nutrition assessment     # Pacemaker present     Disposition Plan  - likely 1-2 days pending clinical stability; appears medically stable    - OT rec home cares OT but patient appears very fatigued/weak and given her dementia concern for her ability to take care of herself at [facility]  - will get PT reevaluation; SW/CC following for disposition planning    Diet:   Room Service  Snacks/Supplements Adult: Other; trials started (RD); Between Meals  Combination Diet Regular Diet; Low Saturated Fat Na <2400mg Diet      Page Me (7 am to 6 pm)    Cable discussed with patient, nursing and               Physical Exam:      Blood pressure 131/78, pulse 81, temperature 97.5  F (36.4  C), temperature source Oral, resp. rate 18, height 1.593 m (5' 2.72\"), weight 51.6 kg (113 lb 12.8 oz), SpO2 96%, not currently breastfeeding.  Vitals:    02/24/24 0629 02/25/24 0430 02/26/24 0240   Weight: 53.2 kg (117 lb 4.6 oz) 53.2 kg (117 lb 4.6 oz) 51.6 kg (113 lb 12.8 oz)     Vital Signs with Ranges  Temp:  [97.5  F (36.4  C)-98.3  F (36.8  C)] 97.5  F (36.4  C)  Pulse:  [79-82] 81  Resp:  [18] 18  BP: ()/(58-80) 131/78  SpO2:  [93 %-97 %] 96 %  I/O's Last 24 hours  I/O last 3 completed shifts:  In: -   Out: 1000 [Urine:1000]    Constitutional: Alert, awake and oriented X 1-2 (baseline); resting comfortably in no apparent distress; has rattling cough       Oral cavity: Moist mucosa   Cardiovascular: Normal s1 s2, regular rate and rhythm, no murmur; left chest wall pacemaker in place   Lungs: B/l clear to auscultation, no wheezes or crepitations   Abdomen: Soft, nt, nd, no guarding, rigidity or rebound; BS +   LE : No edema   Musculoskeletal/Neuro Power 5/5 in all extremities; No focal neurological deficits noted   Psychiatry: normal mood and affect                Medications:         apixaban ANTICOAGULANT  2.5 mg Oral BID    bimatoprost  1 drop Both Eyes At Bedtime    brimonidine-timolol  1 drop Both " Eyes BID    citalopram  10 mg Oral Daily    guaiFENesin  600 mg Oral BID    ipratropium  0.5 mg Nebulization 3 times daily    levalbuterol  0.63 mg Nebulization 3 times daily    losartan  25 mg Oral Daily    menthol-zinc oxide   Topical BID    metoprolol succinate ER  25 mg Oral Daily    mycophenolate  250 mg Oral BID IS     PRN Meds: acetaminophen, calcium carbonate, HOLD MEDICATION, HOLD MEDICATION, HOLD MEDICATION, levalbuterol, ondansetron **OR** ondansetron, senna-docusate **OR** senna-docusate         Data:      All new lab and imaging data was reviewed.   Recent Labs   Lab Test 02/25/24  0557 02/21/24  0536 02/20/24  1829 02/20/24  0452 02/19/24  0538 01/01/18  2035 12/21/17  0746   WBC  --  6.9  --  7.8 11.3*   < >  --    HGB 13.7 13.6  --  13.4 14.0   < >  --    MCV  --  92  --  91 92   < >  --    PLT  --  214  --  239 225   < >  --    INR  --   --  1.19* 1.16*  --   --  1.02    < > = values in this interval not displayed.      Recent Labs   Lab Test 02/25/24  0557 02/23/24  0550 02/22/24  0531    135 139   POTASSIUM 3.6 3.5 3.5   CHLORIDE 96* 95* 97*   CO2 30* 30* 32*   BUN 31.0* 25.5* 31.2*   CR 1.25* 1.17* 1.23*   ANIONGAP 11 10 10   DANIELITO 9.6 9.5 9.9   GLC 94 118* 99     Recent Labs   Lab Test 12/07/17  0950 04/26/16  1125   TROPI <0.015 <0.015  The 99th percentile for upper reference range is 0.045 ug/L.  Troponin values in   the range of 0.045 - 0.120 ug/L may be associated with risks of adverse   clinical events.

## 2024-02-26 NOTE — PROGRESS NOTES
Post Device Implant Instructions    Dressing:  Clean, dry, and intact  Chest XR reviewed:  Yes  Pneumo present?:  No  Lead Measurements per Device Rep:  WNL    Education Provided:  - Avoid raising left arm above the level of the shoulder for 3 weeks.  - Do not shower until outer occlusive dressing has been removed.  - Remove outer dressing in 3 - 4 days.  - Leave steri-strips in place, these will be removed at the 1 week check.   - Call Device Clinic with increased swelling, drainage, or fever > 101 degrees.   - Follow-up with the Device Clinic as scheduled.       Pt did not participate in conversation. She did not verbalize understanding of instructions or ok a follow up to be made. Called daughter. She was currently making  arrangements for her father and pt's  who passed away on Saturday. She did not have the capacity to review instructions or make appt. Just that Monday will not work. She ok'd the  and or  so made appt for the . I informed her that the instructions, restrictions and follow up appointment would be in the discharge instructions.     SANYA Lee   numerical 0-10

## 2024-02-26 NOTE — PROGRESS NOTES
[facility]    Cardiology Progress Note    Primary Cardiologist: Dr. Murillo    Date of Admission: 2/16/2024  Service Date: 02/26/24    Summary:  Ms. Elaina Winn is a very pleasant 78 year old female with a past medical history of atrial fibrillation, dementia, COPD exacerbation, autoimmune hepatitis, CKD stage III presented to hospital with A-fib RVR on 2/16/24. Failed cardioversion despite oral amiodarone. Underwent successful single-chamber pacemaker implantation with LBB area RV lead. (Medtronic, VVIR 80/120 ppm) and successful AV node ablation ----> complete AVB with no escape over 30 on 2/23.  Cardiology was consulted for atrial fibrillation with RVR.    Interval History   Patient hypotensive this morning following start of losartan. Appears euvolemic. No acute concerns overnight.     Telemetry: 100% paced, rate 70-80's    Assessment & Plan   Atrial fibrillation with RVR  Last EKG of sinus rhythm was 3/2022  Patient underwent a JIM and cardioversion 2/20/2024 and the following day reverted back to A-fib with RVR.    Digoxin was stopped after cardioversion.  She was bolused with IV amiodarone was slightly hypotensive after bolus.  - on 2/23 she underwent successful single-chamber pacemaker implantation with LBB area RV lead. (Medtronic, VVIR 80/120 ppm) and successful AV node ablation ----> complete AVB with no escape over 30.  -2/25/24 restarted apixaban 2.5 mg twice daily      2. 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  Dementia  -Patient is alert, but unable to answer questions regarding her medical history    Plan:   1. STOP losartan with hypotensive response this morning  2. Will need further consideration  for GDMT outpatient   3. Continue apixaban 2.5mg BID  4.  Will need reassessment of LV function with echo outpatient following continued escalation/optimization of GDMT, likely in 8 to 12-week  5.  Continue to defer diuretics as patient appears euvolemic  6.  Outpatient device monitoring per device clinic  7.  Chest x-ray for ongoing congestion and wheezing, reassess pleural effusion  8.  Cardiology will continue to follow      CATY Dominguez, CNP   Nurse Practitioner  [facility]  Pager: [phone]  (8am - 5pm, M-F)    Patient Active Problem List   Diagnosis    Heel pain    Advanced directives, counseling/discussion    Hyperlipidemia with target LDL less than 130    Glaucoma    Generalized OA    Dry eyes, bilateral    HTN (hypertension), benign    Anxiety    Memory loss    Elevated serum creatinine    Abdominal pain    Abnormal liver function tests    PAF (paroxysmal atrial fibrillation) (H)    Pulmonary nodule, right    Atrial fibrillation with rapid ventricular response (H)    Atrial fibrillation with RVR (H)    Hepatitis    Autoimmune hepatitis (H)       Physical Exam   Temp: 97.9  F (36.6  C) Temp src: Oral BP: (!) 82/49 Pulse: 79   Resp: 16 SpO2: 91 % O2 Device: None (Room air)    Vitals:    02/24/24 0629 02/25/24 0430 02/26/24 0240   Weight: 53.2 kg (117 lb 4.6 oz) 53.2 kg (117 lb 4.6 oz) 51.6 kg (113 lb 12.8 oz)     Vital Signs with Ranges  Temp:  [97.5  F (36.4  C)-98.3  F (36.8  C)] 97.9  F (36.6  C)  Pulse:  [79-82] 79  Resp:  [16-18] 16  BP: ()/(49-80) 82/49  SpO2:  [91 %-96 %] 91 %  I/O last 3 completed shifts:  In: -   Out: 1000 [Urine:1000]    Constitutional:  Appears her stated age, well nourished, and in no acute distress.  Eyes: Pupils equal, round. Sclerae anicteric.   HEENT: Normocephalic, atraumatic.   Neck: No JVD appreciated.  Respiratory: Breathing non-labored. Lungs clear to auscultation bilaterally. No crackles, wheezes, rhonchi, or rales.  Cardiovascular:  irregularly irregular rate and rhythm, normal S1 and S2. No murmur, rub, or gallop.  GI: Soft, non-distended, non-tender, bowel sounds present in all four quadrants.  Skin: Warm, dry. Left chest pacemaker site with dressing in place, non-tender, no bruising   Musculoskeletal/Extremities: Moves all extremities well and symmetrically. No edema  Neurologic: No gross focal deficits. Alert, awake, and oriented to person  Psychiatric: Affect appropriate. Mentation normal.    Medications      apixaban ANTICOAGULANT  2.5 mg Oral BID    bimatoprost  1 drop Both Eyes At Bedtime    brimonidine-timolol  1 drop Both Eyes BID    citalopram  10 mg Oral Daily    guaiFENesin  600 mg Oral BID    ipratropium  0.5 mg Nebulization 3 times daily    levalbuterol  0.63 mg Nebulization 3 times daily    losartan  25 mg Oral Daily    menthol-zinc oxide   Topical BID    metoprolol succinate ER  25 mg Oral Daily    mycophenolate  250 mg Oral BID IS       Data   No results found for this or any previous visit (from the past 24 hour(s)).    Recent Labs   Lab 02/25/24  0557 02/21/24  0536 02/20/24  0452   WBC  --  6.9 7.8   HGB 13.7 13.6 13.4   HCT  --  42.5 41.7   MCV  --  92 91   PLT  --  214 239     Recent Labs   Lab 02/25/24  0557 02/23/24  0550 02/22/24  0531    135 139   POTASSIUM 3.6 3.5 3.5   CHLORIDE 96* 95* 97*   CO2 30* 30* 32*   ANIONGAP 11 10 10   GLC 94 118* 99   BUN 31.0* 25.5* 31.2*   CR 1.25* 1.17* 1.23*   GFRESTIMATED 43* 47* 44*   DANIELITO 9.6 9.5 9.9        This note was completed in part using Dragon voice recognition software. Although reviewed after completion, some word and grammatical errors may occur.

## 2024-02-26 NOTE — PROGRESS NOTES
Care Management Follow Up    Length of Stay (days): 10    Expected Discharge Date: 02/26/2024     Concerns to be Addressed:       Patient plan of care discussed at interdisciplinary rounds: Yes    Anticipated Discharge Disposition:  (To be determined;)     Anticipated Discharge Services:    Anticipated Discharge DME:      Patient/family educated on Medicare website which has current facility and service quality ratings:    Education Provided on the Discharge Plan:    Patient/Family in Agreement with the Plan:      Referrals Placed by CM/SW:    Private pay costs discussed:Transportation    Additional Information:  Writer spoke with patient's daughter Tresa to discuss discharge plans and new recommendations.  Writer reviewed new recommendations for TCU as PT reassessed and feels patient will meet medical criteria/needs for TCU per extensive therapy note.  Patient's daughter states she is aware it causes her mother to become more disoriented with more moves but she is aware and in understanding that she will need continuous monitoring d/t permanent pacemaker placement. Patient's JORGE is unable to provide the appropriate level of checks that patient would need to be safe. Writer did educate that if they only wanted patient back at [facility] the next recommendation would be to hire private around the clock care to ensure patient is following precautions -- Tersa noted this is not something they can financial sustain and requested for writer to send referrals to [facility], and [facility] TCU. Writer educated that patient will need insurance authorization prior to discharge.  Tresa noted if placement is found tomorrow, she would not be available until after 3:30pm.   Writer educated we are able to set up [facility] transportation and anticipate patient would require stretcher transport d/t memory impairment and needing an attendant present in rig. Reviewed form submitted but reviewed potential costs -- Tresa  understood and is aware.   Writer also spoke about recommendations for memory care post TCU -- Tresa stated she has already been looking into this and that [address] [place] (when pt currently resides) does not have availability and that she has a tour on 2/28 with [facility]agg[facility].     SW to continue to follow.    NICKO Gallo, LGSW    [facility]

## 2024-02-26 NOTE — PLAN OF CARE
Neuro- oriented to self only  Most Recent Vitals- Temp: 97.6  F (36.4  C) Temp src: Axillary BP: 109/63 Pulse: 80   Resp: 24 SpO2: 97 % O2 Device: None (Room air)   Tele/Cardiac- SR  Resp- LS wheezy on 1 LPM, productive cough  Activity- SBA w GB  Pain- to coccyx wound, turn and repo Q2 hours  Drips- none  Drains/Tubes- PIV  Skin- R groin site CDI, CMS intact. PPM site CDI, CMS intact  GI/- voiding minimally to bathroom  Aggression Color- Green  COVID status- Negative  Plan- discharge pending BP and safe dispo plan  Misc-     Cady Zhang, RN Goal Outcome Evaluation:

## 2024-02-26 NOTE — PLAN OF CARE
"Goal Outcome Evaluation:  [id number]  Neuro- x1 forgetful   Most Recent Vitals- /78   Pulse 81   Temp 97.5  F (36.4  C) (Oral)   Resp 18   Ht 1.593 m (5' 2.72\")   Wt 51.6 kg (113 lb 12.8 oz)   SpO2 96%   BMI 20.34 kg/m    Tele/Cardiac- 100% V pacexd  Resp- RA ,occasional dry cough   Activity- sba w/ staff. Turn/reposition overnight   Pain- denies   GI/- WDL  Diet: Room Service  Snacks/Supplements Adult: Other; trials started (RD); Between Meals  Combination Diet Regular Diet; Low Saturated Fat Na <2400mg Diet    Plan- possible discharge today    "

## 2024-02-27 ENCOUNTER — APPOINTMENT (OUTPATIENT)
Dept: OCCUPATIONAL THERAPY | Facility: CLINIC | Age: 79
DRG: 242 | End: 2024-02-27
Payer: COMMERCIAL

## 2024-02-27 ENCOUNTER — APPOINTMENT (OUTPATIENT)
Dept: GENERAL RADIOLOGY | Facility: CLINIC | Age: 79
DRG: 242 | End: 2024-02-27
Attending: NURSE PRACTITIONER
Payer: COMMERCIAL

## 2024-02-27 LAB
ATRIAL RATE - MUSE: 208 BPM
BASE EXCESS BLDV CALC-SCNC: 8.5 MMOL/L (ref -3–3)
DIASTOLIC BLOOD PRESSURE - MUSE: NORMAL MMHG
ERYTHROCYTE [DISTWIDTH] IN BLOOD BY AUTOMATED COUNT: 14.6 % (ref 10–15)
HCO3 BLDV-SCNC: 34 MMOL/L (ref 21–28)
HCT VFR BLD AUTO: 38.5 % (ref 35–47)
HGB BLD-MCNC: 12.2 G/DL (ref 11.7–15.7)
INTERPRETATION ECG - MUSE: NORMAL
LACTATE SERPL-SCNC: 1.6 MMOL/L (ref 0.7–2)
MCH RBC QN AUTO: 29.4 PG (ref 26.5–33)
MCHC RBC AUTO-ENTMCNC: 31.7 G/DL (ref 31.5–36.5)
MCV RBC AUTO: 93 FL (ref 78–100)
O2/TOTAL GAS SETTING VFR VENT: 4 %
OXYHGB MFR BLDV: 60 % (ref 70–75)
P AXIS - MUSE: NORMAL DEGREES
PCO2 BLDV: 48 MM HG (ref 40–50)
PH BLDV: 7.45 [PH] (ref 7.32–7.43)
PLATELET # BLD AUTO: 281 10E3/UL (ref 150–450)
PO2 BLDV: 31 MM HG (ref 25–47)
PR INTERVAL - MUSE: NORMAL MS
QRS DURATION - MUSE: 124 MS
QT - MUSE: 452 MS
QTC - MUSE: 521 MS
R AXIS - MUSE: 35 DEGREES
RBC # BLD AUTO: 4.15 10E6/UL (ref 3.8–5.2)
SAO2 % BLDV: 61.1 % (ref 70–75)
SYSTOLIC BLOOD PRESSURE - MUSE: NORMAL MMHG
T AXIS - MUSE: -61 DEGREES
VENTRICULAR RATE- MUSE: 80 BPM
WBC # BLD AUTO: 13.9 10E3/UL (ref 4–11)

## 2024-02-27 PROCEDURE — 94640 AIRWAY INHALATION TREATMENT: CPT | Mod: 76

## 2024-02-27 PROCEDURE — 210N000002 HC R&B HEART CARE

## 2024-02-27 PROCEDURE — 250N000012 HC RX MED GY IP 250 OP 636 PS 637: Performed by: STUDENT IN AN ORGANIZED HEALTH CARE EDUCATION/TRAINING PROGRAM

## 2024-02-27 PROCEDURE — 83735 ASSAY OF MAGNESIUM: CPT | Performed by: NURSE PRACTITIONER

## 2024-02-27 PROCEDURE — 80053 COMPREHEN METABOLIC PANEL: CPT | Performed by: NURSE PRACTITIONER

## 2024-02-27 PROCEDURE — 250N000013 HC RX MED GY IP 250 OP 250 PS 637: Performed by: HOSPITALIST

## 2024-02-27 PROCEDURE — 250N000009 HC RX 250: Performed by: INTERNAL MEDICINE

## 2024-02-27 PROCEDURE — 36415 COLL VENOUS BLD VENIPUNCTURE: CPT | Performed by: NURSE PRACTITIONER

## 2024-02-27 PROCEDURE — 71045 X-RAY EXAM CHEST 1 VIEW: CPT

## 2024-02-27 PROCEDURE — 85027 COMPLETE CBC AUTOMATED: CPT | Performed by: NURSE PRACTITIONER

## 2024-02-27 PROCEDURE — 999N000157 HC STATISTIC RCP TIME EA 10 MIN

## 2024-02-27 PROCEDURE — 99232 SBSQ HOSP IP/OBS MODERATE 35: CPT | Mod: 24 | Performed by: NURSE PRACTITIONER

## 2024-02-27 PROCEDURE — 250N000013 HC RX MED GY IP 250 OP 250 PS 637: Performed by: INTERNAL MEDICINE

## 2024-02-27 PROCEDURE — 99233 SBSQ HOSP IP/OBS HIGH 50: CPT | Mod: 24 | Performed by: NURSE PRACTITIONER

## 2024-02-27 PROCEDURE — 250N000009 HC RX 250: Performed by: HOSPITALIST

## 2024-02-27 PROCEDURE — 84484 ASSAY OF TROPONIN QUANT: CPT | Performed by: NURSE PRACTITIONER

## 2024-02-27 PROCEDURE — 258N000003 HC RX IP 258 OP 636: Performed by: NURSE PRACTITIONER

## 2024-02-27 PROCEDURE — 94640 AIRWAY INHALATION TREATMENT: CPT

## 2024-02-27 PROCEDURE — 99291 CRITICAL CARE FIRST HOUR: CPT | Performed by: NURSE PRACTITIONER

## 2024-02-27 PROCEDURE — 83605 ASSAY OF LACTIC ACID: CPT | Performed by: NURSE PRACTITIONER

## 2024-02-27 PROCEDURE — 99232 SBSQ HOSP IP/OBS MODERATE 35: CPT | Performed by: HOSPITALIST

## 2024-02-27 PROCEDURE — 87040 BLOOD CULTURE FOR BACTERIA: CPT | Performed by: NURSE PRACTITIONER

## 2024-02-27 PROCEDURE — 97535 SELF CARE MNGMENT TRAINING: CPT | Mod: GO

## 2024-02-27 PROCEDURE — 84145 PROCALCITONIN (PCT): CPT | Performed by: NURSE PRACTITIONER

## 2024-02-27 PROCEDURE — 82805 BLOOD GASES W/O2 SATURATION: CPT | Performed by: NURSE PRACTITIONER

## 2024-02-27 RX ADMIN — APIXABAN 2.5 MG: 2.5 TABLET, FILM COATED ORAL at 08:01

## 2024-02-27 RX ADMIN — ANORECTAL OINTMENT: 15.7; .44; 24; 20.6 OINTMENT TOPICAL at 20:35

## 2024-02-27 RX ADMIN — SODIUM CHLORIDE 250 ML: 9 INJECTION, SOLUTION INTRAVENOUS at 23:58

## 2024-02-27 RX ADMIN — METOPROLOL SUCCINATE 12.5 MG: 25 TABLET, EXTENDED RELEASE ORAL at 08:06

## 2024-02-27 RX ADMIN — GUAIFENESIN 600 MG: 600 TABLET, EXTENDED RELEASE ORAL at 08:01

## 2024-02-27 RX ADMIN — CITALOPRAM HYDROBROMIDE 10 MG: 10 TABLET ORAL at 08:01

## 2024-02-27 RX ADMIN — BRIMONIDINE TARTRATE, TIMOLOL MALEATE 1 DROP: 2; 5 SOLUTION/ DROPS TOPICAL at 20:29

## 2024-02-27 RX ADMIN — IPRATROPIUM BROMIDE 0.5 MG: 0.5 SOLUTION RESPIRATORY (INHALATION) at 12:31

## 2024-02-27 RX ADMIN — ANORECTAL OINTMENT: 15.7; .44; 24; 20.6 OINTMENT TOPICAL at 08:02

## 2024-02-27 RX ADMIN — MYCOPHENOLATE MOFETIL 250 MG: 250 CAPSULE ORAL at 08:01

## 2024-02-27 RX ADMIN — LEVALBUTEROL HYDROCHLORIDE 0.63 MG: 1.25 SOLUTION RESPIRATORY (INHALATION) at 22:34

## 2024-02-27 RX ADMIN — MYCOPHENOLATE MOFETIL 250 MG: 250 CAPSULE ORAL at 17:44

## 2024-02-27 RX ADMIN — BIMATOPROST 1 DROP: 0.1 SOLUTION/ DROPS OPHTHALMIC at 20:47

## 2024-02-27 RX ADMIN — IPRATROPIUM BROMIDE 0.5 MG: 0.5 SOLUTION RESPIRATORY (INHALATION) at 22:35

## 2024-02-27 RX ADMIN — APIXABAN 2.5 MG: 2.5 TABLET, FILM COATED ORAL at 20:27

## 2024-02-27 RX ADMIN — LEVALBUTEROL HYDROCHLORIDE 0.63 MG: 1.25 SOLUTION RESPIRATORY (INHALATION) at 07:14

## 2024-02-27 RX ADMIN — LEVALBUTEROL HYDROCHLORIDE 0.63 MG: 1.25 SOLUTION RESPIRATORY (INHALATION) at 12:31

## 2024-02-27 RX ADMIN — BRIMONIDINE TARTRATE, TIMOLOL MALEATE 1 DROP: 2; 5 SOLUTION/ DROPS TOPICAL at 08:02

## 2024-02-27 RX ADMIN — IPRATROPIUM BROMIDE 0.5 MG: 0.5 SOLUTION RESPIRATORY (INHALATION) at 07:14

## 2024-02-27 RX ADMIN — GUAIFENESIN 600 MG: 600 TABLET, EXTENDED RELEASE ORAL at 20:27

## 2024-02-27 NOTE — PROGRESS NOTES
[facility]    Cardiology Progress Note    Primary Cardiologist: Dr. Murillo    Date of Admission: 2/16/2024  Service Date: 02/27/24    Summary:  Ms. Elaina Winn is a very pleasant 78 year old female with a past medical history of atrial fibrillation, dementia, COPD exacerbation, autoimmune hepatitis, CKD stage III presented to hospital with A-fib RVR on 2/16/24. Failed cardioversion despite oral amiodarone. Underwent successful single-chamber pacemaker implantation with LBB area RV lead. (Medtronic, VVIR 80/120 ppm) and successful AV node ablation -> complete AVB with no escape over 30 on 2/23.  Cardiology was consulted for atrial fibrillation with RVR.    Interval History   Blood pressures stabilized around 100 systolic this morning. Appears euvolemic. No acute concerns overnight. Chest x-ray done yesterday showing small bilateral pleural effusions, decreased on the right when compared to previous. On room air today with stable oxygen saturations.     Telemetry: 100% V paced, rate 70-80's    Assessment & Plan   Atrial fibrillation with RVR  -Last EKG of sinus rhythm was 3/2022  Patient underwent a JIM and cardioversion 2/20/2024 and the following day reverted back to A-fib with RVR.    -Digoxin was stopped after cardioversion.  -She was bolused with IV amiodarone was slightly hypotensive after bolus.  - on 2/23 she underwent successful single-chamber pacemaker implantation with LBB area RV lead. (Medtronic, VVIR 80/120 ppm) and successful AV node ablation ----> complete AVB with no escape over 30  -2/25/24 restarted apixaban 2.5 mg twice daily      2. 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  Dementia  -Patient is  alert, but unable to answer questions regarding her medical history    Plan:   1. Continue apixaban 2.5mg BID  2. Will need further consideration for GDMT outpatient, unable to escalate further given soft blood pressures  3. Continue to defer diuretics as patient appears euvolemic  4. Will need reassessment of LV function with echo outpatient following continued escalation/optimization of GDMT, likely in 8 to 12-week  5. Outpatient device monitoring per device clinic  6. Cardiology will sign off   7. Will arrange outpatient general cardiology follow up in 2-3 weeks        CATY Dominguez, CNP   Nurse Practitioner  [facility]  Pager: [phone]  (8am - 5pm, M-F)    Patient Active Problem List   Diagnosis    Heel pain    Advanced directives, counseling/discussion    Hyperlipidemia with target LDL less than 130    Glaucoma    Generalized OA    Dry eyes, bilateral    HTN (hypertension), benign    Anxiety    Memory loss    Elevated serum creatinine    Abdominal pain    Abnormal liver function tests    PAF (paroxysmal atrial fibrillation) (H)    Pulmonary nodule, right    Atrial fibrillation with rapid ventricular response (H)    Atrial fibrillation with RVR (H)    Hepatitis    Autoimmune hepatitis (H)       Physical Exam   Temp: 97.8  F (36.6  C) Temp src: Oral BP: 107/59 Pulse: 80   Resp: 18 SpO2: (!) 89 % O2 Device: None (Room air) Oxygen Delivery: 2 LPM  Vitals:    02/26/24 0240 02/27/24 0600 02/27/24 0645   Weight: 51.6 kg (113 lb 12.8 oz) 51 kg (112 lb 7 oz) 51.4 kg (113 lb 4.8 oz)     Vital Signs with Ranges  Temp:  [97.3  F (36.3  C)-97.9  F (36.6  C)] 97.8  F (36.6  C)  Pulse:  [80-85] 80  Resp:  [18] 18  BP: ()/(52-75) 107/59  SpO2:  [87 %-97 %] 89 %  I/O last 3 completed shifts:  In: 180 [P.O.:180]  Out: -     Constitutional:  Appears her stated age, well nourished, and in no acute distress.  Eyes: Pupils equal, round. Sclerae anicteric.   HEENT: Normocephalic, atraumatic.    Neck: No JVD appreciated.  Respiratory: Breathing non-labored. Lungs clear to auscultation bilaterally. No crackles, wheezes, rhonchi, or rales.  Cardiovascular: irregularly irregular rate and rhythm, normal S1 and S2. No murmur, rub, or gallop.  GI: Soft, non-distended, non-tender, bowel sounds present in all four quadrants.  Skin: Warm, dry. Left chest pacemaker site with dressing in place, non-tender, no bruising   Musculoskeletal/Extremities: Moves all extremities well and symmetrically. No edema  Neurologic: No gross focal deficits. Alert, awake, and oriented to person  Psychiatric: Affect appropriate. Mentation normal.    Medications      apixaban ANTICOAGULANT  2.5 mg Oral BID    bimatoprost  1 drop Both Eyes At Bedtime    brimonidine-timolol  1 drop Both Eyes BID    citalopram  10 mg Oral Daily    guaiFENesin  600 mg Oral BID    ipratropium  0.5 mg Nebulization 3 times daily    levalbuterol  0.63 mg Nebulization 3 times daily    menthol-zinc oxide   Topical BID    metoprolol succinate ER  12.5 mg Oral Daily    mycophenolate  250 mg Oral BID IS       Data   Recent Results (from the past 24 hour(s))   XR Chest 2 Views    Narrative    CHEST TWO VIEWS  2/26/2024 11:59 AM       INDICATION: Cough and congestion.  COMPARISON: 2/24/2024       Impression    IMPRESSION: Small bilateral pleural effusions, decreased on the right  when compared to previous. Stable cardiac pacemaker. Mild atelectasis  in both lung bases. Upper lungs are clear.       JAY ESCAMILLA MD         SYSTEM ID:  [id number]       Recent Labs   Lab 02/25/24  0557 02/21/24  0536   WBC  --  6.9   HGB 13.7 13.6   HCT  --  42.5   MCV  --  92   PLT  --  214     Recent Labs   Lab 02/25/24  0557 02/23/24  0550 02/22/24  0531    135 139   POTASSIUM 3.6 3.5 3.5   CHLORIDE 96* 95* 97*   CO2 30* 30* 32*   ANIONGAP 11 10 10   GLC 94 118* 99   BUN 31.0* 25.5* 31.2*   CR 1.25* 1.17* 1.23*   GFRESTIMATED 43* 47* 44*   DANIELITO 9.6 9.5 9.9        This note was  completed in part using Dragon voice recognition software. Although reviewed after completion, some word and grammatical errors may occur.

## 2024-02-27 NOTE — PLAN OF CARE
Care plan note:      Recent Vitals:  Temp: 97.8  F (36.6  C) Temp src: Oral BP: (!) 81/52 Pulse: 81   Resp: 18 SpO2: 96 % O2 Device: None (Room air)      Orientation/Neuro: Disoriented to, Place , Time, Situation  Pain: The patient is not having any pain.   Tele: Paced   IV medications: None   Mobility: St. by assist   Skin: With in normal limits   Resp: RA.  GI: WDL  : WDL     Diet: Tolerating diet:  Poor, eating less than 25%, no appetite.  Orders Placed This Encounter      Combination Diet Regular Diet; Low Saturated Fat Na <2400mg Diet      Safety/Concerns:  Fall Risk, Rodrigo Risk.  Aggression Color: Green    Plan: BP low, pt asymptomatic, Losartan discontinued.  Lung sound coarse/rhonchi, Pulmonary hygiene promoted. Appetite poor, fluids encouraged. Possible discharge to TCU tomorrow once bed available.    Continue to monitor.      Mandi Meek RN

## 2024-02-27 NOTE — PROGRESS NOTES
[facility]  Hospitalist Progress Note        Jaxson Morocho MD   02/27/2024        Interval History:        - Evaluated by therapies, rec TCU; SW following for disposition; family plan to transition to memory care after TCU  - BP was soft in 80s, this morning in 90s; Losartan d/alfredo by cardiology, will also decrease Toprol-XL to 12.5 mg daily with hold parameters         Assessment and Plan:        78 year old female with a PMH of atrial fibrillation, dementia, autoimmune hepatitis, CKD stage III who presented with A-fib RVR and also noted with likely COPD exacerbation, admitted inpatient 2/16/24.     Paroxysmal Afib RVR s/p JIM cardioversion 2/20/24 with later recurrence of Afib with RVR  S/p AV haile ablation and PPM placement 2/23/24  NSTEMI, type 2 secondary to demand  - initially had cardioversion on 2/20/24 with re-development of a fib with RVR that persisted despite IV amiodarone bolus and gtt  - then, underwent successful ablation and PPM placement on 2/23/24  - device integration noted with normal function; post PPM chest x-ray with no pneumothorax  - cardiology following; noted EF on JIM at 35% (55 to 60% on TTE 2/17/2024); was given a total of 80 mg IV Lasix on 2/24-- holding further diuresis  - has been intermittently hypotensive; cardiology had added losartan-- discontinued on 2/26  - will further decrease Toprol-XL to 12.5 mg daily (2/27) with hold parameters  - will need repeat ECHO in 3 to 6 months to reevaluate her ejection fraction; to follow up with EP RONALDO in about 3 months (scheduled per EP)  - Had not recently been PTA on anticoagulation due to previous liver dysfunction  Started on Eliquis this admission, continue Eliquis 2.5 mg BID  - continue telemetry    Acute hypoxic respiratory failure  Possible undiagnosed COPD  Acute on chronic diastolic heart failure exacerbation  New systolic CHF - suspect d/t rate control issues  - CT chest PE protocol 2/17 noted No pulmonary  emboli, Bilateral pleural effusions and Mild scattered mucous plugging  -  was noted to be wheezing in the ED requiring supplemental O2. She does have a multi year history of smoking raising suspicion for undiagnosed copd.   - Has been getting intermittent diuresis as noted above  - completed a short course of PO prednisone (2/25/24)  - continue with Atrovent and xopenex nebs; mucinex BID  - oxygen weaned down to room air (2/26) but has rattling cough, on Mucinex BID. ; ordered for Respiratory therapy assess/treat (2/26)  - encourage incentive spirometry     Amiodarone skin infiltrate 2/19  LUE had infiltrate of amiodarone. Ecchymosis noted prior, now seems resolving.   - Monitor clinically     Dementia without behavioral disturbance  -  A&Ox1-2 baseline, daughter is decision maker  - patient resides at [address] and not the memory care unit but might need to move to Memory Care; her  was her primary care giver but recently passed away  - re-evaluated by therapies, rec TCU; SW following for disposition; family plan to transition to memory care after TCU     Autoimmune hepatitis:  -mycophenolate continued, no acute issue now noted     CKD stage III  Stable, did have bump up to 1.41 on 2/18 after diuresis  - creat improved to 1.17 (2/23/24); stable around 1.2     Coccyx pressure wound PTA  - incontinence associated dermatitis, monitor with local cares.     DVT Prophylaxis: DOAC    Code Status: Full Code          Clinically Significant Risk Factors                   # Hypertension: Noted on problem list  # Acute heart failure with reduced ejection fraction: last echo with EF <40% and receiving IV diuretics        # Severe Malnutrition: based on nutrition assessment     # Pacemaker present     Disposition Plan  - seems medically stable for discharge pending stable BP with meds adjustment and disposition to TCU  - patient resides at [address] and not the memory care unit but might  "need to move to Memory Care; her  was her primary care giver but recently passed away  - re-evaluated by therapies, rec TCU; SW following for disposition; family plan to transition to memory care after TCU    Diet:   Room Service  Snacks/Supplements Adult: Other; trials started (RD); Between Meals  Combination Diet Regular Diet; Low Saturated Fat Na <2400mg Diet      Page Me (7 am to 6 pm)    Cable discussed with patient and     Tried calling her daughter and me to update but no response. I didn't leave a voicemail at this time.              Physical Exam:      Blood pressure 97/55, pulse 83, temperature 97.3  F (36.3  C), temperature source Axillary, resp. rate 18, height 1.593 m (5' 2.72\"), weight 51.4 kg (113 lb 4.8 oz), SpO2 93%, not currently breastfeeding.  Vitals:    02/26/24 0240 02/27/24 0600 02/27/24 0645   Weight: 51.6 kg (113 lb 12.8 oz) 51 kg (112 lb 7 oz) 51.4 kg (113 lb 4.8 oz)     Vital Signs with Ranges  Temp:  [97.3  F (36.3  C)-97.9  F (36.6  C)] 97.3  F (36.3  C)  Pulse:  [78-85] 83  Resp:  [16-18] 18  BP: ()/(43-75) 97/55  SpO2:  [89 %-97 %] 93 %  I/O's Last 24 hours  I/O last 3 completed shifts:  In: 180 [P.O.:180]  Out: -     Constitutional: Alert, awake and oriented X 1-2 (baseline); resting comfortably in no apparent distress       Oral cavity: Moist mucosa   Cardiovascular: Normal s1 s2, regular rate and rhythm, no murmur; left chest wall pacemaker in place   Lungs: B/l clear to auscultation, no wheezes or crepitations   Abdomen: Soft, nt, nd, no guarding, rigidity or rebound; BS +   LE : No edema   Musculoskeletal/Neuro Power 5/5 in all extremities; No focal neurological deficits noted   Psychiatry: normal mood and affect                Medications:         apixaban ANTICOAGULANT  2.5 mg Oral BID    bimatoprost  1 drop Both Eyes At Bedtime    brimonidine-timolol  1 drop Both Eyes BID    citalopram  10 mg Oral Daily    guaiFENesin  600 mg Oral BID    ipratropium  " 0.5 mg Nebulization 3 times daily    levalbuterol  0.63 mg Nebulization 3 times daily    menthol-zinc oxide   Topical BID    metoprolol succinate ER  25 mg Oral Daily    mycophenolate  250 mg Oral BID IS     PRN Meds: acetaminophen, calcium carbonate, HOLD MEDICATION, HOLD MEDICATION, HOLD MEDICATION, levalbuterol, ondansetron **OR** ondansetron, senna-docusate **OR** senna-docusate         Data:      All new lab and imaging data was reviewed.   Recent Labs   Lab Test 02/25/24  0557 02/21/24  0536 02/20/24  1829 02/20/24  0452 02/19/24  0538 01/01/18  2035 12/21/17  0746   WBC  --  6.9  --  7.8 11.3*   < >  --    HGB 13.7 13.6  --  13.4 14.0   < >  --    MCV  --  92  --  91 92   < >  --    PLT  --  214  --  239 225   < >  --    INR  --   --  1.19* 1.16*  --   --  1.02    < > = values in this interval not displayed.      Recent Labs   Lab Test 02/25/24  0557 02/23/24  0550 02/22/24  0531    135 139   POTASSIUM 3.6 3.5 3.5   CHLORIDE 96* 95* 97*   CO2 30* 30* 32*   BUN 31.0* 25.5* 31.2*   CR 1.25* 1.17* 1.23*   ANIONGAP 11 10 10   DANIELITO 9.6 9.5 9.9   GLC 94 118* 99     Recent Labs   Lab Test 12/07/17  0950 04/26/16  1125   TROPI <0.015 <0.015  The 99th percentile for upper reference range is 0.045 ug/L.  Troponin values in   the range of 0.045 - 0.120 ug/L may be associated with risks of adverse   clinical events.

## 2024-02-27 NOTE — PROGRESS NOTES
Status: Patient admitted on 2/16 with A-fib with RVR s/p failed cardioversion s/p pacemaker placement 2/23  Vitals: VSS ex patient requiring oxygen while sleeping to maintain sats above 89%, unable to wean patient off oxygen while sleeping, patient will desat to low 80's on room air  Tele: 100% v-paced   Neuros: Oriented to self only, confused, forgetful, generalized weakness, strengths 4/5 throughout  IV: PIV saline locked  Labs/Electrolytes: WNL ex creatinine 1.25  Resp/trach: Lung sounds with rhonchi and coarse crackles patient has productive cough, nebs scheduled 3x/day  Diet: Low fat Low Na diet, poor PO intake, supplements ordered  Bowel status: Last BM yesterday, BS+  : Voiding spontaneously  Skin: Generalized bruising, sacral mepilex in place, changed today, pacemaker site with dressing CDI  Pain: Denies  Activity: Up with SBA  Social: Withdrawn, flat affect, cooperative with cares  Plan: Discharge to TCU pending placement, continue to monitor and follow POC

## 2024-02-27 NOTE — PROGRESS NOTES
Care Management Follow Up    Length of Stay (days): 11    Expected Discharge Date: 02/28/2024     Concerns to be Addressed:       Patient plan of care discussed at interdisciplinary rounds: Yes    Anticipated Discharge Disposition:  (To be determined;)     Anticipated Discharge Services:    Anticipated Discharge DME:      Patient/family educated on Medicare website which has current facility and service quality ratings:    Education Provided on the Discharge Plan:    Patient/Family in Agreement with the Plan:      Referrals Placed by CM/SW:    Private pay costs discussed: Not applicable    Additional Information:  Per [facility], [facility] is considering for placement pending bed availability. Writer called and left a VM with [facility] admissions to determine when a bed is open.     Writer called [facility] and left a  with admissions to determine if they can accept for placement.     Addendum 1610: Writer met with patient and daughter, Tresa at bedside. Writer discussed status with pending referrals. Writer and Tresa discussed other options for TCU and Tresa is in agreement with [facility] and [facility] for additional referrals. Writer discussed option between a shared or private room. Daughter prefers a private room and Is aware of private costs at both TCU facilities. However, Tresa stated if a shared room is the only thing available they will take it. Referrals sent via DOD.     NICKO Adams, LGSW   Social Work   [facility]

## 2024-02-27 NOTE — PLAN OF CARE
Neuro- A/O to self only, forgetful  Most Recent Vitals- Temp: 97.3  F (36.3  C) Temp src: Axillary BP: 97/55 Pulse: 83   Resp: 18 SpO2: 97 % O2 Device: Nasal cannula   Tele/Cardiac- 100% V-paced  Resp- on 2L NC, LS coarse w/ rhonchi; very frequent weak, congested cough  Activity- SBA  Pain- denies  Drips- n/a  Drains/Tubes- PIV  Skin- trace BLE edema, mepilex on coccyx for blanchable redness  GI/- WDL  Plan- possible discharge to TCU today pending bed availability    Sosa Belle RN

## 2024-02-27 NOTE — PROGRESS NOTES
[facility]  Electrophysiology Progress Note  Date of Admission: 2/16/2024  Date of Service: 02/27/2024  Primary Cardiologist: Dr. Oliveros (general) and Dr. Murillo (EP)    Assessment & Plan   Elaina Winn is a 78 year old female with past medical history significant for atrial fibrillation, dementia, COPD, autoimmune hepatitis, CKD, stage III) admitted on 2/16/2024 with atrial fibrillation with RVR and acute hypoxic respiratory failure.     Assessment:   Persistent atrial fibrillation with RVR  Previous EKG in 3/2022 noted SR  [id number] score of 5 (age++, gender)  Eliquis 2.5 mg twice daily (adjusted for age, weight)  Status post JIM guided cardioversion on 2/20 and reverted back to AF with RVR the following day.  Digoxin was stopped after cardioversion  She was bolused with IV amiodarone and became hypotensive.    Management options included continuing on amiodarone versus undergoing an AV node ablation and PPM with patient's family.  It was opted for the latter.   Status post AV node ablation and single-chamber PPM with LBB area RV lead ([employer]tr[employer]) 2/23/2024  Chest x-ray: No pneumothorax and lead placement confirmed  Device check: WNL  Tele: 100%     Secondary cardiomyopathy  [Not on PTA diuretics]  Etiology: Thought to be tachycardia mediated in the setting of atrial fibrillation with RVR  JMI (2/20/2024): LVEF of 35% with severe biatrial enlargement  Echocardiogram (2/17/2024): Noted LVEF of 55 to 60%  Did not tolerate ARB due to hypotension  Currently on metoprolol 12.5 mg daily  Received IV Lasix 40 mg x 2 on 2/30 and 2/21 and 20 mg x 1 on 2/24  Requiring supplemental O2 while sleeping due to underlying COPD    Dementia  Alert, but confused at times.     Plan:  Follow up with echocardiogram and EP RONALDO in ~3 months. (Orders placed and messaged scheduling)  Device check 3/6/2024     Roz Hall, CATY CNP  Pager:  [phone]       Physical Exam   Temp: 97.8  F (36.6  C) Temp  src: Oral BP: 102/60 Pulse: 80   Resp: 18 SpO2: (!) 87 % O2 Device: None (Room air) Oxygen Delivery: 2 LPM  Vitals:    02/26/24 0240 02/27/24 0600 02/27/24 0645   Weight: 51.6 kg (113 lb 12.8 oz) 51 kg (112 lb 7 oz) 51.4 kg (113 lb 4.8 oz)       Constitutional:   Sleeping   Skin:   Warm and dry   Head:   Nontraumatic   Neck:   no JVD   Lungs:   scattered wheezes    Cardiovascular:   Paced rhythm   Abdomen:   Benign    Extremities and Back:   No edema   Neurological:   Grossly nonfocal     Medications      apixaban ANTICOAGULANT  2.5 mg Oral BID    bimatoprost  1 drop Both Eyes At Bedtime    brimonidine-timolol  1 drop Both Eyes BID    citalopram  10 mg Oral Daily    guaiFENesin  600 mg Oral BID    ipratropium  0.5 mg Nebulization 3 times daily    levalbuterol  0.63 mg Nebulization 3 times daily    menthol-zinc oxide   Topical BID    metoprolol succinate ER  12.5 mg Oral Daily    mycophenolate  250 mg Oral BID IS       Code Status    Full Code    Allergies   Allergies   Allergen Reactions    Adhesive Tape     Zocor [Simvastatin - High Dose]      Elevated LFTs

## 2024-02-27 NOTE — PROGRESS NOTES
SPIRITUAL HEALTH SERVICES  SPIRITUAL ASSESSMENT Progress Note  [facility]     REFERRAL SOURCE: Routine Consult    Attempted to visit throughout the day. Elaina was sleeping or receiving cares each attempt.     PLAN: Triaged for follow up tomorrow.    Adela Velez  Associate      [facility] available 24/7 for emergent requests/referrals, either by paging the on-call  or by entering an ASAP/STAT consult in Epic (this will also page the on-call ).

## 2024-02-28 ENCOUNTER — APPOINTMENT (OUTPATIENT)
Dept: GENERAL RADIOLOGY | Facility: CLINIC | Age: 79
DRG: 242 | End: 2024-02-28
Attending: NURSE PRACTITIONER
Payer: COMMERCIAL

## 2024-02-28 ENCOUNTER — APPOINTMENT (OUTPATIENT)
Dept: CARDIOLOGY | Facility: CLINIC | Age: 79
DRG: 242 | End: 2024-02-28
Attending: INTERNAL MEDICINE
Payer: COMMERCIAL

## 2024-02-28 LAB
ALBUMIN SERPL BCG-MCNC: 2.9 G/DL (ref 3.5–5.2)
ALBUMIN UR-MCNC: 30 MG/DL
ALP SERPL-CCNC: 101 U/L (ref 40–150)
ALT SERPL W P-5'-P-CCNC: 7 U/L (ref 0–50)
ANION GAP SERPL CALCULATED.3IONS-SCNC: 10 MMOL/L (ref 7–15)
ANION GAP SERPL CALCULATED.3IONS-SCNC: 11 MMOL/L (ref 7–15)
APPEARANCE UR: CLEAR
AST SERPL W P-5'-P-CCNC: 12 U/L (ref 0–45)
BILIRUB SERPL-MCNC: 0.9 MG/DL
BILIRUB UR QL STRIP: NEGATIVE
BUN SERPL-MCNC: 37.7 MG/DL (ref 8–23)
BUN SERPL-MCNC: 39.1 MG/DL (ref 8–23)
CALCIUM SERPL-MCNC: 9.1 MG/DL (ref 8.8–10.2)
CALCIUM SERPL-MCNC: 9.3 MG/DL (ref 8.8–10.2)
CHLORIDE SERPL-SCNC: 101 MMOL/L (ref 98–107)
CHLORIDE SERPL-SCNC: 97 MMOL/L (ref 98–107)
COLOR UR AUTO: YELLOW
CREAT SERPL-MCNC: 1.31 MG/DL (ref 0.51–0.95)
CREAT SERPL-MCNC: 1.4 MG/DL (ref 0.51–0.95)
DEPRECATED HCO3 PLAS-SCNC: 27 MMOL/L (ref 22–29)
DEPRECATED HCO3 PLAS-SCNC: 28 MMOL/L (ref 22–29)
EGFRCR SERPLBLD CKD-EPI 2021: 38 ML/MIN/1.73M2
EGFRCR SERPLBLD CKD-EPI 2021: 41 ML/MIN/1.73M2
ERYTHROCYTE [DISTWIDTH] IN BLOOD BY AUTOMATED COUNT: 14.6 % (ref 10–15)
GLUCOSE SERPL-MCNC: 119 MG/DL (ref 70–99)
GLUCOSE SERPL-MCNC: 161 MG/DL (ref 70–99)
GLUCOSE UR STRIP-MCNC: NEGATIVE MG/DL
HCT VFR BLD AUTO: 37.5 % (ref 35–47)
HGB BLD-MCNC: 11.9 G/DL (ref 11.7–15.7)
HGB UR QL STRIP: NEGATIVE
HYALINE CASTS: 1 /LPF
KETONES UR STRIP-MCNC: ABNORMAL MG/DL
LEUKOCYTE ESTERASE UR QL STRIP: ABNORMAL
LVEF ECHO: NORMAL
MAGNESIUM SERPL-MCNC: 1.9 MG/DL (ref 1.7–2.3)
MCH RBC QN AUTO: 29.5 PG (ref 26.5–33)
MCHC RBC AUTO-ENTMCNC: 31.7 G/DL (ref 31.5–36.5)
MCV RBC AUTO: 93 FL (ref 78–100)
MUCOUS THREADS #/AREA URNS LPF: PRESENT /LPF
NITRATE UR QL: NEGATIVE
PH UR STRIP: 5.5 [PH] (ref 5–7)
PLATELET # BLD AUTO: 244 10E3/UL (ref 150–450)
POTASSIUM SERPL-SCNC: 3.7 MMOL/L (ref 3.4–5.3)
POTASSIUM SERPL-SCNC: 3.9 MMOL/L (ref 3.4–5.3)
PROCALCITONIN SERPL IA-MCNC: 0.14 NG/ML
PROT SERPL-MCNC: 5.7 G/DL (ref 6.4–8.3)
RBC # BLD AUTO: 4.04 10E6/UL (ref 3.8–5.2)
RBC URINE: 1 /HPF
SODIUM SERPL-SCNC: 136 MMOL/L (ref 135–145)
SODIUM SERPL-SCNC: 138 MMOL/L (ref 135–145)
SP GR UR STRIP: 1.03 (ref 1–1.03)
SQUAMOUS EPITHELIAL: 1 /HPF
TROPONIN T SERPL HS-MCNC: 106 NG/L
TROPONIN T SERPL HS-MCNC: 143 NG/L
UROBILINOGEN UR STRIP-MCNC: 2 MG/DL
WBC # BLD AUTO: 12.8 10E3/UL (ref 4–11)
WBC URINE: 14 /HPF

## 2024-02-28 PROCEDURE — 250N000009 HC RX 250: Performed by: INTERNAL MEDICINE

## 2024-02-28 PROCEDURE — 250N000013 HC RX MED GY IP 250 OP 250 PS 637: Performed by: HOSPITALIST

## 2024-02-28 PROCEDURE — 87086 URINE CULTURE/COLONY COUNT: CPT | Performed by: NURSE PRACTITIONER

## 2024-02-28 PROCEDURE — 36415 COLL VENOUS BLD VENIPUNCTURE: CPT | Performed by: NURSE PRACTITIONER

## 2024-02-28 PROCEDURE — 94640 AIRWAY INHALATION TREATMENT: CPT

## 2024-02-28 PROCEDURE — 250N000011 HC RX IP 250 OP 636: Performed by: NURSE PRACTITIONER

## 2024-02-28 PROCEDURE — 999N000040 HC STATISTIC CONSULT NO CHARGE VASC ACCESS

## 2024-02-28 PROCEDURE — 250N000009 HC RX 250: Performed by: HOSPITALIST

## 2024-02-28 PROCEDURE — 250N000013 HC RX MED GY IP 250 OP 250 PS 637: Performed by: INTERNAL MEDICINE

## 2024-02-28 PROCEDURE — 255N000002 HC RX 255 OP 636: Performed by: HOSPITALIST

## 2024-02-28 PROCEDURE — 93325 DOPPLER ECHO COLOR FLOW MAPG: CPT | Mod: 26 | Performed by: INTERNAL MEDICINE

## 2024-02-28 PROCEDURE — P9047 ALBUMIN (HUMAN), 25%, 50ML: HCPCS | Performed by: NURSE PRACTITIONER

## 2024-02-28 PROCEDURE — 999N000128 HC STATISTIC PERIPHERAL IV START W/O US GUIDANCE

## 2024-02-28 PROCEDURE — 99232 SBSQ HOSP IP/OBS MODERATE 35: CPT | Mod: 24 | Performed by: NURSE PRACTITIONER

## 2024-02-28 PROCEDURE — 99291 CRITICAL CARE FIRST HOUR: CPT | Performed by: NURSE PRACTITIONER

## 2024-02-28 PROCEDURE — 999N000157 HC STATISTIC RCP TIME EA 10 MIN

## 2024-02-28 PROCEDURE — 94640 AIRWAY INHALATION TREATMENT: CPT | Mod: 76

## 2024-02-28 PROCEDURE — 258N000003 HC RX IP 258 OP 636: Performed by: NURSE PRACTITIONER

## 2024-02-28 PROCEDURE — 84484 ASSAY OF TROPONIN QUANT: CPT | Performed by: NURSE PRACTITIONER

## 2024-02-28 PROCEDURE — 71045 X-RAY EXAM CHEST 1 VIEW: CPT

## 2024-02-28 PROCEDURE — 85027 COMPLETE CBC AUTOMATED: CPT | Performed by: NURSE PRACTITIONER

## 2024-02-28 PROCEDURE — 250N000012 HC RX MED GY IP 250 OP 636 PS 637: Performed by: STUDENT IN AN ORGANIZED HEALTH CARE EDUCATION/TRAINING PROGRAM

## 2024-02-28 PROCEDURE — 210N000001 HC R&B IMCU HEART CARE

## 2024-02-28 PROCEDURE — 99233 SBSQ HOSP IP/OBS HIGH 50: CPT | Performed by: INTERNAL MEDICINE

## 2024-02-28 PROCEDURE — 81001 URINALYSIS AUTO W/SCOPE: CPT | Performed by: NURSE PRACTITIONER

## 2024-02-28 PROCEDURE — 80048 BASIC METABOLIC PNL TOTAL CA: CPT | Performed by: NURSE PRACTITIONER

## 2024-02-28 PROCEDURE — 93308 TTE F-UP OR LMTD: CPT | Mod: 26 | Performed by: INTERNAL MEDICINE

## 2024-02-28 PROCEDURE — 93321 DOPPLER ECHO F-UP/LMTD STD: CPT | Mod: 26 | Performed by: INTERNAL MEDICINE

## 2024-02-28 PROCEDURE — 93325 DOPPLER ECHO COLOR FLOW MAPG: CPT

## 2024-02-28 RX ORDER — PIPERACILLIN SODIUM, TAZOBACTAM SODIUM 3; .375 G/15ML; G/15ML
3.38 INJECTION, POWDER, LYOPHILIZED, FOR SOLUTION INTRAVENOUS EVERY 6 HOURS
Status: DISCONTINUED | OUTPATIENT
Start: 2024-02-28 | End: 2024-02-29

## 2024-02-28 RX ORDER — PIPERACILLIN SODIUM, TAZOBACTAM SODIUM 2; .25 G/10ML; G/10ML
2.25 INJECTION, POWDER, LYOPHILIZED, FOR SOLUTION INTRAVENOUS EVERY 6 HOURS
Status: DISCONTINUED | OUTPATIENT
Start: 2024-02-28 | End: 2024-02-28

## 2024-02-28 RX ORDER — ALBUMIN (HUMAN) 12.5 G/50ML
25 SOLUTION INTRAVENOUS ONCE
Status: COMPLETED | OUTPATIENT
Start: 2024-02-28 | End: 2024-02-28

## 2024-02-28 RX ORDER — ALBUMIN (HUMAN) 12.5 G/50ML
12.5 SOLUTION INTRAVENOUS ONCE
Status: COMPLETED | OUTPATIENT
Start: 2024-02-28 | End: 2024-02-28

## 2024-02-28 RX ORDER — SODIUM CHLORIDE 9 MG/ML
INJECTION, SOLUTION INTRAVENOUS CONTINUOUS
Status: DISCONTINUED | OUTPATIENT
Start: 2024-02-28 | End: 2024-02-29

## 2024-02-28 RX ORDER — MIDODRINE HYDROCHLORIDE 5 MG/1
5 TABLET ORAL
Status: DISCONTINUED | OUTPATIENT
Start: 2024-02-28 | End: 2024-03-01

## 2024-02-28 RX ADMIN — IPRATROPIUM BROMIDE 0.5 MG: 0.5 SOLUTION RESPIRATORY (INHALATION) at 08:32

## 2024-02-28 RX ADMIN — LEVALBUTEROL HYDROCHLORIDE 0.63 MG: 1.25 SOLUTION RESPIRATORY (INHALATION) at 08:33

## 2024-02-28 RX ADMIN — APIXABAN 2.5 MG: 2.5 TABLET, FILM COATED ORAL at 11:33

## 2024-02-28 RX ADMIN — PIPERACILLIN AND TAZOBACTAM 3.38 G: 3; .375 INJECTION, POWDER, FOR SOLUTION INTRAVENOUS at 20:08

## 2024-02-28 RX ADMIN — SODIUM CHLORIDE 250 ML: 9 INJECTION, SOLUTION INTRAVENOUS at 12:08

## 2024-02-28 RX ADMIN — SODIUM CHLORIDE 250 ML: 9 INJECTION, SOLUTION INTRAVENOUS at 01:01

## 2024-02-28 RX ADMIN — MYCOPHENOLATE MOFETIL 250 MG: 250 CAPSULE ORAL at 11:33

## 2024-02-28 RX ADMIN — ANORECTAL OINTMENT: 15.7; .44; 24; 20.6 OINTMENT TOPICAL at 21:51

## 2024-02-28 RX ADMIN — GUAIFENESIN 600 MG: 600 TABLET, EXTENDED RELEASE ORAL at 20:03

## 2024-02-28 RX ADMIN — PIPERACILLIN AND TAZOBACTAM 2.25 G: 2; .25 INJECTION, POWDER, FOR SOLUTION INTRAVENOUS at 02:05

## 2024-02-28 RX ADMIN — BIMATOPROST 1 DROP: 0.1 SOLUTION/ DROPS OPHTHALMIC at 21:43

## 2024-02-28 RX ADMIN — CITALOPRAM HYDROBROMIDE 10 MG: 10 TABLET ORAL at 11:33

## 2024-02-28 RX ADMIN — SODIUM CHLORIDE 250 ML: 9 INJECTION, SOLUTION INTRAVENOUS at 10:53

## 2024-02-28 RX ADMIN — IPRATROPIUM BROMIDE 0.5 MG: 0.5 SOLUTION RESPIRATORY (INHALATION) at 12:36

## 2024-02-28 RX ADMIN — ALBUMIN HUMAN 12.5 G: 0.25 SOLUTION INTRAVENOUS at 11:18

## 2024-02-28 RX ADMIN — LEVALBUTEROL HYDROCHLORIDE 0.63 MG: 1.25 SOLUTION RESPIRATORY (INHALATION) at 12:36

## 2024-02-28 RX ADMIN — MIDODRINE HYDROCHLORIDE 5 MG: 5 TABLET ORAL at 14:17

## 2024-02-28 RX ADMIN — MIDODRINE HYDROCHLORIDE 5 MG: 5 TABLET ORAL at 18:34

## 2024-02-28 RX ADMIN — SODIUM CHLORIDE: 9 INJECTION, SOLUTION INTRAVENOUS at 13:37

## 2024-02-28 RX ADMIN — APIXABAN 2.5 MG: 2.5 TABLET, FILM COATED ORAL at 21:43

## 2024-02-28 RX ADMIN — ALBUMIN HUMAN 25 G: 0.25 SOLUTION INTRAVENOUS at 10:12

## 2024-02-28 RX ADMIN — GUAIFENESIN 600 MG: 600 TABLET, EXTENDED RELEASE ORAL at 11:32

## 2024-02-28 RX ADMIN — IPRATROPIUM BROMIDE 0.5 MG: 0.5 SOLUTION RESPIRATORY (INHALATION) at 20:06

## 2024-02-28 RX ADMIN — BRIMONIDINE TARTRATE, TIMOLOL MALEATE 1 DROP: 2; 5 SOLUTION/ DROPS TOPICAL at 10:43

## 2024-02-28 RX ADMIN — PIPERACILLIN AND TAZOBACTAM 2.25 G: 2; .25 INJECTION, POWDER, FOR SOLUTION INTRAVENOUS at 10:33

## 2024-02-28 RX ADMIN — HUMAN ALBUMIN MICROSPHERES AND PERFLUTREN 3 ML: 10; .22 INJECTION, SOLUTION INTRAVENOUS at 08:49

## 2024-02-28 RX ADMIN — MYCOPHENOLATE MOFETIL 250 MG: 250 CAPSULE ORAL at 18:34

## 2024-02-28 RX ADMIN — PIPERACILLIN AND TAZOBACTAM 3.38 G: 3; .375 INJECTION, POWDER, FOR SOLUTION INTRAVENOUS at 16:15

## 2024-02-28 RX ADMIN — BRIMONIDINE TARTRATE, TIMOLOL MALEATE 1 DROP: 2; 5 SOLUTION/ DROPS TOPICAL at 20:24

## 2024-02-28 NOTE — PLAN OF CARE
Alert, oriented to self only. On 1-2L O2 NC. Tele: Intermittent VPacing. A2 for turns/reposition. Scott for strict I/Os. Wound to coccyx, mepilix in place/Calmoseptine ointment. NS @ 75mL/hr. L) upper extremity abscess vs boil, Gen Surg consulted. Apply ice packs, light dressings, per Gen Surg. L) PPM dressing, CDI. Plan to monitor blood pressures/UOP. Will continue w/plan of care.

## 2024-02-28 NOTE — CODE/RAPID RESPONSE
[facility]    House RONALDO RRT Note  2/27/2024   Time Called: 2220    RRT called for: Asymptomatic hypotension    Assessment & Plan     Acute, asymptomatic hypotension possibly 2/2 intravascular depletion in setting of decreased PO intake, recent diuretic therapy.  Alternatively considered cardiogenic shock, sepsis, PE  DEANA on CKD III possibly 2/2 intravascular depletion, recent diuretic therapy.  - Upon arrival, pt lying in bed with HOB at 45 degrees, awake, alert, in no overt distress, frail but benign appearing, warm, pink.  Nursing notes pt's SBP slowly downtrending this evening from 80s to now 60s (confirmed in BUE, different BP cuffs), HR remains paced in the 70s.  Pt reports no associated dizziness, lightheadedness, chest pain, abdominal pain, nausea or diaphoresis.  Pt has weak, congested, minimally productive cough, remains on 3L O2 via NC.  Pt afebrile.  Nursing notes pt with minimal PO intake.       INTERVENTIONS:  - Stat lactic acid, VBG, CBC, CMP, trop, procalcitonin, blood culture x2  - UA/UC  - Stat CXR  - Stat NICOM assessment - SVI -5.7 = NOT VOLUME RESPONSIVE  - Noted pt continues on DOAC  - Contacted pt's daughter, Tresa, to discuss above.  After extensive conversation, Tresa requests for pt to remain FULL CODE.  Tresa in agreement to transfer to ICU for vasopressor therapy and central line placement if deemed appropriate  - Discussed pt's overall clinical status with Dr. Gibbs, intensivist; greatly appreciate intensivist's expertise and review of pt's overall clinical status.  At this time, in setting of pt warm, pink, dry, mentating, reporting no associated symptoms and currently only requiring 3L O2 via NC, will judiciously trial small IVF boluses at this time to see if pt's BP improves.    - Will trial 250 ml NS bolus over 1 hour; pending response and respiratory status, may consider an additional IVF bolus    - Pt remains IM status  - Will place hold on scheduled PO  metoprolol  - Noted pt's WBC 13.9 (up from 6.9), procalcitonin 0.14, afebrile, no obvious infiltrate noted on imaging, has had cough for several days (not new); awaiting UA.  Will defer antibiotic therapy at this time as no obvious source of infection.  Noted pt immunosuppressed.    - 0045: Pt's SBP low trending upward, SBP 80s, MAPs 50s, O2 sats remain > 92% on 3L O2 via NC; will order 2nd IVF bolus 250 ml NS over 1 hour  - 0120: Pt requesting to use bathroom; able to void without difficulty.  Will initiate pt on zosyn therapy for possible infection in setting of immunocompromised status.  Could likely quickly de-escalate if no obvious source of infection noted.  Will order repeat CBC and BMP in AM    At the end of the RRT pt resting in bed, SBP slowly trending    Addendum: 0305: Updated by nursing that pt's SBP 90s, MAPs 60s; IVF bolus completed ~ 20 min ago    Discussed with and defer further cares to nursing, Dr. Gibbs, intensivist and hospitalist    Interval History     Elaina Winn is a 78 year old female who was admitted on 2/16/2024 for a-fib RVR.    Medical history significant for: A-fib, dementia, autoimmune hepatitis, CKD III, HFpEF    Code Status: Full Code    Allergies   Allergies   Allergen Reactions    Adhesive Tape     Zocor [Simvastatin - High Dose]      Elevated LFTs       Physical Exam   Vital Signs with Ranges:  Temp:  [97.3  F (36.3  C)-98.5  F (36.9  C)] 97.7  F (36.5  C)  Pulse:  [78-85] 81  Resp:  [18] 18  BP: ()/(34-64) 66/34  SpO2:  [85 %-97 %] 96 %  I/O last 3 completed shifts:  In: 480 [P.O.:480]  Out: -     Constitutional: Pt lying in bed with HOB at 45 degrees, awake, alert, in no overt distress, frail but benign appearing, warm, pink  Neck: No upper airway wheezes or stridor noted  Pulmonary: In mild respiratory distress, minimally tachypneic, mild use of chest accessory muscles, clear to auscultation bilaterally, no crackles or wheezes noted  Cardiovascular: Regular rate and  "rhythm, normal S1S2, no murmur, rub or gallop noted  GI: Soft, nondistended, nontender to palpation, no guarding or rebound tenderness noted, active bowel sounds  Skin/Integumen: Warm, dry, pink, scattered ecchymoses throughout BUE of various healing stages  Neuro: Awake, alert, clear speech, able to follow commands, moving all extremities, no obvious facial asymmetry, PERRL  Psych:  Calm  Extremities: No peripheral edema    Data     IMAGING: (X-ray/CT/MRI)   Recent Results (from the past 24 hour(s))   XR Chest Port 1 View    Narrative    EXAM: XR CHEST PORT 1 VIEW  LOCATION: [facility]  DATE: 2/27/2024    INDICATION: RRT, congested cough, new hypotension  COMPARISON: 02/26/2024      Impression    IMPRESSION: Left subclavian pacing device. Small bilateral pleural effusions, left greater than right with associated atelectasis. No pneumothorax. The heart size is stable.     VBG:  Recent Labs   Lab 02/27/24  2331   PHV 7.45*   PO2V 31   PCO2V 48   HCO3V 34*     Lactic acid:  Recent Labs   Lab 02/27/24  2331   LACT 1.6     CBC with Diff:  Recent Labs   Lab Test 02/27/24  2331 02/21/24  0536 02/20/24  1829   WBC 13.9*   < >  --    HGB 12.2   < >  --    MCV 93   < >  --       < >  --    INR  --   --  1.19*    < > = values in this interval not displayed.     Comprehensive Metabolic Panel:  Recent Labs   Lab 02/27/24  2331 02/22/24  0531 02/21/24  0536      < > 138   POTASSIUM 3.9   < > 4.1   CHLORIDE 97*   < > 97*   CO2 28   < > 29   ANIONGAP 11   < > 12   *   < > 98   BUN 39.1*   < > 33.6*   CR 1.40*   < > 1.28*   GFRESTIMATED 38*   < > 42*   DANIELITO 9.3   < > 10.2   MAG 1.9  --  2.0   PHOS  --   --  2.9   PROTTOTAL 5.7*  --   --    ALBUMIN 2.9*  --   --    BILITOTAL 0.9  --   --    ALKPHOS 101  --   --    AST 12  --   --    ALT 7  --   --     < > = values in this interval not displayed.     UA:  No results for input(s): \"COLOR\", \"APPEARANCE\", \"URINEGLC\", \"URINEBILI\", " "\"URINEKETONE\", \"SG\", \"UBLD\", \"URINEPH\", \"PROTEIN\", \"UROBILINOGEN\", \"NITRITE\", \"LEUKEST\", \"RBCU\", \"WBCU\" in the last 168 hours.    Time Spent on this Encounter   I spent 70 minutes of critical care time on the unit/floor managing the care of Elaina Winn. Upon evaluation, this patient had a high probability of imminent or life-threatening deterioration due to hypotension, which required my direct attention, intervention, and personal management. 100% of my time was spent at the bedside counseling the patient and/or coordinating care regarding services listed in this note.    CATY [facility]zman [facility]  Hospitalist-House RONALDO  Hospitalist Service  Securely message with [employer] (more info)  Text page via [facility] Paging/Directory     "

## 2024-02-28 NOTE — PROGRESS NOTES
"CLINICAL NUTRITION SERVICES - REASSESSMENT NOTE    RECOMMENDATIONS FOR MD/PROVIDER TO ORDER:   May need to consider nutrition support as intakes have been <50% x 12 days. Please consult if aligns with GOC.   Recommendations Ordered by Registered Dietitian (RD):   Liberalized diet to regular given very minimal intakes + wound healing    Malnutrition: (2/23)  % Weight Loss:  ANNE-MARIE  % Intake:  </= 50% for >/= 5 days (severe malnutrition)-- x12 days   Subcutaneous Fat Loss:  Orbital region severe depletion-- suspect partially age-related  Muscle Loss:  Temporal region mild depletion, Clavicle bone region severe depletion, Acromion bone region moderate depletion, and Scapular bone region moderate depletion-- suspect partially age-related  Fluid Retention:  Does not meet criteria-- trace BLE edema     Malnutrition Diagnosis: Severe malnutrition in the context of --  Acute illness or injury  Chronic illness or disease     EVALUATION OF PROGRESS TOWARD GOALS   Diet:  Low Saturated Fat, Na <2400mg  Supplements: Ensure Enlive w/ meals  Room Service w/ Assist    Intake/Tolerance:    - 0-50% intakes documented per flowsheets  - Ordering TID meals per [facility]  - Patient with very minimal intake yesterday 2/27 per chart review  - Currently A/O to self only. Attempted to see pt in room but active RRT was happening.    ASSESSED NUTRITION NEEDS:  Dosing Weight: 52.7 kg (2/23)  Estimated Energy Needs: 7562-9995 kcal (25-30 Kcal/Kg)  Justification: maintenance  Estimated Protein Needs: 63-79 grams protein (1.2-1.5 g pro/Kg)  Justification: hypercatabolism with acute illness  Estimated Fluid Needs: 1 mL/kcal  Justification: maintenance     NEW FINDINGS:   General: RRT called last PM for hypotension, \"possibly 2/2 intravascular depletion in setting of decreased PO intake, recent diuretic therapy.\" RRT also called this AM.    WOC following -  Wound location: Inner Gluteal Crease - healing  Wound due to: Incontinence Associated " Dermatitis (IAD)     Labs: BUN 37.7 (H), Cr 1.31 (H), GFR 41 (L)    Meds reviewed    Weight: seems stable in the past week  02/28/24 0546 52.2 kg (115 lb 1.6 oz) Standing scale   02/27/24 0645 51.4 kg (113 lb 4.8 oz) Standing scale   02/27/24 0600 51 kg (112 lb 7 oz) Bed scale   02/26/24 0240 51.6 kg (113 lb 12.8 oz) Standing scale   02/25/24 0430 53.2 kg (117 lb 4.6 oz) Bed scale   02/24/24 0629 53.2 kg (117 lb 4.6 oz) Bed scale   02/23/24 0646 52.7 kg (116 lb 3.2 oz) Standing scale   02/22/24 0425 52.4 kg (115 lb 8 oz) Standing scale     Previous Goals:   Pt to consume >50% intakes of 2 meals/day    Evaluation: Not met    Previous Nutrition Diagnosis:   Inadequate oral intake related to suspected poor appetite 2/2 dementia as evidenced by 25-50% intakes documented this admission x7 days, mild-severe fat and muscle loss    Evaluation: No change    CURRENT NUTRITION DIAGNOSIS  Inadequate oral intake related to poor appetite 2/2 dementia as evidenced by 0-50% intakes documented in the past 5 days, mild-severe fat and muscle loss      INTERVENTIONS  Recommendations / Nutrition Prescription  See above    Implementation  Liberalization of diet  Collaboration of care - discussed liberalization w/ diet via urbano de jesus/ MD  Medical Food Supplement - continue    Goals  Patient to consume >50% of TID meals and/or supplements vs nutrition support start within 48-72 hours    MONITORING AND EVALUATION:  Progress towards goals will be monitored and evaluated per protocol and Practice Guidelines    Dejah Min RD, LD  Clinical Dietitian - [facility]

## 2024-02-28 NOTE — PROGRESS NOTES
[facility]  Hospitalist Progress Note        Patt Pardo MD   02/28/2024        Interval History:        Patient is new to me today.  Chart reviewed, care assumed.    Patient was hypotensive overnight systolic blood pressure as low as 60s to 80s..  Patient was given 250 cc IV fluid bolus.  Patient was having congested cough.  Chest x-ray done showed small bilateral pleural effusions.  Pacer in place.  Left greater than right with associated atelectasis.  No pneumothorax.  Patient was started on IV Zosyn for possible pneumonia.  Toprol-XL was held.  Blood pressure improved to systolics in the 90s to 100s currently.  Stat echo ordered this morning.  Patient with poor oral intake nutrition is going to really liberalize her diet to regular today.  Patient resting comfortably in bed.  Denies any shortness of breath or chest pain.  Still having intermittent mild congested cough.  Getting a neb treatment currently.  Denies any nausea vomiting or abdominal pain.  No fevers or chills.  1 to 2 L of oxygen by nasal cannula             Assessment and Plan:        78 year old female with a PMH of atrial fibrillation, dementia, autoimmune hepatitis, CKD stage III who presented with A-fib RVR and also noted with likely COPD exacerbation, admitted inpatient 2/16/24.     Paroxysmal Afib RVR s/p JIM cardioversion 2/20/24 with later recurrence of Afib with RVR  S/p AV haile ablation and PPM placement 2/23/24  NSTEMI, type 2 secondary to demand  - initially had cardioversion on 2/20/24 with re-development of a fib with RVR that persisted despite IV amiodarone bolus and gtt  - then, underwent successful ablation and PPM placement on 2/23/24  - device integration noted with normal function; post PPM chest x-ray with no pneumothorax  - cardiology following; noted EF on JIM at 35% (55 to 60% on TTE 2/17/2024); was given a total of 80 mg IV Lasix on 2/24-- holding further diuresis  - has been intermittently hypotensive;  cardiology had added losartan-- discontinued on 2/26  - will further decrease Toprol-XL to 12.5 mg daily (2/27) with hold parameters  - will need repeat ECHO in 3 to 6 months to reevaluate her ejection fraction; to follow up with EP RONALDO in about 3 months (scheduled per EP)  - Had not recently been PTA on anticoagulation due to previous liver dysfunction  Started on Eliquis this admission, continue Eliquis 2.5 mg BID  - continue telemetry  Patient was hypotensive overnight systolic blood pressure as low as 60s to 80s..  Patient was given 250 cc IV fluid bolus.  Patient was having congested cough.  Chest x-ray done showed small bilateral pleural effusions.  Pacer in place.  Left greater than right with associated atelectasis.  No pneumothorax.  Patient was started on IV Zosyn for possible pneumonia.  Toprol-XL was held.  Blood pressure improved to systolics in the 90s to 100s currently.  Stat echo ordered on 228 morning    Cardiology signed off on 2/27/2024.  Reconsulted on 2/28  Appreciate their assistance  Off of all cardiac medications and diuretics currently due to hypotension issues    Acute hypoxic respiratory failure  Possible undiagnosed COPD  Acute on chronic diastolic heart failure exacerbation  New systolic CHF - suspect d/t rate control issues  - CT chest PE protocol 2/17 noted No pulmonary emboli, Bilateral pleural effusions and Mild scattered mucous plugging  -  was noted to be wheezing in the ED requiring supplemental O2. She does have a multi year history of smoking raising suspicion for undiagnosed copd.   - Has been getting intermittent diuresis as noted above  - completed a short course of PO prednisone (2/25/24)  - continue with Atrovent and xopenex nebs; mucinex BID  - oxygen weaned down to room air (2/26) but has rattling cough, on Mucinex BID. ; ordered for Respiratory therapy assess/treat (2/26)  - encourage incentive spirometry    Started on IV Zosyn for possible pneumonia on 2/27/2024 during  RRT for hypotension  As her WBC count went up to 13 K  WBC count on 2/28/2024 at 12.8K     Amiodarone skin infiltrate 2/19  LUE had infiltrate of amiodarone. Ecchymosis noted prior, now seems resolving.   - Monitor clinically    Malnutrition Diagnosis: Severe malnutrition in the context of --  Acute illness or injury  Chronic illness or disease    Nutrition services following      Dementia without behavioral disturbance  -  A&Ox1-2 baseline, daughter is decision maker  - patient resides at [address] and not the memory care unit but might need to move to Memory Care; her  was her primary care giver but recently passed away  - re-evaluated by therapies, rec TCU; SW following for disposition; family plan to transition to memory care after TCU     Autoimmune hepatitis:  -mycophenolate continued, no acute issue now noted     CKD stage III  Stable, did have bump up to 1.41 on 2/18 after diuresis  - creat improved to 1.17 (2/23/24); stable around 1.2     Coccyx pressure wound PTA  - incontinence associated dermatitis, monitor with local cares.     DVT Prophylaxis: DOAC    Code Status: Full Code          Clinically Significant Risk Factors                   # Hypertension: Noted on problem list  # Acute heart failure with reduced ejection fraction: last echo with EF <40% and receiving IV diuretics        # Severe Malnutrition: based on nutrition assessment     # Pacemaker present     Disposition Plan  Discharge most likely in the next 2 to 3 days if respiratory status remains stable and blood pressure also stabilizes  - patient resides at [address] and not the memory care unit but might need to move to Memory Care; her  was her primary care giver but recently passed away  - re-evaluated by therapies, rec TCU; SW following for disposition; family plan to transition to memory care after TCU    Franco Gtz updated by me about pts condition. She wants to continue current cares  "including Full Code status     Diet:   Room Service  Snacks/Supplements Adult: Other; trials started (RD); Between Meals  Combination Diet Regular Diet; Low Saturated Fat Na <2400mg Diet  Snacks/Supplements Adult: Ensure Enlive; With Meals      Patt Pardo MD   Page [phone](7AM-6PM)                Physical Exam:      Blood pressure 93/57, pulse (P) 79, temperature (P) 98.6  F (37  C), temperature source (P) Oral, resp. rate (P) 18, height 1.593 m (5' 2.72\"), weight 52.2 kg (115 lb 1.6 oz), SpO2 95%, not currently breastfeeding.  Vitals:    02/27/24 0600 02/27/24 0645 02/28/24 0546   Weight: 51 kg (112 lb 7 oz) 51.4 kg (113 lb 4.8 oz) 52.2 kg (115 lb 1.6 oz)     Vital Signs with Ranges  Temp:  [97.7  F (36.5  C)-98.6  F (37  C)] (P) 98.6  F (37  C)  Pulse:  [78-85] (P) 79  Resp:  [18] (P) 18  BP: ()/(34-64) 93/57  SpO2:  [85 %-100 %] 95 %  I/O's Last 24 hours  I/O last 3 completed shifts:  In: 540 [P.O.:540]  Out: 70 [Urine:70]    Constitutional: Alert, awake and oriented X 1-2 (baseline); resting comfortably in no apparent distress, having congested cough intermittently       Oral cavity: Moist mucosa   Cardiovascular: Normal s1 s2, regular rate and rhythm, no murmur; left chest wall pacemaker in place   Lungs: Bibasilar crackles heard on auscultation, no wheezing   Abdomen: Soft, nt, nd, no guarding, rigidity or rebound; BS +   LE : No edema   Musculoskeletal/Neuro Power 5/5 in all extremities; No focal neurological deficits noted   Psychiatry: normal mood and affect                Medications:         apixaban ANTICOAGULANT  2.5 mg Oral BID    bimatoprost  1 drop Both Eyes At Bedtime    brimonidine-timolol  1 drop Both Eyes BID    citalopram  10 mg Oral Daily    guaiFENesin  600 mg Oral BID    ipratropium  0.5 mg Nebulization 3 times daily    levalbuterol  0.63 mg Nebulization 3 times daily    menthol-zinc oxide   Topical BID    mycophenolate  250 mg Oral BID IS    piperacillin-tazobactam  2.25 g " Intravenous Q6H     PRN Meds: acetaminophen, calcium carbonate, levalbuterol, ondansetron **OR** ondansetron, senna-docusate **OR** senna-docusate         Data:      All new lab and imaging data was reviewed.   Recent Labs   Lab Test 02/28/24  0542 02/27/24  2331 02/25/24  0557 02/21/24  0536 02/20/24  1829 02/20/24  0452 01/01/18  2035 12/21/17  0746   WBC 12.8* 13.9*  --  6.9  --  7.8   < >  --    HGB 11.9 12.2 13.7 13.6  --  13.4   < >  --    MCV 93 93  --  92  --  91   < >  --     281  --  214  --  239   < >  --    INR  --   --   --   --  1.19* 1.16*  --  1.02    < > = values in this interval not displayed.      Recent Labs   Lab Test 02/28/24  0542 02/27/24  2331 02/25/24  0557    136 137   POTASSIUM 3.7 3.9 3.6   CHLORIDE 101 97* 96*   CO2 27 28 30*   BUN 37.7* 39.1* 31.0*   CR 1.31* 1.40* 1.25*   ANIONGAP 10 11 11   DANIELITO 9.1 9.3 9.6   * 161* 94     Recent Labs   Lab Test 12/07/17  0950 04/26/16  1125   TROPI <0.015 <0.015  The 99th percentile for upper reference range is 0.045 ug/L.  Troponin values in   the range of 0.045 - 0.120 ug/L may be associated with risks of adverse   clinical events.

## 2024-02-28 NOTE — PLAN OF CARE
Neuro- A/O to self only, forgetful  Most Recent Vitals- Temp: 98.2  F (36.8  C) Temp src: Oral BP: 93/57 Pulse: 79   Resp: 18 SpO2: 96 % O2 Device: Nasal cannula   Tele/Cardiac- Intermittently V-paced  Resp- on 1L NC, LS coarse w/ fine crackles in bases; frequent weak, congested cough  Activity- 1A w/ GB, turn/repo in bed  Pain- denies  Drips- n/a  Drains/Tubes- PIV  Skin- BLE trace edema, coccyx wound covered w/ mepilex  GI/- 1 BM NOC, minimal UOP this shift  Plan- continue to monitor BP, plan of care ongoing  Misc- see House RONALDO note regarding RRT for hypotension overnight    Sosa Belle RN

## 2024-02-28 NOTE — PROVIDER NOTIFICATION
MD Notification    Notified Person: MD    Notified Person Name: Grady    Notification Date/Time: 02/27/24 7:37 PM    Notification Interaction: Vocera    Purpose of Notification: BP 80/43, other VSS, pt asymptomatic. Pt has had very minimal intake today, likely dehydrated. Please advise. *[id number] SANYA Swan    Orders Received: Continue to monitor, no new orders received    Second page: 10:06 PM  BP continuing to trend down, now 66/43, pt asymptomatic. Please advise.    Orders Received: Call RRT

## 2024-02-28 NOTE — PROGRESS NOTES
[facility]    Cardiology Progress Note    Primary Cardiologist: Dr. Murillo    Date of Admission: 2/16/2024  Service Date: 02/28/24    Summary:  Ms. Elaina Winn is a very pleasant 78 year old female with a past medical history of atrial fibrillation, dementia, COPD exacerbation, autoimmune hepatitis, CKD stage III presented to hospital with A-fib RVR on 2/16/24. Failed cardioversion despite oral amiodarone. Underwent successful single-chamber pacemaker implantation with LBB area RV lead. ([employer]tr[employer], VVIR 80/120 ppm) and successful AV node ablation -> complete AVB with no escape over 30 on 2/23.  Cardiology was consulted for atrial fibrillation with RVR.    Interval History   Patient with hypotensive blood pressures overnight. Systolic readings dropped to 70's precipitating a rapid response. She was given an IV fluid bolus of 250ml and blood pressures improved to 90's systolic. Toprol XL was held. Patient is noted to have poor oral intake. Repeat limited echo is pending for today. Labs from last evening showing stable BMP and Hemoglobin.     This morning patient denies any chest pain. She appears slightly more short of breath and is on supplemental oxygen. Oxygen saturations in the high 90's.     Telemetry: 100% V paced, rate 70-80's    Assessment & Plan   Atrial fibrillation with RVR  -Last EKG of sinus rhythm was 3/2022  Patient underwent a JIM and cardioversion 2/20/2024 and the following day reverted back to A-fib with RVR.    -Digoxin was stopped after cardioversion.  -She was bolused with IV amiodarone was slightly hypotensive after bolus.  - on 2/23 she underwent successful single-chamber pacemaker implantation with LBB area RV lead. ([employer]tr[employer], VVIR 80/120 ppm) and successful AV node ablation ----> complete AVB with no escape over 30  -2/25/24 restarted apixaban 2.5 mg twice daily      2. 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  Dementia  -Patient is alert, but unable to answer questions regarding her medical history    Plan:   1. Continue apixaban 2.5mg BID  2. Will need further consideration for GDMT outpatient, unable to escalate further given soft blood pressures  3. Agree with holding metoprolol XL, consider resumption at lower dose of 12.5mg daily   4. Will follow results of repeat limited echo from today  5. Will need reassessment of LV function with echo outpatient following continued escalation/optimization of GDMT, likely in 8 to 12-week  6. Outpatient device monitoring per device clinic and outpatient follow up arranged for 3/18/24 with Eli Maya PA-C  7. Further plans and recommendations following echo results      CATY Dominguez, CNP   Nurse Practitioner  [facility]  Pager: [phone]  (8am - 5pm, M-F)    Patient Active Problem List   Diagnosis    Heel pain    Advanced directives, counseling/discussion    Hyperlipidemia with target LDL less than 130    Glaucoma    Generalized OA    Dry eyes, bilateral    HTN (hypertension), benign    Anxiety    Memory loss    Elevated serum creatinine    Abdominal pain    Abnormal liver function tests    PAF (paroxysmal atrial fibrillation) (H)    Pulmonary nodule, right    Atrial fibrillation with rapid ventricular response (H)    Atrial fibrillation with RVR (H)    Hepatitis    Autoimmune hepatitis (H)       Physical Exam   Temp: (P) 98.6  F (37  C) Temp src: (P) Oral BP: 93/57 Pulse: (P) 79   Resp: (P) 18 SpO2: 95 % O2 Device: Nasal cannula Oxygen Delivery: 2 LPM  Vitals:    02/27/24 0600 02/27/24 0645 02/28/24 0546   Weight: 51 kg (112 lb 7 oz) 51.4 kg (113 lb 4.8 oz) 52.2 kg (115  lb 1.6 oz)     Vital Signs with Ranges  Temp:  [97.7  F (36.5  C)-98.6  F (37  C)] (P) 98.6  F (37  C)  Pulse:  [78-85] (P) 79  Resp:  [18] (P) 18  BP: ()/(34-64) 93/57  SpO2:  [85 %-100 %] 95 %  I/O last 3 completed shifts:  In: 540 [P.O.:540]  Out: 70 [Urine:70]    Constitutional:  Appears her stated age, well nourished, and in no acute distress.  Eyes: Pupils equal, round. Sclerae anicteric.   HEENT: Normocephalic, atraumatic.   Neck: No JVD appreciated.  Respiratory: Breathing non-labored. Lungs clear to auscultation bilaterally. Productive, wet cough  Cardiovascular: irregularly irregular rate and rhythm, normal S1 and S2. No murmur, rub, or gallop.  GI: Soft, non-distended, non-tender, bowel sounds present in all four quadrants.  Skin: Warm, dry. Left chest pacemaker site with dressing in place, non-tender, no bruising   Musculoskeletal/Extremities: Moves all extremities well and symmetrically. No edema  Neurologic: No gross focal deficits. Alert, awake, and oriented to person  Psychiatric: Affect appropriate. Mentation normal.    Medications      apixaban ANTICOAGULANT  2.5 mg Oral BID    bimatoprost  1 drop Both Eyes At Bedtime    brimonidine-timolol  1 drop Both Eyes BID    citalopram  10 mg Oral Daily    guaiFENesin  600 mg Oral BID    ipratropium  0.5 mg Nebulization 3 times daily    levalbuterol  0.63 mg Nebulization 3 times daily    menthol-zinc oxide   Topical BID    mycophenolate  250 mg Oral BID IS    piperacillin-tazobactam  2.25 g Intravenous Q6H       Data   Recent Results (from the past 24 hour(s))   XR Chest Port 1 View    Narrative    EXAM: XR CHEST PORT 1 VIEW  LOCATION: [facility]  DATE: 2/27/2024    INDICATION: RRT, congested cough, new hypotension  COMPARISON: 02/26/2024      Impression    IMPRESSION: Left subclavian pacing device. Small bilateral pleural effusions, left greater than right with associated atelectasis. No pneumothorax. The heart size is  stable.       Recent Labs   Lab 02/28/24  0542 02/27/24  2331 02/25/24  0557   WBC 12.8* 13.9*  --    HGB 11.9 12.2 13.7   HCT 37.5 38.5  --    MCV 93 93  --     281  --      Recent Labs   Lab 02/28/24  0542 02/27/24  2331 02/25/24  0557    136 137   POTASSIUM 3.7 3.9 3.6   CHLORIDE 101 97* 96*   CO2 27 28 30*   ANIONGAP 10 11 11   * 161* 94   BUN 37.7* 39.1* 31.0*   CR 1.31* 1.40* 1.25*   GFRESTIMATED 41* 38* 43*   DANIELITO 9.1 9.3 9.6        This note was completed in part using Dragon voice recognition software. Although reviewed after completion, some word and grammatical errors may occur.

## 2024-02-28 NOTE — PLAN OF CARE
Problem: Heart Failure  Goal: Improved Oral Intake  Outcome: Not Progressing   Goal Outcome Evaluation:    Liberalized diet to regular given very minimal intakes + wound healing.    May need to consider nutrition support as intakes have been <50% x 12 days. Please consult if aligns with GOC.     Dejah Mni RD, LD  Clinical Dietitian - [facility]

## 2024-02-28 NOTE — CODE/RAPID RESPONSE
[facility]    RRT Note  2/28/2024   Time Called: 0937    RRT called for: Hypotension    Assessment & Plan   Elaina Winn is a 78 year old female with significant past medical history for dementia, atrial fibrillation, stage III chronic kidney disease and autoimmune hepatitis admitted on 02/16/2024 due to a-fib with rvr.     Asymptomatic hypotension, severe vascular depletion: RRT due to hypotension. Review is notable for 70cc of urine output over the last 24 hours. RRT overnight with great response to gentle fluid resuscitation with SBP from 69>110. Lactic, procalcitonin were normal.  Patient did respond to fluids and therefore initiated gentle fluid resuscitation with 250 cc accompanied with albumin.  Echocardiogram with evidence of significant improvement of cardiac function with EF improving from 27% to 55%.  Chest x-ray without any significant concerns of pulmonary edema or signs of acute heart failure.  With this, we will continue with fluid resuscitation with goals to maintain MAP greater than 60.    INTERVENTIONS:  -albumin 25g IV  -250 ml 0.9% NS bolus  -repeat 250ml bolus   -madalyn hose  -continue cardiac monitoring   -strict I&Os via winters catheter   -repeat albumin   -250ml bolus 0.9% NS repeat  -troponin repeat improved  -chest xray  - Niacom states 0.3%, however, she does appear to be having favorable response.       At the end of the RRT patient appears hemodynamically stable alert, did seem to become quite a bit more alert as well.  She is sitting up in bed eating denies any distress, shortness of breath, chest pain.  She is having good urine output at this time as well.  ICU transfer request has been discontinue given patient's current stability.  Will continue maintenance fluids at this time at 75 cc/h recheck labs in the a.m.    Discussed with and defer further cares to Dr. Pardo      Code Status: Full Code    Allergies   Allergies   Allergen Reactions    Adhesive Tape      Zocor [Simvastatin - High Dose]      Elevated LFTs       Physical Exam   Vital Signs with Ranges:  Temp:  [97.7  F (36.5  C)-98.6  F (37  C)] (P) 98.6  F (37  C)  Pulse:  [78-85] (P) 79  Resp:  [18] (P) 18  BP: ()/(34-64) 93/57  SpO2:  [85 %-100 %] 95 %  I/O last 3 completed shifts:  In: 540 [P.O.:540]  Out: 70 [Urine:70]    Constitutional: alert   Pulmonary: cough, rhonchi  Cardiovascular: regular, hypotensive  GI: active bowel  Skin/Integumen: left arm wound   Neuro: Alert, pleasant,  Extremities: +2 pitting edema    Data     Sinus rhythm telemetry: 80    ABG:  -  Recent Labs   Lab 02/27/24  2331   O2PER 4       Troponin:    Recent Labs   Lab Test 12/07/17  0950   TROPI <0.015       IMAGING: (X-ray/CT/MRI)   Recent Results (from the past 24 hour(s))   XR Chest Port 1 View    Narrative    EXAM: XR CHEST PORT 1 VIEW  LOCATION: [facility]  DATE: 2/27/2024    INDICATION: RRT, congested cough, new hypotension  COMPARISON: 02/26/2024      Impression    IMPRESSION: Left subclavian pacing device. Small bilateral pleural effusions, left greater than right with associated atelectasis. No pneumothorax. The heart size is stable.       CBC with Diff:  Recent Labs   Lab Test 02/28/24  0542 02/21/24  0536 02/20/24  1829   WBC 12.8*   < >  --    HGB 11.9   < >  --    MCV 93   < >  --       < >  --    INR  --   --  1.19*    < > = values in this interval not displayed.        Lactic Acid:    Lab Results   Component Value Date    LACT 1.6 02/27/2024           Comprehensive Metabolic Panel:  Recent Labs   Lab 02/28/24  0542 02/27/24  2331    136   POTASSIUM 3.7 3.9   CHLORIDE 101 97*   CO2 27 28   ANIONGAP 10 11   * 161*   BUN 37.7* 39.1*   CR 1.31* 1.40*   GFRESTIMATED 41* 38*   DANIELITO 9.1 9.3   MAG  --  1.9   PROTTOTAL  --  5.7*   ALBUMIN  --  2.9*   BILITOTAL  --  0.9   ALKPHOS  --  101   AST  --  12   ALT  --  7       INR:    Recent Labs   Lab Test 02/20/24  1829   INR 1.19*  "      D-DIMER:  No results found for: \"DIMER\"    BNP:  No results found for: \"BNP\"    UA:  Recent Labs   Lab 02/28/24  0620   COLOR Yellow   APPEARANCE Clear   URINEGLC Negative   URINEBILI Negative   URINEKETONE Trace*   SG 1.028   UBLD Negative   URINEPH 5.5   PROTEIN 30*   NITRITE Negative   LEUKEST Small*   RBCU 1   WBCU 14*         Time Spent on this Encounter   I spent 78 minutes of critical care time on the unit/floor managing the care of Elaina Winn. Upon evaluation, this patient had a high probability of imminent or life-threatening deterioration due to hypotensive, cardiogenic shock, which required my direct attention, intervention, and personal management. 100% of my time was spent at the bedside counseling the patient and/or coordinating care regarding services listed in this note.    Patricia Altman, DNP, APRN CNP   "

## 2024-02-28 NOTE — CONSULTS
SPIRITUAL HEALTH SERVICES  SPIRITUAL ASSESSMENT Consult Note  [facility]     REFERRAL SOURCE: Routine Consult for support    Introduced spiritual health services/role to Elaina. She requested prayer, which was provided. Elaina shared that she is [employer] and would appreciate a visit from Fr Pacheco for sacrament of anointing.     Addendum: Left a voicemail for daughter, Tresa, to let her know about request for  visit for anointing.    PLAN: Triaged for  visit for sacramental care. Spiritual Health remains available for support.    Adela Velez  Associate      SHS available 24/7 for emergent requests/referrals, either by paging the on-call  or by entering an ASAP/STAT consult in Epic (this will also page the on-call ).

## 2024-02-29 ENCOUNTER — APPOINTMENT (OUTPATIENT)
Dept: OCCUPATIONAL THERAPY | Facility: CLINIC | Age: 79
DRG: 242 | End: 2024-02-29
Payer: COMMERCIAL

## 2024-02-29 LAB
ANION GAP SERPL CALCULATED.3IONS-SCNC: 11 MMOL/L (ref 7–15)
BACTERIA UR CULT: NO GROWTH
BUN SERPL-MCNC: 33.7 MG/DL (ref 8–23)
CALCIUM SERPL-MCNC: 9.2 MG/DL (ref 8.8–10.2)
CHLORIDE SERPL-SCNC: 103 MMOL/L (ref 98–107)
CREAT SERPL-MCNC: 1.16 MG/DL (ref 0.51–0.95)
DEPRECATED HCO3 PLAS-SCNC: 24 MMOL/L (ref 22–29)
EGFRCR SERPLBLD CKD-EPI 2021: 47 ML/MIN/1.73M2
ERYTHROCYTE [DISTWIDTH] IN BLOOD BY AUTOMATED COUNT: 15 % (ref 10–15)
GLUCOSE SERPL-MCNC: 99 MG/DL (ref 70–99)
HCT VFR BLD AUTO: 35.8 % (ref 35–47)
HGB BLD-MCNC: 11 G/DL (ref 11.7–15.7)
MCH RBC QN AUTO: 28.7 PG (ref 26.5–33)
MCHC RBC AUTO-ENTMCNC: 30.7 G/DL (ref 31.5–36.5)
MCV RBC AUTO: 94 FL (ref 78–100)
PLATELET # BLD AUTO: 226 10E3/UL (ref 150–450)
POTASSIUM SERPL-SCNC: 3.9 MMOL/L (ref 3.4–5.3)
RBC # BLD AUTO: 3.83 10E6/UL (ref 3.8–5.2)
SODIUM SERPL-SCNC: 138 MMOL/L (ref 135–145)
WBC # BLD AUTO: 10.8 10E3/UL (ref 4–11)

## 2024-02-29 PROCEDURE — 85027 COMPLETE CBC AUTOMATED: CPT | Performed by: INTERNAL MEDICINE

## 2024-02-29 PROCEDURE — 94640 AIRWAY INHALATION TREATMENT: CPT

## 2024-02-29 PROCEDURE — 250N000013 HC RX MED GY IP 250 OP 250 PS 637: Performed by: HOSPITALIST

## 2024-02-29 PROCEDURE — 94640 AIRWAY INHALATION TREATMENT: CPT | Mod: 76

## 2024-02-29 PROCEDURE — 250N000012 HC RX MED GY IP 250 OP 636 PS 637: Performed by: STUDENT IN AN ORGANIZED HEALTH CARE EDUCATION/TRAINING PROGRAM

## 2024-02-29 PROCEDURE — 250N000013 HC RX MED GY IP 250 OP 250 PS 637: Performed by: INTERNAL MEDICINE

## 2024-02-29 PROCEDURE — 250N000009 HC RX 250: Performed by: INTERNAL MEDICINE

## 2024-02-29 PROCEDURE — 36415 COLL VENOUS BLD VENIPUNCTURE: CPT | Performed by: INTERNAL MEDICINE

## 2024-02-29 PROCEDURE — 250N000011 HC RX IP 250 OP 636: Performed by: NURSE PRACTITIONER

## 2024-02-29 PROCEDURE — 250N000009 HC RX 250: Performed by: HOSPITALIST

## 2024-02-29 PROCEDURE — 99233 SBSQ HOSP IP/OBS HIGH 50: CPT | Mod: 24 | Performed by: NURSE PRACTITIONER

## 2024-02-29 PROCEDURE — 99233 SBSQ HOSP IP/OBS HIGH 50: CPT | Performed by: INTERNAL MEDICINE

## 2024-02-29 PROCEDURE — 80048 BASIC METABOLIC PNL TOTAL CA: CPT | Performed by: INTERNAL MEDICINE

## 2024-02-29 PROCEDURE — 97110 THERAPEUTIC EXERCISES: CPT | Mod: GO

## 2024-02-29 PROCEDURE — 210N000001 HC R&B IMCU HEART CARE

## 2024-02-29 PROCEDURE — G0463 HOSPITAL OUTPT CLINIC VISIT: HCPCS

## 2024-02-29 PROCEDURE — 258N000003 HC RX IP 258 OP 636: Performed by: NURSE PRACTITIONER

## 2024-02-29 PROCEDURE — 999N000157 HC STATISTIC RCP TIME EA 10 MIN

## 2024-02-29 RX ADMIN — MYCOPHENOLATE MOFETIL 250 MG: 250 CAPSULE ORAL at 17:47

## 2024-02-29 RX ADMIN — PIPERACILLIN AND TAZOBACTAM 3.38 G: 3; .375 INJECTION, POWDER, FOR SOLUTION INTRAVENOUS at 03:26

## 2024-02-29 RX ADMIN — ANORECTAL OINTMENT: 15.7; .44; 24; 20.6 OINTMENT TOPICAL at 21:37

## 2024-02-29 RX ADMIN — BRIMONIDINE TARTRATE, TIMOLOL MALEATE 1 DROP: 2; 5 SOLUTION/ DROPS TOPICAL at 21:27

## 2024-02-29 RX ADMIN — MIDODRINE HYDROCHLORIDE 5 MG: 5 TABLET ORAL at 11:35

## 2024-02-29 RX ADMIN — ANORECTAL OINTMENT: 15.7; .44; 24; 20.6 OINTMENT TOPICAL at 09:51

## 2024-02-29 RX ADMIN — LEVALBUTEROL HYDROCHLORIDE 0.63 MG: 1.25 SOLUTION RESPIRATORY (INHALATION) at 19:39

## 2024-02-29 RX ADMIN — GUAIFENESIN 600 MG: 600 TABLET, EXTENDED RELEASE ORAL at 21:27

## 2024-02-29 RX ADMIN — IPRATROPIUM BROMIDE 0.5 MG: 0.5 SOLUTION RESPIRATORY (INHALATION) at 19:39

## 2024-02-29 RX ADMIN — GUAIFENESIN 600 MG: 600 TABLET, EXTENDED RELEASE ORAL at 09:50

## 2024-02-29 RX ADMIN — IPRATROPIUM BROMIDE 0.5 MG: 0.5 SOLUTION RESPIRATORY (INHALATION) at 07:38

## 2024-02-29 RX ADMIN — APIXABAN 2.5 MG: 2.5 TABLET, FILM COATED ORAL at 21:27

## 2024-02-29 RX ADMIN — APIXABAN 2.5 MG: 2.5 TABLET, FILM COATED ORAL at 09:50

## 2024-02-29 RX ADMIN — AMOXICILLIN AND CLAVULANATE POTASSIUM 1 TABLET: 875; 125 TABLET, FILM COATED ORAL at 21:27

## 2024-02-29 RX ADMIN — AMOXICILLIN AND CLAVULANATE POTASSIUM 1 TABLET: 875; 125 TABLET, FILM COATED ORAL at 11:35

## 2024-02-29 RX ADMIN — MYCOPHENOLATE MOFETIL 250 MG: 250 CAPSULE ORAL at 09:50

## 2024-02-29 RX ADMIN — MIDODRINE HYDROCHLORIDE 5 MG: 5 TABLET ORAL at 17:47

## 2024-02-29 RX ADMIN — LEVALBUTEROL HYDROCHLORIDE 0.63 MG: 1.25 SOLUTION RESPIRATORY (INHALATION) at 07:37

## 2024-02-29 RX ADMIN — MIDODRINE HYDROCHLORIDE 5 MG: 5 TABLET ORAL at 09:50

## 2024-02-29 RX ADMIN — IPRATROPIUM BROMIDE 0.5 MG: 0.5 SOLUTION RESPIRATORY (INHALATION) at 12:23

## 2024-02-29 RX ADMIN — BIMATOPROST 1 DROP: 0.1 SOLUTION/ DROPS OPHTHALMIC at 21:27

## 2024-02-29 RX ADMIN — BRIMONIDINE TARTRATE, TIMOLOL MALEATE 1 DROP: 2; 5 SOLUTION/ DROPS TOPICAL at 11:35

## 2024-02-29 RX ADMIN — SODIUM CHLORIDE: 9 INJECTION, SOLUTION INTRAVENOUS at 02:40

## 2024-02-29 RX ADMIN — LEVALBUTEROL HYDROCHLORIDE 0.63 MG: 1.25 SOLUTION RESPIRATORY (INHALATION) at 12:23

## 2024-02-29 RX ADMIN — CITALOPRAM HYDROBROMIDE 10 MG: 10 TABLET ORAL at 09:50

## 2024-02-29 NOTE — PROGRESS NOTES
Care Management Follow Up    Length of Stay (days): 13    Expected Discharge Date: 03/02/2024     Concerns to be Addressed:   TCU    Patient plan of care discussed at interdisciplinary rounds: Yes    Anticipated Discharge Disposition:  TCU     Anticipated Discharge Services:  rehabilitation   Anticipated Discharge DME:  N/A    Patient/family educated on Medicare website which has current facility and service quality ratings:  yes  Education Provided on the Discharge Plan:  yes  Patient/Family in Agreement with the Plan:  yes    Referrals Placed by CM/SW:  post acute facilities   Private pay costs discussed: Not applicable    Additional Information:  Writer spent time following up on pending referrals.       Call placed to [facility] on [facility] and spoke with Diane in admissions. Diane stated that they have not had a chance to review referral but will review today and call SW tomorrow with a decision.      Email sent to [facility] admissions by [facility] Team to determine status of referral.       NICKO Adams, LGSW   Social Work   [facility]

## 2024-02-29 NOTE — PROGRESS NOTES
[facility]  Hospitalist Progress Note        Patt Pardo MD   02/29/2024        Interval History:        Patient is resting in bed. Denies SOB. No dizziness. BP improved now. No other acute issues              Assessment and Plan:        78 year old female with a PMH of atrial fibrillation, dementia, autoimmune hepatitis, CKD stage III who presented with A-fib RVR and also noted with likely COPD exacerbation, admitted inpatient 2/16/24.     Paroxysmal Afib RVR s/p JIM cardioversion 2/20/24 with later recurrence of Afib with RVR  S/p AV haile ablation and PPM placement 2/23/24  NSTEMI, type 2 secondary to demand  - initially had cardioversion on 2/20/24 with re-development of a fib with RVR that persisted despite IV amiodarone bolus and gtt  - then, underwent successful ablation and PPM placement on 2/23/24  - device integration noted with normal function; post PPM chest x-ray with no pneumothorax  - cardiology following; noted EF on JIM at 35% (55 to 60% on TTE 2/17/2024); was given a total of 80 mg IV Lasix on 2/24-- holding further diuresis  - has been intermittently hypotensive; cardiology had added losartan-- discontinued on 2/26  - will further decrease Toprol-XL to 12.5 mg daily (2/27) with hold parameters  - will need repeat ECHO in 3 to 6 months to reevaluate her ejection fraction; to follow up with EP RONALDO in about 3 months (scheduled per EP)  - Had not recently been PTA on anticoagulation due to previous liver dysfunction  Started on Eliquis this admission, continue Eliquis 2.5 mg BID  - continue telemetry  Patient was hypotensive overnight systolic blood pressure as low as 60s to 80s..  Patient was given 250 cc IV fluid bolus.  Patient was having congested cough.  Chest x-ray done showed small bilateral pleural effusions.  Pacer in place.  Left greater than right with associated atelectasis.  No pneumothorax.  Patient was started on IV Zosyn for possible pneumonia.  Toprol-XL was held.   Blood pressure improved to systolics in the 90s to 100s currently.  Stat echo ordered on 228 morning showed EF improved to 55-60%    Cardiology signed off on 2/27/2024.  Reconsulted on 2/28  Appreciate their assistance  Off of all cardiac medications and diuretics currently due to hypotension issues    BP improved on 2/29/24 AM   D/alfredo IVF on 2/29  On low ose midodrine 5mg TID will change to PRN if systolic less than 90  tomorrow       Acute hypoxic respiratory failure  Possible undiagnosed COPD  Acute on chronic diastolic heart failure exacerbation  New systolic CHF - suspect d/t rate control issues  - CT chest PE protocol 2/17 noted No pulmonary emboli, Bilateral pleural effusions and Mild scattered mucous plugging  -  was noted to be wheezing in the ED requiring supplemental O2. She does have a multi year history of smoking raising suspicion for undiagnosed copd.   - Has been getting intermittent diuresis as noted above  - completed a short course of PO prednisone (2/25/24)  - continue with Atrovent and xopenex nebs; mucinex BID  - oxygen weaned down to room air (2/26) but has rattling cough, on Mucinex BID. ; ordered for Respiratory therapy assess/treat (2/26)  - encourage incentive spirometry    Started on IV Zosyn for possible pneumonia on 2/27/2024 during RRT for hypotension  As her WBC count went up to 13 K  WBC count  12.8K-10.8K on 2/29  Switched zosyn to oral augemtin for 1week course     Left forearm blood blister:  Pt pulled her IV out developed a blood blister on forearm   Seen by surgery team recommended ic packs application and Gauze dressing      Amiodarone skin infiltrate 2/19  CHANTELLEE had infiltrate of amiodarone. Ecchymosis noted prior, now seems resolving.   - Monitor clinically    Malnutrition Diagnosis: Severe malnutrition in the context of --  Acute illness or injury  Chronic illness or disease    Nutrition services following      Dementia without behavioral disturbance  -  A&Ox1-2 baseline,  "daughter is decision maker  - patient resides at [address] and not the memory care unit but might need to move to Memory Care; her  was her primary care giver but recently passed away  - re-evaluated by therapies, rec TCU; SW following for disposition; family plan to transition to memory care after TCU     Autoimmune hepatitis:  -mycophenolate continued, no acute issue now noted     CKD stage III  Stable, did have bump up to 1.41 on 2/18 after diuresis  - creat improved to 1.17 (2/23/24); stable around 1.2     Coccyx pressure wound PTA  - incontinence associated dermatitis, monitor with local cares.     DVT Prophylaxis: DOAC    Code Status: Full Code          Clinically Significant Risk Factors                   # Hypertension: Noted on problem list  # Acute heart failure with reduced ejection fraction: last echo with EF <40% and receiving IV diuretics        # Severe Malnutrition: based on nutrition assessment     # Pacemaker present     Disposition Plan  Discharge most likely in the next 2 to 3 days if respiratory status remains stable and blood pressure also stabilizes  - patient resides at [address] and not the memory care unit but might need to move to Memory Care; her  was her primary care giver but recently passed away  - re-evaluated by therapies, rec TCU; SW following for disposition; family plan to transition to memory care after TCU    Daughter Tresa updated by me about pts condition on 2/28/24 . She wants to continue current cares including Full Code status     Diet:   Room Service  Snacks/Supplements Adult: Ensure Enlive; With Meals  Combination Diet Regular Diet      Patt Pardo MD   Page [phone](7AM-6PM)                Physical Exam:      Blood pressure 110/57, pulse 79, temperature 97.9  F (36.6  C), temperature source Oral, resp. rate 18, height 1.593 m (5' 2.72\"), weight 54.1 kg (119 lb 4.3 oz), SpO2 95%, not currently breastfeeding.  Vitals:    " 02/27/24 0645 02/28/24 0546 02/29/24 0600   Weight: 51.4 kg (113 lb 4.8 oz) 52.2 kg (115 lb 1.6 oz) 54.1 kg (119 lb 4.3 oz)     Vital Signs with Ranges  Temp:  [97.9  F (36.6  C)] 97.9  F (36.6  C)  Pulse:  [79-83] 79  Resp:  [18] 18  BP: ()/(43-65) 110/57  SpO2:  [93 %-100 %] 95 %  I/O's Last 24 hours  I/O last 3 completed shifts:  In: 650 [IV Piggyback:500]  Out: 785 [Urine:785]    Constitutional: Alert, awake and oriented X 1-2 (baseline); resting comfortably in no apparent distress, having congested cough intermittently       Oral cavity: Moist mucosa   Cardiovascular: Normal s1 s2, regular rate and rhythm, no murmur; left chest wall pacemaker in place   Lungs: Bibasilar crackles heard on auscultation, no wheezing   Abdomen: Soft, nt, nd, no guarding, rigidity or rebound; BS +   LE : No edema   Musculoskeletal/Neuro Power 5/5 in all extremities; No focal neurological deficits noted   Psychiatry: normal mood and affect                Medications:         amoxicillin-clavulanate  1 tablet Oral Q12H ITALIA (08/20)    apixaban ANTICOAGULANT  2.5 mg Oral BID    bimatoprost  1 drop Both Eyes At Bedtime    brimonidine-timolol  1 drop Both Eyes BID    citalopram  10 mg Oral Daily    guaiFENesin  600 mg Oral BID    ipratropium  0.5 mg Nebulization 3 times daily    levalbuterol  0.63 mg Nebulization 3 times daily    menthol-zinc oxide   Topical BID    midodrine  5 mg Oral TID w/meals    mycophenolate  250 mg Oral BID IS     PRN Meds: acetaminophen, calcium carbonate, levalbuterol, ondansetron **OR** ondansetron, senna-docusate **OR** senna-docusate         Data:      All new lab and imaging data was reviewed.   Recent Labs   Lab Test 02/29/24  0532 02/28/24  0542 02/27/24  2331 02/21/24  0536 02/20/24  1829 02/20/24  0452 01/01/18 2035 12/21/17  0746   WBC 10.8 12.8* 13.9*   < >  --  7.8   < >  --    HGB 11.0* 11.9 12.2   < >  --  13.4   < >  --    MCV 94 93 93   < >  --  91   < >  --     244 281   < >  --  239    < >  --    INR  --   --   --   --  1.19* 1.16*  --  1.02    < > = values in this interval not displayed.      Recent Labs   Lab Test 02/29/24  0532 02/28/24  0542 02/27/24  2331    138 136   POTASSIUM 3.9 3.7 3.9   CHLORIDE 103 101 97*   CO2 24 27 28   BUN 33.7* 37.7* 39.1*   CR 1.16* 1.31* 1.40*   ANIONGAP 11 10 11   DANIELITO 9.2 9.1 9.3   GLC 99 119* 161*     Recent Labs   Lab Test 12/07/17  0950 04/26/16  1125   TROPI <0.015 <0.015  The 99th percentile for upper reference range is 0.045 ug/L.  Troponin values in   the range of 0.045 - 0.120 ug/L may be associated with risks of adverse   clinical events.

## 2024-02-29 NOTE — PROGRESS NOTES
[facility]    Cardiology Progress Note    Primary Cardiologist: Dr. Murillo    Date of Admission: 2/16/2024  Service Date: 02/29/24    Summary:  Ms. Elaina Winn is a very pleasant 78 year old female with a past medical history of atrial fibrillation, dementia, COPD exacerbation, autoimmune hepatitis, CKD stage III presented to hospital with A-fib RVR on 2/16/24. Failed cardioversion despite oral amiodarone. Underwent successful single-chamber pacemaker implantation with LBB area RV lead. ([employer]tronic, VVIR 80/120 ppm) and successful AV node ablation -> complete AVB with no escape over 30 on 2/23.  Cardiology was consulted for atrial fibrillation with RVR.    Electrophysiology team also followed while inpatient.    Interval History   Patient with stable blood pressure this morning.  Respiratory status improved however she continues to have a wet, productive cough. Off supplemental oxygen and maintaining saturations in the mid 90's.  She appears more alert and oriented as well. Denies any chest pain.     Telemetry: 100% V paced, rate 70-80's    Assessment & Plan   Atrial fibrillation with RVR  -Last EKG of sinus rhythm was 3/2022  Patient underwent a JIM and cardioversion 2/20/2024 and the following day reverted back to A-fib with RVR.    -Digoxin was stopped after cardioversion.  -She was bolused with IV amiodarone was slightly hypotensive after bolus.  - on 2/23 she underwent successful single-chamber pacemaker implantation with LBB area RV lead. (Medtronic, VVIR 80/120 ppm) and successful AV node ablation ----> complete AVB with no escape over 30  -2/25/24 restarted apixaban 2.5 mg twice daily      2. 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  Dementia  -Patient is alert, but unable to answer questions regarding her medical history    Plan:   1. Continue apixaban 2.5mg BID  2. Will not resume any beta-blocker today given historical times caused hypotension  3. Will need further consideration for GDMT outpatient once patient recovered and blood pressure is more robust  4. Reassessment of LV function with echo outpatient following continued escalation/optimization of GDMT, likely in 8 to 12-week  5. Outpatient device monitoring per device clinic and outpatient follow up arranged for 3/18/24 with Eli Maya PA-C  7. Cardiology will sign off    CATY Dominguez, CNP   Nurse Practitioner  [facility]  Pager: [phone]  (8am - 5pm, M-F)    Patient Active Problem List   Diagnosis    Heel pain    Advanced directives, counseling/discussion    Hyperlipidemia with target LDL less than 130    Glaucoma    Generalized OA    Dry eyes, bilateral    HTN (hypertension), benign    Anxiety    Memory loss    Elevated serum creatinine    Abdominal pain    Abnormal liver function tests    PAF (paroxysmal atrial fibrillation) (H)    Pulmonary nodule, right    Atrial fibrillation with rapid ventricular response (H)    Atrial fibrillation with RVR (H)    Hepatitis    Autoimmune hepatitis (H)       Physical Exam   Temp: 97.9  F (36.6  C) Temp src: Oral BP: 110/57 Pulse: 79   Resp: 18 SpO2: 97 % O2 Device: None (Room air) Oxygen Delivery: 2 LPM  Vitals:    02/27/24 0645 02/28/24 0546 02/29/24 0600   Weight: 51.4 kg (113 lb 4.8 oz) 52.2 kg (115 lb 1.6 oz) 54.1 kg (119 lb 4.3 oz)     Vital Signs with Ranges  Temp:  [97.9  F (36.6  C)] 97.9  F (36.6  C)  Pulse:  [] 79  Resp:  [18] 18  BP: ()/(40-65) 110/57  SpO2:  [93 %-100 %] 97 %  I/O last 3 completed shifts:  In: 650 [IV Piggyback:500]  Out: 785 [Urine:785]    Constitutional:  Appears her stated age, well  nourished, and in no acute distress.  Eyes: Pupils equal, round. Sclerae anicteric.   HEENT: Normocephalic, atraumatic.   Neck: No JVD appreciated.  Respiratory: Breathing non-labored. Lungs clear to auscultation bilaterally. Productive, wet cough  Cardiovascular: irregularly irregular rate and rhythm, normal S1 and S2. No murmur, rub, or gallop.  GI: Soft, non-distended, non-tender, bowel sounds present in all four quadrants.  Skin: Warm, dry. Left chest pacemaker site with dressing in place, non-tender, no bruising   Musculoskeletal/Extremities: Moves all extremities well and symmetrically. No edema  Neurologic: No gross focal deficits. Alert, awake, and oriented to person  Psychiatric: Affect appropriate. Mentation normal.    Medications      amoxicillin-clavulanate  1 tablet Oral Q12H [facility] (08/20)    apixaban ANTICOAGULANT  2.5 mg Oral BID    bimatoprost  1 drop Both Eyes At Bedtime    brimonidine-timolol  1 drop Both Eyes BID    citalopram  10 mg Oral Daily    guaiFENesin  600 mg Oral BID    ipratropium  0.5 mg Nebulization 3 times daily    levalbuterol  0.63 mg Nebulization 3 times daily    menthol-zinc oxide   Topical BID    midodrine  5 mg Oral TID w/meals    mycophenolate  250 mg Oral BID IS       Data   Recent Results (from the past 24 hour(s))   XR Chest Port 1 View    Narrative    CHEST ONE VIEW PORTABLE  2/28/2024 11:08 AM       INDICATION: Short of breath, hypotensive.    COMPARISON: 2/27/2024       Impression    IMPRESSION: Single-lead cardiac pacemaker is stable. Trace left  pleural effusion, unchanged. Mild bibasilar infiltrates, stable to  slightly improved. No new findings. No pneumothorax.    JAY ESCAMILLA MD         SYSTEM ID:  [id number]       Recent Labs   Lab 02/29/24  0532 02/28/24  0542 02/27/24  2331   WBC 10.8 12.8* 13.9*   HGB 11.0* 11.9 12.2   HCT 35.8 37.5 38.5   MCV 94 93 93    244 281     Recent Labs   Lab 02/29/24  0532 02/28/24  0542 02/27/24  2331    138 136    POTASSIUM 3.9 3.7 3.9   CHLORIDE 103 101 97*   CO2 24 27 28   ANIONGAP 11 10 11   GLC 99 119* 161*   BUN 33.7* 37.7* 39.1*   CR 1.16* 1.31* 1.40*   GFRESTIMATED 47* 41* 38*   DANIELITO 9.2 9.1 9.3        This note was completed in part using Dragon voice recognition software. Although reviewed after completion, some word and grammatical errors may occur.

## 2024-02-29 NOTE — PROGRESS NOTES
[facility] Nurse Inpatient Assessment     Consulted for: Coccyx    Summary: Pt with dementia and daughter reports poor hygiene with plans to have correction have more involvement. Tissue breakdown mild due to incontinence, no s/s of infection    Patient History (according to provider note(s):      Elaina Winn is a 78 year old female with a past medical history of atrial fibrillation, dementia, autoimmune hepatitis, CKD stage III presents to hospital with A-fib RVR.     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 Calmoseptine has shown great improvement for incontinence, however concern of pressure injury today with focused erythema over coccyx. Appears with less pressure concern over coccyx today and more incontinence concern. Will switch back to barrier cream.    Wound base: 100 % blanchable erythema with denudement.     Palpation of the wound bed: normal      Drainage: none     Description of drainage: none     Measurements (length x width x depth, in cm): 8 x 4  x  0 cm      Tunneling: N/A     Undermining: N/A  Periwound skin: Intact      Color: normal and consistent with surrounding tissue      Temperature: normal   Odor: none  Pain: denies , none  Pain interventions prior to dressing change: N/A  Treatment goal: Decrease moisture and Protection  STATUS: stable has been switching back and forth between incontinence concern vs pressure concern. Encouraged staff to position patient side to side and no pillows under coccyx.   Supplies ordered: supplies stored on unit       Treatment Plan:     Inner gluteal crease: BID  After any incontinent episode cleanse with cherry cleanse and protect and  "odilon dry wipes/washcloths.   Ensure area is completely dry by blotting and using circular motions, do not wipe as this can cause trauma to the skin   Apply thin layer of calmoseptine barrier cream. Remove only soiled paste, then reapply thin layer. If complete removal is needed use baby/mineral oil (located in pharmacy).   *Avoid pre moisten wipes.   *Avoid use of brief  *Use single covidien pad and limit number of linens underneath patient. Covidien pad can be used as incontinence pad and lift pad      Pressure Injury Prevention (PIP) Plan:  If patient is declining pressure injury prevention interventions: Explore reason why and address patient's concerns, Educate on pressure injury risk and prevention intervention(s), and If patient is still declining, document \"informed refusal\"   Mattress: Follow bed algorithm, reassess daily and order specialty mattress, if indicated.  HOB: Maintain at or below 30 degrees, unless contraindicated  Repositioning in bed: Every 1-2 hours , Left/right positioning; avoid supine, and Raise foot of bed prior to raising head of bed, to reduce patient sliding down (shear)  Heels: Keep elevated off mattress and Pillows under calves  Protective Dressing: Sacral Mepilex for prevention (#[id number]),  especially for the agitated patient   Positioning Equipment: None  Chair positioning: Chair cushion (#[id number])    If patient has a buttock pressure injury, or high risk for PI use chair cushion or SPS.  Moisture Management: Perineal cleansing /protection: Follow Incontinence Protocol, Avoid brief in bed, and Clean and dry skin folds with bathing   Under Devices: Inspect skin under all medical devices during skin inspection , Ensure tubes are stabilized without tension, and Ensure patient is not lying on medical devices or equipment when repositioned  Ask provider to discontinue device when no longer needed.      Orders: Written    RECOMMEND PRIMARY TEAM ORDER: None, at this time  Education " provided: importance of repositioning, plan of care, wound progress, Moisture management, Hygiene, and Off-loading pressure  Discussed plan of care with: Patient and Nurse  WOC nurse follow-up plan: weekly  Notify WOC if wound(s) deteriorate.  Nursing to notify the Provider(s) and re-consult the WOC Nurse if new skin concern.    DATA:     Current support surface: Standard  Standard gel/foam mattress (IsoFlex, Atmos air, etc)  Containment of urine/stool: Incontinence Protocol and Incontinent pad in bed  BMI: Body mass index is 21.32 kg/m .   Active diet order: Orders Placed This Encounter      Combination Diet Regular Diet     Output: I/O last 3 completed shifts:  In: 650 [IV Piggyback:500]  Out: 785 [Urine:785]     Labs:   Recent Labs   Lab 02/29/24  0532 02/28/24  0542 02/27/24  2331   ALBUMIN  --   --  2.9*   HGB 11.0*   < > 12.2   WBC 10.8   < > 13.9*    < > = values in this interval not displayed.     Pressure injury risk assessment:   Sensory Perception: 3-->slightly limited  Moisture: 3-->occasionally moist  Activity: 1-->bedfast  Mobility: 3-->slightly limited  Nutrition: 3-->adequate  Friction and Shear: 3-->no apparent problem  Rodrigo Score: 16    Pritesh Ozuna RN CWOCN  -Securely message with Behalf (more info) - can reach individually by name or search 'WOC Nurse' (Asif) to reach all current WOCs on duty.  WOC Office Phone: [phone]

## 2024-02-29 NOTE — PLAN OF CARE
Elaina is oriented to self and time only, disoriented to place and situation, O2 sats stable at 2LPMNC,bps soft overnight, denies pain/sob throughout shift, Tele: V Paced/ SR. Zosyn given overnightx2 as ordered, affect was calm and cooperative throughout shift, PPM dressing WDL with no pain/drainage noted at the site, winters in place for urinary retention. Will continue with POC.

## 2024-02-29 NOTE — PLAN OF CARE
Oriented to self only, pleasantly, calm, cooperative. VSS. On 2L O2 NC. Denies pain/SOB. Ax1GB/Walker. Tyler patent. Tele: SR w/intermittent pacing. LS: crackles to bases. PPM site, CDI. Compression stockings on. L) arm abscess, dressing CDI, ice packs intermittently. PO abx. Will continue w/plan of care.

## 2024-03-01 ENCOUNTER — APPOINTMENT (OUTPATIENT)
Dept: PHYSICAL THERAPY | Facility: CLINIC | Age: 79
DRG: 242 | End: 2024-03-01
Payer: COMMERCIAL

## 2024-03-01 PROCEDURE — 97530 THERAPEUTIC ACTIVITIES: CPT | Mod: GP

## 2024-03-01 PROCEDURE — 210N000001 HC R&B IMCU HEART CARE

## 2024-03-01 PROCEDURE — 250N000013 HC RX MED GY IP 250 OP 250 PS 637: Performed by: HOSPITALIST

## 2024-03-01 PROCEDURE — 250N000012 HC RX MED GY IP 250 OP 636 PS 637: Performed by: STUDENT IN AN ORGANIZED HEALTH CARE EDUCATION/TRAINING PROGRAM

## 2024-03-01 PROCEDURE — 250N000013 HC RX MED GY IP 250 OP 250 PS 637: Performed by: INTERNAL MEDICINE

## 2024-03-01 PROCEDURE — 94640 AIRWAY INHALATION TREATMENT: CPT

## 2024-03-01 PROCEDURE — 999N000157 HC STATISTIC RCP TIME EA 10 MIN

## 2024-03-01 PROCEDURE — 94640 AIRWAY INHALATION TREATMENT: CPT | Mod: 76

## 2024-03-01 PROCEDURE — 250N000009 HC RX 250: Performed by: INTERNAL MEDICINE

## 2024-03-01 PROCEDURE — 99233 SBSQ HOSP IP/OBS HIGH 50: CPT | Performed by: INTERNAL MEDICINE

## 2024-03-01 PROCEDURE — 250N000009 HC RX 250: Performed by: HOSPITALIST

## 2024-03-01 RX ORDER — MIDODRINE HYDROCHLORIDE 5 MG/1
5 TABLET ORAL 3 TIMES DAILY PRN
Status: DISCONTINUED | OUTPATIENT
Start: 2024-03-01 | End: 2024-03-01

## 2024-03-01 RX ORDER — MIDODRINE HYDROCHLORIDE 5 MG/1
5 TABLET ORAL 2 TIMES DAILY
Status: DISCONTINUED | OUTPATIENT
Start: 2024-03-01 | End: 2024-03-02 | Stop reason: HOSPADM

## 2024-03-01 RX ORDER — FUROSEMIDE 20 MG
20 TABLET ORAL ONCE
Status: COMPLETED | OUTPATIENT
Start: 2024-03-01 | End: 2024-03-01

## 2024-03-01 RX ADMIN — FUROSEMIDE 20 MG: 20 TABLET ORAL at 14:38

## 2024-03-01 RX ADMIN — LEVALBUTEROL HYDROCHLORIDE 0.63 MG: 1.25 SOLUTION RESPIRATORY (INHALATION) at 20:10

## 2024-03-01 RX ADMIN — AMOXICILLIN AND CLAVULANATE POTASSIUM 1 TABLET: 875; 125 TABLET, FILM COATED ORAL at 08:59

## 2024-03-01 RX ADMIN — BIMATOPROST 1 DROP: 0.1 SOLUTION/ DROPS OPHTHALMIC at 21:35

## 2024-03-01 RX ADMIN — ANORECTAL OINTMENT: 15.7; .44; 24; 20.6 OINTMENT TOPICAL at 10:45

## 2024-03-01 RX ADMIN — IPRATROPIUM BROMIDE 0.5 MG: 0.5 SOLUTION RESPIRATORY (INHALATION) at 20:11

## 2024-03-01 RX ADMIN — BRIMONIDINE TARTRATE, TIMOLOL MALEATE 1 DROP: 2; 5 SOLUTION/ DROPS TOPICAL at 21:35

## 2024-03-01 RX ADMIN — IPRATROPIUM BROMIDE 0.5 MG: 0.5 SOLUTION RESPIRATORY (INHALATION) at 12:22

## 2024-03-01 RX ADMIN — BRIMONIDINE TARTRATE, TIMOLOL MALEATE 1 DROP: 2; 5 SOLUTION/ DROPS TOPICAL at 09:04

## 2024-03-01 RX ADMIN — CITALOPRAM HYDROBROMIDE 10 MG: 10 TABLET ORAL at 09:00

## 2024-03-01 RX ADMIN — GUAIFENESIN 600 MG: 600 TABLET, EXTENDED RELEASE ORAL at 08:59

## 2024-03-01 RX ADMIN — MYCOPHENOLATE MOFETIL 250 MG: 250 CAPSULE ORAL at 08:59

## 2024-03-01 RX ADMIN — ANORECTAL OINTMENT: 15.7; .44; 24; 20.6 OINTMENT TOPICAL at 21:35

## 2024-03-01 RX ADMIN — LEVALBUTEROL HYDROCHLORIDE 0.63 MG: 1.25 SOLUTION RESPIRATORY (INHALATION) at 07:33

## 2024-03-01 RX ADMIN — MIDODRINE HYDROCHLORIDE 5 MG: 5 TABLET ORAL at 11:17

## 2024-03-01 RX ADMIN — MYCOPHENOLATE MOFETIL 250 MG: 250 CAPSULE ORAL at 17:56

## 2024-03-01 RX ADMIN — AMOXICILLIN AND CLAVULANATE POTASSIUM 1 TABLET: 875; 125 TABLET, FILM COATED ORAL at 21:33

## 2024-03-01 RX ADMIN — IPRATROPIUM BROMIDE 0.5 MG: 0.5 SOLUTION RESPIRATORY (INHALATION) at 07:33

## 2024-03-01 RX ADMIN — GUAIFENESIN 600 MG: 600 TABLET, EXTENDED RELEASE ORAL at 21:33

## 2024-03-01 RX ADMIN — APIXABAN 2.5 MG: 2.5 TABLET, FILM COATED ORAL at 09:04

## 2024-03-01 RX ADMIN — APIXABAN 2.5 MG: 2.5 TABLET, FILM COATED ORAL at 21:34

## 2024-03-01 RX ADMIN — LEVALBUTEROL HYDROCHLORIDE 0.63 MG: 1.25 SOLUTION RESPIRATORY (INHALATION) at 12:22

## 2024-03-01 ASSESSMENT — ACTIVITIES OF DAILY LIVING (ADL)
ADLS_ACUITY_SCORE: 37
ADLS_ACUITY_SCORE: 35
ADLS_ACUITY_SCORE: 37
ADLS_ACUITY_SCORE: 37
ADLS_ACUITY_SCORE: 35
ADLS_ACUITY_SCORE: 37
ADLS_ACUITY_SCORE: 37
ADLS_ACUITY_SCORE: 35
ADLS_ACUITY_SCORE: 35
ADLS_ACUITY_SCORE: 37
ADLS_ACUITY_SCORE: 37
ADLS_ACUITY_SCORE: 35
ADLS_ACUITY_SCORE: 35
ADLS_ACUITY_SCORE: 37
ADLS_ACUITY_SCORE: 37
ADLS_ACUITY_SCORE: 35
ADLS_ACUITY_SCORE: 37

## 2024-03-01 NOTE — PROVIDER NOTIFICATION
MD Notification    Notified Person: MD    Notified Person Name: Char    Notification Date/Time:03/01/24 10:54 AM     Notification Interaction: Vocera text    Purpose of Notification:FYBRANDON- BRANDON went to give the lasix and BP now 85/52. She has been up in the chair for over an hour, feels tired but otherwise fine. Will reassess in 30 minutes. Lasix not yet given.    Orders Received: Midodrine ordered    Comments:

## 2024-03-01 NOTE — PLAN OF CARE
Disoriented to time, denies pain. Tele remains Vpaced. Up to chair this am for couple hours, pt became hypotensive. Midodrine reordered and administered, SBP improved. Lasix x1 given with good results, tolerated well. Denies SOB but appears tachypnic with activity. Congested, productive cough somewhat improved today. Up with SBA, gb and walker. Plan to mobilize more and discharge to TCU when BP stabilizes.

## 2024-03-01 NOTE — PLAN OF CARE
"A&O to self and place. /54   Pulse 80   Temp 98.4  F (36.9  C) (Oral)   Resp 16   Ht 1.593 m (5' 2.72\")   Wt 54.5 kg (120 lb 3.2 oz)   SpO2 94%   BMI 21.49 kg/m   Tele V-paced with underlying afib. Denies pain. Q2H repo. Excoriated to coccyx. Up with A1 walker and ANNITA. Scott in place. BPs stable overnight. Weaned oxygen to RA. PAIGE hose on. Plan for TCU at discharge.   "

## 2024-03-01 NOTE — PROGRESS NOTES
Care Management Follow Up    Length of Stay (days): 14    Expected Discharge Date: 03/02/2024     Concerns to be Addressed:       Patient plan of care discussed at interdisciplinary rounds: Yes    Anticipated Discharge Disposition:  (To be determined;)     Anticipated Discharge Services:    Anticipated Discharge DME:      Patient/family educated on Medicare website which has current facility and service quality ratings:    Education Provided on the Discharge Plan:    Patient/Family in Agreement with the Plan:      Referrals Placed by CM/SW:    Private pay costs discussed: Not applicable    Additional Information:  Writer received voicemail from [facility] stating they are able to accept pt.   Writer able to see  has noted in her note that discharge would be in the next 1-2 days.   Writer spoke with care coordinator who states that writer can check with them to see if any ability to take pt over the weekend.    Writer placed phone call to [facility]. No answer. Writer left voicemail asking if they can take pt over the weekend if medically cleared. Writer requesting call back.      LINDY Gipson  Social Work  [facility]

## 2024-03-01 NOTE — PROGRESS NOTES
[facility]  Hospitalist Progress Note        Patt Pardo MD   03/01/2024        Interval History:        Patient is resting in bed. Denies SOB.  He is mildly tachypneic with diminished breath sounds in the bases.  No dizziness.  Trialed off of midodrine and blood pressure dropped to high 80s this morning.  Midodrine scheduled again 5 mg p.o. twice daily.  No other acute issues              Assessment and Plan:        78 year old female with a PMH of atrial fibrillation, dementia, autoimmune hepatitis, CKD stage III who presented with A-fib RVR and also noted with likely COPD exacerbation, admitted inpatient 2/16/24.     Paroxysmal Afib RVR s/p JIM cardioversion 2/20/24 with later recurrence of Afib with RVR  S/p AV haile ablation and PPM placement 2/23/24  NSTEMI, type 2 secondary to demand  - initially had cardioversion on 2/20/24 with re-development of a fib with RVR that persisted despite IV amiodarone bolus and gtt  - then, underwent successful ablation and PPM placement on 2/23/24  - device integration noted with normal function; post PPM chest x-ray with no pneumothorax  - cardiology following; noted EF on JIM at 35% (55 to 60% on TTE 2/17/2024); was given a total of 80 mg IV Lasix on 2/24-- holding further diuresis  - has been intermittently hypotensive; cardiology had added losartan-- discontinued on 2/26  - will further decrease Toprol-XL to 12.5 mg daily (2/27) with hold parameters  - will need repeat ECHO in 3 to 6 months to reevaluate her ejection fraction; to follow up with EP RONALDO in about 3 months (scheduled per EP)  - Had not recently been PTA on anticoagulation due to previous liver dysfunction  Started on Eliquis this admission, continue Eliquis 2.5 mg BID  - continue telemetry  Patient was hypotensive overnight systolic blood pressure as low as 60s to 80s..  Patient was given 250 cc IV fluid bolus.  Patient was having congested cough.  Chest x-ray done showed small bilateral  pleural effusions.  Pacer in place.  Left greater than right with associated atelectasis.  No pneumothorax.  Patient was started on IV Zosyn for possible pneumonia.  Toprol-XL was held.  Blood pressure improved to systolics in the 90s to 100s currently.  Stat echo ordered on 228 morning showed EF improved to 55-60%    Cardiology signed off on 2/27/2024.  Reconsulted on 2/28  Appreciate their assistance  Off of all cardiac medications and diuretics currently due to hypotension issues    BP improved on 2/29/24 AM   D/alfredo IVF on 2/29  On low ose midodrine 5mg TID started and without the midodrine blood pressure dropped to 80s on 3/1/2024 so midodrine restarted at 5 mg p.o. twice daily    Acute hypoxic respiratory failure  Possible undiagnosed COPD  Acute on chronic diastolic heart failure exacerbation  New systolic CHF - suspect d/t rate control issues  - CT chest PE protocol 2/17 noted No pulmonary emboli, Bilateral pleural effusions and Mild scattered mucous plugging  -  was noted to be wheezing in the ED requiring supplemental O2. She does have a multi year history of smoking raising suspicion for undiagnosed copd.   - Has been getting intermittent diuresis as noted above  - completed a short course of PO prednisone (2/25/24)  - continue with Atrovent and xopenex nebs; mucinex BID  - oxygen weaned down to room air (2/26) but has rattling cough, on Mucinex BID. ; ordered for Respiratory therapy assess/treat (2/26)  - encourage incentive spirometry    Started on IV Zosyn for possible pneumonia on 2/27/2024 during RRT for hypotension  As her WBC count went up to 13 K  WBC count  12.8K-10.8K on 2/29  Switched zosyn to oral augemtin for 1week course  Appears slightly tachypneic with diminished breath sounds in the bases so we will give 20 mg of p.o. Lasix one-time dose on 3/1/2024    Left forearm blood blister:  Pt pulled her IV out developed a blood blister on forearm   Seen by surgery team recommended ice packs  application and Gauze dressing      Amiodarone skin infiltrate 2/19  SYDNEY had infiltrate of amiodarone. Ecchymosis noted prior, now seems resolving.   - Monitor clinically    Malnutrition Diagnosis: Severe malnutrition in the context of --  Acute illness or injury  Chronic illness or disease    Nutrition services following      Dementia without behavioral disturbance  -  A&Ox1-2 baseline, daughter is decision maker  - patient resides at [address] and not the memory care unit but might need to move to Memory Care; her  was her primary care giver but recently passed away  - re-evaluated by therapies, rec TCU; SW following for disposition; family plan to transition to memory care after TCU     Autoimmune hepatitis:  -mycophenolate continued, no acute issue now noted     CKD stage III  Stable, did have bump up to 1.41 on 2/18 after diuresis  - creat improved to 1.17 (2/23/24); stable around 1.2     Coccyx pressure wound PTA  - incontinence associated dermatitis, monitor with local cares.     DVT Prophylaxis: DOAC    Code Status: Full Code          Clinically Significant Risk Factors                   # Hypertension: Noted on problem list  # Acute heart failure with reduced ejection fraction: last echo with EF <40% and receiving IV diuretics        # Severe Malnutrition: based on nutrition assessment     # Pacemaker present     Disposition Plan  Discharge most likely in the next 1-2 days if respiratory status remains stable and blood pressure also stabilizes  - patient resides at [address] and not the memory care unit but might need to move to Memory Care; her  was her primary care giver but recently passed away  - re-evaluated by therapies, rec TCU; SW following for disposition; family plan to transition to memory care after TCU    Franco Gtz updated by me about pts condition on 2/28/24 . She wants to continue current cares including Full Code status     Diet:   Room  "Service  Snacks/Supplements Adult: Ensure Enlive; With Meals  Combination Diet Regular Diet      Patt Pardo MD   Page [phone](7AM-6PM)                Physical Exam:      Blood pressure (!) 85/61, pulse 94, temperature 98.8  F (37.1  C), temperature source Axillary, resp. rate 18, height 1.593 m (5' 2.72\"), weight 54.5 kg (120 lb 3.2 oz), SpO2 92%, not currently breastfeeding.  Vitals:    02/28/24 0546 02/29/24 0600 03/01/24 0625   Weight: 52.2 kg (115 lb 1.6 oz) 54.1 kg (119 lb 4.3 oz) 54.5 kg (120 lb 3.2 oz)     Vital Signs with Ranges  Temp:  [97.7  F (36.5  C)-98.8  F (37.1  C)] 98.8  F (37.1  C)  Pulse:  [79-94] 94  Resp:  [16-18] 18  BP: ()/(54-83) 85/61  SpO2:  [92 %-98 %] 92 %  I/O's Last 24 hours  I/O last 3 completed shifts:  In: 240 [P.O.:240]  Out: 650 [Urine:650]    Constitutional: Alert, awake and oriented X 1-2 (baseline); resting comfortably in no apparent distress, having congested cough intermittently       Oral cavity: Moist mucosa   Cardiovascular: Normal s1 s2, regular rate and rhythm, no murmur; left chest wall pacemaker in place   Lungs: Bibasilar crackles heard on auscultation, no wheezing   Abdomen: Soft, nt, nd, no guarding, rigidity or rebound; BS +   LE : No edema   Musculoskeletal/Neuro Power 5/5 in all extremities; No focal neurological deficits noted   Psychiatry: normal mood and affect                Medications:         amoxicillin-clavulanate  1 tablet Oral [facility]H [facility] ([facility]    apixaban ANTICOAGULANT  2.5 mg Oral BID    bimatoprost  1 drop Both Eyes At Bedtime    brimonidine-timolol  1 drop Both Eyes BID    citalopram  10 mg Oral Daily    furosemide  20 mg Oral Once    guaiFENesin  600 mg Oral BID    ipratropium  0.5 mg Nebulization 3 times daily    levalbuterol  0.63 mg Nebulization 3 times daily    menthol-zinc oxide   Topical BID    midodrine  5 mg Oral BID 09 12    mycophenolate  250 mg Oral BID IS     PRN Meds: acetaminophen, calcium carbonate, levalbuterol, " ondansetron **OR** ondansetron, senna-docusate **OR** senna-docusate         Data:      All new lab and imaging data was reviewed.   Recent Labs   Lab Test 02/29/24  0532 02/28/24  0542 02/27/24  2331 02/21/24  0536 02/20/24  1829 02/20/24  0452 01/01/18  2035 12/21/17  0746   WBC 10.8 12.8* 13.9*   < >  --  7.8   < >  --    HGB 11.0* 11.9 12.2   < >  --  13.4   < >  --    MCV 94 93 93   < >  --  91   < >  --     244 281   < >  --  239   < >  --    INR  --   --   --   --  1.19* 1.16*  --  1.02    < > = values in this interval not displayed.      Recent Labs   Lab Test 02/29/24  0532 02/28/24  0542 02/27/24  2331    138 136   POTASSIUM 3.9 3.7 3.9   CHLORIDE 103 101 97*   CO2 24 27 28   BUN 33.7* 37.7* 39.1*   CR 1.16* 1.31* 1.40*   ANIONGAP 11 10 11   DANIELITO 9.2 9.1 9.3   GLC 99 119* 161*     Recent Labs   Lab Test 12/07/17  0950 04/26/16  1125   TROPI <0.015 <0.015  The 99th percentile for upper reference range is 0.045 ug/L.  Troponin values in   the range of 0.045 - 0.120 ug/L may be associated with risks of adverse   clinical events.

## 2024-03-02 ENCOUNTER — APPOINTMENT (OUTPATIENT)
Dept: OCCUPATIONAL THERAPY | Facility: CLINIC | Age: 79
DRG: 242 | End: 2024-03-02
Payer: COMMERCIAL

## 2024-03-02 VITALS
BODY MASS INDEX: 21.14 KG/M2 | OXYGEN SATURATION: 92 % | DIASTOLIC BLOOD PRESSURE: 67 MMHG | WEIGHT: 119.3 LBS | HEIGHT: 63 IN | SYSTOLIC BLOOD PRESSURE: 107 MMHG | TEMPERATURE: 97.5 F | RESPIRATION RATE: 20 BRPM | HEART RATE: 80 BPM

## 2024-03-02 PROCEDURE — 94640 AIRWAY INHALATION TREATMENT: CPT

## 2024-03-02 PROCEDURE — 99239 HOSP IP/OBS DSCHRG MGMT >30: CPT | Performed by: INTERNAL MEDICINE

## 2024-03-02 PROCEDURE — 250N000009 HC RX 250: Performed by: INTERNAL MEDICINE

## 2024-03-02 PROCEDURE — 250N000013 HC RX MED GY IP 250 OP 250 PS 637: Performed by: HOSPITALIST

## 2024-03-02 PROCEDURE — 250N000013 HC RX MED GY IP 250 OP 250 PS 637: Performed by: INTERNAL MEDICINE

## 2024-03-02 PROCEDURE — 97535 SELF CARE MNGMENT TRAINING: CPT | Mod: GO | Performed by: OCCUPATIONAL THERAPIST

## 2024-03-02 PROCEDURE — 94640 AIRWAY INHALATION TREATMENT: CPT | Mod: 76

## 2024-03-02 PROCEDURE — 999N000157 HC STATISTIC RCP TIME EA 10 MIN

## 2024-03-02 PROCEDURE — 250N000012 HC RX MED GY IP 250 OP 636 PS 637: Performed by: STUDENT IN AN ORGANIZED HEALTH CARE EDUCATION/TRAINING PROGRAM

## 2024-03-02 PROCEDURE — 250N000009 HC RX 250: Performed by: HOSPITALIST

## 2024-03-02 RX ORDER — FUROSEMIDE 20 MG
20 TABLET ORAL DAILY PRN
Qty: 20 TABLET | Refills: 0 | DISCHARGE
Start: 2024-03-02

## 2024-03-02 RX ORDER — MIDODRINE HYDROCHLORIDE 5 MG/1
5 TABLET ORAL 2 TIMES DAILY
Qty: 60 TABLET | Refills: 0 | DISCHARGE
Start: 2024-03-02

## 2024-03-02 RX ORDER — GUAIFENESIN 600 MG/1
600 TABLET, EXTENDED RELEASE ORAL 2 TIMES DAILY
Qty: 20 TABLET | Refills: 0 | DISCHARGE
Start: 2024-03-02 | End: 2024-03-12

## 2024-03-02 RX ORDER — LEVALBUTEROL INHALATION SOLUTION 1.25 MG/3ML
1.25 SOLUTION RESPIRATORY (INHALATION) EVERY 4 HOURS PRN
Qty: 90 ML | Refills: 0 | DISCHARGE
Start: 2024-03-02

## 2024-03-02 RX ADMIN — BRIMONIDINE TARTRATE, TIMOLOL MALEATE 1 DROP: 2; 5 SOLUTION/ DROPS TOPICAL at 09:04

## 2024-03-02 RX ADMIN — GUAIFENESIN 600 MG: 600 TABLET, EXTENDED RELEASE ORAL at 09:02

## 2024-03-02 RX ADMIN — MIDODRINE HYDROCHLORIDE 5 MG: 5 TABLET ORAL at 12:09

## 2024-03-02 RX ADMIN — LEVALBUTEROL HYDROCHLORIDE 0.63 MG: 1.25 SOLUTION RESPIRATORY (INHALATION) at 07:15

## 2024-03-02 RX ADMIN — MYCOPHENOLATE MOFETIL 250 MG: 250 CAPSULE ORAL at 09:02

## 2024-03-02 RX ADMIN — LEVALBUTEROL HYDROCHLORIDE 0.63 MG: 1.25 SOLUTION RESPIRATORY (INHALATION) at 12:19

## 2024-03-02 RX ADMIN — MIDODRINE HYDROCHLORIDE 5 MG: 5 TABLET ORAL at 09:02

## 2024-03-02 RX ADMIN — ANORECTAL OINTMENT: 15.7; .44; 24; 20.6 OINTMENT TOPICAL at 09:05

## 2024-03-02 RX ADMIN — IPRATROPIUM BROMIDE 0.5 MG: 0.5 SOLUTION RESPIRATORY (INHALATION) at 12:19

## 2024-03-02 RX ADMIN — APIXABAN 2.5 MG: 2.5 TABLET, FILM COATED ORAL at 09:02

## 2024-03-02 RX ADMIN — AMOXICILLIN AND CLAVULANATE POTASSIUM 1 TABLET: 875; 125 TABLET, FILM COATED ORAL at 09:02

## 2024-03-02 RX ADMIN — IPRATROPIUM BROMIDE 0.5 MG: 0.5 SOLUTION RESPIRATORY (INHALATION) at 07:15

## 2024-03-02 RX ADMIN — CITALOPRAM HYDROBROMIDE 10 MG: 10 TABLET ORAL at 09:02

## 2024-03-02 ASSESSMENT — ACTIVITIES OF DAILY LIVING (ADL)
ADLS_ACUITY_SCORE: 35
ADLS_ACUITY_SCORE: 37
ADLS_ACUITY_SCORE: 37
ADLS_ACUITY_SCORE: 35
ADLS_ACUITY_SCORE: 37
ADLS_ACUITY_SCORE: 35
ADLS_ACUITY_SCORE: 37
ADLS_ACUITY_SCORE: 35
ADLS_ACUITY_SCORE: 37
ADLS_ACUITY_SCORE: 35
ADLS_ACUITY_SCORE: 37
ADLS_ACUITY_SCORE: 35

## 2024-03-02 NOTE — PROGRESS NOTES
[facility]  Hospitalist Progress Note        Patt Pardo MD   03/02/2024        Interval History:        Patient is resting in bed. Denies SOB.  Doning well, no other acute issues              Assessment and Plan:        78 year old female with a PMH of atrial fibrillation, dementia, autoimmune hepatitis, CKD stage III who presented with A-fib RVR and also noted with likely COPD exacerbation, admitted inpatient 2/16/24.     Paroxysmal Afib RVR s/p JIM cardioversion 2/20/24 with later recurrence of Afib with RVR  S/p AV haile ablation and PPM placement 2/23/24  NSTEMI, type 2 secondary to demand  - initially had cardioversion on 2/20/24 with re-development of a fib with RVR that persisted despite IV amiodarone bolus and gtt  - then, underwent successful ablation and PPM placement on 2/23/24  - device integration noted with normal function; post PPM chest x-ray with no pneumothorax  - cardiology following; noted EF on JIM at 35% (55 to 60% on TTE 2/17/2024); was given a total of 80 mg IV Lasix on 2/24-- holding further diuresis  - has been intermittently hypotensive; cardiology had added losartan-- discontinued on 2/26  - will further decrease Toprol-XL to 12.5 mg daily (2/27) with hold parameters  - will need repeat ECHO in 3 to 6 months to reevaluate her ejection fraction; to follow up with EP RONALDO in about 3 months (scheduled per EP)  - Had not recently been PTA on anticoagulation due to previous liver dysfunction  Started on Eliquis this admission, continue Eliquis 2.5 mg BID  - continue telemetry  Patient was hypotensive overnight systolic blood pressure as low as 60s to 80s..  Patient was given 250 cc IV fluid bolus.  Patient was having congested cough.  Chest x-ray done showed small bilateral pleural effusions.  Pacer in place.  Left greater than right with associated atelectasis.  No pneumothorax.  Patient was started on IV Zosyn for possible pneumonia.  Toprol-XL was held.  Blood pressure  improved to systolics in the 90s to 100s currently.  Stat echo ordered on 228 morning showed EF improved to 55-60%    Cardiology signed off on 2/27/2024.  Reconsulted on 2/28  Appreciate their assistance  Off of all cardiac medications and diuretics currently due to hypotension issues    BP improved on 2/29/24 AM   D/alfredo IVF on 2/29  On low ose midodrine 5mg TID started and without the midodrine blood pressure dropped to 80s on 3/1/2024 so midodrine restarted at 5 mg p.o. twice daily  BP stable on midodrine     Acute hypoxic respiratory failure  Possible undiagnosed COPD  Acute on chronic diastolic heart failure exacerbation  New systolic CHF - suspect d/t rate control issues  - CT chest PE protocol 2/17 noted No pulmonary emboli, Bilateral pleural effusions and Mild scattered mucous plugging  -  was noted to be wheezing in the ED requiring supplemental O2. She does have a multi year history of smoking raising suspicion for undiagnosed copd.   - Has been getting intermittent diuresis as noted above  - completed a short course of PO prednisone (2/25/24)  - continue with Atrovent and xopenex nebs; mucinex BID  - oxygen weaned down to room air (2/26) but has rattling cough, on Mucinex BID. ; ordered for Respiratory therapy assess/treat (2/26)  - encourage incentive spirometry    Started on IV Zosyn for possible pneumonia on 2/27/2024 during RRT for hypotension  As her WBC count went up to 13 K  WBC count  12.8K-10.8K on 2/29  Switched zosyn to oral augemtin for 1week course  Appears slightly tachypneic with diminished breath sounds in the bases so we will give 20 mg of p.o. Lasix one-time dose on 3/1/2024  Appears euvolemic on 3/2/24     Left forearm blood blister:  Pt pulled her IV out developed a blood blister on forearm   Seen by surgery team recommended ice packs application and Gauze dressing      Amiodarone skin infiltrate 2/19  LUE had infiltrate of amiodarone. Ecchymosis noted prior, now seems resolving.   -  Monitor clinically    Malnutrition Diagnosis: Severe malnutrition in the context of --  Acute illness or injury  Chronic illness or disease    Nutrition services following      Dementia without behavioral disturbance  -  A&Ox1-2 baseline, daughter is decision maker  - patient resides at [address] and not the memory care unit but might need to move to Memory Care; her  was her primary care giver but recently passed away  - re-evaluated by therapies, rec TCU; SW following for disposition; family plan to transition to memory care after TCU     Autoimmune hepatitis:  -mycophenolate continued, no acute issue now noted     CKD stage III  Stable, did have bump up to 1.41 on 2/18 after diuresis  - creat improved to 1.17 (2/23/24); stable around 1.2     Coccyx pressure wound PTA  - incontinence associated dermatitis, monitor with local cares.     DVT Prophylaxis: DOAC    Code Status: Full Code          Clinically Significant Risk Factors                   # Hypertension: Noted on problem list  # Acute heart failure with reduced ejection fraction: last echo with EF <40% and receiving IV diuretics        # Severe Malnutrition: based on nutrition assessment     # Pacemaker present     Disposition Plan  Discharge to TCU when bed is available   - patient resides at [address] and not the memory care unit but might need to move to Memory Care; her  was her primary care giver but recently passed away  - re-evaluated by therapies, rec TCU; SW following for disposition; family plan to transition to memory care after TCU    Daughter Tresa updated by me about pts condition on 3/2//24 . She wants to continue current cares including Full Code status     Diet:   Room Service  Snacks/Supplements Adult: Ensure Enlive; With Meals  Combination Diet Regular Diet  Diet      Patt Pardo MD   Page [phone](7AM-6PM)                Physical Exam:      Blood pressure 99/62, pulse 79, temperature 97.5  F  "(36.4  C), temperature source Oral, resp. rate 20, height 1.593 m (5' 2.72\"), weight 54.1 kg (119 lb 4.8 oz), SpO2 93%, not currently breastfeeding.  Vitals:    02/29/24 0600 03/01/24 0625 03/02/24 0500   Weight: 54.1 kg (119 lb 4.3 oz) 54.5 kg (120 lb 3.2 oz) 54.1 kg (119 lb 4.8 oz)     Vital Signs with Ranges  Temp:  [97.5  F (36.4  C)-98.1  F (36.7  C)] 97.5  F (36.4  C)  Pulse:  [78-81] 79  Resp:  [18-20] 20  BP: ()/(57-77) 99/62  SpO2:  [89 %-97 %] 93 %  I/O's Last 24 hours  I/O last 3 completed shifts:  In: 360 [P.O.:360]  Out: 1025 [Urine:1025]    Constitutional: Alert, awake and oriented X 1-2 (baseline); resting comfortably in no apparent distress, having congested cough intermittently       Oral cavity: Moist mucosa   Cardiovascular: Normal s1 s2, regular rate and rhythm, no murmur; left chest wall pacemaker in place   Lungs: CTA b/l , no wheezing   Abdomen: Soft, nt, nd, no guarding, rigidity or rebound; BS +   LE : No edema   Musculoskeletal/Neuro Power 5/5 in all extremities; No focal neurological deficits noted   Psychiatry: normal mood and affect                Medications:         amoxicillin-clavulanate  1 tablet Oral Q12H [facility] (08/20)    apixaban ANTICOAGULANT  2.5 mg Oral BID    bimatoprost  1 drop Both Eyes At Bedtime    brimonidine-timolol  1 drop Both Eyes BID    citalopram  10 mg Oral Daily    guaiFENesin  600 mg Oral BID    ipratropium  0.5 mg Nebulization 3 times daily    levalbuterol  0.63 mg Nebulization 3 times daily    menthol-zinc oxide   Topical BID    midodrine  5 mg Oral BID 09 12    mycophenolate  250 mg Oral BID IS     PRN Meds: acetaminophen, calcium carbonate, levalbuterol, ondansetron **OR** ondansetron, senna-docusate **OR** senna-docusate         Data:      All new lab and imaging data was reviewed.   Recent Labs   Lab Test 02/29/24  0532 02/28/24  0542 02/27/24  2331 02/21/24  0536 02/20/24  1829 02/20/24  0452 01/01/18 2035 12/21/17  0746   WBC 10.8 12.8* 13.9*   < >  " --  7.8   < >  --    HGB 11.0* 11.9 12.2   < >  --  13.4   < >  --    MCV 94 93 93   < >  --  91   < >  --     244 281   < >  --  239   < >  --    INR  --   --   --   --  1.19* 1.16*  --  1.02    < > = values in this interval not displayed.      Recent Labs   Lab Test 02/29/24  0532 02/28/24  0542 02/27/24  2331    138 136   POTASSIUM 3.9 3.7 3.9   CHLORIDE 103 101 97*   CO2 24 27 28   BUN 33.7* 37.7* 39.1*   CR 1.16* 1.31* 1.40*   ANIONGAP 11 10 11   DANIELITO 9.2 9.1 9.3   GLC 99 119* 161*     Recent Labs   Lab Test 12/07/17  0950 04/26/16  1125   TROPI <0.015 <0.015  The 99th percentile for upper reference range is 0.045 ug/L.  Troponin values in   the range of 0.045 - 0.120 ug/L may be associated with risks of adverse   clinical events.

## 2024-03-02 NOTE — PROGRESS NOTES
Care Management Follow Up    Length of Stay (days): 15    Expected Discharge Date: 03/03/2024     Concerns to be Addressed:     Discharge planning  Patient plan of care discussed at interdisciplinary rounds: Yes    Anticipated Discharge Disposition:  TCU placement     Anticipated Discharge Services:  Therapy  Anticipated Discharge DME:  N/a    Patient/family educated on Medicare website which has current facility and service quality ratings: No  Education Provided on the Discharge Plan:  yes  Patient/Family in Agreement with the Plan:  yes    Referrals Placed by CM/SW:  [facility]  Private pay costs discussed: Not applicable    Additional Information:  Call placed to [facility] to get pre-auth for patient's TCU stay.  Received case # [id number].  The nurse was unable to authorize the case, she had to send it on to the MD for authorization.  Awaiting a return call from [facility].    Will continue to follow.      NICKO Fatima, [facility]    [phone]  [employer]

## 2024-03-02 NOTE — PROGRESS NOTES
Care Management Discharge Note    Discharge Date: 03/02/2024       Discharge Disposition: Transitional Care    Discharge Services: Other (see comment) (Therapy)    Discharge DME: None    Discharge Transportation:  family via the skyway to [place] around 16:00    Private pay costs discussed: Not applicable    Does the patient's insurance plan have a 3 day qualifying hospital stay waiver?  No    PAS Confirmation Code: [id number]  Patient/family educated on Medicare website which has current facility and service quality ratings: no    Education Provided on the Discharge Plan: Yes  Persons Notified of Discharge Plans: patient's daughter Tresa and son-in-law Amol  Patient/Family in Agreement with the Plan: yes    Handoff Referral Completed:  No    Additional Information:  Received a call back from [employer].  Patient has received pre-auth from her insurance, pre-auth # [id number].  The pre-auth is valid from 3/2-3/8.  Call placed to update patient's daughter and she is in agreement.  Patient's daughter states that her  will transport patient via the skyway around 16:00 today.  Updated [facility] and faxed the orders, the [employer] auth, and the PAS.      PAS-RR    D: Per DHS regulation, SW completed and submitted PAS-RR to [place] [employer] via the Senior [employer] Line.  PAS-RR confirmation # is : [id number].    I: NIRAJ spoke with patient's daughter Tresa and they are aware a PAS-RR has been submitted.  NIRAJ reviewed with patient's daughter Tresa that they may be contacted for a follow up appointment within 10 days of hospital discharge if their SNF stay is < 30 days.  Contact information for [facility] [place] Line was also provided.    A: Patient's daughter Tresa verbalized understanding.    P: Further questions may be directed to [facility] [place] Line at #[phone], option #4 for PAS-RR staff.       NICKO Fatima, [facility]    [phone]  [facility]

## 2024-03-02 NOTE — PLAN OF CARE
Disoriented to place, time, situation. Tele remains Vpaced, VSS on RA. Poor appetite. Bmx2, winters removed with adequate urine output.    VSS, not in pain at time of discharge. Pt states all belongings and paperwork are accounted for. Discharge packet sent with patient. Family here to transport pt to [place].

## 2024-03-02 NOTE — PLAN OF CARE
Goal Outcome Evaluation:       1900-0730    A&O self, mentation waxes/weans. Was more confused at night; repeatedly asking the date pt was admitted and current date. Reorientation provided. Pt denied pain. Breath sounds diminished, productive cough; scheduled mucinex given. VSS, on RA; would occasionally de sat to 88-89% when sleeping- placed on 2L NC with O2 sats now >90%. Coccyx wound care done, mepilex placed. Scott in place & intact with good urine output. Up w SBA. Turn and repositioning as pt tolerated. Tele: Occasionally V paced, underlying Afib. Alarms on. Plan for TCU discharge when medically stable. Continue with plan of care.

## 2024-03-03 ENCOUNTER — DOCUMENTATION ONLY (OUTPATIENT)
Dept: GERIATRICS | Facility: CLINIC | Age: 79
End: 2024-03-03
Payer: COMMERCIAL

## 2024-03-03 VITALS
BODY MASS INDEX: 20.84 KG/M2 | WEIGHT: 116.6 LBS | RESPIRATION RATE: 16 BRPM | SYSTOLIC BLOOD PRESSURE: 99 MMHG | OXYGEN SATURATION: 96 % | HEART RATE: 80 BPM | TEMPERATURE: 97.2 F | DIASTOLIC BLOOD PRESSURE: 62 MMHG

## 2024-03-03 NOTE — DISCHARGE SUMMARY
[facility]  Hospitalist Discharge Summary      Date of Admission:  2/16/2024  Date of Discharge:  3/2/2024  4:42 PM  Discharging Provider: Patt Pardo MD  Discharge Service: Hospitalist Service    Discharge Diagnoses   Please see hospital course     Clinically Significant Risk Factors     # Severe Malnutrition: based on nutrition assessment      Follow-ups Needed After Discharge   Follow-up Appointments     Follow Up and recommended labs and tests      Follow up with [facility] physician.  The following labs/tests are   recommended: CBC, BMP in 1week .               Discharge Disposition   Discharged to short-term care facility  Condition at discharge: Stable    Hospital Course   78 year old female with a PMH of atrial fibrillation, dementia, autoimmune hepatitis, CKD stage III who presented with A-fib RVR and also noted with likely COPD exacerbation, admitted inpatient 2/16/24.      Paroxysmal Afib RVR s/p JIM cardioversion 2/20/24 with later recurrence of Afib with RVR  S/p AV haile ablation and PPM placement 2/23/24  NSTEMI, type 2 secondary to demand  - initially had cardioversion on 2/20/24 with re-development of a fib with RVR that persisted despite IV amiodarone bolus and gtt  - then, underwent successful ablation and PPM placement on 2/23/24  - device integration noted with normal function; post PPM chest x-ray with no pneumothorax  - cardiology following; noted EF on JIM at 35% (55 to 60% on TTE 2/17/2024); was given a total of 80 mg IV Lasix on 2/24-- holding further diuresis  - has been intermittently hypotensive; cardiology had added losartan-- discontinued on 2/26  - will further decrease Toprol-XL to 12.5 mg daily (2/27) with hold parameters  - will need repeat ECHO in 3 to 6 months to reevaluate her ejection fraction; to follow up with EP RONALDO in about 3 months (scheduled per EP)  - Had not recently been PTA on anticoagulation due to previous liver dysfunction  Started on  Eliquis this admission, continue Eliquis 2.5 mg BID  - continue telemetry  Patient was hypotensive overnight systolic blood pressure as low as 60s to 80s..  Patient was given 250 cc IV fluid bolus.  Patient was having congested cough.  Chest x-ray done showed small bilateral pleural effusions.  Pacer in place.  Left greater than right with associated atelectasis.  No pneumothorax.  Patient was started on IV Zosyn for possible pneumonia.  Toprol-XL was held.  Blood pressure improved to systolics in the 90s to 100s currently.  Stat echo ordered on 228 morning showed EF improved to 55-60%     Cardiology signed off on 2/27/2024.  Reconsulted on 2/28  Appreciate their assistance  Off of all cardiac medications and diuretics currently due to hypotension issues     BP improved on 2/29/24 AM   D/alfredo IVF on 2/29  On low ose midodrine 5mg TID started and without the midodrine blood pressure dropped to 80s on 3/1/2024 so midodrine restarted at 5 mg p.o. twice daily  BP stable on midodrine      Acute hypoxic respiratory failure  Possible undiagnosed COPD  Acute on chronic diastolic heart failure exacerbation  New systolic CHF - suspect d/t rate control issues  - CT chest PE protocol 2/17 noted No pulmonary emboli, Bilateral pleural effusions and Mild scattered mucous plugging  -  was noted to be wheezing in the ED requiring supplemental O2. She does have a multi year history of smoking raising suspicion for undiagnosed copd.   - Has been getting intermittent diuresis as noted above  - completed a short course of PO prednisone (2/25/24)  - continue with Atrovent and xopenex nebs; mucinex BID  - oxygen weaned down to room air (2/26) but has rattling cough, on Mucinex BID. ; ordered for Respiratory therapy assess/treat (2/26)  - encourage incentive spirometry     Started on IV Zosyn for possible pneumonia on 2/27/2024 during RRT for hypotension  As her WBC count went up to 13 K  WBC count  12.8K-10.8K on 2/29  Switched zosyn to  oral augemtin for 1week course  Appears slightly tachypneic with diminished breath sounds in the bases so we will give 20 mg of p.o. Lasix one-time dose on 3/1/2024  Appears euvolemic on 3/2/24      Left forearm blood blister:  Pt pulled her IV out developed a blood blister on forearm   Seen by surgery team recommended ice packs application and Gauze dressing      Amiodarone skin infiltrate 2/19  LUE had infiltrate of amiodarone. Ecchymosis noted prior, now seems resolving.   - Monitor clinically     Malnutrition Diagnosis: Severe malnutrition in the context of --  Acute illness or injury  Chronic illness or disease     Nutrition services following      Dementia without behavioral disturbance  -  A&Ox1-2 baseline, daughter is decision maker  - patient resides at [address] and not the memory care unit but might need to move to Memory Care; her  was her primary care giver but recently passed away  - re-evaluated by therapies, rec TCU;  following for disposition; family plan to transition to memory care after TCU     Autoimmune hepatitis:  -mycophenolate continued, no acute issue now noted     CKD stage III  Stable, did have bump up to 1.41 on 2/18 after diuresis  - creat improved to 1.17 (2/23/24); stable around 1.2     Coccyx pressure wound PTA  - incontinence associated dermatitis, monitor with local cares.     DVT Prophylaxis: DOAC     Code Status: Full Code          Clinically Significant Risk Factors                   # Hypertension: Noted on problem list  # Acute heart failure with reduced ejection fraction: last echo with EF <40% and receiving IV diuretics        # Severe Malnutrition: based on nutrition assessment     # Pacemaker present     Disposition Plan  Discharge to TCU when bed is available   - patient resides at [address] and not the memory care unit but might need to move to Memory Care; her  was her primary care giver but recently passed away  -  re-evaluated by therapies, rec TCU; SW following for disposition; family plan to transition to memory care after TCU     Daughter Tresa updated by me about pts condition on 3/2//24 . She wants to continue current cares including Full Code status      Diet:   Room Service  Snacks/Supplements Adult: Ensure Enlive; With Meals  Combination Diet Regular Diet  Diet       Patt Pardo MD   Page [phone](7AM-6PM)    Consultations This Hospital Stay   CARDIOLOGY IP CONSULT  WOUND OSTOMY CONTINENCE NURSE  IP CONSULT  PHARMACY IP CONSULT  CARE MANAGEMENT / SOCIAL WORK IP CONSULT  CARDIAC REHAB IP CONSULT  ELECTROPHYSIOLOGY IP CONSULT  OCCUPATIONAL THERAPY ADULT IP CONSULT  PHYSICAL THERAPY ADULT IP CONSULT  SPIRITUAL HEALTH SERVICES IP CONSULT  CARDIOLOGY IP CONSULT  SURGERY GENERAL IP CONSULT  VASCULAR ACCESS ADULT IP CONSULT  PHYSICAL THERAPY ADULT IP CONSULT  OCCUPATIONAL THERAPY ADULT IP CONSULT    Code Status   Full Code    Time Spent on this Encounter   I, Patt Pardo MD, personally saw the patient today and spent greater than 30 minutes discharging this patient.       Patt Pardo MD  [facility]ON[facility]  [address] [place], [place] [address], [address]  [facility] [address]  Phone: [phone]  ______________________________________________________________________    Physical Exam   Vital Signs:     BP: 107/67 Pulse: 80     SpO2: 92 % O2 Device: None (Room air)    Weight: 119 lbs 4.8 oz  Constitutional: Alert, awake and oriented X 1-2 (baseline); resting comfortably in no apparent distress, having congested cough intermittently         Oral cavity: Moist mucosa   Cardiovascular: Normal s1 s2, regular rate and rhythm, no murmur; left chest wall pacemaker in place   Lungs: CTA b/l , no wheezing   Abdomen: Soft, nt, nd, no guarding, rigidity or rebound; BS +   LE : No edema   Musculoskeletal/Neuro Power 5/5 in all extremities; No focal neurological deficits noted   Psychiatry: normal mood and affect              Primary Care Physician   Shlomo Munguia    Discharge Orders      Follow-Up with Cardiology RONALDO      Follow-Up with Cardiology RONALDO      General info for SNF    Length of Stay Estimate: Short Term Care: Estimated # of Days 31-90  Condition at Discharge: Stable  Level of care:skilled   Rehabilitation Potential: Good  Admission H&P remains valid and up-to-date: Yes  Recent Chemotherapy: N/A  Use Nursing Home Standing Orders: Yes     Mantoux instructions    Give two-step Mantoux (PPD) Per Facility Policy Yes     Follow Up and recommended labs and tests    Follow up with senior living physician.  The following labs/tests are recommended: CBC, BMP in 1week .     Reason for your hospital stay    AFIB RVR S/p Pacemaker placement AND low BP issues after diuresis     Activity - Up with nursing assistance     Full Code     Physical Therapy Adult Consult    Evaluate and treat as clinically indicated.    Reason:  AFIB RVR S/p Pacemaker     Occupational Therapy Adult Consult    Evaluate and treat as clinically indicated.    Reason:  Afib RVR S/p Pacemaker     Oxygen (SNF/TCU) Discharge     Echocardiogram Complete    Administration of IV contrast will be tailored to this examination per the appropriate written protocol listed in the Echocardiography department Protocol Book, or by the supervising Cardiologist. This may result in an order change.    Use of contrast is at the discretion of the supervising Cardiologist.     Diet    Follow this diet upon discharge: Orders Placed This Encounter      Room Service      Snacks/Supplements Adult: Ensure Enlive; With Meals      Combination Diet Regular Diet       Significant Results and Procedures   Most Recent 3 CBC's:  Recent Labs   Lab Test 02/29/24  0532 02/28/24  0542 02/27/24  2331   WBC 10.8 12.8* 13.9*   HGB 11.0* 11.9 12.2   MCV 94 93 93    244 281     Most Recent 3 BMP's:  Recent Labs   Lab Test 02/29/24  0532 02/28/24  0542 02/27/24  2331    138 136   POTASSIUM  3.9 3.7 3.9   CHLORIDE 103 101 97*   CO2 24 27 28   BUN 33.7* 37.7* 39.1*   CR 1.16* 1.31* 1.40*   ANIONGAP 11 10 11   DANIELITO 9.2 9.1 9.3   GLC 99 119* 161*     Most Recent 2 LFT's:  Recent Labs   Lab Test 02/27/24  2331 02/16/24  1941   AST 12 30   ALT 7 21   ALKPHOS 101 128   BILITOTAL 0.9 1.1     Most Recent 3 INR's:  Recent Labs   Lab Test 02/20/24  1829 02/20/24  0452 12/21/17  0746   INR 1.19* 1.16* 1.02     Most Recent INR's and Anticoagulation Dosing History:  Anticoagulation Dose History          Latest Ref Rng & Units 11/13/2017 12/21/2017 2/20/2024   Recent Dosing and Labs   INR 0.85 - 1.15 1.01  1.02  1.19  1.16      Most Recent 3 Creatinines:  Recent Labs   Lab Test 02/29/24  0532 02/28/24  0542 02/27/24  2331   CR 1.16* 1.31* 1.40*     Most Recent 3 Hemoglobins:  Recent Labs   Lab Test 02/29/24  0532 02/28/24  0542 02/27/24  2331   HGB 11.0* 11.9 12.2     Most Recent 3 Troponin's:  Recent Labs   Lab Test 12/07/17  0950 04/26/16  1125   TROPI <0.015 <0.015  The 99th percentile for upper reference range is 0.045 ug/L.  Troponin values in   the range of 0.045 - 0.120 ug/L may be associated with risks of adverse   clinical events.       Most Recent 3 BNP's:  Recent Labs   Lab Test 02/16/24  2305   NTBNPI 10,462*     Most Recent D-dimer:  Recent Labs   Lab Test 02/16/24  1941   DD 0.91*     Most Recent Cholesterol Panel:  Recent Labs   Lab Test 01/25/24  1619   CHOL 192   *   HDL 69   TRIG 82     7-Day Micro Results       Collected Updated Procedure Result Status      02/28/2024 0620 02/29/2024 0629 Urine Culture [[id number]]   Urine, Clean Catch    Final result Component Value   Culture No Growth               02/27/202427/2024 2331 03/03/2024 0216 Blood Culture Arm, Left [[id number]]   Blood from Arm, Left    Preliminary result Component Value   Culture No growth after 4 days  [P]                02/27/2024 2331 03/03/2024 0216 Blood Culture Arm, Right [[id number]]   Blood from Arm, Right    Preliminary  result Component Value   Culture No growth after 4 days  [P]                      Most Recent TSH and T4:  Recent Labs   Lab Test 02/16/24  2305   TSH 2.43     Most Recent Hemoglobin A1c:No lab results found.  Most Recent 6 glucoses:  Recent Labs   Lab Test 02/29/24  0532 02/28/24  0542 02/27/24  2331 02/25/24  0557 02/23/24  0550 02/22/24  0531   GLC 99 119* 161* 94 118* 99     Most Recent Urinalysis:  Recent Labs   Lab Test 02/28/24  0620 11/14/17  0425 06/20/16  1029   COLOR Yellow   < > Yellow   APPEARANCE Clear   < > Clear   URINEGLC Negative   < > Negative   URINEBILI Negative   < > Small  This is an unconfirmed screening test result. A positive result may be false.  *   URINEKETONE Trace*   < > Negative   SG 1.028   < > 1.025   UBLD Negative   < > Negative   URINEPH 5.5   < > 5.0   PROTEIN 30*   < > Negative   UROBILINOGEN  --   --  0.2   NITRITE Negative   < > Negative   LEUKEST Small*   < > Small*   RBCU 1   < > O - 2   WBCU 14*   < > 2-5*    < > = values in this interval not displayed.     Most Recent ABG:No lab results found.  Most Recent ESR & CRP:  Recent Labs   Lab Test 11/09/17  1156   CRP <2.9     Most Recent Anemia Panel:  Recent Labs   Lab Test 02/29/24  0532 11/15/17  0726 11/13/17  0735 07/27/16  1054 06/15/16  1514   WBC 10.8   < > 5.1   < >  --    HGB 11.0*   < > 13.4   < >  --    HCT 35.8   < > 40.7   < >  --    MCV 94   < > 96   < >  --       < > 203   < >  --    RASHEEDA  --   --  259*  --   --    B12  --   --   --   --  656    < > = values in this interval not displayed.     Most Recent CPK:  Recent Labs   Lab Test 11/09/17  1156 07/27/16  1054   CKT 29* 44   ,   Results for orders placed or performed during the hospital encounter of 02/16/24   XR Chest Port 1 View    Narrative    EXAM: XR CHEST PORT 1 VIEW  LOCATION: [facility]  DATE: 2/16/2024    INDICATION: afib rvr  COMPARISON: 12/7/2017      Impression    IMPRESSION: Patchy right infrahilar airspace  opacities concerning for pneumonia. Possible tiny left pleural effusion. No pneumothorax. Normal cardiomediastinal silhouette.   CT Chest Pulmonary Embolism w Contrast    Narrative    EXAM: CT CHEST PULMONARY EMBOLISM W CONTRAST  LOCATION: [facility]  DATE: 2/17/2024    INDICATION: afib and hypoxia  COMPARISON: 11/13/2017  TECHNIQUE: CT chest pulmonary angiogram during arterial phase injection of IV contrast. Multiplanar reformats and MIP reconstructions were performed. Dose reduction techniques were used.   CONTRAST: 57 mL Isovue   370    FINDINGS:  ANGIOGRAM CHEST: Pulmonary arteries are upper normal caliber and negative for pulmonary emboli. Thoracic aorta is not well opacified and is  indeterminate for dissection. No CT evidence of right heart strain.    LUNGS AND PLEURA: Small pleural effusions. Basilar atelectasis and bronchial wall thickening. Mild scattered mucous plugging.    MEDIASTINUM/AXILLAE: No adenopathy or significant pericardial effusion.    CORONARY ARTERY CALCIFICATION: Mild to moderate.    UPPER ABDOMEN: Grossly within normal limits where seen.    MUSCULOSKELETAL: Degenerative change osseous structures. Lipomatous lesion right chest wall anterolaterally series 3 image 260 unchanged.      Impression    IMPRESSION:  1.  No pulmonary emboli.  2.  Bilateral pleural effusions.  3.  Mild scattered mucous plugging.   CT Head w/o Contrast    Narrative    EXAM: CT HEAD W/O CONTRAST  LOCATION: [facility]  DATE: 2/18/2024    INDICATION: facial droop  COMPARISON: None.  TECHNIQUE: Routine CT Head without IV contrast. Multiplanar reformats. Dose reduction techniques were used.    FINDINGS:  INTRACRANIAL CONTENTS: Mild global cortical volume loss with expected dilatation of the ventricular system. Remote lacunar infarct in the left basal ganglia. Mild chronic small vessel ischemic changes.    VISUALIZED ORBITS/SINUSES/MASTOIDS: No intraorbital abnormality. No  paranasal sinus mucosal disease. No middle ear or mastoid effusion.    BONES/SOFT TISSUES: No acute abnormality.      Impression    IMPRESSION:  1.  No acute findings.   XR Chest 1 View    Narrative    EXAM: XR CHEST 1 VIEW  LOCATION: [facility]  DATE: 2/20/2024    INDICATION: Wheezing; heart failure; status post cardioversion.  COMPARISON: 02/16/2024.      Impression    IMPRESSION: Mild pulmonary interstitial edema, with associated small to moderate-sized right and small left pleural effusions. No pneumothorax.    Upper limits of normal heart size. Atherosclerosis of the thoracic aorta.   X-ray Chest 2 vws*    Narrative    EXAM: XR CHEST 2 VIEWS  LOCATION: [facility]  DATE: 2/24/2024    INDICATION: Status post pacer ICD  COMPARISON: 02/20/2024      Impression    IMPRESSION: There is a new single lead cardiac pacemaker in the right ventricle. No pneumothorax. Small right greater than left pleural effusions and underlying atelectasis are similar to previous.   XR Chest 2 Views    Narrative    CHEST TWO VIEWS  2/26/2024 11:59 AM       INDICATION: Cough and congestion.  COMPARISON: 2/24/2024       Impression    IMPRESSION: Small bilateral pleural effusions, decreased on the right  when compared to previous. Stable cardiac pacemaker. Mild atelectasis  in both lung bases. Upper lungs are clear.       JAY ESCAMILLA MD         SYSTEM ID:  [id number]   XR Chest Port 1 View    Narrative    EXAM: XR CHEST PORT 1 VIEW  LOCATION: [facility]  DATE: 2/27/2024    INDICATION: RRT, congested cough, new hypotension  COMPARISON: 02/26/2024      Impression    IMPRESSION: Left subclavian pacing device. Small bilateral pleural effusions, left greater than right with associated atelectasis. No pneumothorax. The heart size is stable.   XR Chest Port 1 View    Narrative    CHEST ONE VIEW PORTABLE  2/28/2024 11:08 AM       INDICATION: Short of breath,  hypotensive.    COMPARISON: 2/27/2024       Impression    IMPRESSION: Single-lead cardiac pacemaker is stable. Trace left  pleural effusion, unchanged. Mild bibasilar infiltrates, stable to  slightly improved. No new findings. No pneumothorax.    JAY ESCAMILLA MD         SYSTEM ID:  [id number]   JIM, cardioversion    Narrative    Thai Feliciano MD     2/20/2024  3:39 PM  [facility]    Procedure: JIM, cardioversion    Date/Time: 2/20/2024 3:37 PM    Performed by: Thai Feliciano MD  Authorized by: Amy Rasheed PA-C      UNIVERSAL PROTOCOL   Site Marked: NA  Prior Images Obtained and Reviewed:  Yes  Required items: Required blood products, implants, devices and special   equipment available    Patient identity confirmed:  Verbally with patient  Patient was reevaluated immediately before administering moderate or deep   sedation or anesthesia  Confirmation Checklist:  Patient's identity using two indicators  Time out: Immediately prior to the procedure a time out was called    Universal Protocol: the Joint Commission Universal Protocol was followed    Preparation: Patient was prepped and draped in usual sterile fashion       ANESTHESIA    Anesthesia was administered and monitored by anesthesiology.  See   anesthesia documentation for details.    SEDATION  Patient Sedated: Yes    Sedation:  Fentanyl and midazolam  Vital signs: Vital signs monitored during sedation      PROCEDURE DETAILS  Cardioversion basis: elective  Pre-procedure rhythm: atrial fibrillation  Electrodes: pads  Electrodes placed: anterior-posterior  Number of attempts: 1  Post-procedure rhythm: normal sinus rhythm      PROCEDURE  Describe Procedure: JIM & Cardioversion Note    Indication: afib    Informed consent was signed by the patient.  A timeout was taken.    Sedation for JIM:  Versed 1.5mg  Fentanyl 50mcg    Anesthesia was present for cardioversion, see separate documentation for    their sedation.    Findings:  LV: EF 35%  RV: mild dysfunction  LA: no thrombus  Mitral valve: 3+ MR, likely functional  Aortic valve: normal  Tricuspid valve: 2+ TR  Atrial septum: positive bubble study  Aorta: normal    Cardioversion:  See anesthesia documentation for sedation during cardioversion.    Baseline rhythm: afib, rate 130  Synchronized cardioversion performed at 120J, x1  Post-DCCV rhythm: sinus, rate 70    No complications.    Thai Feliciano MD  Cardiology - [facility]  Pager: [phone]  Text Page  February 20, 2024        Patient Tolerance:  Patient tolerated the procedure well with no immediate   complications   EP Device    Narrative    PROCEDURES PERFORMED:  1. Implantation of single-chamber permanent pacemaker (LBB area lead).  2. Transcatheter AV node ablation.   3. Cardiac fluoroscopy (6 minutes, radiation dose 18 mGy)  4. Conscious sedation, mild-moderate level.        CLINICAL HISTORY:  78-year-old female with history of atrial fibrillation with RVR who   presents with rapid AF, associated cardiomyopathy. Pharmacologic rate   control has not been possible. AV node ablation with pacemaker   implantation was recommended. LBB area RV lead is planned.  The risks and benefits of the procedure were discussed in detail; the   patient expressed understanding and provided consent.       PROCEDURE:  (A) PACEMAKER IMPLANTATION  The patient was brought to the cardiac electrophysiology laboratory at   [facility] on 2/23/2024.  I determined this patient to be   an appropriate candidate for the planned sedation and procedure and have   reassessed the patient immediately prior to sedation and procedure.  The   patient was in the fasting nonsedated state.  Informed consent had been   obtained.  The presenting rhythm was atrial fibrillation/flutter with RVR.    The patient was placed on the procedure table and the anterior chest was   prepped and draped in the usual sterile fashion.  We  continuously   monitored vital signs, oxygenation and level of sedation.  Antibiotic   prophylaxis was administered.  Intravenous sedation was given to achieve   patient comfort.      Using a fluoroscopic guided technique the left subclavian vein was   cannulated without difficulty.  A single guidewire was introduced and   retained.  Under local anesthesia an incision was created in the left   pectoral region. The incision was taken down to the pectoralis muscle and   fascia and a pocket was created for the pacemaker generator.    Using a peel-away introducer sheath and a long Medtronic steerable guiding   sheath the right ventricular Medtronic lead model 3830 was introduced.    Under fluoroscopy, the tip of the active fixation lead was brought at the   mid proximal RV septum. Under continuous ECG and impedance monitoring, the   body of the lead was rotated clockwise. Favorable QRS morphology was   obtained. Good sensing and pacing parameters were confirmed. 10 V pacing   did not stimulate the diaphragm. The guiding sheath was carefully split.   The lead was secured using interrupted Ethibond sutures.    The device pocket was then irrigated with antibiotic solution. The lead   was connected to the pacemaker generator which was placed in the pocket.    It was secured using silk.  The wound was closed in 2 layers, using 2.0   and 4.0 Vicryl.  Steri-Strips, sterile gauze and a pressure dressing were   placed over the wound.     Device implant results:  I.  Implanted lead: Medtronic lead model number 3830 - 69 cm, serial   [id number]. R-wave 4.8 mV. Pacing threshold 0.75 V at 0.4 ms. Pacing   impedance 1008 ohms.  II.  Pulse generator: Medtronic Olivia XT SR MRI, serial [id number].  III.  Programming: VVIR 80/120 ppm.       (B) AV NODE ABLATION  The patient remained in the EP laboratory following pacemaker   implantation.  Additional i.v. sedation was administered. The groin areas   were prepped and draped in the usual  sterile manner.  The permanent   pacemaker was programmed at VVI 30 to assess the acute outcome of   ablation.    Under 1% lidocaine for local anesthesia a single 8 Azeri introducer   sheath was placed in the right femoral vein.  Under fluoroscopy a standard   mapping/ablation catheter with 4 mm tip was brought in the right atrium   (AV node/His bundle area).  Mapping was performed until a clear His   deflection was recorded.  The catheter was slightly withdrawn to record a   sizable atrial signal in association with the His electrogram.    Radiofrequency energy delivery at 50 [address] resulted in complete AV block   with no escape rhythm >30 bpm. Actual energy delivered at the site of   successful ablation was about 12 to 18 W.    Complete AV block persisted for 20 minutes.  At that time the procedure   was concluded.  The catheter and sheath were withdrawn and hemostasis was   obtained with manual pressure.  The pacemaker was reprogrammed to its   final settings.  There were no apparent complications.  The patient was   taken back to the Hospital room in stable condition.      Estimated blood loss was 20 - 25 ml.      CONCLUSIONS:  1.  Successful implantation of single-chamber permanent pacemaker.  2.  Successful AV node ablation with induction of complete AV block.   3.  No apparent in-lab complication.          EP Ablation    Narrative    PROCEDURES PERFORMED:  1. Implantation of single-chamber permanent pacemaker (LBB area lead).  2. Transcatheter AV node ablation.   3. Cardiac fluoroscopy (6 minutes, radiation dose 18 mGy)  4. Conscious sedation, mild-moderate level.        CLINICAL HISTORY:  78-year-old female with history of atrial fibrillation with RVR who   presents with rapid AF, associated cardiomyopathy. Pharmacologic rate   control has not been possible. AV node ablation with pacemaker   implantation was recommended. LBB area RV lead is planned.  The risks and benefits of the procedure were discussed in  detail; the   patient expressed understanding and provided consent.       PROCEDURE:  (A) PACEMAKER IMPLANTATION  The patient was brought to the cardiac electrophysiology laboratory at   [facility] on 2/23/2024.  I determined this patient to be   an appropriate candidate for the planned sedation and procedure and have   reassessed the patient immediately prior to sedation and procedure.  The   patient was in the fasting nonsedated state.  Informed consent had been   obtained.  The presenting rhythm was atrial fibrillation/flutter with RVR.    The patient was placed on the procedure table and the anterior chest was   prepped and draped in the usual sterile fashion.  We continuously   monitored vital signs, oxygenation and level of sedation.  Antibiotic   prophylaxis was administered.  Intravenous sedation was given to achieve   patient comfort.      Using a fluoroscopic guided technique the left subclavian vein was   cannulated without difficulty.  A single guidewire was introduced and   retained.  Under local anesthesia an incision was created in the left   pectoral region. The incision was taken down to the pectoralis muscle and   fascia and a pocket was created for the pacemaker generator.    Using a peel-away introducer sheath and a long Medtronic steerable guiding   sheath the right ventricular Medtronic lead model 3830 was introduced.    Under fluoroscopy, the tip of the active fixation lead was brought at the   mid proximal RV septum. Under continuous ECG and impedance monitoring, the   body of the lead was rotated clockwise. Favorable QRS morphology was   obtained. Good sensing and pacing parameters were confirmed. 10 V pacing   did not stimulate the diaphragm. The guiding sheath was carefully split.   The lead was secured using interrupted Ethibond sutures.    The device pocket was then irrigated with antibiotic solution. The lead   was connected to the pacemaker generator which was placed  in the pocket.    It was secured using silk.  The wound was closed in 2 layers, using 2.0   and 4.0 Vicryl.  Steri-Strips, sterile gauze and a pressure dressing were   placed over the wound.     Device implant results:  I.  Implanted lead: Medtronic lead model number 3830 - 69 cm, serial   [id number]. R-wave 4.8 mV. Pacing threshold 0.75 V at 0.4 ms. Pacing   impedance 1008 ohms.  II.  Pulse generator: Medtronic Olivia XT SR MRI, serial [id number].  III.  Programming: VVIR 80/120 ppm.       (B) AV NODE ABLATION  The patient remained in the EP laboratory following pacemaker   implantation.  Additional i.v. sedation was administered. The groin areas   were prepped and draped in the usual sterile manner.  The permanent   pacemaker was programmed at VVI 30 to assess the acute outcome of   ablation.    Under 1% lidocaine for local anesthesia a single 8 Kinyarwanda introducer   sheath was placed in the right femoral vein.  Under fluoroscopy a standard   mapping/ablation catheter with 4 mm tip was brought in the right atrium   (AV node/His bundle area).  Mapping was performed until a clear His   deflection was recorded.  The catheter was slightly withdrawn to record a   sizable atrial signal in association with the His electrogram.    Radiofrequency energy delivery at 50 Downing resulted in complete AV block   with no escape rhythm >30 bpm. Actual energy delivered at the site of   successful ablation was about 12 to 18 W.    Complete AV block persisted for 20 minutes.  At that time the procedure   was concluded.  The catheter and sheath were withdrawn and hemostasis was   obtained with manual pressure.  The pacemaker was reprogrammed to its   final settings.  There were no apparent complications.  The patient was   taken back to the Hospital room in stable condition.      Estimated blood loss was 20 - 25 ml.      CONCLUSIONS:  1.  Successful implantation of single-chamber permanent pacemaker.  2.  Successful AV node ablation with  induction of complete AV block.   3.  No apparent in-lab complication.          Echocardiogram Complete     Value    LVEF  55-60%    [facility]    [id number]  [id number]  [id number]  [id number]^SUNITA^COLBY^JULI     [facility]  Echocardiography Laboratory  [address] [place] [address]     Name: ABDULLAHI BLACKWOOD  MRN: [id number]  : 1945  Study Date: 2024 02:56 PM  Age: 78 yrs  Gender: Female  Patient Location: [facility]  Reason For Study: Atrial Fibrillation  Ordering Physician: COLBY DORSEY  Referring Physician: Shlomo Munguia  Performed By: Bryce Anaya     BSA: 1.6 m2  Height: 63 in  Weight: 134 lb  HR: 133  BP: 114/70 mmHg  ______________________________________________________________________________  Procedure  Complete Portable Echo Adult.  ______________________________________________________________________________  Interpretation Summary     The rhythm was rapid atrial fibrillation.  The visual ejection fraction is 55-60%.  The left ventricle is normal in size.  Left ventricular systolic function is normal.  The right ventricle is normal in structure, function and size.  There is severe biatrial enlargement.  There is mild (1+) mitral regurgitation.     Since the last study 2017 the pateient has developed atrial fibrillation  iwth a rapid ventricular response rate/.  ______________________________________________________________________________  Left Ventricle  The left ventricle is normal in size. There is normal left ventricular wall  thickness. The visual ejection fraction is 55-60%. Left ventricular systolic  function is normal. Left ventricular diastolic function is indeterminate. No  regional wall motion abnormalities noted. There is no thrombus seen in the  left ventricle.     Right Ventricle  The right ventricle is normal in structure, function and size. There is no  mass or thrombus in the right ventricle.     Atria  There is severe biatrial  enlargement. A patent foramen ovale is present. Left  to right atrial shunt, small. No thrombus is detected in the left atrial  appendage.     Mitral Valve  The mitral valve leaflets appear normal. There is no evidence of stenosis,  fluttering, or prolapse. There is mild (1+) mitral regurgitation.     Tricuspid Valve  Normal tricuspid valve. The right ventricular systolic pressure is  approximated at 28.6 mmHg plus the right atrial pressure. Right ventricle  systolic pressure estimate normal. There is mild (1+) tricuspid regurgitation.  There is no tricuspid stenosis.     Aortic Valve  The aortic valve is trileaflet. No aortic regurgitation is present. No aortic  stenosis is present.     Pulmonic Valve  Normal pulmonic valve. There is no pulmonic valvular regurgitation. There is  no pulmonic valvular stenosis.     Vessels  The aortic root is normal size. Normal size ascending aorta. The inferior vena  cava is normal. The pulmonary artery is normal size.     Pericardium  The pericardium appears normal. Moderate left pleural effusion.     Rhythm  The rhythm was rapid atrial fibrillation.  ______________________________________________________________________________  MMode/2D Measurements & Calculations  IVSd: 1.00 cm     LVIDd: 3.8 cm  LVIDs: 2.8 cm  LVPWd: 1.0 cm  FS: 26.9 %  LV mass(C)d: 121.9 grams  LV mass(C)dI: 74.7 grams/m2  Ao root diam: 2.7 cm  LA dimension: 4.8 cm  asc Aorta Diam: 3.2 cm  LA/Ao: 1.8  LVOT diam: 2.0 cm  LVOT area: 3.1 cm2  Ao root diam index Ht(cm/m): 1.7  Ao root diam index BSA (cm/m2): 1.6  Asc Ao diam index BSA (cm/m2): 2.0  Asc Ao diam index Ht(cm/m): 2.0  LA Volume (BP): 83.2 ml     LA Volume Index (BP): 51.0 ml/m2  RWT: 0.55  TAPSE: 1.5 cm     Doppler Measurements & Calculations  MV E max antonino: 113.0 cm/sec  MV dec slope: 1112 cm/sec2  MV dec time: 0.10 sec  PA acc time: 0.09 sec  TR max antonino: 267.4 cm/sec  TR max P.6 mmHg  RV S Antonino: 8.6 cm/sec      ______________________________________________________________________________  Report approved by: Dr. Zion Pryor 2024 04:00 PM         Transesophageal Echocardiogram     Value    LVEF  35%    [facility]    [id number]  [id number]  [id number]^MAIRON^ALESSIA^BILL BISHOP     [facility]  Echocardiography Laboratory  [address] [place] [address]     Name: ABDULLAHI BLACKWOOD  MRN: [id number]  : 1945  Study Date: 2024 02:55 PM  Age: 78 yrs  Gender: Female  Patient Location: [facility]  Reason For Study: Afib  Ordering Physician: ALESSIA SCHULTZ  Referring Physician: Shlomo Munguia,  Performed By: Madison Gayle     BSA: 1.6 m2  Height: 62 in  Weight: 126 lb  HR: 149  BP: 118/99 mmHg  ______________________________________________________________________________  Procedure  Complete Portable JIM Adult. JIM Probe #[id number] was also used during the  procedure.  ______________________________________________________________________________  Interpretation Summary     1. The left ventricle is normal in size. The visual ejection fraction is  estimated at 35%. There is moderate global hypokinesia of the left ventricle.  2. The right ventricle is normal size. Mildly decreased right ventricular  systolic function  3. There is moderately severe (3+) mitral regurgitation. MV appears  structurally normal. Suspect this is functional MR from rapid afib and low EF.     Echo 24 showed EF 55%, 1+ MR.  ______________________________________________________________________________  JIM  I determined this patient to be an appropriate candidate for the planned  sedation and procedure and have reassessed the patient immediately prior to  sedation and procedure. Total sedation time: 13mins minutes of continuous  bedside 1:1 monitoring. Versed (50mg) was given intravenously. Fentanyl  (1.5mcg) was given intravenously. Consent to the procedure was obtained prior  to sedation. The  transesophageal probe was passed without difficulty. There  were no complications associated with this procedure.     Left Ventricle  The left ventricle is normal in size. The visual ejection fraction is  estimated at 35%. There is moderate global hypokinesia of the left ventricle.     Right Ventricle  The right ventricle is normal size. Mildly decreased right ventricular  systolic function.     Atria  There is severe biatrial enlargement. A patent foramen ovale is present. A  contrast injection (Bubble Study) was performed that was mildly positive for  flow across the interatrial septum. No thrombus is detected in the left atrial  appendage.     Mitral Valve  There is moderately severe (3+) mitral regurgitation. MV appears structurally  normal. Suspect this is functional MR from rapid afib and low EF.     Tricuspid Valve  There is mild (1+) tricuspid regurgitation.     Aortic Valve  The aortic valve is trileaflet. There is trace aortic regurgitation.     Pulmonic Valve  The pulmonic valve is normal in structure and function.     Vessels  Normal ascending, transverse (arch), and descending aorta.     Pericardial/Pleural  There is no pericardial effusion.     Rhythm  The rhythm was rapid atrial fibrillation.  ______________________________________________________________________________  Doppler Measurements & Calculations  MV max P.9 mmHg  MV mean P.4 mmHg  MV V2 VTI: 11.8 cm  MR PISA: 2.7 cm2  MR ERO: 0.20 cm2  MR volume: 21.4 ml  TR max mendoza: 269.7 cm/sec  TR max P.1 mmHg     ______________________________________________________________________________  Report approved by: Varinder Abarca 2024 04:26 PM         Echocardiogram Limited     Value    LVEF  55-60%    Narrative    [id number]  [id number]  [id number]  [id number]^DAHLIA^JAYME     [facility]  Echocardiography Laboratory  [address] [place] [address], [place] [address]     Name: ENMA BLACKWOODEMMA GONGORA  MRN: [id number]  :  1945  Study Date: 02/28/2024 08:37 AM  Age: 78 yrs  Gender: Female  Patient Location: [facility]  Reason For Study: Cardiomyopathy  Ordering Physician: JAYME VLILAGOMEZ  Referring Physician: JAYME VILLAGOMEZ  Performed By: PRAVEEN Weiss     BSA: 1.5 m2  Height: 62 in  Weight: 115 lb  HR: 80  BP: 101/56 mmHg  ______________________________________________________________________________  Procedure  Limited Portable Echo Adult. Optison (NDC #4513-8156) given intravenously.  ______________________________________________________________________________  Interpretation Summary     Limited echocardiogram performed.  Left ventricular systolic function is normal.  The visual ejection fraction is 55-60%.  No regional wall motion abnormalities.  Grossly normal right ventricular size and systolic function.  Mitral valve sclerosis with moderate regurgitation, no stenosis.  Severe left atrial enlargement.  Trace tricuspid valve regurgitation.  Normal inferior vena cava.     Compared to the recent JIM done last week on 2/20/2024, MR has decreased from  moderately severe to moderate, LVEF has improved from 35% to 55-60%.     ______________________________________________________________________________  Left Ventricle  The left ventricle is normal in size. There is normal left ventricular wall  thickness. Left ventricular systolic function is normal. The visual ejection  fraction is 55-60%. No regional wall motion abnormalities noted.     Right Ventricle  The right ventricle is grossly normal size. The right ventricular systolic  function is normal.     Atria  The left atrium is severely dilated. Right atrial size is normal.     Mitral Valve  The mitral valve leaflets are mildly thickened. There is moderate (2+) mitral  regurgitation. There is no mitral valve stenosis.     Tricuspid Valve  The tricuspid valve is not well visualized. There is trace tricuspid  regurgitation. Right ventricular systolic pressure could not be  approximated  due to inadequate tricuspid regurgitation.     Aortic Valve  There is trace aortic regurgitation. No hemodynamically significant valvular  aortic stenosis.     Pulmonic Valve  Normal pulmonic valve. There is trace pulmonic valvular regurgitation. Normal  pulmonic valve velocity.     Vessels  The aortic root is normal size. Normal size ascending aorta. The inferior vena  cava was normal in size with preserved respiratory variability.     Pericardium  There is no pericardial effusion.     Rhythm  Sinus rhythm was noted.  ______________________________________________________________________________  MMode/2D Measurements & Calculations  Ao root diam: 3.1 cm     Ao root diam index Ht(cm/m): 1.9  Ao root diam index BSA (cm/m2): 2.0  RV Base: 2.3 cm     Doppler Measurements & Calculations  MR PISA: 2.5 cm2  MR ERO: 0.12 cm2  MR volume: 17.3 ml  RV S Antonino: 12.6 cm/sec     ______________________________________________________________________________  Report approved by: Dr Grupo Cuenca 02/28/2024 10:34 AM             Discharge Medications   Discharge Medication List as of 3/2/2024  3:42 PM        START taking these medications    Details   amoxicillin-clavulanate (AUGMENTIN) 875-125 MG tablet Take 1 tablet by mouth every 12 hours for 4 days, Disp-8 tablet, R-0, Transitional      apixaban ANTICOAGULANT (ELIQUIS) 2.5 MG tablet Take 1 tablet (2.5 mg) by mouth 2 times daily, Disp-60 tablet, R-0, Transitional      furosemide (LASIX) 20 MG tablet Take 1 tablet (20 mg) by mouth daily as needed (shortness of breath), Disp-20 tablet, R-0, Transitional      guaiFENesin (MUCINEX) 600 MG 12 hr tablet Take 1 tablet (600 mg) by mouth 2 times daily for 10 days, Disp-20 tablet, R-0, Transitional      ipratropium (ATROVENT) 0.02 % neb solution Take 2.5 mLs (0.5 mg) by nebulization 3 times daily, Disp-60 mL, R-0, Transitional      levalbuterol (XOPENEX) 1.25 MG/3ML neb solution Take 3 mLs (1.25 mg) by nebulization every 4  hours as needed for wheezing, Disp-90 mL, R-0, Transitional      midodrine (PROAMATINE) 5 MG tablet Take 1 tablet (5 mg) by mouth 2 times daily, Disp-60 tablet, R-0, Transitional           CONTINUE these medications which have NOT CHANGED    Details   Apoaequorin (PREVAGEN) 10 MG CAPS Take 1 capsule by mouth daily, Disp-30 capsule, R-11, E-Prescribe      bimatoprost (LUMIGAN) 0.01 % SOLN Place 1 drop into both eyes at bedtime, Historical      brimonidine-timolol (COMBIGAN) 0.2-0.5 % ophthalmic solution Place 1 drop into both eyes 2 times daily, Historical      citalopram (CELEXA) 10 MG tablet Take 1 tablet (10 mg) by mouth daily, Disp-30 tablet, R-11, E-Prescribe      mycophenolate (GENERIC EQUIVALENT) 250 MG capsule Take 250 mg by mouth 2 times daily, Historical      miconazole with skin protectant (MARLON ANTIFUNGAL) 2 % CREA cream Apply topically 2 times daily To buttocks and sacral areaDisp-198 g, [id number]           STOP taking these medications       metoprolol tartrate (LOPRESSOR) 25 MG tablet Comments:   Reason for Stopping:             Allergies   Allergies   Allergen Reactions    Adhesive Tape     Zocor [Simvastatin - High Dose]      Elevated LFTs

## 2024-03-04 ENCOUNTER — LAB REQUISITION (OUTPATIENT)
Dept: LAB | Facility: CLINIC | Age: 79
End: 2024-03-04

## 2024-03-04 ENCOUNTER — TRANSITIONAL CARE UNIT VISIT (OUTPATIENT)
Dept: GERIATRICS | Facility: CLINIC | Age: 79
End: 2024-03-04
Payer: COMMERCIAL

## 2024-03-04 DIAGNOSIS — L30.9 DERMATITIS: ICD-10-CM

## 2024-03-04 DIAGNOSIS — N18.31 STAGE 3A CHRONIC KIDNEY DISEASE (H): ICD-10-CM

## 2024-03-04 DIAGNOSIS — F03.B4 MODERATE DEMENTIA WITH ANXIETY, UNSPECIFIED DEMENTIA TYPE (H): ICD-10-CM

## 2024-03-04 DIAGNOSIS — J96.01 ACUTE RESPIRATORY FAILURE WITH HYPOXIA (H): ICD-10-CM

## 2024-03-04 DIAGNOSIS — K75.4 AUTOIMMUNE HEPATITIS (H): ICD-10-CM

## 2024-03-04 DIAGNOSIS — I95.9 HYPOTENSION, UNSPECIFIED HYPOTENSION TYPE: ICD-10-CM

## 2024-03-04 DIAGNOSIS — Z71.89 ADVANCED DIRECTIVES, COUNSELING/DISCUSSION: ICD-10-CM

## 2024-03-04 DIAGNOSIS — J44.1 COPD EXACERBATION (H): ICD-10-CM

## 2024-03-04 DIAGNOSIS — I48.91 ATRIAL FIBRILLATION WITH RAPID VENTRICULAR RESPONSE (H): Primary | ICD-10-CM

## 2024-03-04 DIAGNOSIS — I25.2 STATUS POST NON-ST ELEVATION MYOCARDIAL INFARCTION (NSTEMI): ICD-10-CM

## 2024-03-04 DIAGNOSIS — Z98.890 HX OF ATRIOVENTRICULAR NODE ABLATION: ICD-10-CM

## 2024-03-04 DIAGNOSIS — F32.A DEPRESSION, UNSPECIFIED DEPRESSION TYPE: ICD-10-CM

## 2024-03-04 DIAGNOSIS — R53.81 PHYSICAL DECONDITIONING: ICD-10-CM

## 2024-03-04 DIAGNOSIS — I50.43 ACUTE ON CHRONIC COMBINED SYSTOLIC AND DIASTOLIC CONGESTIVE HEART FAILURE (H): ICD-10-CM

## 2024-03-04 DIAGNOSIS — Z95.0 CARDIAC PACEMAKER IN SITU: ICD-10-CM

## 2024-03-04 DIAGNOSIS — D64.9 ANEMIA, UNSPECIFIED: ICD-10-CM

## 2024-03-04 DIAGNOSIS — F41.9 ANXIETY: ICD-10-CM

## 2024-03-04 LAB
BACTERIA BLD CULT: NO GROWTH
BACTERIA BLD CULT: NO GROWTH

## 2024-03-04 PROCEDURE — 99309 SBSQ NF CARE MODERATE MDM 30: CPT | Performed by: NURSE PRACTITIONER

## 2024-03-04 NOTE — PROGRESS NOTES
[facility] GERIATRICS    PRIMARY CARE PROVIDER AND CLINIC:  Shlomo Munguia, APRN CNP, [address] [facility] [address]  Chief Complaint   Patient presents with    Hospital F/U      [facility] Record Number:  [id number]  Place of Service where encounter took place:  [facility] (TCU) [[id number]]    Elaina Winn  is a 78 year old  (1945), admitted to the above facility from  [facility]. Hospital stay 2/16/24 through 3/2/24..   HPI:    78 year old female with a PMH of atrial fibrillation, malnutrition, dementia, autoimmune hepatitis on mycophenolate, CKD stage III hospitalized with A-fib RVR and also noted with likely COPD exacerbation. She is now s/p AV node ablation and PPM on 2/23/24. She also had a NSTEMI type 2 due to demand. Initially had cardioversion, but a fib and RVR persisted despite amiodarone. Cards: EF 35% from 55-60% on 2/17/24, given IV lasix. Due to hypotension stopped losartan and decreased Toprol XL dose. Repeat ECHO 2/28 with EF 55-60%. Needs repeat ECHO outpatient 3-6 months. Started Eliquis. Due to SBP 60-80s needed fluids, also added midodrine. CXR with small bilateral pleural effusions, treated with IV zosyn for possible pneumonia. Acute hypoxic respiratory failure, COPD, acute on chronic d CHF with exacerbation and systolic CHF: treated with intermittent diuresis, short term steroids, Atrovent + xopenex + Mucinex. IV zosyn as above due to WBC 13 k, improved to 10.8 at discharge. Now on Augmentin x 1 week. Left arm blood blister covered with gauze. Dementia: lives in group home, may need memory care . CKD 3: Cr up to 1.41 with diuretics, down to 1.17 on 2/23. Has coccyx dermatitis due to incontinence. To TCU for rehab.     Seen for initial TCU visits. Asks to be Full Code. Denies pain. No headaches, dizziness, chest pain, dyspnea, bowel and bladder issues. BP range /62-72 and sats 96% room air. HR 80s. Reports fatigued.     CODE  STATUS/ADVANCE DIRECTIVES DISCUSSION:  Full Code    ALLERGIES:   Allergies   Allergen Reactions    Adhesive Tape     Zocor [Simvastatin - High Dose]      Elevated LFTs      PAST MEDICAL HISTORY:   Past Medical History:   Diagnosis Date    Anxiety     Atrial fibrillation (H)     Chronic renal insufficiency     Dementia (H)     Glaucoma     Hepatitis     autoimmune    HTN (hypertension), benign     Hyperlipidemia LDL goal < 130     Paroxysmal A-fib (H)       PAST SURGICAL HISTORY:   has a past surgical history that includes Hysterectomy; Dilation and curettage; tonsillectomy & adenoidectomy; Septoplasty; S/p partial vaginectomy; Anesthesia cardioversion (N/A, 2/20/2024); Ablation Atrioventricular Node (N/A, 2/23/2024); and Pacemaker Device & Lead Implant- Right ventricular (N/A, 2/23/2024).  FAMILY HISTORY: family history includes Alzheimer Disease in her paternal uncle and paternal uncle; Alzheimer Disease (age of onset: 75) in her father; Cancer in her brother; Hypertension in her mother; Lipids in her brother.  SOCIAL HISTORY:   reports that she quit smoking about 34 years ago. Her smoking use included cigarettes. She has never used smokeless tobacco. She reports that she does not drink alcohol and does not use drugs.  Patient's living condition: lives in an assisted living facility    Post Discharge Medication Reconciliation Status:   MED REC REQUIRED  Post Medication Reconciliation Status: discharge medications reconciled and changed, per note/orders       Current Outpatient Medications   Medication Sig    amoxicillin-clavulanate (AUGMENTIN) 875-125 MG tablet Take 1 tablet by mouth every 12 hours for 4 days    apixaban ANTICOAGULANT (ELIQUIS) 2.5 MG tablet Take 1 tablet (2.5 mg) by mouth 2 times daily    Apoaequorin (PREVAGEN) 10 MG CAPS Take 1 capsule by mouth daily    bimatoprost (LUMIGAN) 0.01 % SOLN Place 1 drop into both eyes at bedtime    brimonidine-timolol (COMBIGAN) 0.2-0.5 % ophthalmic solution Place 1  drop into both eyes 2 times daily    citalopram (CELEXA) 10 MG tablet Take 1 tablet (10 mg) by mouth daily    furosemide (LASIX) 20 MG tablet Take 1 tablet (20 mg) by mouth daily as needed (shortness of breath)    guaiFENesin (MUCINEX) 600 MG 12 hr tablet Take 1 tablet (600 mg) by mouth 2 times daily for 10 days    ipratropium (ATROVENT) 0.02 % neb solution Take 2.5 mLs (0.5 mg) by nebulization 3 times daily    levalbuterol (XOPENEX) 1.25 MG/3ML neb solution Take 3 mLs (1.25 mg) by nebulization every 4 hours as needed for wheezing    miconazole with skin protectant (MARLON ANTIFUNGAL) 2 % CREA cream Apply topically 2 times daily To buttocks and sacral area    midodrine (PROAMATINE) 5 MG tablet Take 1 tablet (5 mg) by mouth 2 times daily    mycophenolate (GENERIC EQUIVALENT) 250 MG capsule Take 250 mg by mouth 2 times daily     No current facility-administered medications for this visit.       ROS:  10 point ROS of systems including Constitutional, Eyes, Respiratory, Cardiovascular, Gastroenterology, Genitourinary, Integumentary, Musculoskeletal, Psychiatric were all negative except for pertinent positives noted in my HPI.    Vitals:  BP 99/62   Pulse 80   Temp 97.2  F (36.2  C)   Resp 16   Wt 52.9 kg (116 lb 9.6 oz)   SpO2 96%   BMI 20.84 kg/m    Exam:  GENERAL APPEARANCE:  Alert, in no distress, pleasant, cooperative, oriented x self place and recent events  EYES:  EOM, lids, pupils and irises normal, sclera clear and conjunctiva normal, no discharge or mattering on lids or lashes noted  ENT:  Mouth normal, moist mucous membranes, nose normal without drainage or crusting, external ears without lesions, hearing acuity intact  NECK: supple, symmetrical, trachea midline  RESP:  respiratory effort normal, no chest wall tenderness, no respiratory distress, Lung sounds upper airway congestion, patient is on room air  CV:  Auscultation of heart done, rate and rhythm controlled and regular, no murmur, no rub or gallop.  Edema +1 pitting bilateral lower extremities  ABDOMEN:  normal bowel sounds, soft, nontender, no palpable masses.  M/S:   Gait and station not assessed, no tenderness or swelling of the joints; able to move all extremities, digits normal  NEURO: cranial nerves 2-12 grossly intact, no facial asymmetry, no speech deficits and able to follow directions, moves all extremities symmetrically  PSYCH:  insight and judgement and memory appear impaired, affect and mood normal     Lab/Diagnostic data:  Most Recent 3 CBC's:  Recent Labs   Lab Test 03/05/24  0540 02/29/24  0532 02/28/24  0542   WBC 6.8 10.8 12.8*   HGB 12.0 11.0* 11.9   MCV 95 94 93    226 244     Most Recent 3 BMP's:  Recent Labs   Lab Test 03/05/24  0540 02/29/24  0532 02/28/24  0542    138 138   POTASSIUM 4.6 3.9 3.7   CHLORIDE 103 103 101   CO2 25 24 27   BUN 16.8 33.7* 37.7*   CR 1.12* 1.16* 1.31*   ANIONGAP 12 11 10   DANIELITO 10.0 9.2 9.1   GLC 79 99 119*     Most Recent 2 LFT's:  Recent Labs   Lab Test 02/27/24  2331 02/16/24  1941   AST 12 30   ALT 7 21   ALKPHOS 101 128   BILITOTAL 0.9 1.1     Most Recent TSH and T4:  Recent Labs   Lab Test 02/16/24  2305   TSH 2.43     Most Recent Hemoglobin A1c:No lab results found.    ASSESSMENT/PLAN:  Atrial fibrillation with rapid ventricular response (H)  Hx of atrioventricular node ablation  Status post non-ST elevation myocardial infarction (NSTEMI)  Cardiac pacemaker in situ  Acute on chronic combined systolic and diastolic congestive heart failure (H)  Acute on chronic. Continue Eliquis 2.5 mg BID, lasix 20 mg PO now and daily PRN edema, wt daily. Monitor vs and review ranges. Cardiology follow-up as recommended.     COPD exacerbation (H)  Acute respiratory failure with hypoxia (H)  Acute on chronic. Continue Augmentin 875 mg BID x 4 days, Mucinex 600 mg BID x 10 days, Atrovent 0./02% neb TID, xopenex 1.25 mg neb every 4 hrs PRN, CBC on 3/5. Monitor respiratory symptoms.     Hypotension, unspecified  hypotension type  Chronic. Continue midodrine 5 mg BID, monitor vs and review ranges next visit.     Stage 3a chronic kidney disease (H)  Chronic. Last Cr 1.16. Avoid nephrotoxins and check BMP on 3/5    Autoimmune hepatitis (H)  Chronic. Continue mycophenolate 250 mg BID as PTA.     Moderate dementia with anxiety, unspecified dementia type (H)  Anxiety  Depression, unspecified depression type  Chronic. Continue prevagen 10 mg daily, Celexa 10 mg daily, monitor mood.     Dermatitis  Present prior to hospitalization. Continue Jessica antifungal 2% cream BI to buttocks, monitor skin status.     Physical deconditioning  Acute. Therapies as ordered and follow-up progress next visit.     Advanced directives, counseling/discussion  Full Code per admission orders    Orders:  Full Code  CBC and BMP 3/5 diagnosis anemia, CKD  Lasix 20 mg PO on 3/5 diagnosis edema  Wt daily diagnosis CHF    Electronically signed by:  CATY Caro CNP

## 2024-03-04 NOTE — PLAN OF CARE
Physical Therapy Discharge Summary    Reason for therapy discharge:    Discharged to transitional care facility.    Progress towards therapy goal(s). See goals on Care Plan in [facility] electronic health record for goal details.  Goals partially met.  Barriers to achieving goals:   discharge from facility.    Therapy recommendation(s):    Continued therapy is recommended.  Rationale/Recommendations:  TCU to improve functional mobility as pt below baseline at this time.

## 2024-03-04 NOTE — PLAN OF CARE
Occupational Therapy Discharge Summary    Reason for therapy discharge:    Discharged to transitional care facility.    Progress towards therapy goal(s). See goals on Care Plan in [facility] electronic health record for goal details.  Goals partially met.  Barriers to achieving goals:   discharge from facility.    Therapy recommendation(s):    Continued therapy is recommended.  Rationale/Recommendations:  Pt has baseline of dementia and was living at home in an [facility] with spouse who passed away recently. Pts  assisted pt with some ADl's as needed and all IADLs. Patient is currently below baseline. Pt is demonstrating deficits in STM recall, impaired safety, fall risk and lacks insight into deficits and unable to recall PPM precautions. If pt returned home will need 24/7 supervision and assistance with all ADL/IADLs, functional mobilty due to impaired cognition and pacer precautions and home RN, OT and PT . recommend TCU at discharge to improve ind and safety with ADLs and functional mobility and to assist with carryover of PPM precautions..    **Pt not seen by discharging therapist on this date, note written based on previous treating therapist's notes and recommendations

## 2024-03-05 ENCOUNTER — TELEPHONE (OUTPATIENT)
Dept: CARDIOLOGY | Facility: CLINIC | Age: 79
End: 2024-03-05
Payer: COMMERCIAL

## 2024-03-05 ENCOUNTER — MEDICAL CORRESPONDENCE (OUTPATIENT)
Dept: HEALTH INFORMATION MANAGEMENT | Facility: CLINIC | Age: 79
End: 2024-03-05
Payer: COMMERCIAL

## 2024-03-05 VITALS
RESPIRATION RATE: 18 BRPM | BODY MASS INDEX: 21.09 KG/M2 | WEIGHT: 114.6 LBS | HEART RATE: 82 BPM | TEMPERATURE: 98.2 F | SYSTOLIC BLOOD PRESSURE: 99 MMHG | DIASTOLIC BLOOD PRESSURE: 61 MMHG | HEIGHT: 62 IN | OXYGEN SATURATION: 96 %

## 2024-03-05 LAB
ANION GAP SERPL CALCULATED.3IONS-SCNC: 12 MMOL/L (ref 7–15)
BUN SERPL-MCNC: 16.8 MG/DL (ref 8–23)
CALCIUM SERPL-MCNC: 10 MG/DL (ref 8.8–10.2)
CHLORIDE SERPL-SCNC: 103 MMOL/L (ref 98–107)
CREAT SERPL-MCNC: 1.12 MG/DL (ref 0.51–0.95)
DEPRECATED HCO3 PLAS-SCNC: 25 MMOL/L (ref 22–29)
EGFRCR SERPLBLD CKD-EPI 2021: 49 ML/MIN/1.73M2
ERYTHROCYTE [DISTWIDTH] IN BLOOD BY AUTOMATED COUNT: 14.4 % (ref 10–15)
GLUCOSE SERPL-MCNC: 79 MG/DL (ref 70–99)
HCT VFR BLD AUTO: 39.2 % (ref 35–47)
HGB BLD-MCNC: 12 G/DL (ref 11.7–15.7)
MCH RBC QN AUTO: 28.9 PG (ref 26.5–33)
MCHC RBC AUTO-ENTMCNC: 30.6 G/DL (ref 31.5–36.5)
MCV RBC AUTO: 95 FL (ref 78–100)
PLATELET # BLD AUTO: 305 10E3/UL (ref 150–450)
POTASSIUM SERPL-SCNC: 4.6 MMOL/L (ref 3.4–5.3)
RBC # BLD AUTO: 4.15 10E6/UL (ref 3.8–5.2)
SODIUM SERPL-SCNC: 140 MMOL/L (ref 135–145)
WBC # BLD AUTO: 6.8 10E3/UL (ref 4–11)

## 2024-03-05 PROCEDURE — 85049 AUTOMATED PLATELET COUNT: CPT | Performed by: NURSE PRACTITIONER

## 2024-03-05 PROCEDURE — 80048 BASIC METABOLIC PNL TOTAL CA: CPT | Performed by: NURSE PRACTITIONER

## 2024-03-05 PROCEDURE — 36415 COLL VENOUS BLD VENIPUNCTURE: CPT | Performed by: NURSE PRACTITIONER

## 2024-03-05 PROCEDURE — P9604 ONE-WAY ALLOW PRORATED TRIP: HCPCS | Performed by: NURSE PRACTITIONER

## 2024-03-06 ENCOUNTER — ANCILLARY PROCEDURE (OUTPATIENT)
Dept: CARDIOLOGY | Facility: CLINIC | Age: 79
End: 2024-03-06
Attending: INTERNAL MEDICINE
Payer: COMMERCIAL

## 2024-03-06 ENCOUNTER — TRANSITIONAL CARE UNIT VISIT (OUTPATIENT)
Dept: GERIATRICS | Facility: CLINIC | Age: 79
End: 2024-03-06
Payer: COMMERCIAL

## 2024-03-06 DIAGNOSIS — I95.9 HYPOTENSION, UNSPECIFIED HYPOTENSION TYPE: ICD-10-CM

## 2024-03-06 DIAGNOSIS — I50.43 ACUTE ON CHRONIC COMBINED SYSTOLIC AND DIASTOLIC CONGESTIVE HEART FAILURE (H): ICD-10-CM

## 2024-03-06 DIAGNOSIS — Z95.0 CARDIAC PACEMAKER IN SITU: ICD-10-CM

## 2024-03-06 DIAGNOSIS — F41.9 ANXIETY: ICD-10-CM

## 2024-03-06 DIAGNOSIS — J96.01 ACUTE RESPIRATORY FAILURE WITH HYPOXIA (H): ICD-10-CM

## 2024-03-06 DIAGNOSIS — I25.2 STATUS POST NON-ST ELEVATION MYOCARDIAL INFARCTION (NSTEMI): ICD-10-CM

## 2024-03-06 DIAGNOSIS — F32.A DEPRESSION, UNSPECIFIED DEPRESSION TYPE: ICD-10-CM

## 2024-03-06 DIAGNOSIS — J44.1 COPD EXACERBATION (H): ICD-10-CM

## 2024-03-06 DIAGNOSIS — I48.91 ATRIAL FIBRILLATION WITH RAPID VENTRICULAR RESPONSE (H): Primary | ICD-10-CM

## 2024-03-06 DIAGNOSIS — Z98.890 HX OF ATRIOVENTRICULAR NODE ABLATION: ICD-10-CM

## 2024-03-06 DIAGNOSIS — L30.9 DERMATITIS: ICD-10-CM

## 2024-03-06 DIAGNOSIS — F03.B4 MODERATE DEMENTIA WITH ANXIETY, UNSPECIFIED DEMENTIA TYPE (H): ICD-10-CM

## 2024-03-06 DIAGNOSIS — N18.31 STAGE 3A CHRONIC KIDNEY DISEASE (H): ICD-10-CM

## 2024-03-06 DIAGNOSIS — R53.81 PHYSICAL DECONDITIONING: ICD-10-CM

## 2024-03-06 DIAGNOSIS — K75.4 AUTOIMMUNE HEPATITIS (H): ICD-10-CM

## 2024-03-06 DIAGNOSIS — I44.7 LBBB (LEFT BUNDLE BRANCH BLOCK): Primary | ICD-10-CM

## 2024-03-06 PROCEDURE — 99309 SBSQ NF CARE MODERATE MDM 30: CPT | Performed by: NURSE PRACTITIONER

## 2024-03-06 PROCEDURE — 93279 PRGRMG DEV EVAL PM/LDLS PM: CPT | Performed by: INTERNAL MEDICINE

## 2024-03-06 NOTE — PROGRESS NOTES
[facility] GERIATRICS    Chief Complaint   Patient presents with    RECHECK     HPI:  Elaina Winn is a 78 year old  (1945), who is being seen today for an episodic care visit at: [facility] (TCU) [[id number]].     Per recent TCU provider progress notes:   78 year old female with a PMH of atrial fibrillation, malnutrition, dementia, autoimmune hepatitis on mycophenolate, CKD stage III hospitalized with A-fib RVR and also noted with likely COPD exacerbation. She is now s/p AV node ablation and PPM on 2/23/24. She also had a NSTEMI type 2 due to demand. Initially had cardioversion, but a fib and RVR persisted despite amiodarone. Cards: EF 35% from 55-60% on 2/17/24, given IV lasix. Due to hypotension stopped losartan and decreased Toprol XL dose. Repeat ECHO 2/28 with EF 55-60%. Needs repeat ECHO outpatient 3-6 months. Started Eliquis. Due to SBP 60-80s needed fluids, also added midodrine. CXR with small bilateral pleural effusions, treated with IV zosyn for possible pneumonia. Acute hypoxic respiratory failure, COPD, acute on chronic d CHF with exacerbation and systolic CHF: treated with intermittent diuresis, short term steroids, Atrovent + xopenex + Mucinex. IV zosyn as above due to WBC 13 k, improved to 10.8 at discharge. Now on Augmentin x 1 week. Left arm blood blister covered with gauze. Dementia: lives in [facility], may need memory care . CKD 3: Cr up to 1.41 with diuretics, down to 1.17 on 2/23. Has coccyx dermatitis due to incontinence. To TCU for rehab.     Today's concern is: episodic follow-up mobility, vs, labs and respiratory status. No headaches, dizziness, chest pain, dyspnea, bowel or bladder issues. Wt down 3 lbs since admission. Sats 98% room air. BP range /61-72. Walks 200 ft with cane.     Allergies, and PMH/PSH reviewed in EPIC today.  REVIEW OF SYSTEMS:  4 point ROS including Respiratory, CV, GI and , other than that noted in the HPI,  is negative    Objective:   BP 99/61    "Pulse 82   Temp 98.2  F (36.8  C)   Resp 18   Ht 1.575 m (5' 2\")   Wt 52 kg (114 lb 9.6 oz)   SpO2 96%   BMI 20.96 kg/m    GENERAL APPEARANCE:  Alert, in no distress, pleasant, cooperative, oriented x 4  EYES:  EOM, lids, pupils and irises normal, sclera clear and conjunctiva normal, no discharge or mattering on lids or lashes noted  ENT:  Mouth normal, moist mucous membranes, nose normal without drainage or crusting, external ears without lesions, hearing acuity intact  RESP:  respiratory effort normal, no chest wall tenderness, no respiratory distress, Lung sounds clear, patient is on room air  CV:  Auscultation of heart done, rate and rhythm controlled and regular, no murmur, no rub or gallop. Edema none bilateral lower extremities  ABDOMEN:  normal bowel sounds, soft, nontender, no palpable masses.  M/S:   Gait and station ambulates independently, no tenderness or swelling of the joints; able to move all extremities, digits normal  NEURO: cranial nerves 2-12 grossly intact, no facial asymmetry, no speech deficits and able to follow directions, moves all extremities symmetrically  PSYCH:  insight and judgement and memory appear impaired, affect and mood normal     Most Recent 3 CBC's:  Recent Labs   Lab Test 03/05/24  0540 02/29/24  0532 02/28/24  0542   WBC 6.8 10.8 12.8*   HGB 12.0 11.0* 11.9   MCV 95 94 93    226 244     Most Recent 3 BMP's:  Recent Labs   Lab Test 03/05/24  0540 02/29/24  0532 02/28/24  0542    138 138   POTASSIUM 4.6 3.9 3.7   CHLORIDE 103 103 101   CO2 25 24 27   BUN 16.8 33.7* 37.7*   CR 1.12* 1.16* 1.31*   ANIONGAP 12 11 10   DANIELITO 10.0 9.2 9.1   GLC 79 99 119*       Assessment/Plan:  Atrial fibrillation with rapid ventricular response (H)  Hx of atrioventricular node ablation  Status post non-ST elevation myocardial infarction (NSTEMI)  Cardiac pacemaker in situ  Acute on chronic combined systolic and diastolic congestive heart failure (H)  Acute on chronic. Continue " Eliquis 2.5 mg BID, lasix 20 mg daily PRN edema, wt daily. Monitor vs and review ranges. Cardiology follow-up as recommended.     COPD exacerbation (H)  Acute respiratory failure with hypoxia (H)  Acute on chronic. Completed antibiotics. Continue Mucinex 600 mg BID x 10 days, Atrovent 0./02% neb TID, xopenex 1.25 mg neb every 4 hrs PRN, CBC on 3/5 with WBC 6.8. Monitor respiratory symptoms.     Hypotension, unspecified hypotension type  Chronic. Continue midodrine 5 mg BID, monitor vs and review ranges next visit.     Stage 3a chronic kidney disease (H)  Chronic. Last Cr 1.16. Avoid nephrotoxins. BMP on 3/5 with Cr 1.12.     Autoimmune hepatitis (H)  Chronic. Continue mycophenolate 250 mg BID as PTA.     Moderate dementia with anxiety, unspecified dementia type (H)  Anxiety  Depression, unspecified depression type  Chronic. Continue prevagen 10 mg daily, Celexa 10 mg daily, monitor mood.     Dermatitis  Present prior to hospitalization. Continue Jessica antifungal 2% cream BI to buttocks, monitor skin status.     Physical deconditioning  Acute. Therapies as ordered and follow-up progress next visit.     MED REC REQUIRED  Post Medication Reconciliation Status: discharge medications reconciled, continue medications without change    Orders:  No new orders    Electronically signed by: CATY Caro CNP

## 2024-03-07 VITALS
HEART RATE: 69 BPM | HEIGHT: 62 IN | OXYGEN SATURATION: 98 % | SYSTOLIC BLOOD PRESSURE: 100 MMHG | DIASTOLIC BLOOD PRESSURE: 61 MMHG | TEMPERATURE: 97.5 F | RESPIRATION RATE: 18 BRPM | WEIGHT: 112.6 LBS | BODY MASS INDEX: 20.72 KG/M2

## 2024-03-07 LAB
MDC_IDC_LEAD_CONNECTION_STATUS: NORMAL
MDC_IDC_LEAD_IMPLANT_DT: NORMAL
MDC_IDC_LEAD_LOCATION: NORMAL
MDC_IDC_LEAD_LOCATION_DETAIL_1: NORMAL
MDC_IDC_LEAD_MFG: NORMAL
MDC_IDC_LEAD_MODEL: NORMAL
MDC_IDC_LEAD_POLARITY_TYPE: NORMAL
MDC_IDC_LEAD_SERIAL: NORMAL
MDC_IDC_MSMT_BATTERY_DTM: NORMAL
MDC_IDC_MSMT_BATTERY_REMAINING_LONGEVITY: 166 MO
MDC_IDC_MSMT_BATTERY_RRT_TRIGGER: 2.62
MDC_IDC_MSMT_BATTERY_STATUS: NORMAL
MDC_IDC_MSMT_BATTERY_VOLTAGE: 3.22 V
MDC_IDC_MSMT_LEADCHNL_RV_IMPEDANCE_VALUE: 456 OHM
MDC_IDC_MSMT_LEADCHNL_RV_IMPEDANCE_VALUE: 665 OHM
MDC_IDC_MSMT_LEADCHNL_RV_PACING_THRESHOLD_AMPLITUDE: 0.62 V
MDC_IDC_MSMT_LEADCHNL_RV_PACING_THRESHOLD_PULSEWIDTH: 0.4 MS
MDC_IDC_MSMT_LEADCHNL_RV_SENSING_INTR_AMPL: 8.75 MV
MDC_IDC_PG_IMPLANT_DTM: NORMAL
MDC_IDC_PG_MFG: NORMAL
MDC_IDC_PG_MODEL: NORMAL
MDC_IDC_PG_SERIAL: NORMAL
MDC_IDC_PG_TYPE: NORMAL
MDC_IDC_SESS_CLINIC_NAME: NORMAL
MDC_IDC_SESS_DTM: NORMAL
MDC_IDC_SESS_TYPE: NORMAL
MDC_IDC_SET_BRADY_HYSTRATE: NORMAL
MDC_IDC_SET_BRADY_LOWRATE: 70 {BEATS}/MIN
MDC_IDC_SET_BRADY_MAX_SENSOR_RATE: 120 {BEATS}/MIN
MDC_IDC_SET_BRADY_MODE: NORMAL
MDC_IDC_SET_LEADCHNL_RV_PACING_AMPLITUDE: 2 V
MDC_IDC_SET_LEADCHNL_RV_PACING_ANODE_ELECTRODE_1: NORMAL
MDC_IDC_SET_LEADCHNL_RV_PACING_ANODE_LOCATION_1: NORMAL
MDC_IDC_SET_LEADCHNL_RV_PACING_CAPTURE_MODE: NORMAL
MDC_IDC_SET_LEADCHNL_RV_PACING_CATHODE_ELECTRODE_1: NORMAL
MDC_IDC_SET_LEADCHNL_RV_PACING_CATHODE_LOCATION_1: NORMAL
MDC_IDC_SET_LEADCHNL_RV_PACING_POLARITY: NORMAL
MDC_IDC_SET_LEADCHNL_RV_PACING_PULSEWIDTH: 0.4 MS
MDC_IDC_SET_LEADCHNL_RV_SENSING_ANODE_ELECTRODE_1: NORMAL
MDC_IDC_SET_LEADCHNL_RV_SENSING_ANODE_LOCATION_1: NORMAL
MDC_IDC_SET_LEADCHNL_RV_SENSING_CATHODE_ELECTRODE_1: NORMAL
MDC_IDC_SET_LEADCHNL_RV_SENSING_CATHODE_LOCATION_1: NORMAL
MDC_IDC_SET_LEADCHNL_RV_SENSING_POLARITY: NORMAL
MDC_IDC_SET_LEADCHNL_RV_SENSING_SENSITIVITY: 0.9 MV
MDC_IDC_SET_ZONE_DETECTION_INTERVAL: 400 MS
MDC_IDC_SET_ZONE_STATUS: NORMAL
MDC_IDC_SET_ZONE_TYPE: NORMAL
MDC_IDC_SET_ZONE_VENDOR_TYPE: NORMAL
MDC_IDC_STAT_BRADY_DTM_END: NORMAL
MDC_IDC_STAT_BRADY_DTM_START: NORMAL
MDC_IDC_STAT_BRADY_RV_PERCENT_PACED: 99.96 %
MDC_IDC_STAT_EPISODE_RECENT_COUNT: 0
MDC_IDC_STAT_EPISODE_RECENT_COUNT_DTM_END: NORMAL
MDC_IDC_STAT_EPISODE_RECENT_COUNT_DTM_START: NORMAL
MDC_IDC_STAT_EPISODE_TOTAL_COUNT: 0
MDC_IDC_STAT_EPISODE_TOTAL_COUNT_DTM_END: NORMAL
MDC_IDC_STAT_EPISODE_TOTAL_COUNT_DTM_START: NORMAL
MDC_IDC_STAT_EPISODE_TYPE: NORMAL

## 2024-03-08 ENCOUNTER — DISCHARGE SUMMARY NURSING HOME (OUTPATIENT)
Dept: GERIATRICS | Facility: CLINIC | Age: 79
End: 2024-03-08
Payer: COMMERCIAL

## 2024-03-08 DIAGNOSIS — K75.4 AUTOIMMUNE HEPATITIS (H): ICD-10-CM

## 2024-03-08 DIAGNOSIS — I50.43 ACUTE ON CHRONIC COMBINED SYSTOLIC AND DIASTOLIC CONGESTIVE HEART FAILURE (H): ICD-10-CM

## 2024-03-08 DIAGNOSIS — F03.B4 MODERATE DEMENTIA WITH ANXIETY, UNSPECIFIED DEMENTIA TYPE (H): ICD-10-CM

## 2024-03-08 DIAGNOSIS — Z98.890 HX OF ATRIOVENTRICULAR NODE ABLATION: ICD-10-CM

## 2024-03-08 DIAGNOSIS — J96.01 ACUTE RESPIRATORY FAILURE WITH HYPOXIA (H): ICD-10-CM

## 2024-03-08 DIAGNOSIS — F32.A DEPRESSION, UNSPECIFIED DEPRESSION TYPE: ICD-10-CM

## 2024-03-08 DIAGNOSIS — L30.9 DERMATITIS: ICD-10-CM

## 2024-03-08 DIAGNOSIS — Z95.0 CARDIAC PACEMAKER IN SITU: ICD-10-CM

## 2024-03-08 DIAGNOSIS — I48.91 ATRIAL FIBRILLATION WITH RAPID VENTRICULAR RESPONSE (H): Primary | ICD-10-CM

## 2024-03-08 DIAGNOSIS — I25.2 STATUS POST NON-ST ELEVATION MYOCARDIAL INFARCTION (NSTEMI): ICD-10-CM

## 2024-03-08 DIAGNOSIS — R53.81 PHYSICAL DECONDITIONING: ICD-10-CM

## 2024-03-08 DIAGNOSIS — N18.31 STAGE 3A CHRONIC KIDNEY DISEASE (H): ICD-10-CM

## 2024-03-08 DIAGNOSIS — J44.1 COPD EXACERBATION (H): ICD-10-CM

## 2024-03-08 DIAGNOSIS — F41.9 ANXIETY: ICD-10-CM

## 2024-03-08 DIAGNOSIS — I95.9 HYPOTENSION, UNSPECIFIED HYPOTENSION TYPE: ICD-10-CM

## 2024-03-08 PROCEDURE — 99316 NF DSCHRG MGMT 30 MIN+: CPT | Performed by: NURSE PRACTITIONER

## 2024-03-08 NOTE — PROGRESS NOTES
[facility] GERIATRICS DISCHARGE SUMMARY  PATIENT'S NAME: Elaina Winn  YOB: 1945  MEDICAL RECORD NUMBER:  [id number]  Place of Service where encounter took place:  [facility] ([facility]) [[id number]]    PRIMARY CARE PROVIDER AND CLINIC RESPONSIBLE AFTER TRANSFER:   CATY Kang CNP, [address] [facility] / [facility] [address]    [facility] Provider     Transferring providers: CATY Caro CNP, Dr Lenora MD  Recent Hospitalization/ED:  [facility] Hospital stay 2/16 to 3/2/24.  Date of SNF Admission:  3/2/24  Date of SNF (anticipated) Discharge:  3/12/24  Discharged to: memory care unit  Cognitive Scores:  not assessed  Physical Function:  up with walker  DME: No new DME needed    CODE STATUS/ADVANCE DIRECTIVES DISCUSSION:  Full Code   ALLERGIES: Adhesive tape and Zocor [simvastatin - high dose]    NURSING FACILITY COURSE   Medication Changes/Rationale:   Changed nebs to PRN    Per recent TCU provider progress notes:   78 year old female with a PMH of atrial fibrillation, malnutrition, dementia, autoimmune hepatitis on mycophenolate, CKD stage III hospitalized with A-fib RVR and also noted with likely COPD exacerbation. She is now s/p AV node ablation and PPM on 2/23/24. She also had a NSTEMI type 2 due to demand. Initially had cardioversion, but a fib and RVR persisted despite amiodarone. Cards: EF 35% from 55-60% on 2/17/24, given IV lasix. Due to hypotension stopped losartan and decreased Toprol XL dose. Repeat ECHO 2/28 with EF 55-60%. Needs repeat ECHO outpatient 3-6 months. Started Eliquis. Due to SBP 60-80s needed fluids, also added midodrine. CXR with small bilateral pleural effusions, treated with IV zosyn for possible pneumonia. Acute hypoxic respiratory failure, COPD, acute on chronic d CHF with exacerbation and systolic CHF: treated with intermittent diuresis, short term steroids, Atrovent + xopenex + Mucinex. IV zosyn as above due to WBC 13 k,  improved to 10.8 at discharge. Now on Augmentin x 1 week. Left arm blood blister covered with gauze. Dementia: lives in [facility], may need memory care . CKD 3: Cr up to 1.41 with diuretics, down to 1.17 on 2/23. Has coccyx dermatitis due to incontinence. To TCU for rehab.     Seen for discharge visit. Much improved, up with walker and off oxygen. No other new concerns. BP range /61-75 and sats 92%. Plan is to discharge to a memory care unit.     Summary of nursing facility stay:   Atrial fibrillation with rapid ventricular response (H)  Hx of atrioventricular node ablation  Status post non-ST elevation myocardial infarction (NSTEMI)  Cardiac pacemaker in situ  Acute on chronic combined systolic and diastolic congestive heart failure (H)  Acute on chronic. Continue Eliquis 2.5 mg BID, lasix 20 mg daily PRN edema, wt daily. Monitor vs and review ranges next PCP visit. Cardiology follow-up as recommended.     COPD exacerbation (H)  Acute respiratory failure with hypoxia (H)  Acute on chronic. Completed antibiotics. Complete Mucinex 600 mg BID x 10 days, Atrovent 0./02% neb TID changed to PRN, xopenex 1.25 mg neb every 4 hrs PRN, CBC on 3/5 with WBC 6.8. Monitor respiratory symptoms.     Hypotension, unspecified hypotension type  Chronic. Continue midodrine 5 mg BID, monitor vs and review ranges next PCP visit.     Stage 3a chronic kidney disease (H)  Chronic. Last Cr 1.16. Avoid nephrotoxins. BMP on 3/5 with Cr 1.12.     Autoimmune hepatitis (H)  Chronic. Continue mycophenolate 250 mg BID as PTA.     Moderate dementia with anxiety, unspecified dementia type (H)  Anxiety  Depression, unspecified depression type  Chronic. Continue prevagen 10 mg daily, Celexa 10 mg daily, monitor mood.     Dermatitis  Present prior to hospitalization. Continue Jessica antifungal 2% cream BI to buttocks, monitor skin status.     Physical deconditioning  Acute. Improved. Discharge to memory care    Discharge Medications:  MED REC  "REQUIRED  Post Medication Reconciliation Status: discharge medications reconciled and changed, per note/orders    Current Outpatient Medications   Medication Sig Dispense Refill    apixaban ANTICOAGULANT (ELIQUIS) 2.5 MG tablet Take 1 tablet (2.5 mg) by mouth 2 times daily 60 tablet 0    Apoaequorin (PREVAGEN) 10 MG CAPS Take 1 capsule by mouth daily 30 capsule 11    bimatoprost (LUMIGAN) 0.01 % SOLN Place 1 drop into both eyes at bedtime      brimonidine-timolol (COMBIGAN) 0.2-0.5 % ophthalmic solution Place 1 drop into both eyes 2 times daily      citalopram (CELEXA) 10 MG tablet Take 1 tablet (10 mg) by mouth daily 30 tablet 11    furosemide (LASIX) 20 MG tablet Take 1 tablet (20 mg) by mouth daily as needed (shortness of breath) 20 tablet 0    guaiFENesin (MUCINEX) 600 MG 12 hr tablet Take 1 tablet (600 mg) by mouth 2 times daily for 10 days 20 tablet 0    ipratropium (ATROVENT) 0.02 % neb solution Take 2.5 mLs (0.5 mg) by nebulization 3 times daily 60 mL 0    levalbuterol (XOPENEX) 1.25 MG/3ML neb solution Take 3 mLs (1.25 mg) by nebulization every 4 hours as needed for wheezing 90 mL 0    miconazole with skin protectant (MARLON ANTIFUNGAL) 2 % CREA cream Apply topically 2 times daily To buttocks and sacral area 198 g 11    midodrine (PROAMATINE) 5 MG tablet Take 1 tablet (5 mg) by mouth 2 times daily 60 tablet 0    mycophenolate (GENERIC EQUIVALENT) 250 MG capsule Take 250 mg by mouth 2 times daily          Controlled medications:   not applicable/none     Past Medical History:   Past Medical History:   Diagnosis Date    Anxiety     Atrial fibrillation (H)     Chronic renal insufficiency     Dementia (H)     Glaucoma     Hepatitis     autoimmune    HTN (hypertension), benign     Hyperlipidemia LDL goal < 130     Paroxysmal A-fib (H)      Physical Exam:   Vitals: /61   Pulse 69   Temp 97.5  F (36.4  C)   Resp 18   Ht 1.575 m (5' 2\")   Wt 51.1 kg (112 lb 9.6 oz)   SpO2 98%   BMI 20.59 kg/m    BMI: " Body mass index is 20.59 kg/m .  GENERAL APPEARANCE:  Alert, in no distress, pleasant, cooperative, oriented x self place and family  EYES:  EOM, lids, pupils and irises normal, sclera clear and conjunctiva normal, no discharge or mattering on lids or lashes noted  ENT:  Mouth normal, moist mucous membranes, nose normal without drainage or crusting, external ears without lesions, hearing acuity intact  RESP:  respiratory effort normal, no chest wall tenderness, no respiratory distress, Lung sounds clear, patient is on room air  CV:  Auscultation of heart done, rate and rhythm controlled and regular, no murmur, no rub or gallop. Edema none bilateral lower extremities  ABDOMEN:  normal bowel sounds, soft, nontender, no palpable masses.  M/S:   Gait and station ambulates independently, no tenderness or swelling of the joints; able to move all extremities, digits normal  NEURO: cranial nerves 2-12 grossly intact, no facial asymmetry, no speech deficits and able to follow directions, moves all extremities symmetrically  PSYCH:  insight and judgement and memory appear impaired, affect and mood normal     SNF labs: Most Recent 3 CBC's:  Recent Labs   Lab Test 03/05/24  0540 02/29/24  0532 02/28/24  0542   WBC 6.8 10.8 12.8*   HGB 12.0 11.0* 11.9   MCV 95 94 93    226 244     Most Recent 3 BMP's:  Recent Labs   Lab Test 03/05/24  0540 02/29/24  0532 02/28/24  0542    138 138   POTASSIUM 4.6 3.9 3.7   CHLORIDE 103 103 101   CO2 25 24 27   BUN 16.8 33.7* 37.7*   CR 1.12* 1.16* 1.31*   ANIONGAP 12 11 10   DANIELITO 10.0 9.2 9.1   GLC 79 99 119*       DISCHARGE PLAN:  Follow up labs: No labs orders/due  Medical Follow Up:      Follow up with primary care provider in 3-4 weeks  Follow up with specialist cardiology as recommended   Current [place] scheduled appointments:  Appointments in Next Year      Mar 18, 2024 12:30 PM  (Arrive by 12:20 PM)  Return Cardiology with MICHELE Padilla [facility]  [place] ([facility] ) [phone]     Apr 25, 2024  1:00 PM  CARDIAC DEVICE CHECK - IN CLINIC with PONCE DCR2  [facility] ([facility] ) [phone]     Apr 25, 2024  2:00 PM  (Arrive by 1:50 PM)  ecg with PONCE UMP CARD CSS  [facility] [place] ([facility] ) [phone]           Discharge Services: Home Care:  Occupational Therapy, Physical Therapy, and From: [facility]  Discharge Instructions Verbalized to Patient at Discharge:   Weigh yourself daily in the morning and keep a record. Call your primary clinic: a) if you are more short of breath, or b) if your weight changes more than 3 pounds in one day or more than 5 pounds in one week.   DISCONTINUE OXYGEN - not used    TOTAL DISCHARGE TIME:   Greater than 30 minutes  Electronically signed by:  Meryl Ramon, CATY CNP     Documentation of Face to Face and Certification for Home Health Services    I certify that services are/were furnished while this patient was under the care of a physician and that a physician or an allowed non-physician practitioner (NPP), had a face-to-face encounter that meets the physician face-to-face encounter requirements. The encounter was in whole, or in part, related to the primary reason for home health. The patient is confined to his/her home and needs intermittent skilled nursing, physical therapy, speech-language pathology, or the continued need for occupational therapy. A plan of care has been established by a physician and is periodically reviewed by a physician.  Date of Face-to-Face Encounter: 3/8/2024.    I certify that, based on my findings, the following services are medically necessary home health services: Occupational Therapy and Physical Therapy.    My clinical findings support the need for the above skilled services because: Requires assistance of another person or specialized equipment  to access medical services because patient: Is prone to wander/get lost without assistance...    Patient to re-establish plan of care with their PCP within 7-10 days after leaving the facility to reestablish care.  Medicare certified SARAH provider: CATY Caro CNP  Date: March 11, 2024

## 2024-03-12 ENCOUNTER — OFFICE VISIT (OUTPATIENT)
Dept: CARDIOLOGY | Facility: CLINIC | Age: 79
End: 2024-03-12
Attending: NURSE PRACTITIONER
Payer: COMMERCIAL

## 2024-03-12 ENCOUNTER — DOCUMENTATION ONLY (OUTPATIENT)
Dept: GERIATRICS | Facility: CLINIC | Age: 79
End: 2024-03-12
Payer: COMMERCIAL

## 2024-03-12 VITALS
OXYGEN SATURATION: 96 % | DIASTOLIC BLOOD PRESSURE: 68 MMHG | WEIGHT: 111.4 LBS | SYSTOLIC BLOOD PRESSURE: 98 MMHG | BODY MASS INDEX: 20.5 KG/M2 | HEIGHT: 62 IN | HEART RATE: 71 BPM

## 2024-03-12 DIAGNOSIS — Z95.0 CARDIAC PACEMAKER IN SITU: ICD-10-CM

## 2024-03-12 DIAGNOSIS — I48.91 ATRIAL FIBRILLATION WITH RAPID VENTRICULAR RESPONSE (H): Primary | ICD-10-CM

## 2024-03-12 DIAGNOSIS — R41.3 MEMORY LOSS: ICD-10-CM

## 2024-03-12 DIAGNOSIS — I42.9 SECONDARY CARDIOMYOPATHY (H): ICD-10-CM

## 2024-03-12 DIAGNOSIS — I95.9 HYPOTENSION, UNSPECIFIED HYPOTENSION TYPE: ICD-10-CM

## 2024-03-12 PROCEDURE — 99214 OFFICE O/P EST MOD 30 MIN: CPT | Mod: 24 | Performed by: NURSE PRACTITIONER

## 2024-03-12 NOTE — LETTER
3/12/2024    Jefferson Calderon, APRN CNP  [address] [facility] [address]    RE: Elaina Winn       Dear Colleague,     I had the pleasure of seeing Elaina Winn in the [facility].  CARDIOLOGY CLINIC NOTE    PRIMARY CARDIOLOGIST  Dr. Murillo     PRIMARY CARE PHYSICIAN:  Jefferson Calderon    HISTORY OF PRESENT ILLNESS:  Elaina Winn is a very pleasant 78-year-old female with a past medical history significant for paroxysmal atrial fibrillation, autoimmune hepatitis, hyperlipidemia, hypertension, chronic renal insufficiency, dementia, and anxiety.    She was last seen in 2022, followed by electrophysiology, Dr. Erwin who has since retired.  At that time, she remained on metoprolol for management of her atrial fibrillation.  She was not on anticoagulation due to her liver dysfunction.    On 2/16/2024, she presented to [facility] with shortness of breath, found to be in atrial fibrillation with RVR.  Her high-sensitivity troponin was also mildly elevated.  Her past liver disease has been well-controlled and platelets as well as ALT/AST were normal.  She is a [id number] 3 and was initiated on low-dose Eliquis 2.5 mg twice daily for stroke prevention.  She was also found to be in acute decompensated heart failure with an elevated NT proBNP of greater than 10,000.  Echocardiogram showed a normal ejection fraction estimated at 55 to 60%, normal RV size and function, severe biatrial enlargement, and mild mitral regurgitation.  Her heart failure was felt to be likely related to rapid atrial fibrillation.  A rate control strategy was attempted with both metoprolol and diltiazem but resulted in hypotension.  She was also trialed on digoxin without successful rate control and hypotension.  She subsequently underwent a JIM/cardioversion and initiated on amiodarone.  JIM did show a decline in LV function, now 35% with moderate global hypokinesis of the LV, mildly decreased RV systolic  function, and moderately severe mitral regurgitation.  Unfortunately, she developed recurrent atrial fibrillation a few hours post cardioversion.  She was seen by EP and subsequently underwent a AV node ablation status post single-chamber pacemaker implant.  GDMT with Toprol and losartan was reattempted but held due to hypotension.  She was discharged to a TCU for further rehabilitation.    She presents to the office today as an add-on to my schedule.  Unfortunately, she thought thought her office visit was today but actually scheduled for next week.  Her daughter who lives in [facility] is here with her and requested to be seen today.     Overall, patient is feeling well on a cardiac standpoint, denies chest pain, palpitations, PND, orthopnea, presyncope, syncope, or leg edema.  She does endorse exertional shortness of breath that resolves with rest.  lEaina does carry a history of dementia.  The majority of today's information is provided by her daughter who has noticed some exertional dyspnea.  For example, just walking from the parking ramp into the clinic she needed to stop and rest.  Upon exam, lungs are clear bilaterally, heart rate and rhythm regular, no evidence of fluid overload.  Pacemaker site is well-healed.  She has as needed furosemide which she has not needed.  Weight is down to 111 pounds (119 pounds at discharge).     On 2/28/2024, follow-up limited echocardiogram showed a normalized ejection fraction estimated at 55 to 60%, no regional wall motion abnormalities, normal RV size and function, moderate mitral regurgitation, severe left atrial enlargement and trace tricuspid regurgitation.    Device interrogation on 3/6/2024 showed 100% ventricular pacing underlying rhythm atrial fibrillation with complete heart block and no junctional escape.  Heart rate is blunted at 80.    Blood pressure is soft at 98/68, on no antihypertensive agents.  She does take midodrine 5 mg twice daily  Last BMP showed a  sodium of 140, potassium 4.6, BUN 16.8, creatinine 1.12, GFR 49  CBC is unremarkable    She just moved into a memory care center today and scheduled for daily PT/OT    PAST MEDICAL HISTORY:  Past Medical History:   Diagnosis Date    Anxiety     Atrial fibrillation (H)     Chronic renal insufficiency     Dementia (H)     Glaucoma     Hepatitis     autoimmune    HTN (hypertension), benign     Hyperlipidemia LDL goal < 130     Paroxysmal A-fib (H)        MEDICATIONS:  Current Outpatient Medications   Medication    apixaban ANTICOAGULANT (ELIQUIS) 2.5 MG tablet    Apoaequorin (PREVAGEN) 10 MG CAPS    bimatoprost (LUMIGAN) 0.01 % SOLN    brimonidine-timolol (COMBIGAN) 0.2-0.5 % ophthalmic solution    citalopram (CELEXA) 10 MG tablet    furosemide (LASIX) 20 MG tablet    ipratropium (ATROVENT) 0.02 % neb solution    levalbuterol (XOPENEX) 1.25 MG/3ML neb solution    miconazole with skin protectant (MARLON ANTIFUNGAL) 2 % CREA cream    midodrine (PROAMATINE) 5 MG tablet    mycophenolate (GENERIC EQUIVALENT) 250 MG capsule     No current facility-administered medications for this visit.       SOCIAL HISTORY:  I have reviewed this patient's social history and updated it with pertinent information if needed. Elaina Winn  reports that she quit smoking about 34 years ago. Her smoking use included cigarettes. She has never used smokeless tobacco. She reports that she does not drink alcohol and does not use drugs.    PHYSICAL EXAM:     111 lbs 6.4 oz    Constitutional: alert, no distress  Respiratory: Good bilateral air entry  Cardiovascular: Paced rhythm, systolic murmur  GI: nondistended  Neuropsychiatric: appropriate affact    ASSESSMENT/PLAN:  Pertinent issues addressed/ reviewed during this cardiology visit  Symptomatic atrial fibrillation -status post unsuccessful JIM cardioversion with recurrent A-fib with RVR status post AV haile ablation and single chamber pacemaker implant on 2/23/2024 . Device interrogation on 3/6/2024  showed 100% ventricular pacing underlying rhythm atrial fibrillation with complete heart block and no junctional escape.  Heart rate is blunted at 80.  Pacemaker site is well-healed.  Continue arm precautions x 2 weeks.  Toprol discontinued due to hypotension.  Continue low-dose anticoagulation for stroke prevention.  Follow-up with device clinic as scheduled.  Secondary cardiomyopathy -likely related to A-fib with RVR, now normalized.Follow-up limited echocardiogram showed a normalized ejection fraction estimated at 55 to 60%, no regional wall motion abnormalities, normal RV size and function, moderate mitral regurgitation, severe left atrial enlargement and trace tricuspid regurgitation.  Euvolemic upon exam.  Weight stable.  Low-dose furosemide as needed for weight gain, leg swelling, or worsening dyspnea.  Dementia -transitioned to long-term memory care facility today  Hypotension -BP stable in the 90s systolic, on midodrine    Follow-up with EP in 6 months to establish care in [place].     It was a pleasure seeing this patient in clinic today. Please do not hesitate to contact me with any future questions.     CATY Han, CNP  Cardiology - [facility]  03/13/2024    Review of the result(s) of each unique test - Last echocardiogram, CBC, BMP, device interrogation.     The level of medical decision making during this visit was of moderate complexity.    This note was completed in part using dictation via the Dragon voice recognition software. Some word and grammatical errors may occur and must be interpreted in the appropriate clinical context.  If there are any questions pertaining to this issue, please contact me for further clarification.      Thank you for allowing me to participate in the care of your patient.      Sincerely,     CATY Han CNP     [facility]  cc:   Cindy Fuller, [place]  [address] [address]

## 2024-03-12 NOTE — PROGRESS NOTES
CARDIOLOGY CLINIC NOTE    PRIMARY CARDIOLOGIST  Dr. Murillo     PRIMARY CARE PHYSICIAN:  Jefferson Calderon    HISTORY OF PRESENT ILLNESS:  Elaina Winn is a very pleasant 78-year-old female with a past medical history significant for paroxysmal atrial fibrillation, autoimmune hepatitis, hyperlipidemia, hypertension, chronic renal insufficiency, dementia, and anxiety.    She was last seen in 2022, followed by electrophysiology, Dr. Erwin who has since retired.  At that time, she remained on metoprolol for management of her atrial fibrillation.  She was not on anticoagulation due to her liver dysfunction.    On 2/16/2024, she presented to [facility] with shortness of breath, found to be in atrial fibrillation with RVR.  Her high-sensitivity troponin was also mildly elevated.  Her past liver disease has been well-controlled and platelets as well as ALT/AST were normal.  She is a [id number] 3 and was initiated on low-dose Eliquis 2.5 mg twice daily for stroke prevention.  She was also found to be in acute decompensated heart failure with an elevated NT proBNP of greater than 10,000.  Echocardiogram showed a normal ejection fraction estimated at 55 to 60%, normal RV size and function, severe biatrial enlargement, and mild mitral regurgitation.  Her heart failure was felt to be likely related to rapid atrial fibrillation.  A rate control strategy was attempted with both metoprolol and diltiazem but resulted in hypotension.  She was also trialed on digoxin without successful rate control and hypotension.  She subsequently underwent a JIM/cardioversion and initiated on amiodarone.  JIM did show a decline in LV function, now 35% with moderate global hypokinesis of the LV, mildly decreased RV systolic function, and moderately severe mitral regurgitation.  Unfortunately, she developed recurrent atrial fibrillation a few hours post cardioversion.  She was seen by EP and subsequently underwent a AV node ablation  status post single-chamber pacemaker implant.  GDMT with Toprol and losartan was reattempted but held due to hypotension.  She was discharged to a TCU for further rehabilitation.    She presents to the office today as an add-on to my schedule.  Unfortunately, she thought thought her office visit was today but actually scheduled for next week.  Her daughter who lives in [facility] is here with her and requested to be seen today.     Overall, patient is feeling well on a cardiac standpoint, denies chest pain, palpitations, PND, orthopnea, presyncope, syncope, or leg edema.  She does endorse exertional shortness of breath that resolves with rest.  Elaina does carry a history of dementia.  The majority of today's information is provided by her daughter who has noticed some exertional dyspnea.  For example, just walking from the parking ramp into the clinic she needed to stop and rest.  Upon exam, lungs are clear bilaterally, heart rate and rhythm regular, no evidence of fluid overload.  Pacemaker site is well-healed.  She has as needed furosemide which she has not needed.  Weight is down to 111 pounds (119 pounds at discharge).     On 2/28/2024, follow-up limited echocardiogram showed a normalized ejection fraction estimated at 55 to 60%, no regional wall motion abnormalities, normal RV size and function, moderate mitral regurgitation, severe left atrial enlargement and trace tricuspid regurgitation.    Device interrogation on 3/6/2024 showed 100% ventricular pacing underlying rhythm atrial fibrillation with complete heart block and no junctional escape.  Heart rate is blunted at 80.    Blood pressure is soft at 98/68, on no antihypertensive agents.  She does take midodrine 5 mg twice daily  Last BMP showed a sodium of 140, potassium 4.6, BUN 16.8, creatinine 1.12, GFR 49  CBC is unremarkable    She just moved into a memory care center today and scheduled for daily PT/OT    PAST MEDICAL HISTORY:  Past Medical History:    Diagnosis Date    Anxiety     Atrial fibrillation (H)     Chronic renal insufficiency     Dementia (H)     Glaucoma     Hepatitis     autoimmune    HTN (hypertension), benign     Hyperlipidemia LDL goal < 130     Paroxysmal A-fib (H)        MEDICATIONS:  Current Outpatient Medications   Medication    apixaban ANTICOAGULANT (ELIQUIS) 2.5 MG tablet    Apoaequorin (PREVAGEN) 10 MG CAPS    bimatoprost (LUMIGAN) 0.01 % SOLN    brimonidine-timolol (COMBIGAN) 0.2-0.5 % ophthalmic solution    citalopram (CELEXA) 10 MG tablet    furosemide (LASIX) 20 MG tablet    ipratropium (ATROVENT) 0.02 % neb solution    levalbuterol (XOPENEX) 1.25 MG/3ML neb solution    miconazole with skin protectant (MARLON ANTIFUNGAL) 2 % CREA cream    midodrine (PROAMATINE) 5 MG tablet    mycophenolate (GENERIC EQUIVALENT) 250 MG capsule     No current facility-administered medications for this visit.       SOCIAL HISTORY:  I have reviewed this patient's social history and updated it with pertinent information if needed. Elaina Winn  reports that she quit smoking about 34 years ago. Her smoking use included cigarettes. She has never used smokeless tobacco. She reports that she does not drink alcohol and does not use drugs.    PHYSICAL EXAM:     111 lbs 6.4 oz    Constitutional: alert, no distress  Respiratory: Good bilateral air entry  Cardiovascular: Paced rhythm, systolic murmur  GI: nondistended  Neuropsychiatric: appropriate affact    ASSESSMENT/PLAN:  Pertinent issues addressed/ reviewed during this cardiology visit  Symptomatic atrial fibrillation -status post unsuccessful JIM cardioversion with recurrent A-fib with RVR status post AV haile ablation and single chamber pacemaker implant on 2/23/2024 . Device interrogation on 3/6/2024 showed 100% ventricular pacing underlying rhythm atrial fibrillation with complete heart block and no junctional escape.  Heart rate is blunted at 80.  Pacemaker site is well-healed.  Continue arm precautions x 2  weeks.  Toprol discontinued due to hypotension.  Continue low-dose anticoagulation for stroke prevention.  Follow-up with device clinic as scheduled.  Secondary cardiomyopathy -likely related to A-fib with RVR, now normalized.Follow-up limited echocardiogram showed a normalized ejection fraction estimated at 55 to 60%, no regional wall motion abnormalities, normal RV size and function, moderate mitral regurgitation, severe left atrial enlargement and trace tricuspid regurgitation.  Euvolemic upon exam.  Weight stable.  Low-dose furosemide as needed for weight gain, leg swelling, or worsening dyspnea.  Dementia -transitioned to long-term memory care facility today  Hypotension -BP stable in the 90s systolic, on midodrine    Follow-up with EP in 6 months to establish care in [place].     It was a pleasure seeing this patient in clinic today. Please do not hesitate to contact me with any future questions.     CATY Han, CNP  Cardiology - [facility]  03/13/2024    Review of the result(s) of each unique test - Last echocardiogram, CBC, BMP, device interrogation.     The level of medical decision making during this visit was of moderate complexity.    This note was completed in part using dictation via the Dragon voice recognition software. Some word and grammatical errors may occur and must be interpreted in the appropriate clinical context.  If there are any questions pertaining to this issue, please contact me for further clarification.

## 2024-03-12 NOTE — PATIENT INSTRUCTIONS
Thanks for participating in a office visit with the [facility] clinic today.    Doing well on a cardiac standpoint   Pacemaker site healing well. Do not raise left arm above shoulder level for 2 weeks.   Reviewed results of last echocardiogram showing a normal heart function.   Blood pressures mildly low, encourage hydration, compression stockings.   Encourage exercise 30 min daily  Low sodium diet  Follow up with device clinic as scheduled     Follow up in 6 months with EP to establish care     Please call my nurse at  [phone] with any questions or concerns.    Scheduling phone number: [phone]  Reminder: Please bring in all current medications, over the counter supplements and vitamin bottles to your next appointment.

## 2024-03-13 ENCOUNTER — MEDICAL CORRESPONDENCE (OUTPATIENT)
Dept: HEALTH INFORMATION MANAGEMENT | Facility: CLINIC | Age: 79
End: 2024-03-13
Payer: COMMERCIAL

## 2024-03-13 PROBLEM — Z95.0 CARDIAC PACEMAKER IN SITU: Status: ACTIVE | Noted: 2024-03-13

## 2024-03-13 PROBLEM — I95.9 HYPOTENSION, UNSPECIFIED HYPOTENSION TYPE: Status: ACTIVE | Noted: 2024-03-13

## 2024-03-18 VITALS
HEART RATE: 73 BPM | RESPIRATION RATE: 14 BRPM | TEMPERATURE: 97.7 F | BODY MASS INDEX: 20.61 KG/M2 | DIASTOLIC BLOOD PRESSURE: 66 MMHG | OXYGEN SATURATION: 94 % | HEIGHT: 62 IN | SYSTOLIC BLOOD PRESSURE: 124 MMHG | WEIGHT: 112 LBS

## 2024-03-18 PROBLEM — N18.31 CHRONIC KIDNEY DISEASE, STAGE 3A (H): Status: ACTIVE | Noted: 2024-03-18

## 2024-03-18 PROBLEM — F03.B4 MODERATE DEMENTIA WITH ANXIETY, UNSPECIFIED DEMENTIA TYPE (H): Status: ACTIVE | Noted: 2024-03-18

## 2024-03-18 PROBLEM — J44.9 CHRONIC OBSTRUCTIVE PULMONARY DISEASE, UNSPECIFIED COPD TYPE (H): Status: ACTIVE | Noted: 2024-03-18

## 2024-03-18 RX ORDER — MICONAZOLE NITRATE 20 MG/G
CREAM TOPICAL 2 TIMES DAILY
COMMUNITY

## 2024-03-18 NOTE — PROGRESS NOTES
[facility]  PRIMARY CARE PROVIDER AND CLINIC:  Jefferson Calderon, CATY CNP, [address] [facility] [address]  Chief Complaint   Patient presents with    Establish Care     [facility] Medical Record Number:  [id number]  Place of Service where encounter took place:  [facility] [[id number]]    Elaina Winn  is a 78 year old  (1945), admitted to the above facility from [facility] 3/2/24-3/12/24 from [facility] 2/16/24-3/2/24..  Admitted to this facility for  rehab, medical management, and nursing care.    HPI:    HPI information obtained from: facility chart records, facility staff, patient report, and [facility] chart review.   Brief Summary of Hospital Course including TCU stay:  Per recent TCU provider progress notes:   78 year old female with a PMH of atrial fibrillation, malnutrition, dementia, autoimmune hepatitis on mycophenolate, CKD stage III hospitalized with A-fib RVR and also noted with likely COPD exacerbation. She is now s/p AV node ablation and PPM on 2/23/24. She also had a NSTEMI type 2 due to demand. Initially had cardioversion, but a fib and RVR persisted despite amiodarone. Cards: EF 35% from 55-60% on 2/17/24, given IV lasix. Due to hypotension stopped losartan and decreased Toprol XL dose. Repeat ECHO 2/28 with EF 55-60%. Needs repeat ECHO outpatient 3-6 months. Started Eliquis. Due to SBP 60-80s needed fluids, also added midodrine. CXR with small bilateral pleural effusions, treated with IV zosyn for possible pneumonia. Acute hypoxic respiratory failure, COPD, acute on chronic d CHF with exacerbation and systolic CHF: treated with intermittent diuresis, short term steroids, Atrovent + xopenex + Mucinex. IV zosyn as above due to WBC 13 k, improved to 10.8 at discharge. Now on Augmentin x 1 week. Left arm blood blister covered with gauze. Dementia: lived in [facility] but moving into memory care.  CKD 3: Cr up to 1.41 with diuretics, down to 1.17  on 2/23. Has coccyx dermatitis due to incontinence.     Her nebs at TCU were changes to prn. She continue of midodrine to support her blood pressures. Continue on the lower side.     Moved in [facility] 3/12/24. Daughter is worried about depression and SOB. She was started on celexa prior to last hospital visit at [facility]. She did refuse meeting with [facility] psych therapy today. Moved into [facility] from her home back in January then hospital/TCU,  passed so many changes in short timeframe. Limited exam today. Says she has been through a lot and likes to do things for herself. Likes to sleep in late, doesn't like to eat breakfast. Needs toenails trimmed but won't allow assist. Daughter says she failed attempting to do herself. Denies pain, chest pain, SOB.    Updates on Status Since Skilled nursing Admission:  Notes indicate she is having a hard time with move into memory care. Does not like to be in community areas. She is a retired OR RN and more comfortable giving than receiving care.     CODE STATUS/ADVANCE DIRECTIVES DISCUSSION:   CPR/Full code   Patient's living condition: lives in an assisted living facility now   ALLERGIES: Adhesive tape and Zocor [simvastatin - high dose]  PAST MEDICAL HISTORY:  has a past medical history of Anxiety, Atrial fibrillation (H), Chronic renal insufficiency, Dementia (H), Glaucoma, Hepatitis, HTN (hypertension), benign, Hyperlipidemia LDL goal < 130, and Paroxysmal A-fib (H).  PAST SURGICAL HISTORY:   has a past surgical history that includes Hysterectomy; Dilation and curettage; tonsillectomy & adenoidectomy; Septoplasty; S/p partial vaginectomy; Anesthesia cardioversion (N/A, 2/20/2024); Ablation Atrioventricular Node (N/A, 2/23/2024); and Pacemaker Device & Lead Implant- Right ventricular (N/A, 2/23/2024).  FAMILY HISTORY: family history includes Alzheimer Disease in her paternal uncle and paternal uncle; Alzheimer Disease (age of onset: 75) in her father; Cancer in  her brother; Hypertension in her mother; Lipids in her brother.  SOCIAL HISTORY:   reports that she quit smoking about 34 years ago. Her smoking use included cigarettes. She has never used smokeless tobacco. She reports that she does not drink alcohol and does not use drugs.    Post Discharge Medication Reconciliation Status: discharge medications reconciled and changed, per note/orders    Current Outpatient Medications   Medication Sig Dispense Refill    apixaban ANTICOAGULANT (ELIQUIS) 2.5 MG tablet Take 1 tablet (2.5 mg) by mouth 2 times daily 60 tablet 0    Apoaequorin (PREVAGEN) 10 MG CAPS Take 1 capsule by mouth daily 30 capsule 11    bimatoprost (LUMIGAN) 0.01 % SOLN Place 1 drop into both eyes at bedtime      brimonidine-timolol (COMBIGAN) 0.2-0.5 % ophthalmic solution Place 1 drop into both eyes 2 times daily      citalopram (CELEXA) 10 MG tablet Take 1 tablet (10 mg) by mouth daily 30 tablet 11    furosemide (LASIX) 20 MG tablet Take 1 tablet (20 mg) by mouth daily as needed (shortness of breath) 20 tablet 0    ipratropium (ATROVENT) 0.02 % neb solution Take 2.5 mLs (0.5 mg) by nebulization 3 times daily as needed for wheezing      ipratropium (ATROVENT) 0.02 % neb solution Take 0.5 mg by nebulization 3 times daily as needed for wheezing      levalbuterol (XOPENEX) 1.25 MG/3ML neb solution Take 3 mLs (1.25 mg) by nebulization every 4 hours as needed for wheezing 90 mL 0    miconazole with skin protectant (MARLON ANTIFUNGAL) 2 % CREA cream Apply topically 2 times daily And as needed      midodrine (PROAMATINE) 5 MG tablet Take 1 tablet (5 mg) by mouth 2 times daily 60 tablet 0    mycophenolate (GENERIC EQUIVALENT) 250 MG capsule Take 250 mg by mouth 2 times daily      miconazole with skin protectant (MARLON ANTIFUNGAL) 2 % CREA cream Apply topically 2 times daily To buttocks and sacral area 198 g 11     ROS:  Limited secondary to cognitive impairment but today pt reports ok    Vitals:  /66   Pulse 73    "Temp 97.7  F (36.5  C)   Resp 14   Ht 1.575 m (5' 2\")   Wt 50.8 kg (112 lb)   SpO2 94%   BMI 20.49 kg/m    Exam:  GENERAL APPEARANCE:  Alert, in no distress, confused and guarded   ENT:  Mouth and posterior oropharynx normal, moist mucous membranes  EYES:  EOM, conjunctivae, lids, pupils and irises normal, glasses  RESP:  lungs clear to auscultation   CV:  Palpation and auscultation of heart done , regular rate and rhythm, no murmur, rub, or gallop, trace edema-no compression on  ABDOMEN:  no guarding or rebound, bowel sounds normal  M/S:   ambulates with 2WW but leaves at times  SKIN:  refused most of exam-unable to see coccyx, reddened per staff, elongated toenails   NEURO:   Cranial nerves 2-12 are normal tested and grossly at patient's baseline  PSYCH:  insight and judgement impaired, memory impaired SLUMS 17/30    Lab/Diagnostic data:  CBC RESULTS:   Recent Labs   Lab Test 03/05/24  0540 02/29/24  0532   WBC 6.8 10.8   RBC 4.15 3.83   HGB 12.0 11.0*   HCT 39.2 35.8   MCV 95 94   MCH 28.9 28.7   MCHC 30.6* 30.7*   RDW 14.4 15.0    226       Last Basic Metabolic Panel:  Recent Labs   Lab Test 03/05/24  0540 02/29/24  0532    138   POTASSIUM 4.6 3.9   CHLORIDE 103 103   DANIELITO 10.0 9.2   CO2 25 24   BUN 16.8 33.7*   CR 1.12* 1.16*   GLC 79 99       Liver Function Studies -   Recent Labs   Lab Test 02/27/24  2331 02/16/24  1941   PROTTOTAL 5.7* 6.4   ALBUMIN 2.9* 3.5   BILITOTAL 0.9 1.1   ALKPHOS 101 128   AST 12 30   ALT 7 21       TSH   Date Value Ref Range Status   02/16/2024 2.43 0.30 - 4.20 uIU/mL Final   05/03/2019 1.34 0.40 - 4.00 mU/L Final   11/12/2017 1.59 0.40 - 4.00 mU/L Final       No results found for: \"A1C\"    ASSESSMENT/PLAN:  (F03.B4) Moderate dementia with anxiety, unspecified dementia type (H)  (primary encounter diagnosis)  Comment: moved into memory care-needs reminders and cues with ADLs  Plan:   -continues PTA prevagen 10 mg po daily  -on Celexa 10 mg po daily  -refuses " [facility]    (N18.31) Chronic kidney disease, stage 3a (H)  Comment: ongoing   Plan:   -renal dose and avoid nephrotoxins     (Z95.0) Cardiac pacemaker in situ  (I95.9) Hypotension, unspecified hypotension type  (I48.0) Paroxysmal A-fib (H)  (E78.5) Hyperlipidemia with target LDL less than 130  Comment: not on statin 2/2 elevated LFTs with chronic liver disease, sleeping in recliner  Plan:   -has follow up with cardiology  -newer to anticoagulation apixaban 2.5 mg po BID  -midodrine 5 mg po BID  -facility following weights and BP/HR  -prn lasix for SOB, weight gain and LE edema    (R53.81) Physical deconditioning  (M15.9) Osteoarthritis of multiple joints, unspecified osteoarthritis type  Comment: ongoing   Plan:   -no medications for pain  -PT/OT to eval and treat     (K75.9) Hepatitis  Comment: chronic   Plan:   -follow LFTs  -mycophenolate 250 mg po BID    (J44.9) Chronic obstructive pulmonary disease, unspecified COPD type (H)  Comment: with recent flare  Plan:   -antibiotics, mucinex atorvent nebs for recent flare    (R21) Rash and nonspecific skin eruption  Comment: on buttocks, had a blister on left arm 2/2 iv infiltration in hospital of amiodarone   Plan:  -cherry BID to buttocks  -antifungal to prn for fungal skin flares     (Z78.9) POLST  (Physician Orders for Life-Sustaining Treatment)   Comment: reviewed with daughter today  Plan:  -reviewed, signed and uploaded to [employer]     Future Appointments:   Mar 18, 2024 12:30 PM  (Arrive by 12:20 PM)  Return Cardiology with Eli Maya PA-C  [facility] [place] ([facility] ) [phone]      Apr 25, 2024  1:00 PM  CARDIAC DEVICE CHECK - IN CLINIC with PONCE DCR2  [facility] ([facility] ) [phone]      Apr 25, 2024  2:00 PM  (Arrive by 1:50 PM)  ecg with KRIS DILLARD CARD CSS  [facility] ([facility] ) [phone]       Total time spent with patient visit at the skilled nursing facility was 65 minutes including patient visit, review of past records, and review with nursing, RA's family discussion, review of POLST with family.     Electronically signed by:  CATY Thakkar CNP

## 2024-03-19 ENCOUNTER — ASSISTED LIVING VISIT (OUTPATIENT)
Dept: GERIATRICS | Facility: CLINIC | Age: 79
End: 2024-03-19
Payer: COMMERCIAL

## 2024-03-19 DIAGNOSIS — R21 RASH AND NONSPECIFIC SKIN ERUPTION: ICD-10-CM

## 2024-03-19 DIAGNOSIS — M15.9 OSTEOARTHRITIS OF MULTIPLE JOINTS, UNSPECIFIED OSTEOARTHRITIS TYPE: ICD-10-CM

## 2024-03-19 DIAGNOSIS — I48.0 PAROXYSMAL A-FIB (H): ICD-10-CM

## 2024-03-19 DIAGNOSIS — K75.9 HEPATITIS: ICD-10-CM

## 2024-03-19 DIAGNOSIS — J44.9 CHRONIC OBSTRUCTIVE PULMONARY DISEASE, UNSPECIFIED COPD TYPE (H): ICD-10-CM

## 2024-03-19 DIAGNOSIS — N18.31 CHRONIC KIDNEY DISEASE, STAGE 3A (H): ICD-10-CM

## 2024-03-19 DIAGNOSIS — R53.81 PHYSICAL DECONDITIONING: ICD-10-CM

## 2024-03-19 DIAGNOSIS — Z95.0 CARDIAC PACEMAKER IN SITU: ICD-10-CM

## 2024-03-19 DIAGNOSIS — Z78.9 POLST (PHYSICIAN ORDERS FOR LIFE-SUSTAINING TREATMENT): ICD-10-CM

## 2024-03-19 DIAGNOSIS — I95.9 HYPOTENSION, UNSPECIFIED HYPOTENSION TYPE: ICD-10-CM

## 2024-03-19 DIAGNOSIS — F03.B4 MODERATE DEMENTIA WITH ANXIETY, UNSPECIFIED DEMENTIA TYPE (H): Primary | ICD-10-CM

## 2024-03-19 DIAGNOSIS — E78.5 HYPERLIPIDEMIA WITH TARGET LDL LESS THAN 130: ICD-10-CM

## 2024-03-19 PROCEDURE — 99344 HOME/RES VST NEW MOD MDM 60: CPT | Performed by: NURSE PRACTITIONER

## 2024-03-19 NOTE — LETTER
3/19/2024        RE: Elaina Winn  C/o Tresa Stanton  [address]  [facility] [address]        [facility]  PRIMARY CARE PROVIDER AND CLINIC:  CATY Thakkar [facility], [address] [facility] [address]  Chief Complaint   Patient presents with     [facility] Care     [place] Medical Record Number:  [id number]  Place of Service where encounter took place:  [facility] ([facility]) [[id number]]    Elaina Winn  is a 78 year old  (1945), admitted to the above facility from [facility] 3/2/24-3/12/24 from [facility] 2/16/24-3/2/24..  Admitted to this facility for  rehab, medical management, and nursing care.    HPI:    HPI information obtained from: facility chart records, facility staff, patient report, and [facility] chart review.   Brief Summary of Hospital Course including TCU stay:  Per recent TCU provider progress notes:   78 year old female with a PMH of atrial fibrillation, malnutrition, dementia, autoimmune hepatitis on mycophenolate, CKD stage III hospitalized with A-fib RVR and also noted with likely COPD exacerbation. She is now s/p AV node ablation and PPM on 2/23/24. She also had a NSTEMI type 2 due to demand. Initially had cardioversion, but a fib and RVR persisted despite amiodarone. Cards: EF 35% from 55-60% on 2/17/24, given IV lasix. Due to hypotension stopped losartan and decreased Toprol XL dose. Repeat ECHO 2/28 with EF 55-60%. Needs repeat ECHO outpatient 3-6 months. Started Eliquis. Due to SBP 60-80s needed fluids, also added midodrine. CXR with small bilateral pleural effusions, treated with IV zosyn for possible pneumonia. Acute hypoxic respiratory failure, COPD, acute on chronic d CHF with exacerbation and systolic CHF: treated with intermittent diuresis, short term steroids, Atrovent + xopenex + Mucinex. IV zosyn as above due to WBC 13 k, improved to 10.8 at discharge. Now on Augmentin x 1 week. Left arm blood blister covered with  gauze. Dementia: lived in [facility] but moving into memory care.  CKD 3: Cr up to 1.41 with diuretics, down to 1.17 on 2/23. Has coccyx dermatitis due to incontinence.     Her nebs at TCU were changes to prn. She continue of midodrine to support her blood pressures. Continue on the lower side.     Moved in [facility]/ 3/12/24. Daughter is worried about depression and SOB. She was started on celexa prior to last hospital visit at [facility]. She did refuse meeting with ACP psych therapy today. Moved into [facility] from her home back in January then hospital/TCU,  passed so many changes in short timeframe. Limited exam today. Says she has been through a lot and likes to do things for herself. Likes to sleep in late, doesn't like to eat breakfast. Needs toenails trimmed but won't allow assist. Daughter says she failed attempting to do herself. Denies pain, chest pain, SOB.    Updates on Status Since Skilled nursing Admission:  Notes indicate she is having a hard time with move into memory care. Does not like to be in community areas. She is a retired OR RN and more comfortable giving than receiving care.     CODE STATUS/ADVANCE DIRECTIVES DISCUSSION:   CPR/Full code   Patient's living condition: lives in an assisted living facility now   ALLERGIES: Adhesive tape and Zocor [simvastatin - high dose]  PAST MEDICAL HISTORY:  has a past medical history of Anxiety, Atrial fibrillation (H), Chronic renal insufficiency, Dementia (H), Glaucoma, Hepatitis, HTN (hypertension), benign, Hyperlipidemia LDL goal < 130, and Paroxysmal A-fib (H).  PAST SURGICAL HISTORY:   has a past surgical history that includes Hysterectomy; Dilation and curettage; tonsillectomy & adenoidectomy; Septoplasty; S/p partial vaginectomy; Anesthesia cardioversion (N/A, 2/20/2024); Ablation Atrioventricular Node (N/A, 2/23/2024); and Pacemaker Device & Lead Implant- Right ventricular (N/A, 2/23/2024).  FAMILY HISTORY: family history includes Alzheimer  Disease in her paternal uncle and paternal uncle; Alzheimer Disease (age of onset: 75) in her father; Cancer in her brother; Hypertension in her mother; Lipids in her brother.  SOCIAL HISTORY:   reports that she quit smoking about 34 years ago. Her smoking use included cigarettes. She has never used smokeless tobacco. She reports that she does not drink alcohol and does not use drugs.    Post Discharge Medication Reconciliation Status: discharge medications reconciled and changed, per note/orders    Current Outpatient Medications   Medication Sig Dispense Refill     apixaban ANTICOAGULANT (ELIQUIS) 2.5 MG tablet Take 1 tablet (2.5 mg) by mouth 2 times daily 60 tablet 0     Apoaequorin (PREVAGEN) 10 MG CAPS Take 1 capsule by mouth daily 30 capsule 11     bimatoprost (LUMIGAN) 0.01 % SOLN Place 1 drop into both eyes at bedtime       brimonidine-timolol (COMBIGAN) 0.2-0.5 % ophthalmic solution Place 1 drop into both eyes 2 times daily       citalopram (CELEXA) 10 MG tablet Take 1 tablet (10 mg) by mouth daily 30 tablet 11     furosemide (LASIX) 20 MG tablet Take 1 tablet (20 mg) by mouth daily as needed (shortness of breath) 20 tablet 0     ipratropium (ATROVENT) 0.02 % neb solution Take 2.5 mLs (0.5 mg) by nebulization 3 times daily as needed for wheezing       ipratropium (ATROVENT) 0.02 % neb solution Take 0.5 mg by nebulization 3 times daily as needed for wheezing       levalbuterol (XOPENEX) 1.25 MG/3ML neb solution Take 3 mLs (1.25 mg) by nebulization every 4 hours as needed for wheezing 90 mL 0     miconazole with skin protectant (MARLON ANTIFUNGAL) 2 % CREA cream Apply topically 2 times daily And as needed       midodrine (PROAMATINE) 5 MG tablet Take 1 tablet (5 mg) by mouth 2 times daily 60 tablet 0     mycophenolate (GENERIC EQUIVALENT) 250 MG capsule Take 250 mg by mouth 2 times daily       miconazole with skin protectant (MARLON ANTIFUNGAL) 2 % CREA cream Apply topically 2 times daily To buttocks and sacral  "area 198 g 11     ROS:  Limited secondary to cognitive impairment but today pt reports ok    Vitals:  /66   Pulse 73   Temp 97.7  F (36.5  C)   Resp 14   Ht 1.575 m (5' 2\")   Wt 50.8 kg (112 lb)   SpO2 94%   BMI 20.49 kg/m    Exam:  GENERAL APPEARANCE:  Alert, in no distress, confused and guarded   ENT:  Mouth and posterior oropharynx normal, moist mucous membranes  EYES:  EOM, conjunctivae, lids, pupils and irises normal, glasses  RESP:  lungs clear to auscultation   CV:  Palpation and auscultation of heart done , regular rate and rhythm, no murmur, rub, or gallop, trace edema-no compression on  ABDOMEN:  no guarding or rebound, bowel sounds normal  M/S:   ambulates with 2WW but leaves at times  SKIN:  refused most of exam-unable to see coccyx, reddened per staff, elongated toenails   NEURO:   Cranial nerves 2-12 are normal tested and grossly at patient's baseline  PSYCH:  insight and judgement impaired, memory impaired SLUMS 17/30    Lab/Diagnostic data:  CBC RESULTS:   Recent Labs   Lab Test 03/05/24  0540 02/29/24  0532   WBC 6.8 10.8   RBC 4.15 3.83   HGB 12.0 11.0*   HCT 39.2 35.8   MCV 95 94   MCH 28.9 28.7   MCHC 30.6* 30.7*   RDW 14.4 15.0    226       Last Basic Metabolic Panel:  Recent Labs   Lab Test 03/05/24  0540 02/29/24  0532    138   POTASSIUM 4.6 3.9   CHLORIDE 103 103   DANIELITO 10.0 9.2   CO2 25 24   BUN 16.8 33.7*   CR 1.12* 1.16*   GLC 79 99       Liver Function Studies -   Recent Labs   Lab Test 02/27/24  2331 02/16/24  1941   PROTTOTAL 5.7* 6.4   ALBUMIN 2.9* 3.5   BILITOTAL 0.9 1.1   ALKPHOS 101 128   AST 12 30   ALT 7 21       TSH   Date Value Ref Range Status   02/16/2024 2.43 0.30 - 4.20 uIU/mL Final   05/03/2019 1.34 0.40 - 4.00 mU/L Final   11/12/2017 1.59 0.40 - 4.00 mU/L Final       No results found for: \"A1C\"    ASSESSMENT/PLAN:  (F03.B4) Moderate dementia with anxiety, unspecified dementia type (H)  (primary encounter diagnosis)  Comment: moved into memory " care-needs reminders and cues with ADLs  Plan:   -continues PTA prevagen 10 mg po daily  -on Celexa 10 mg po daily  -refuses ACP    (N18.31) Chronic kidney disease, stage 3a (H)  Comment: ongoing   Plan:   -renal dose and avoid nephrotoxins     (Z95.0) Cardiac pacemaker in situ  (I95.9) Hypotension, unspecified hypotension type  (I48.0) Paroxysmal A-fib (H)  (E78.5) Hyperlipidemia with target LDL less than 130  Comment: not on statin 2/2 elevated LFTs with chronic liver disease, sleeping in recliner  Plan:   -has follow up with cardiology  -newer to anticoagulation apixaban 2.5 mg po BID  -midodrine 5 mg po BID  -facility following weights and BP/HR  -prn lasix for SOB, weight gain and LE edema    (R53.81) Physical deconditioning  (M15.9) Osteoarthritis of multiple joints, unspecified osteoarthritis type  Comment: ongoing   Plan:   -no medications for pain  -PT/OT to eval and treat     (K75.9) Hepatitis  Comment: chronic   Plan:   -follow LFTs  -mycophenolate 250 mg po BID    (J44.9) Chronic obstructive pulmonary disease, unspecified COPD type (H)  Comment: with recent flare  Plan:   -antibiotics, mucinex atorvent nebs for recent flare    (R21) Rash and nonspecific skin eruption  Comment: on buttocks, had a blister on left arm 2/2 iv infiltration in hospital of amiodarone   Plan:  -cherry BID to buttocks  -antifungal to prn for fungal skin flares     (Z78.9) POLST  (Physician Orders for Life-Sustaining Treatment)   Comment: reviewed with daughter today  Plan:  -reviewed, signed and uploaded to [employer]     Future Appointments:   Mar 18, 2024 12:30 PM  (Arrive by 12:20 PM)  Return Cardiology with Eli Maya PA-C  [facility] [place] ([facility] ) [phone]      Apr 25, 2024  1:00 PM  CARDIAC DEVICE CHECK - IN CLINIC with KRIS SINGER  [facility] ([facility] ) [phone]      Apr 25, 2024  2:00 PM  (Arrive by 1:50  PM)  ecg with PONCE UMP CARD CSS  [facility] [place] ([facility] ) [phone]      Total time spent with patient visit at the skilled nursing facility was 65 minutes including patient visit, review of past records, and review with nursing, RA's family discussion, review of POLST with family.     Electronically signed by:  CATY Thakkar CNP                       Sincerely,        CATY Thakkar CNP

## 2024-03-20 ENCOUNTER — DOCUMENTATION ONLY (OUTPATIENT)
Dept: OTHER | Facility: CLINIC | Age: 79
End: 2024-03-20
Payer: COMMERCIAL

## 2024-04-09 DIAGNOSIS — Z53.9 DIAGNOSIS NOT YET DEFINED: Primary | ICD-10-CM

## 2024-04-09 PROCEDURE — G0180 MD CERTIFICATION HHA PATIENT: HCPCS | Performed by: NURSE PRACTITIONER

## 2024-04-24 ENCOUNTER — ANCILLARY PROCEDURE (OUTPATIENT)
Dept: CARDIOLOGY | Facility: CLINIC | Age: 79
End: 2024-04-24
Attending: INTERNAL MEDICINE
Payer: COMMERCIAL

## 2024-04-24 ENCOUNTER — DOCUMENTATION ONLY (OUTPATIENT)
Dept: CARDIOLOGY | Facility: CLINIC | Age: 79
End: 2024-04-24

## 2024-04-24 DIAGNOSIS — Z95.0 CARDIAC PACEMAKER IN SITU: ICD-10-CM

## 2024-04-24 DIAGNOSIS — I44.7 LBBB (LEFT BUNDLE BRANCH BLOCK): ICD-10-CM

## 2024-04-24 PROCEDURE — 93000 ELECTROCARDIOGRAM COMPLETE: CPT | Mod: 59 | Performed by: INTERNAL MEDICINE

## 2024-04-24 PROCEDURE — 93279 PRGRMG DEV EVAL PM/LDLS PM: CPT | Performed by: INTERNAL MEDICINE

## 2024-04-24 NOTE — PROGRESS NOTES
Patient came in for their 6-8 week PPM check.  Patient has a conduction system pacing RV lead.  EKG completed per clinic protocol.      Patient's underlying rhythm today was AF with CHB achieved by nimisha HAN at VVI 30       AP: NA %   (left bundle lead): 99.9 %    RV Pacing Polarity: bipolar    Will route update to implanting MD, Dr. Jennings, for review and recommendations.       Lead testing at implant on 2/23/2024:     R-wave 4.8 mV.   Pacing threshold 0.75 V at 0.4 ms.   Pacing impedance 1008 ohms.         Lead testing at 6-8 week check on 4/24/2024:     R-wave: not obtained, dependent   Pacing threshold: 0.75 V @ 0.4 ms.   Pacing impedance: 608 ohms.

## 2024-04-27 LAB
MDC_IDC_LEAD_CONNECTION_STATUS: NORMAL
MDC_IDC_LEAD_IMPLANT_DT: NORMAL
MDC_IDC_LEAD_LOCATION: NORMAL
MDC_IDC_LEAD_LOCATION_DETAIL_1: NORMAL
MDC_IDC_LEAD_MFG: NORMAL
MDC_IDC_LEAD_MODEL: NORMAL
MDC_IDC_LEAD_POLARITY_TYPE: NORMAL
MDC_IDC_LEAD_SERIAL: NORMAL
MDC_IDC_MSMT_BATTERY_DTM: NORMAL
MDC_IDC_MSMT_BATTERY_REMAINING_LONGEVITY: 165 MO
MDC_IDC_MSMT_BATTERY_RRT_TRIGGER: 2.62
MDC_IDC_MSMT_BATTERY_STATUS: NORMAL
MDC_IDC_MSMT_BATTERY_VOLTAGE: 3.21 V
MDC_IDC_MSMT_LEADCHNL_RV_IMPEDANCE_VALUE: 437 OHM
MDC_IDC_MSMT_LEADCHNL_RV_IMPEDANCE_VALUE: 608 OHM
MDC_IDC_MSMT_LEADCHNL_RV_PACING_THRESHOLD_AMPLITUDE: 0.75 V
MDC_IDC_MSMT_LEADCHNL_RV_PACING_THRESHOLD_PULSEWIDTH: 0.4 MS
MDC_IDC_PG_IMPLANT_DTM: NORMAL
MDC_IDC_PG_MFG: NORMAL
MDC_IDC_PG_MODEL: NORMAL
MDC_IDC_PG_SERIAL: NORMAL
MDC_IDC_PG_TYPE: NORMAL
MDC_IDC_SESS_CLINIC_NAME: NORMAL
MDC_IDC_SESS_DTM: NORMAL
MDC_IDC_SESS_TYPE: NORMAL
MDC_IDC_SET_BRADY_HYSTRATE: NORMAL
MDC_IDC_SET_BRADY_LOWRATE: 60 {BEATS}/MIN
MDC_IDC_SET_BRADY_MAX_SENSOR_RATE: 120 {BEATS}/MIN
MDC_IDC_SET_BRADY_MODE: NORMAL
MDC_IDC_SET_LEADCHNL_RV_PACING_AMPLITUDE: 2 V
MDC_IDC_SET_LEADCHNL_RV_PACING_ANODE_ELECTRODE_1: NORMAL
MDC_IDC_SET_LEADCHNL_RV_PACING_ANODE_LOCATION_1: NORMAL
MDC_IDC_SET_LEADCHNL_RV_PACING_CAPTURE_MODE: NORMAL
MDC_IDC_SET_LEADCHNL_RV_PACING_CATHODE_ELECTRODE_1: NORMAL
MDC_IDC_SET_LEADCHNL_RV_PACING_CATHODE_LOCATION_1: NORMAL
MDC_IDC_SET_LEADCHNL_RV_PACING_POLARITY: NORMAL
MDC_IDC_SET_LEADCHNL_RV_PACING_PULSEWIDTH: 0.4 MS
MDC_IDC_SET_LEADCHNL_RV_SENSING_ANODE_ELECTRODE_1: NORMAL
MDC_IDC_SET_LEADCHNL_RV_SENSING_ANODE_LOCATION_1: NORMAL
MDC_IDC_SET_LEADCHNL_RV_SENSING_CATHODE_ELECTRODE_1: NORMAL
MDC_IDC_SET_LEADCHNL_RV_SENSING_CATHODE_LOCATION_1: NORMAL
MDC_IDC_SET_LEADCHNL_RV_SENSING_POLARITY: NORMAL
MDC_IDC_SET_LEADCHNL_RV_SENSING_SENSITIVITY: 0.9 MV
MDC_IDC_SET_ZONE_DETECTION_INTERVAL: 400 MS
MDC_IDC_SET_ZONE_STATUS: NORMAL
MDC_IDC_SET_ZONE_TYPE: NORMAL
MDC_IDC_SET_ZONE_VENDOR_TYPE: NORMAL
MDC_IDC_STAT_BRADY_DTM_END: NORMAL
MDC_IDC_STAT_BRADY_DTM_START: NORMAL
MDC_IDC_STAT_BRADY_RV_PERCENT_PACED: 99.92 %
MDC_IDC_STAT_EPISODE_RECENT_COUNT: 0
MDC_IDC_STAT_EPISODE_RECENT_COUNT_DTM_END: NORMAL
MDC_IDC_STAT_EPISODE_RECENT_COUNT_DTM_START: NORMAL
MDC_IDC_STAT_EPISODE_TOTAL_COUNT: 0
MDC_IDC_STAT_EPISODE_TOTAL_COUNT_DTM_END: NORMAL
MDC_IDC_STAT_EPISODE_TOTAL_COUNT_DTM_START: NORMAL
MDC_IDC_STAT_EPISODE_TYPE: NORMAL

## 2024-05-14 ENCOUNTER — ASSISTED LIVING VISIT (OUTPATIENT)
Dept: GERIATRICS | Facility: CLINIC | Age: 79
End: 2024-05-14
Payer: COMMERCIAL

## 2024-05-14 VITALS
HEIGHT: 62 IN | HEART RATE: 71 BPM | OXYGEN SATURATION: 100 % | BODY MASS INDEX: 21.75 KG/M2 | DIASTOLIC BLOOD PRESSURE: 89 MMHG | TEMPERATURE: 97.3 F | RESPIRATION RATE: 15 BRPM | SYSTOLIC BLOOD PRESSURE: 131 MMHG | WEIGHT: 118.2 LBS

## 2024-05-14 DIAGNOSIS — I48.0 PAROXYSMAL A-FIB (H): ICD-10-CM

## 2024-05-14 DIAGNOSIS — I10 HTN (HYPERTENSION), BENIGN: ICD-10-CM

## 2024-05-14 DIAGNOSIS — I25.2 STATUS POST NON-ST ELEVATION MYOCARDIAL INFARCTION (NSTEMI): ICD-10-CM

## 2024-05-14 DIAGNOSIS — K75.4 AUTOIMMUNE HEPATITIS (H): ICD-10-CM

## 2024-05-14 DIAGNOSIS — F32.A DEPRESSION, UNSPECIFIED DEPRESSION TYPE: ICD-10-CM

## 2024-05-14 DIAGNOSIS — N18.31 STAGE 3A CHRONIC KIDNEY DISEASE (H): ICD-10-CM

## 2024-05-14 DIAGNOSIS — J44.9 CHRONIC OBSTRUCTIVE PULMONARY DISEASE, UNSPECIFIED COPD TYPE (H): ICD-10-CM

## 2024-05-14 DIAGNOSIS — F03.B4 MODERATE DEMENTIA WITH ANXIETY, UNSPECIFIED DEMENTIA TYPE (H): Primary | ICD-10-CM

## 2024-05-14 PROCEDURE — 99349 HOME/RES VST EST MOD MDM 40: CPT | Performed by: INTERNAL MEDICINE

## 2024-05-14 NOTE — LETTER
5/14/2024        RE: Elaina Winn  C/o Tresa Stanton  [address]  [facility] [address]      To Whom it May Concern:   I am the attending physician for Elaina Winn, date of birth 1945.   She has advanced dementia and is no longer able to understand or manage her financial or legal affairs.      If there are further questions, please do not hesitate to call       Sincerely,        Nely Hopson MD  [facility]   [address] [place] [address]  [address]  [place]   [address] [phone]

## 2024-06-03 NOTE — PROGRESS NOTES
"Elaina Winn is a 79 year old female seen May 14, 2024 at [facility] where she has resided for 2 months (admit 3/2024) seen for initial visit.   Pt is seen in her apartment up to recliner, states she is feeling okay.   Long drawn out conversation, but little pertinent information, does state she has \"problems remembering.\"    She is disoriented to time and place.    Family feels she is depressed, but pt refuses ACP supports     AL nursing staff notes pt won't go to the Dining Room for meals or activities.   Pt reiterates that today, likes her trays brought in and states she has a good relationship with the \"orderlies\"      By chart review, pt has carried a dx of dementia past 8 years, along with autoimmune hepatitis, HTN and CKD3   Pt had a [facility] hospitalization 2/16-3/2 for PAF with RVR for which cardioversion and AV node ablation    She had a pacemaker placed on Feb 23    [facility].      Past Medical History:   Diagnosis Date    Anxiety     Atrial fibrillation (H)     Chronic renal insufficiency     Dementia (H)     Glaucoma     Hepatitis     autoimmune    HTN (hypertension), benign     Hyperlipidemia LDL goal < 130     Paroxysmal A-fib (H)        Past Surgical History:   Procedure Laterality Date    ANESTHESIA CARDIOVERSION N/A 2/20/2024    Procedure: Anesthesia cardioversion;  Surgeon: GENERIC ANESTHESIA PROVIDER;  Location:  OR    DILATION AND CURETTAGE      EP ABLATION AV NODE N/A 2/23/2024    Procedure: Ablation Atrioventricular Node;  Surgeon: Angela Jennings MD;  Location:  HEART CARDIAC CATH LAB    EP PACEMAKER DEVICE & LEAD IMPLANT- RIGHT VENTRICULAR N/A 2/23/2024    Procedure: Pacemaker Device & Lead Implant- Right ventricular;  Surgeon: Angela Jennings MD;  Location:  HEART CARDIAC CATH LAB    HYSTERECTOMY      with USO, not for cancer    S/p partial vaginectomy      SEPTOPLASTY      TONSILLECTOMY & " "ADENOIDECTOMY       Social History     Tobacco Use    Smoking status: Former     Current packs/day: 0.00     Types: Cigarettes     Quit date: 1990     Years since quittin.4    Smokeless tobacco: Never    Tobacco comments:     quit    Substance Use Topics    Alcohol use: No     Alcohol/week: 0.0 standard drinks of alcohol      SH: .     Previously lived at [facility] with her  since 2024 and until he .   Before that they were in their home in [place]   Daughter Tresa works as a  in [employer].     Son Darrell lives locally, has mental health issues and not doing well since the passing of his father      Pt was and OR nurse       ROS:   Wt Readings from Last 5 Encounters:   24 53.6 kg (118 lb 3.2 oz)   24 50.8 kg (112 lb)   24 50.5 kg (111 lb 6.4 oz)   24 51.1 kg (112 lb 9.6 oz)   24 52 kg (114 lb 9.6 oz)      EXAM:  NAD  /89   Pulse 71   Temp 97.3  F (36.3  C)   Resp 15   Ht 1.575 m (5' 2\")   Wt 53.6 kg (118 lb 3.2 oz)   SpO2 100%   BMI 21.62 kg/m     Neck supple without adenopathy  Lungs clear bilaterally with fair air movement  Chest incision well healed.  Heart RRR s1s2 distant   Abd soft, NT, no distention or guarding, +BS  Ext without edema    Neuro:  oriented x1, rambling history   Psych: a little anxious     Lab Results   Component Value Date     2024    POTASSIUM 4.6 2024    CHLORIDE 103 2024    CO2 25 2024    ANIONGAP 12 2024    GLC 79 2024    BUN 16.8 2024    CR 1.12 (H) 2024    GFRESTIMATED 49 (L) 2024    DANIELITO 10.0 2024     Lab Results   Component Value Date    AST 12 2024      ALBUMIN 2.9 2024      ALKPHOS 101 2024     Lab Results   Component Value Date    WBC 6.8 2024      HGB 12.0 2024      MCV 95 2024       2024     CT HEAD W/O CONTRAST   2024  INDICATION: facial droop  INTRACRANIAL CONTENTS: " Mild global cortical volume loss with expected dilatation of the ventricular system. Remote lacunar infarct in the left basal ganglia. Mild chronic small vessel ischemic changes.    CT CHEST PULMONARY EMBOLISM W CONTRAST 2/17/2024  INDICATION: afib and hypoxia  ANGIOGRAM CHEST: Pulmonary arteries are upper normal caliber and negative for pulmonary emboli. Thoracic aorta is not well opacified and is  indeterminate for dissection. No CT evidence of right heart strain.  LUNGS AND PLEURA: Small pleural effusions. Basilar atelectasis and bronchial wall thickening. Mild scattered mucous plugging.  MUSCULOSKELETAL: Degenerative change osseous structures. Lipomatous lesion right chest wall anterolaterally unchanged.                                                             IMPRESSION:  1.  No pulmonary emboli.  2.  Bilateral pleural effusions.  3.  Mild scattered mucous plugging.    ECHO 2/28/2024     Left ventricular systolic function is normal.  The visual ejection fraction is 55-60%.  No regional wall motion abnormalities.  Grossly normal right ventricular size and systolic function.  Mitral valve sclerosis with moderate regurgitation, no stenosis.  Severe left atrial enlargement.  Trace tricuspid valve regurgitation.  Normal inferior vena cava.  Compared to the recent JIM done last week on 2/20/2024, MR has decreased from  moderately severe to moderate, LVEF has improved from 35% to 55-60%.       IMP/PLAN:   (F03.B4) Moderate dementia with anxiety, unspecified dementia type (H)    Comment: gradual progression over many years   Plan: AL support for med admin, meals, activity  Letter of incompetence for financial /legal affairs written with visit.         (I48.0) Paroxysmal A-fib (H)  Comment:   Pulse Readings from Last 4 Encounters:   05/14/24 71   03/18/24 73   03/12/24 71   03/07/24 69      Plan: not requiring rate-slowing medications  Apixaban 2.5 mg bid for stroke prophylaxis    (I25.2) Status post non-ST elevation  myocardial infarction (NSTEMI)  Comment: with cardiomyopathy and HF as above     Plan: stable volume status   Furosemide 20 mg/day PRN shortness of breath    (I10) HTN (hypertension), benign  (N18.31) Stage 3a chronic kidney disease (H)  Comment:   BP Readings from Last 3 Encounters:   05/14/24 131/89   03/18/24 124/66   03/12/24 98/68      Plan: very low bps during hospitalization, so no longer on anti-hypertensive medications    Orthostatic hypotension  Comment:   Plan: midodrine 5 mg bid     (F32.A) Depression, unspecified depression type  Comment: has accompanied cognitive decline, recent widowhood and many changes   Plan: citalopram 10 mg/day   Pt has declined ACP support     (K75.4) Autoimmune hepatitis (H)  Comment: by hx  Plan: mycophenolate 250 mg bid     (J44.9) Chronic obstructive pulmonary disease, unspecified COPD type (H)  Comment: past smoker  Recently treated with IV abx and steroid burst.     Plan: PRN nebs: ipratropium and levalbuterol         Advance directive: full code per signed MAURA Hopson MD

## 2024-07-01 ENCOUNTER — DOCUMENTATION ONLY (OUTPATIENT)
Dept: GERIATRICS | Facility: CLINIC | Age: 79
End: 2024-07-01
Payer: COMMERCIAL

## 2024-08-12 NOTE — PROGRESS NOTES
"[facility]  [facility] Medical Record Number:  [id number]  Place of Service where encounter took place:  [facility] [[id number]]  Chief Complaint   Patient presents with    RECHECK       HPI:    Elaina Winn  is a 79 year old (1945), who is being seen today for an episodic care visit.  HPI information obtained from: facility chart records, facility staff, patient report, and [facility] chart review. Today's concern is:    Seen today for follow up to chronic disease management. No acute concerns from staff. Daughter feels she is adjusting nicely into her new memory care apartment. Less guarded from previous visit. Weekly weights mostly WNL. Denies SOB, abdominal pain, chest pain. Says \"my memory isn't so good\" Mostly eats meals in her apartment. Says she walks the length of the [place] twice daily. PMH includes dementia, autoimmune hepatitis, HTN, CKD3. Pt had a [facility] hospitalization 2/16-3/2 for PAF with RVR for which cardioversion and AV node ablation She had a pacemaker placed on Feb 23. Blink tears added for dry eyes, no complaints today. Not wearing compression but elevating her feet.      Past Medical and Surgical History reviewed in Epic today.    MEDICATIONS:    Current Outpatient Medications   Medication Sig Dispense Refill    apixaban ANTICOAGULANT (ELIQUIS) 2.5 MG tablet Take 1 tablet (2.5 mg) by mouth 2 times daily 60 tablet 0    Apoaequorin (PREVAGEN) 10 MG CAPS Take 1 capsule by mouth daily 30 capsule 11    bimatoprost (LUMIGAN) 0.01 % SOLN Place 1 drop into both eyes at bedtime      brimonidine-timolol (COMBIGAN) 0.2-0.5 % ophthalmic solution Place 1 drop into both eyes 2 times daily      citalopram (CELEXA) 10 MG tablet Take 1 tablet (10 mg) by mouth daily 30 tablet 11    furosemide (LASIX) 20 MG tablet Take 1 tablet (20 mg) by mouth daily as needed (shortness of breath) 20 tablet 0    ipratropium (ATROVENT) 0.02 % neb solution Take 2.5 mLs (0.5 mg) by " "nebulization 3 times daily as needed for wheezing      levalbuterol (XOPENEX) 1.25 MG/3ML neb solution Take 3 mLs (1.25 mg) by nebulization every 4 hours as needed for wheezing 90 mL 0    miconazole with skin protectant (MARLON ANTIFUNGAL) 2 % CREA cream Apply topically 2 times daily And as needed      midodrine (PROAMATINE) 5 MG tablet Take 1 tablet (5 mg) by mouth 2 times daily 60 tablet 0    mycophenolate (GENERIC EQUIVALENT) 250 MG capsule Take 250 mg by mouth 2 times daily       REVIEW OF SYSTEMS:  4 point ROS including Respiratory, CV, GI and , other than that noted in the HPI,  is negative    Objective:  /60   Pulse 70   Temp 97.5  F (36.4  C)   Resp 18   Ht 1.575 m (5' 2\")   Wt 54.9 kg (121 lb)   SpO2 96%   BMI 22.13 kg/m    Exam:  GENERAL APPEARANCE:  Alert, in no distress, less guarded   ENT:  Mouth and posterior oropharynx normal, moist mucous membranes  EYES:  EOM, conjunctivae, lids, pupils and irises normal, glasses  RESP:  lungs clear to auscultation   CV:  Palpation and auscultation of heart done , regular rate and rhythm, no murmur, rub, or gallop, trace edema-no compression on  ABDOMEN:  no guarding or rebound, bowel sounds normal  M/S:   ambulates with 2WW but leaves at times  SKIN: limited, pale and intact to visualized areas  NEURO:   Cranial nerves 2-12 are normal tested and grossly at patient's baseline  PSYCH:  insight and judgement impaired, memory impaired SLUMS 17/30    Labs:   CBC RESULTS:   Recent Labs   Lab Test 03/05/24  0540 02/29/24  0532   WBC 6.8 10.8   RBC 4.15 3.83   HGB 12.0 11.0*   HCT 39.2 35.8   MCV 95 94   MCH 28.9 28.7   MCHC 30.6* 30.7*   RDW 14.4 15.0    226       Last Basic Metabolic Panel:  Recent Labs   Lab Test 03/05/24  0540 02/29/24  0532    138   POTASSIUM 4.6 3.9   CHLORIDE 103 103   DANIELITO 10.0 9.2   CO2 25 24   BUN 16.8 33.7*   CR 1.12* 1.16*   GLC 79 99       Liver Function Studies -   Recent Labs   Lab Test 02/27/24  2331 02/16/24  1941 " "  PROTTOTAL 5.7* 6.4   ALBUMIN 2.9* 3.5   BILITOTAL 0.9 1.1   ALKPHOS 101 128   AST 12 30   ALT 7 21       TSH   Date Value Ref Range Status   02/16/2024 2.43 0.30 - 4.20 uIU/mL Final   05/03/2019 1.34 0.40 - 4.00 mU/L Final   11/12/2017 1.59 0.40 - 4.00 mU/L Final       No results found for: \"A1C\"    ASSESSMENT/PLAN:    (F03.B4) Moderate dementia with anxiety, unspecified dementia type (H)  (primary encounter diagnosis)  Comment: moved into memory care-needs reminders and cues with ADLs  Plan:   -continues PTA prevagen 10 mg po daily  -on Celexa 10 mg po daily  -refuses ACP     (N18.31) Chronic kidney disease, stage 3a (H)  Comment: ongoing   Plan:   -renal dose and avoid nephrotoxins      (Z95.0) Cardiac pacemaker in situ  (I95.9) Hypotension, unspecified hypotension type  (I48.0) Paroxysmal A-fib (H)  (E78.5) Hyperlipidemia with target LDL less than 130  Comment: not on statin 2/2 elevated LFTs with chronic liver disease, sleeping in recliner  Plan:   -has follow up with cardiology  -newer to anticoagulation apixaban 2.5 mg po BID  -midodrine 5 mg po BID  -facility following weights and BP/HR  -prn lasix for SOB, weight gain and LE edema    (K75.4) Autoimmune Hepatitis  Comment: chronic   Plan:   -follow LFTs  -mycophenolate 250 mg po BID  -follows with dr. Bean at [facility] and daughter will reach out      (J44.9) Chronic obstructive pulmonary disease, unspecified COPD type (H)  Comment: with recent flare  Plan:   -monitor for flares       Electronically signed by:  Jefferson Calderon, CATY CNP           "

## 2024-08-13 ENCOUNTER — ASSISTED LIVING VISIT (OUTPATIENT)
Dept: GERIATRICS | Facility: CLINIC | Age: 79
End: 2024-08-13
Payer: COMMERCIAL

## 2024-08-13 VITALS
SYSTOLIC BLOOD PRESSURE: 119 MMHG | RESPIRATION RATE: 18 BRPM | DIASTOLIC BLOOD PRESSURE: 60 MMHG | TEMPERATURE: 97.5 F | BODY MASS INDEX: 22.26 KG/M2 | WEIGHT: 121 LBS | HEIGHT: 62 IN | HEART RATE: 70 BPM | OXYGEN SATURATION: 96 %

## 2024-08-13 DIAGNOSIS — E78.5 HYPERLIPIDEMIA WITH TARGET LDL LESS THAN 130: ICD-10-CM

## 2024-08-13 DIAGNOSIS — N18.31 STAGE 3A CHRONIC KIDNEY DISEASE (H): ICD-10-CM

## 2024-08-13 DIAGNOSIS — K75.4 AUTOIMMUNE HEPATITIS (H): ICD-10-CM

## 2024-08-13 DIAGNOSIS — Z95.0 CARDIAC PACEMAKER IN SITU: ICD-10-CM

## 2024-08-13 DIAGNOSIS — I95.9 HYPOTENSION, UNSPECIFIED HYPOTENSION TYPE: ICD-10-CM

## 2024-08-13 DIAGNOSIS — F03.B4 MODERATE DEMENTIA WITH ANXIETY, UNSPECIFIED DEMENTIA TYPE (H): Primary | ICD-10-CM

## 2024-08-13 DIAGNOSIS — I48.0 PAROXYSMAL A-FIB (H): ICD-10-CM

## 2024-08-13 DIAGNOSIS — J44.9 CHRONIC OBSTRUCTIVE PULMONARY DISEASE, UNSPECIFIED COPD TYPE (H): ICD-10-CM

## 2024-08-13 PROCEDURE — 99349 HOME/RES VST EST MOD MDM 40: CPT | Performed by: NURSE PRACTITIONER

## 2024-08-13 NOTE — LETTER
" 8/13/2024      Elaina Winn  C/o Tresa Stanton  [address]  [facility] [address]        [place] [facility]  [facility] Medical Record Number:  [id number]  Place of Service where encounter took place:  [facility] [[id number]]  Chief Complaint   Patient presents with     RECHECK       HPI:    Elaina Winn  is a 79 year old (1945), who is being seen today for an episodic care visit.  HPI information obtained from: facility chart records, facility staff, patient report, and [facility] chart review. Today's concern is:    Seen today for follow up to chronic disease management. No acute concerns from staff. Daughter feels she is adjusting nicely into her new memory care apartment. Less guarded from previous visit. Weekly weights mostly WNL. Denies SOB, abdominal pain, chest pain. Says \"my memory isn't so good\" Mostly eats meals in her apartment. Says she walks the length of the [place] twice daily. PMH includes dementia, autoimmune hepatitis, HTN, CKD3. Pt had a [facility] hospitalization 2/16-3/2 for PAF with RVR for which cardioversion and AV node ablation She had a pacemaker placed on Feb 23. Blink tears added for dry eyes, no complaints today. Not wearing compression but elevating her feet.      Past Medical and Surgical History reviewed in Epic today.    MEDICATIONS:    Current Outpatient Medications   Medication Sig Dispense Refill     apixaban ANTICOAGULANT (ELIQUIS) 2.5 MG tablet Take 1 tablet (2.5 mg) by mouth 2 times daily 60 tablet 0     Apoaequorin (PREVAGEN) 10 MG CAPS Take 1 capsule by mouth daily 30 capsule 11     bimatoprost (LUMIGAN) 0.01 % SOLN Place 1 drop into both eyes at bedtime       brimonidine-timolol (COMBIGAN) 0.2-0.5 % ophthalmic solution Place 1 drop into both eyes 2 times daily       citalopram (CELEXA) 10 MG tablet Take 1 tablet (10 mg) by mouth daily 30 tablet 11     furosemide (LASIX) 20 MG tablet Take 1 tablet (20 mg) by mouth daily as needed " "(shortness of breath) 20 tablet 0     ipratropium (ATROVENT) 0.02 % neb solution Take 2.5 mLs (0.5 mg) by nebulization 3 times daily as needed for wheezing       levalbuterol (XOPENEX) 1.25 MG/3ML neb solution Take 3 mLs (1.25 mg) by nebulization every 4 hours as needed for wheezing 90 mL 0     miconazole with skin protectant (MARLON ANTIFUNGAL) 2 % CREA cream Apply topically 2 times daily And as needed       midodrine (PROAMATINE) 5 MG tablet Take 1 tablet (5 mg) by mouth 2 times daily 60 tablet 0     mycophenolate (GENERIC EQUIVALENT) 250 MG capsule Take 250 mg by mouth 2 times daily       REVIEW OF SYSTEMS:  4 point ROS including Respiratory, CV, GI and , other than that noted in the HPI,  is negative    Objective:  /60   Pulse 70   Temp 97.5  F (36.4  C)   Resp 18   Ht 1.575 m (5' 2\")   Wt 54.9 kg (121 lb)   SpO2 96%   BMI 22.13 kg/m    Exam:  GENERAL APPEARANCE:  Alert, in no distress, less guarded   ENT:  Mouth and posterior oropharynx normal, moist mucous membranes  EYES:  EOM, conjunctivae, lids, pupils and irises normal, glasses  RESP:  lungs clear to auscultation   CV:  Palpation and auscultation of heart done , regular rate and rhythm, no murmur, rub, or gallop, trace edema-no compression on  ABDOMEN:  no guarding or rebound, bowel sounds normal  M/S:   ambulates with 2WW but leaves at times  SKIN: limited, pale and intact to visualized areas  NEURO:   Cranial nerves 2-12 are normal tested and grossly at patient's baseline  PSYCH:  insight and judgement impaired, memory impaired SLUMS 17/30    Labs:   CBC RESULTS:   Recent Labs   Lab Test 03/05/24  0540 02/29/24  0532   WBC 6.8 10.8   RBC 4.15 3.83   HGB 12.0 11.0*   HCT 39.2 35.8   MCV 95 94   MCH 28.9 28.7   MCHC 30.6* 30.7*   RDW 14.4 15.0    226       Last Basic Metabolic Panel:  Recent Labs   Lab Test 03/05/24  0540 02/29/24  0532    138   POTASSIUM 4.6 3.9   CHLORIDE 103 103   DANIELITO 10.0 9.2   CO2 25 24   BUN 16.8 33.7*   CR " "1.12* 1.16*   GLC 79 99       Liver Function Studies -   Recent Labs   Lab Test 02/27/24  2331 02/16/24  1941   PROTTOTAL 5.7* 6.4   ALBUMIN 2.9* 3.5   BILITOTAL 0.9 1.1   ALKPHOS 101 128   AST 12 30   ALT 7 21       TSH   Date Value Ref Range Status   02/16/2024 2.43 0.30 - 4.20 uIU/mL Final   05/03/2019 1.34 0.40 - 4.00 mU/L Final   11/12/2017 1.59 0.40 - 4.00 mU/L Final       No results found for: \"A1C\"    ASSESSMENT/PLAN:    (F03.B4) Moderate dementia with anxiety, unspecified dementia type (H)  (primary encounter diagnosis)  Comment: moved into memory care-needs reminders and cues with ADLs  Plan:   -continues PTA prevagen 10 mg po daily  -on Celexa 10 mg po daily  -refuses ACP     (N18.31) Chronic kidney disease, stage 3a (H)  Comment: ongoing   Plan:   -renal dose and avoid nephrotoxins      (Z95.0) Cardiac pacemaker in situ  (I95.9) Hypotension, unspecified hypotension type  (I48.0) Paroxysmal A-fib (H)  (E78.5) Hyperlipidemia with target LDL less than 130  Comment: not on statin 2/2 elevated LFTs with chronic liver disease, sleeping in recliner  Plan:   -has follow up with cardiology  -newer to anticoagulation apixaban 2.5 mg po BID  -midodrine 5 mg po BID  -facility following weights and BP/HR  -prn lasix for SOB, weight gain and LE edema    (K75.4) Autoimmune Hepatitis  Comment: chronic   Plan:   -follow LFTs  -mycophenolate 250 mg po BID  -follows with dr. Bean at [facility] and daughter will reach out      (J44.9) Chronic obstructive pulmonary disease, unspecified COPD type (H)  Comment: with recent flare  Plan:   -monitor for flares       Electronically signed by:  CATY Thakkar CNP               Sincerely,        CATY Thakkar CNP      "

## 2024-08-15 ENCOUNTER — ANCILLARY PROCEDURE (OUTPATIENT)
Dept: CARDIOLOGY | Facility: CLINIC | Age: 79
End: 2024-08-15
Attending: INTERNAL MEDICINE
Payer: COMMERCIAL

## 2024-08-15 DIAGNOSIS — Z95.0 CARDIAC PACEMAKER IN SITU: ICD-10-CM

## 2024-08-15 PROCEDURE — 93296 REM INTERROG EVL PM/IDS: CPT | Performed by: INTERNAL MEDICINE

## 2024-08-15 PROCEDURE — 93294 REM INTERROG EVL PM/LDLS PM: CPT | Performed by: INTERNAL MEDICINE

## 2024-08-16 LAB
MDC_IDC_LEAD_CONNECTION_STATUS: NORMAL
MDC_IDC_LEAD_IMPLANT_DT: NORMAL
MDC_IDC_LEAD_LOCATION: NORMAL
MDC_IDC_LEAD_LOCATION_DETAIL_1: NORMAL
MDC_IDC_LEAD_MFG: NORMAL
MDC_IDC_LEAD_MODEL: NORMAL
MDC_IDC_LEAD_POLARITY_TYPE: NORMAL
MDC_IDC_LEAD_SERIAL: NORMAL
MDC_IDC_MSMT_BATTERY_DTM: NORMAL
MDC_IDC_MSMT_BATTERY_REMAINING_LONGEVITY: 161 MO
MDC_IDC_MSMT_BATTERY_RRT_TRIGGER: 2.62
MDC_IDC_MSMT_BATTERY_STATUS: NORMAL
MDC_IDC_MSMT_BATTERY_VOLTAGE: 3.17 V
MDC_IDC_MSMT_LEADCHNL_RV_IMPEDANCE_VALUE: 399 OHM
MDC_IDC_MSMT_LEADCHNL_RV_IMPEDANCE_VALUE: 532 OHM
MDC_IDC_MSMT_LEADCHNL_RV_PACING_THRESHOLD_AMPLITUDE: 0.88 V
MDC_IDC_MSMT_LEADCHNL_RV_PACING_THRESHOLD_PULSEWIDTH: 0.4 MS
MDC_IDC_MSMT_LEADCHNL_RV_SENSING_INTR_AMPL: 8.75 MV
MDC_IDC_PG_IMPLANT_DTM: NORMAL
MDC_IDC_PG_MFG: NORMAL
MDC_IDC_PG_MODEL: NORMAL
MDC_IDC_PG_SERIAL: NORMAL
MDC_IDC_PG_TYPE: NORMAL
MDC_IDC_SESS_CLINIC_NAME: NORMAL
MDC_IDC_SESS_DTM: NORMAL
MDC_IDC_SESS_TYPE: NORMAL
MDC_IDC_SET_BRADY_HYSTRATE: NORMAL
MDC_IDC_SET_BRADY_LOWRATE: 60 {BEATS}/MIN
MDC_IDC_SET_BRADY_MAX_SENSOR_RATE: 120 {BEATS}/MIN
MDC_IDC_SET_BRADY_MODE: NORMAL
MDC_IDC_SET_LEADCHNL_RV_PACING_AMPLITUDE: 2 V
MDC_IDC_SET_LEADCHNL_RV_PACING_ANODE_ELECTRODE_1: NORMAL
MDC_IDC_SET_LEADCHNL_RV_PACING_ANODE_LOCATION_1: NORMAL
MDC_IDC_SET_LEADCHNL_RV_PACING_CAPTURE_MODE: NORMAL
MDC_IDC_SET_LEADCHNL_RV_PACING_CATHODE_ELECTRODE_1: NORMAL
MDC_IDC_SET_LEADCHNL_RV_PACING_CATHODE_LOCATION_1: NORMAL
MDC_IDC_SET_LEADCHNL_RV_PACING_POLARITY: NORMAL
MDC_IDC_SET_LEADCHNL_RV_PACING_PULSEWIDTH: 0.4 MS
MDC_IDC_SET_LEADCHNL_RV_SENSING_ANODE_ELECTRODE_1: NORMAL
MDC_IDC_SET_LEADCHNL_RV_SENSING_ANODE_LOCATION_1: NORMAL
MDC_IDC_SET_LEADCHNL_RV_SENSING_CATHODE_ELECTRODE_1: NORMAL
MDC_IDC_SET_LEADCHNL_RV_SENSING_CATHODE_LOCATION_1: NORMAL
MDC_IDC_SET_LEADCHNL_RV_SENSING_POLARITY: NORMAL
MDC_IDC_SET_LEADCHNL_RV_SENSING_SENSITIVITY: 0.9 MV
MDC_IDC_SET_ZONE_DETECTION_INTERVAL: 400 MS
MDC_IDC_SET_ZONE_STATUS: NORMAL
MDC_IDC_SET_ZONE_TYPE: NORMAL
MDC_IDC_SET_ZONE_VENDOR_TYPE: NORMAL
MDC_IDC_STAT_BRADY_DTM_END: NORMAL
MDC_IDC_STAT_BRADY_DTM_START: NORMAL
MDC_IDC_STAT_BRADY_RV_PERCENT_PACED: 99.9 %
MDC_IDC_STAT_EPISODE_RECENT_COUNT: 0
MDC_IDC_STAT_EPISODE_RECENT_COUNT_DTM_END: NORMAL
MDC_IDC_STAT_EPISODE_RECENT_COUNT_DTM_START: NORMAL
MDC_IDC_STAT_EPISODE_TOTAL_COUNT: 0
MDC_IDC_STAT_EPISODE_TOTAL_COUNT_DTM_END: NORMAL
MDC_IDC_STAT_EPISODE_TOTAL_COUNT_DTM_START: NORMAL
MDC_IDC_STAT_EPISODE_TYPE: NORMAL

## 2024-10-01 ENCOUNTER — TRANSFERRED RECORDS (OUTPATIENT)
Dept: HEALTH INFORMATION MANAGEMENT | Facility: CLINIC | Age: 79
End: 2024-10-01
Payer: COMMERCIAL

## 2024-10-01 LAB
ALT SERPL-CCNC: 11 IU/L (ref 0–32)
AST SERPL-CCNC: 20 IU/L (ref 0–40)
POTASSIUM (EXTERNAL): 4.9 MMOL/L (ref 3.5–5.2)

## 2024-10-02 ENCOUNTER — DOCUMENTATION ONLY (OUTPATIENT)
Dept: OTHER | Facility: CLINIC | Age: 79
End: 2024-10-02
Payer: COMMERCIAL

## 2024-10-02 ENCOUNTER — LAB REQUISITION (OUTPATIENT)
Dept: LAB | Facility: CLINIC | Age: 79
End: 2024-10-02
Payer: COMMERCIAL

## 2024-10-02 DIAGNOSIS — K75.4 AUTOIMMUNE HEPATITIS (H): ICD-10-CM

## 2024-10-07 VITALS
HEART RATE: 67 BPM | TEMPERATURE: 98.2 F | OXYGEN SATURATION: 95 % | DIASTOLIC BLOOD PRESSURE: 61 MMHG | BODY MASS INDEX: 22.08 KG/M2 | WEIGHT: 120 LBS | RESPIRATION RATE: 17 BRPM | SYSTOLIC BLOOD PRESSURE: 116 MMHG | HEIGHT: 62 IN

## 2024-10-07 RX ORDER — POLYETHYLENE GLYCOL 400 2.5 MG/ML
2 SOLUTION/ DROPS OPHTHALMIC EVERY 4 HOURS PRN
COMMUNITY

## 2024-10-07 NOTE — PROGRESS NOTES
[facility]  Chief Complaint   Patient presents with    Annual Comprehensive Exam Assisted Living     [facility] Medical Record Number:  [id number]  Place of Service where encounter took place:  [facility] ([facility] [[id number]]    HPI:    Elaina Winn  is a 79 year old  (1945), who is being seen today for an annual comprehensive visit. HPI information obtained from: facility chart records, facility staff, patient report, [facility] chart review, and family/first contact daughter report.     Seen today for follow up to chronic disease management. Overall stable since admission to facility- 3/12/2024. Reviewed follow up note with [facility] of 10/1. Reviewed facility notes. She is independent with most cares. No prn use of lasix or nebs. Weight and BP are stable. Last pacer check was 8/15/24. She is due for cardiology follow up with Dr. Jennings. Denies any acute concerns today. Sleeping well, loves the food. Thinks she might be moving but not sure if this was a nightmare. Her daughter reports this has been going on since moving from the TCU. Says her memory is not good. Resides in memory care.      ALLERGIES: Adhesive tape and Zocor [simvastatin - high dose]  PAST MEDICAL HISTORY:   Past Medical History:   Diagnosis Date    Anxiety     Atrial fibrillation (H)     Chronic renal insufficiency     Dementia (H)     Glaucoma     Hepatitis     autoimmune    HTN (hypertension), benign     Hyperlipidemia LDL goal < 130     Paroxysmal A-fib (H)       PAST SURGICAL HISTORY:  has a past surgical history that includes Hysterectomy; Dilation and curettage; tonsillectomy & adenoidectomy; Septoplasty; S/p partial vaginectomy; Anesthesia cardioversion (N/A, 2/20/2024); Ablation Atrioventricular Node (N/A, 2/23/2024); and Pacemaker Device & Lead Implant- Right ventricular (N/A, 2/23/2024).      Current Outpatient Medications:     apixaban ANTICOAGULANT (ELIQUIS) 2.5 MG tablet, Take 1 tablet (2.5 mg) by  mouth 2 times daily, Disp: 60 tablet, Rfl: 0    Apoaequorin (PREVAGEN) 10 MG CAPS, Take 1 capsule by mouth daily, Disp: 30 capsule, Rfl: 11    bimatoprost (LUMIGAN) 0.01 % SOLN, Place 1 drop into both eyes at bedtime, Disp: , Rfl:     brimonidine-timolol (COMBIGAN) 0.2-0.5 % ophthalmic solution, Place 1 drop into both eyes 2 times daily, Disp: , Rfl:     citalopram (CELEXA) 10 MG tablet, Take 1 tablet (10 mg) by mouth daily, Disp: 30 tablet, Rfl: 11    furosemide (LASIX) 20 MG tablet, Take 1 tablet (20 mg) by mouth daily as needed (shortness of breath), Disp: 20 tablet, Rfl: 0    ipratropium (ATROVENT) 0.02 % neb solution, Take 2.5 mLs (0.5 mg) by nebulization 3 times daily as needed for wheezing, Disp: , Rfl:     levalbuterol (XOPENEX) 1.25 MG/3ML neb solution, Take 3 mLs (1.25 mg) by nebulization every 4 hours as needed for wheezing, Disp: 90 mL, Rfl: 0    miconazole with skin protectant (MARLON ANTIFUNGAL) 2 % CREA cream, Apply topically 2 times daily And as needed, Disp: , Rfl:     midodrine (PROAMATINE) 5 MG tablet, Take 1 tablet (5 mg) by mouth 2 times daily, Disp: 60 tablet, Rfl: 0    mycophenolate (GENERIC EQUIVALENT) 250 MG capsule, Take 250 mg by mouth 2 times daily, Disp: , Rfl:     polyethylene glycol 400 (BLINK TEARS) 0.25 % SOLN ophthalmic solution, Place 2 drops into both eyes every 4 hours as needed for dry eyes., Disp: , Rfl:     Tdap, tetanus-diptheria-acell pertussis, (BOOSTRIX) 5-2.5-18.5 LF-MCG/0.5 LEILANI injection, Inject 0.5 mLs into the muscle once for 1 dose., Disp: 0.5 mL, Rfl: 0     MED REC REQUIRED  done    Case Management:  I have reviewed the Assisted Living care plan, current immunizations and preventive care/cancer screening..  FIT test ordered . Patient's desire to return to the community is present, but is not able due to care needs . Current Level of Care is appropriate.mhgeroimmunization: TDAP    Advance Directive Discussion:    I reviewed the current advanced directives as reflected  "in EPIC, the POLST and the facility chart, and verified the congruency of orders. I contacted the first party daughter and discussed the plan of care. I did not due to cognitive impairment review the advance directives with the resident.     Team Discussion:  I communicated with the appropriate disciplines involved with the Plan of Care: Nursing  .   Patient's goal is: Full Code.  Information reviewed: Medications, vital signs, orders, and nursing notes.    ROS:  Limited secondary to cognitive impairment but today pt reports fine    Vitals:  /61   Pulse 67   Temp 98.2  F (36.8  C)   Resp 17   Ht 1.575 m (5' 2\")   Wt 54.4 kg (120 lb)   SpO2 95%   BMI 21.95 kg/m   Body mass index is 21.95 kg/m .  Exam:  GENERAL APPEARANCE:  Alert, calm and pleasant   ENT:  Mouth and posterior oropharynx normal, moist mucous membranes  EYES:  EOM, conjunctivae, lids, pupils and irises normal, glasses  RESP:  lungs clear to auscultation   CV:  Palpation and auscultation of heart done , regular rate and rhythm, no murmur, trace edema-no compression on  ABDOMEN:  no guarding or rebound, bowel sounds normal  M/S:   ambulates with 2WW but leaves at times  SKIN: limited, pale and intact to visualized areas  NEURO:   Cranial nerves 2-12 are normal tested and grossly at patient's baseline  PSYCH:  insight and judgement impaired, memory impaired SLUMS 17/30    Lab/Diagnostic data:   CBC RESULTS:   Recent Labs   Lab Test 03/05/24  0540 02/29/24  0532   WBC 6.8 10.8   RBC 4.15 3.83   HGB 12.0 11.0*   HCT 39.2 35.8   MCV 95 94   MCH 28.9 28.7   MCHC 30.6* 30.7*   RDW 14.4 15.0    226       Last Basic Metabolic Panel:  Recent Labs   Lab Test 03/05/24  0540 02/29/24  0532    138   POTASSIUM 4.6 3.9   CHLORIDE 103 103   DANIELITO 10.0 9.2   CO2 25 24   BUN 16.8 33.7*   CR 1.12* 1.16*   GLC 79 99       Liver Function Studies -   Recent Labs   Lab Test 02/27/24  2331 02/16/24  1941   PROTTOTAL 5.7* 6.4   ALBUMIN 2.9* 3.5   BILITOTAL " "0.9 1.1   ALKPHOS 101 128   AST 12 30   ALT 7 21       TSH   Date Value Ref Range Status   02/16/2024 2.43 0.30 - 4.20 uIU/mL Final   05/03/2019 1.34 0.40 - 4.00 mU/L Final   11/12/2017 1.59 0.40 - 4.00 mU/L Final       No results found for: \"A1C\"    ASSESSMENT/PLAN  (F03.B4) Moderate dementia with anxiety, unspecified dementia type (H)  (primary encounter diagnosis)  Comment: stable   Plan:   -continues PTA prevagen 10 mg po daily  -on Celexa 10 mg po daily  -refuses ACP    (Z23) Need for Tdap vaccination  Comment:   Plan:   -ordered Tdap, tetanus-diptheria-acell pertussis,         (BOOSTRIX) 5-2.5-18.5 LF-MCG/0.5 LEILANI injection          (Z12.11) Special screening for malignant neoplasms, colon  Comment:   Plan:   -ordered Fecal colorectal cancer screen FIT          (Z95.0) Cardiac pacemaker in situ  (I95.9) Hypotension, unspecified hypotension type  (I48.0) Paroxysmal A-fib (H)  (E78.5) Hyperlipidemia with target LDL less than 130  Comment: stable   Plan:   -follows with cardiology   -anticoagulation apixaban 2.5 mg po BID  -midodrine 5 mg po BID  -facility following weights and BP/HR  -prn lasix for SOB, weight gain and LE edema  -not on statin 2/2 elevated LFTs with chronic liver disease, sleeping in recliner      (K75.4) Autoimmune hepatitis (H)  Comment: stable   Plan:   -mycophenolate 250 mg po BID  -follows with dr. Bean at [facility]     (J44.9) Chronic obstructive pulmonary disease, unspecified COPD type (H)  Comment: stable with no recent flares  Plan:   -monitor for s/s        Electronically signed by:  CATY Thakkar CNP       "

## 2024-10-08 ENCOUNTER — ASSISTED LIVING VISIT (OUTPATIENT)
Dept: GERIATRICS | Facility: CLINIC | Age: 79
End: 2024-10-08
Payer: COMMERCIAL

## 2024-10-08 DIAGNOSIS — I95.9 HYPOTENSION, UNSPECIFIED HYPOTENSION TYPE: ICD-10-CM

## 2024-10-08 DIAGNOSIS — F03.B4 MODERATE DEMENTIA WITH ANXIETY, UNSPECIFIED DEMENTIA TYPE (H): Primary | ICD-10-CM

## 2024-10-08 DIAGNOSIS — E78.5 HYPERLIPIDEMIA WITH TARGET LDL LESS THAN 130: ICD-10-CM

## 2024-10-08 DIAGNOSIS — Z12.11 SPECIAL SCREENING FOR MALIGNANT NEOPLASMS, COLON: ICD-10-CM

## 2024-10-08 DIAGNOSIS — J44.9 CHRONIC OBSTRUCTIVE PULMONARY DISEASE, UNSPECIFIED COPD TYPE (H): ICD-10-CM

## 2024-10-08 DIAGNOSIS — Z23 NEED FOR TDAP VACCINATION: ICD-10-CM

## 2024-10-08 DIAGNOSIS — I48.0 PAROXYSMAL A-FIB (H): ICD-10-CM

## 2024-10-08 DIAGNOSIS — Z95.0 CARDIAC PACEMAKER IN SITU: ICD-10-CM

## 2024-10-08 DIAGNOSIS — K75.4 AUTOIMMUNE HEPATITIS (H): ICD-10-CM

## 2024-10-08 PROCEDURE — 82784 ASSAY IGA/IGD/IGG/IGM EACH: CPT | Mod: ORL | Performed by: NURSE PRACTITIONER

## 2024-10-08 PROCEDURE — 99349 HOME/RES VST EST MOD MDM 40: CPT | Performed by: NURSE PRACTITIONER

## 2024-10-08 PROCEDURE — 36415 COLL VENOUS BLD VENIPUNCTURE: CPT | Mod: ORL | Performed by: NURSE PRACTITIONER

## 2024-10-08 PROCEDURE — 82785 ASSAY OF IGE: CPT | Mod: ORL | Performed by: NURSE PRACTITIONER

## 2024-10-08 PROCEDURE — P9604 ONE-WAY ALLOW PRORATED TRIP: HCPCS | Mod: ORL | Performed by: NURSE PRACTITIONER

## 2024-10-08 NOTE — LETTER
Elaina Winn orders:     -give     Tdap, tetanus-diptheria-acell pertussis, (BOOSTRIX) 5-2.5-18.5 LF-MCG/0.5 LEILANI injection       -FIT test for colon cancer screen      Electronically signed by:  CATY Thakkar CNP

## 2024-10-08 NOTE — LETTER
10/8/2024      Elaina Winn  C/o Tresa Stanton  [address]  [facility] [address]        [facility]  Chief Complaint   Patient presents with     Annual Comprehensive Exam Assisted Living     [facility] Medical Record Number:  [id number]  Place of Service where encounter took place:  [facility] [[id number]]    HPI:    Elaina Winn  is a 79 year old  (1945), who is being seen today for an annual comprehensive visit. HPI information obtained from: facility chart records, facility staff, patient report, [facility] chart review, and family/first contact daughter report.     Seen today for follow up to chronic disease management. Overall stable since admission to facility- 3/12/2024. Reviewed follow up note with [facility] of 10/1. Reviewed facility notes. She is independent with most cares. No prn use of lasix or nebs. Weight and BP are stable. Last pacer check was 8/15/24. She is due for cardiology follow up with Dr. Jennings. Denies any acute concerns today. Sleeping well, loves the food. Thinks she might be moving but not sure if this was a nightmare. Her daughter reports this has been going on since moving from the TCU. Says her memory is not good. Resides in memory care.      ALLERGIES: Adhesive tape and Zocor [simvastatin - high dose]  PAST MEDICAL HISTORY:   Past Medical History:   Diagnosis Date     Anxiety      Atrial fibrillation (H)      Chronic renal insufficiency      Dementia (H)      Glaucoma      Hepatitis     autoimmune     HTN (hypertension), benign      Hyperlipidemia LDL goal < 130      Paroxysmal A-fib (H)       PAST SURGICAL HISTORY:  has a past surgical history that includes Hysterectomy; Dilation and curettage; tonsillectomy & adenoidectomy; Septoplasty; S/p partial vaginectomy; Anesthesia cardioversion (N/A, 2/20/2024); Ablation Atrioventricular Node (N/A, 2/23/2024); and Pacemaker Device & Lead Implant- Right ventricular (N/A,  2/23/2024).      Current Outpatient Medications:      apixaban ANTICOAGULANT (ELIQUIS) 2.5 MG tablet, Take 1 tablet (2.5 mg) by mouth 2 times daily, Disp: 60 tablet, Rfl: 0     Apoaequorin (PREVAGEN) 10 MG CAPS, Take 1 capsule by mouth daily, Disp: 30 capsule, Rfl: 11     bimatoprost (LUMIGAN) 0.01 % SOLN, Place 1 drop into both eyes at bedtime, Disp: , Rfl:      brimonidine-timolol (COMBIGAN) 0.2-0.5 % ophthalmic solution, Place 1 drop into both eyes 2 times daily, Disp: , Rfl:      citalopram (CELEXA) 10 MG tablet, Take 1 tablet (10 mg) by mouth daily, Disp: 30 tablet, Rfl: 11     furosemide (LASIX) 20 MG tablet, Take 1 tablet (20 mg) by mouth daily as needed (shortness of breath), Disp: 20 tablet, Rfl: 0     ipratropium (ATROVENT) 0.02 % neb solution, Take 2.5 mLs (0.5 mg) by nebulization 3 times daily as needed for wheezing, Disp: , Rfl:      levalbuterol (XOPENEX) 1.25 MG/3ML neb solution, Take 3 mLs (1.25 mg) by nebulization every 4 hours as needed for wheezing, Disp: 90 mL, Rfl: 0     miconazole with skin protectant (MARLON ANTIFUNGAL) 2 % CREA cream, Apply topically 2 times daily And as needed, Disp: , Rfl:      midodrine (PROAMATINE) 5 MG tablet, Take 1 tablet (5 mg) by mouth 2 times daily, Disp: 60 tablet, Rfl: 0     mycophenolate (GENERIC EQUIVALENT) 250 MG capsule, Take 250 mg by mouth 2 times daily, Disp: , Rfl:      polyethylene glycol 400 (BLINK TEARS) 0.25 % SOLN ophthalmic solution, Place 2 drops into both eyes every 4 hours as needed for dry eyes., Disp: , Rfl:      Tdap, tetanus-diptheria-acell pertussis, (BOOSTRIX) 5-2.5-18.5 LF-MCG/0.5 LEILANI injection, Inject 0.5 mLs into the muscle once for 1 dose., Disp: 0.5 mL, Rfl: 0     MED REC REQUIRED  done    Case Management:  I have reviewed the Assisted Living care plan, current immunizations and preventive care/cancer screening..  FIT test ordered . Patient's desire to return to the community is present, but is not able due to care needs . Current Level  "of Care is appropriate.mhgeroimmunization: TDAP    Advance Directive Discussion:    I reviewed the current advanced directives as reflected in EPIC, the POLST and the facility chart, and verified the congruency of orders. I contacted the first party daughter and discussed the plan of care. I did not due to cognitive impairment review the advance directives with the resident.     Team Discussion:  I communicated with the appropriate disciplines involved with the Plan of Care: Nursing  .   Patient's goal is: Full Code.  Information reviewed: Medications, vital signs, orders, and nursing notes.    ROS:  Limited secondary to cognitive impairment but today pt reports fine    Vitals:  /61   Pulse 67   Temp 98.2  F (36.8  C)   Resp 17   Ht 1.575 m (5' 2\")   Wt 54.4 kg (120 lb)   SpO2 95%   BMI 21.95 kg/m   Body mass index is 21.95 kg/m .  Exam:  GENERAL APPEARANCE:  Alert, calm and pleasant   ENT:  Mouth and posterior oropharynx normal, moist mucous membranes  EYES:  EOM, conjunctivae, lids, pupils and irises normal, glasses  RESP:  lungs clear to auscultation   CV:  Palpation and auscultation of heart done , regular rate and rhythm, no murmur, trace edema-no compression on  ABDOMEN:  no guarding or rebound, bowel sounds normal  M/S:   ambulates with 2WW but leaves at times  SKIN: limited, pale and intact to visualized areas  NEURO:   Cranial nerves 2-12 are normal tested and grossly at patient's baseline  PSYCH:  insight and judgement impaired, memory impaired SLUMS 17/30    Lab/Diagnostic data:   CBC RESULTS:   Recent Labs   Lab Test 03/05/24  0540 02/29/24  0532   WBC 6.8 10.8   RBC 4.15 3.83   HGB 12.0 11.0*   HCT 39.2 35.8   MCV 95 94   MCH 28.9 28.7   MCHC 30.6* 30.7*   RDW 14.4 15.0    226       Last Basic Metabolic Panel:  Recent Labs   Lab Test 03/05/24 0540 02/29/24  0532    138   POTASSIUM 4.6 3.9   CHLORIDE 103 103   DANIELITO 10.0 9.2   CO2 25 24   BUN 16.8 33.7*   CR 1.12* 1.16*   GLC 79 " "99       Liver Function Studies -   Recent Labs   Lab Test 02/27/24  2331 02/16/24  1941   PROTTOTAL 5.7* 6.4   ALBUMIN 2.9* 3.5   BILITOTAL 0.9 1.1   ALKPHOS 101 128   AST 12 30   ALT 7 21       TSH   Date Value Ref Range Status   02/16/2024 2.43 0.30 - 4.20 uIU/mL Final   05/03/2019 1.34 0.40 - 4.00 mU/L Final   11/12/2017 1.59 0.40 - 4.00 mU/L Final       No results found for: \"A1C\"    ASSESSMENT/PLAN  (F03.B4) Moderate dementia with anxiety, unspecified dementia type (H)  (primary encounter diagnosis)  Comment: stable   Plan:   -continues PTA prevagen 10 mg po daily  -on Celexa 10 mg po daily  -refuses ACP    (Z23) Need for Tdap vaccination  Comment:   Plan:   -ordered Tdap, tetanus-diptheria-acell pertussis,         (BOOSTRIX) 5-2.5-18.5 LF-MCG/0.5 LEILANI injection          (Z12.11) Special screening for malignant neoplasms, colon  Comment:   Plan:   -ordered Fecal colorectal cancer screen FIT          (Z95.0) Cardiac pacemaker in situ  (I95.9) Hypotension, unspecified hypotension type  (I48.0) Paroxysmal A-fib (H)  (E78.5) Hyperlipidemia with target LDL less than 130  Comment: stable   Plan:   -follows with cardiology   -anticoagulation apixaban 2.5 mg po BID  -midodrine 5 mg po BID  -facility following weights and BP/HR  -prn lasix for SOB, weight gain and LE edema  -not on statin 2/2 elevated LFTs with chronic liver disease, sleeping in recliner      (K75.4) Autoimmune hepatitis (H)  Comment: stable   Plan:   -mycophenolate 250 mg po BID  -follows with dr. Bean at [facility]     (J44.9) Chronic obstructive pulmonary disease, unspecified COPD type (H)  Comment: stable with no recent flares  Plan:   -monitor for s/s        Electronically signed by:  CATY Thakkar CNP         Sincerely,        CATY Thakkar CNP      "

## 2024-10-09 LAB
IGA SERPL-MCNC: 143 MG/DL (ref 84–499)
IGE SERPL-ACNC: 5 KU/L (ref 0–114)
IGG SERPL-MCNC: 766 MG/DL (ref 610–1616)
IGM SERPL-MCNC: 93 MG/DL (ref 35–242)

## 2024-11-21 ENCOUNTER — ANCILLARY PROCEDURE (OUTPATIENT)
Dept: CARDIOLOGY | Facility: CLINIC | Age: 79
End: 2024-11-21
Attending: INTERNAL MEDICINE
Payer: COMMERCIAL

## 2024-11-21 DIAGNOSIS — Z95.0 CARDIAC PACEMAKER IN SITU: ICD-10-CM

## 2024-11-21 LAB
MDC_IDC_LEAD_CONNECTION_STATUS: NORMAL
MDC_IDC_LEAD_IMPLANT_DT: NORMAL
MDC_IDC_LEAD_LOCATION: NORMAL
MDC_IDC_LEAD_LOCATION_DETAIL_1: NORMAL
MDC_IDC_LEAD_MFG: NORMAL
MDC_IDC_LEAD_MODEL: NORMAL
MDC_IDC_LEAD_POLARITY_TYPE: NORMAL
MDC_IDC_LEAD_SERIAL: NORMAL
MDC_IDC_MSMT_BATTERY_DTM: NORMAL
MDC_IDC_MSMT_BATTERY_REMAINING_LONGEVITY: 148 MO
MDC_IDC_MSMT_BATTERY_RRT_TRIGGER: 2.62
MDC_IDC_MSMT_BATTERY_STATUS: NORMAL
MDC_IDC_MSMT_BATTERY_VOLTAGE: 3.13 V
MDC_IDC_MSMT_LEADCHNL_RV_IMPEDANCE_VALUE: 399 OHM
MDC_IDC_MSMT_LEADCHNL_RV_IMPEDANCE_VALUE: 532 OHM
MDC_IDC_MSMT_LEADCHNL_RV_PACING_THRESHOLD_AMPLITUDE: 1.12 V
MDC_IDC_MSMT_LEADCHNL_RV_PACING_THRESHOLD_PULSEWIDTH: 0.4 MS
MDC_IDC_MSMT_LEADCHNL_RV_SENSING_INTR_AMPL: 8.75 MV
MDC_IDC_PG_IMPLANT_DTM: NORMAL
MDC_IDC_PG_MFG: NORMAL
MDC_IDC_PG_MODEL: NORMAL
MDC_IDC_PG_SERIAL: NORMAL
MDC_IDC_PG_TYPE: NORMAL
MDC_IDC_SESS_CLINIC_NAME: NORMAL
MDC_IDC_SESS_DTM: NORMAL
MDC_IDC_SESS_TYPE: NORMAL
MDC_IDC_SET_BRADY_HYSTRATE: NORMAL
MDC_IDC_SET_BRADY_LOWRATE: 60 {BEATS}/MIN
MDC_IDC_SET_BRADY_MAX_SENSOR_RATE: 120 {BEATS}/MIN
MDC_IDC_SET_BRADY_MODE: NORMAL
MDC_IDC_SET_LEADCHNL_RV_PACING_AMPLITUDE: 2.25 V
MDC_IDC_SET_LEADCHNL_RV_PACING_ANODE_ELECTRODE_1: NORMAL
MDC_IDC_SET_LEADCHNL_RV_PACING_ANODE_LOCATION_1: NORMAL
MDC_IDC_SET_LEADCHNL_RV_PACING_CAPTURE_MODE: NORMAL
MDC_IDC_SET_LEADCHNL_RV_PACING_CATHODE_ELECTRODE_1: NORMAL
MDC_IDC_SET_LEADCHNL_RV_PACING_CATHODE_LOCATION_1: NORMAL
MDC_IDC_SET_LEADCHNL_RV_PACING_POLARITY: NORMAL
MDC_IDC_SET_LEADCHNL_RV_PACING_PULSEWIDTH: 0.4 MS
MDC_IDC_SET_LEADCHNL_RV_SENSING_ANODE_ELECTRODE_1: NORMAL
MDC_IDC_SET_LEADCHNL_RV_SENSING_ANODE_LOCATION_1: NORMAL
MDC_IDC_SET_LEADCHNL_RV_SENSING_CATHODE_ELECTRODE_1: NORMAL
MDC_IDC_SET_LEADCHNL_RV_SENSING_CATHODE_LOCATION_1: NORMAL
MDC_IDC_SET_LEADCHNL_RV_SENSING_POLARITY: NORMAL
MDC_IDC_SET_LEADCHNL_RV_SENSING_SENSITIVITY: 0.9 MV
MDC_IDC_SET_ZONE_DETECTION_INTERVAL: 400 MS
MDC_IDC_SET_ZONE_STATUS: NORMAL
MDC_IDC_SET_ZONE_TYPE: NORMAL
MDC_IDC_SET_ZONE_VENDOR_TYPE: NORMAL
MDC_IDC_STAT_BRADY_DTM_END: NORMAL
MDC_IDC_STAT_BRADY_DTM_START: NORMAL
MDC_IDC_STAT_BRADY_RV_PERCENT_PACED: 99.92 %
MDC_IDC_STAT_EPISODE_RECENT_COUNT: 0
MDC_IDC_STAT_EPISODE_RECENT_COUNT_DTM_END: NORMAL
MDC_IDC_STAT_EPISODE_RECENT_COUNT_DTM_START: NORMAL
MDC_IDC_STAT_EPISODE_TOTAL_COUNT: 0
MDC_IDC_STAT_EPISODE_TOTAL_COUNT_DTM_END: NORMAL
MDC_IDC_STAT_EPISODE_TOTAL_COUNT_DTM_START: NORMAL
MDC_IDC_STAT_EPISODE_TYPE: NORMAL

## 2024-11-21 PROCEDURE — 93296 REM INTERROG EVL PM/IDS: CPT | Performed by: INTERNAL MEDICINE

## 2024-11-21 PROCEDURE — 93294 REM INTERROG EVL PM/LDLS PM: CPT | Performed by: INTERNAL MEDICINE

## 2024-11-27 DIAGNOSIS — H04.123 DRY EYES: Primary | ICD-10-CM

## 2024-11-27 RX ORDER — POLYETHYLENE GLYCOL 400 2.5 MG/ML
SOLUTION/ DROPS OPHTHALMIC
Qty: 15 ML | Refills: 11 | Status: SHIPPED | OUTPATIENT
Start: 2024-11-27

## 2024-12-26 ENCOUNTER — PATIENT OUTREACH (OUTPATIENT)
Dept: CARE COORDINATION | Facility: CLINIC | Age: 79
End: 2024-12-26
Payer: COMMERCIAL

## 2025-01-09 ENCOUNTER — PATIENT OUTREACH (OUTPATIENT)
Dept: CARE COORDINATION | Facility: CLINIC | Age: 80
End: 2025-01-09
Payer: COMMERCIAL

## 2025-01-20 NOTE — PROGRESS NOTES
[place] [facility]  [place] Medical Record Number:  [id number]  Place of Service where encounter took place:  [facility] [[id number]]  Chief Complaint   Patient presents with    RECHECK       HPI:    Elaina Winn  is a 79 year old (1945), who is being seen today for an episodic care visit.  HPI information obtained from: facility chart records, facility staff, patient report, and [facility] chart review. Today's concern is:    Seen today for follow up to chronic disease management. No acute concerns from staff. She spends much time in her apartment. She reports to feeling cold, family gave her heating pad to use (help with arthritis) but not allowed in facility and on [facility] unit. Facility chart reviewed, no use of prn Lasix, VSS, weight stable.     PMH includes paroxysmal atrial fibrillation, autoimmune hepatitis, hyperlipidemia, hypertension, chronic renal insufficiency, dementia, and anxiety. Per cardiology she was not anticoagulated due to her liver function. In 2024 after hospitalization for a-fib RVR s/p ablation and pacemaker she was initiated on low dose Eliquis for stoke prevention. She had not returned to cardiology.     Past Medical and Surgical History reviewed in Epic today.    MEDICATIONS:    Current Outpatient Medications   Medication Sig Dispense Refill    apixaban ANTICOAGULANT (ELIQUIS) 2.5 MG tablet Take 1 tablet (2.5 mg) by mouth 2 times daily 60 tablet 0    Apoaequorin (PREVAGEN) 10 MG CAPS Take 1 capsule by mouth daily 30 capsule 11    bimatoprost (LUMIGAN) 0.01 % SOLN Place 1 drop into both eyes at bedtime      BLINK TEARS 0.25 % SOLN ophthalmic solution INSTILL 2 DROPS IN EACH EYE EVERY 4 HOURS AS NEEDED FOR DRY EYES 15 mL 11    brimonidine-timolol (COMBIGAN) 0.2-0.5 % ophthalmic solution Place 1 drop into both eyes 2 times daily      citalopram (CELEXA) 10 MG tablet Take 1 tablet (10 mg) by mouth daily 30 tablet 11    furosemide (LASIX) 20 MG tablet  "Take 1 tablet (20 mg) by mouth daily as needed (shortness of breath) 20 tablet 0    ipratropium (ATROVENT) 0.02 % neb solution Take 2.5 mLs (0.5 mg) by nebulization 3 times daily as needed for wheezing      levalbuterol (XOPENEX) 1.25 MG/3ML neb solution Take 3 mLs (1.25 mg) by nebulization every 4 hours as needed for wheezing 90 mL 0    miconazole with skin protectant (MARLON ANTIFUNGAL) 2 % CREA cream Apply topically 2 times daily And as needed      midodrine (PROAMATINE) 5 MG tablet Take 1 tablet (5 mg) by mouth 2 times daily 60 tablet 0    mycophenolate (GENERIC EQUIVALENT) 250 MG capsule Take 250 mg by mouth 2 times daily       REVIEW OF SYSTEMS:  Limited secondary to cognitive impairment but today pt reports ok    Objective:  BP (!) 153/74   Pulse 74   Temp 97.6  F (36.4  C)   Resp 16   Ht 1.575 m (5' 2\")   Wt 56.4 kg (124 lb 6.4 oz)   SpO2 95%   BMI 22.75 kg/m    Exam:  GENERAL APPEARANCE:  Alert, calm and pleasant, confused   ENT:  Mouth and posterior oropharynx normal, moist mucous membranes  EYES:  EOM, conjunctivae, lids, pupils and irises normal, glasses  RESP:  lungs clear to auscultation   CV:  Palpation and auscultation of heart done , regular rate and rhythm, no murmur, doughy edema, compression on   ABDOMEN:  no guarding or rebound, bowel sounds normal  M/S:   ambulates with 2WW but leaves at times  SKIN: limited, pale and intact to visualized areas  NEURO:   Cranial nerves 2-12 are normal tested and grossly at patient's baseline  PSYCH:  insight and judgement impaired, memory impaired SLUMS 17/30    Labs:   CBC RESULTS:   Recent Labs   Lab Test 03/05/24  0540 02/29/24  0532   WBC 6.8 10.8   RBC 4.15 3.83   HGB 12.0 11.0*   HCT 39.2 35.8   MCV 95 94   MCH 28.9 28.7   MCHC 30.6* 30.7*   RDW 14.4 15.0    226       Last Basic Metabolic Panel:  Recent Labs   Lab Test 03/05/24  0540 02/29/24  0532    138   POTASSIUM 4.6 3.9   CHLORIDE 103 103   DANIELITO 10.0 9.2   CO2 25 24   BUN 16.8 33.7* " "  CR 1.12* 1.16*   GLC 79 99       Liver Function Studies -   Recent Labs   Lab Test 02/27/24  2331 02/16/24  1941   PROTTOTAL 5.7* 6.4   ALBUMIN 2.9* 3.5   BILITOTAL 0.9 1.1   ALKPHOS 101 128   AST 12 30   ALT 7 21       TSH   Date Value Ref Range Status   02/16/2024 2.43 0.30 - 4.20 uIU/mL Final   05/03/2019 1.34 0.40 - 4.00 mU/L Final   11/12/2017 1.59 0.40 - 4.00 mU/L Final       No results found for: \"A1C\"    ASSESSMENT/PLAN:  (I50.42) Chronic combined systolic and diastolic congestive heart failure (H)  (primary encounter diagnosis)  (I48.91) Atrial fibrillation with rapid ventricular response (H)  (F03.B4) Moderate dementia with anxiety, unspecified dementia type (H)  (K75.4) Autoimmune hepatitis (H)  (I95.9) Hypotension, unspecified hypotension type  (R60.0) Bilateral leg edema  Comment: stable on chronic conditions   Plan:   -under anticoagulated at 2.5 mg po BID versus 5 mg po BID. Will review with her daughter for dose increase recommendations  -weights moved to weekly versus daily   -future lab orders in Epic       Electronically signed by:  CATY Thakkar CNP           "

## 2025-01-21 ENCOUNTER — ASSISTED LIVING VISIT (OUTPATIENT)
Dept: GERIATRICS | Facility: CLINIC | Age: 80
End: 2025-01-21
Payer: COMMERCIAL

## 2025-01-21 DIAGNOSIS — I95.9 HYPOTENSION, UNSPECIFIED HYPOTENSION TYPE: ICD-10-CM

## 2025-01-21 DIAGNOSIS — N18.31 STAGE 3A CHRONIC KIDNEY DISEASE (H): ICD-10-CM

## 2025-01-21 DIAGNOSIS — I50.42 CHRONIC COMBINED SYSTOLIC AND DIASTOLIC CONGESTIVE HEART FAILURE (H): Primary | ICD-10-CM

## 2025-01-21 DIAGNOSIS — R60.0 BILATERAL LEG EDEMA: ICD-10-CM

## 2025-01-21 DIAGNOSIS — K75.4 AUTOIMMUNE HEPATITIS (H): ICD-10-CM

## 2025-01-21 DIAGNOSIS — F03.B4 MODERATE DEMENTIA WITH ANXIETY, UNSPECIFIED DEMENTIA TYPE (H): ICD-10-CM

## 2025-01-21 DIAGNOSIS — I48.91 ATRIAL FIBRILLATION WITH RAPID VENTRICULAR RESPONSE (H): ICD-10-CM

## 2025-01-21 PROCEDURE — 99349 HOME/RES VST EST MOD MDM 40: CPT | Performed by: NURSE PRACTITIONER

## 2025-01-21 NOTE — LETTER
1/21/2025      Elaina Winn  C/o Tresa Stanton  [address]  [facility] [address]        [place] [facility]  [place] Medical Record Number:  [id number]  Place of Service where encounter took place:  [facility] [[id number]]  Chief Complaint   Patient presents with     RECHECK       HPI:    Elaina Winn  is a 79 year old (1945), who is being seen today for an episodic care visit.  HPI information obtained from: facility chart records, facility staff, patient report, and [facility] chart review. Today's concern is:    Seen today for follow up to chronic disease management. No acute concerns from staff. She spends much time in her apartment. She reports to feeling cold, family gave her heating pad to use (help with arthritis) but not allowed in facility and on [facility] unit. Facility chart reviewed, no use of prn Lasix, VSS, weight stable.     PMH includes paroxysmal atrial fibrillation, autoimmune hepatitis, hyperlipidemia, hypertension, chronic renal insufficiency, dementia, and anxiety. Per cardiology she was not anticoagulated due to her liver function. In 2024 after hospitalization for a-fib RVR s/p ablation and pacemaker she was initiated on low dose Eliquis for stoke prevention. She had not returned to cardiology.     Past Medical and Surgical History reviewed in Epic today.    MEDICATIONS:    Current Outpatient Medications   Medication Sig Dispense Refill     apixaban ANTICOAGULANT (ELIQUIS) 2.5 MG tablet Take 1 tablet (2.5 mg) by mouth 2 times daily 60 tablet 0     Apoaequorin (PREVAGEN) 10 MG CAPS Take 1 capsule by mouth daily 30 capsule 11     bimatoprost (LUMIGAN) 0.01 % SOLN Place 1 drop into both eyes at bedtime       BLINK TEARS 0.25 % SOLN ophthalmic solution INSTILL 2 DROPS IN EACH EYE EVERY 4 HOURS AS NEEDED FOR DRY EYES 15 mL 11     brimonidine-timolol (COMBIGAN) 0.2-0.5 % ophthalmic solution Place 1 drop into both eyes 2 times daily       citalopram  "(CELEXA) 10 MG tablet Take 1 tablet (10 mg) by mouth daily 30 tablet 11     furosemide (LASIX) 20 MG tablet Take 1 tablet (20 mg) by mouth daily as needed (shortness of breath) 20 tablet 0     ipratropium (ATROVENT) 0.02 % neb solution Take 2.5 mLs (0.5 mg) by nebulization 3 times daily as needed for wheezing       levalbuterol (XOPENEX) 1.25 MG/3ML neb solution Take 3 mLs (1.25 mg) by nebulization every 4 hours as needed for wheezing 90 mL 0     miconazole with skin protectant (MARLON ANTIFUNGAL) 2 % CREA cream Apply topically 2 times daily And as needed       midodrine (PROAMATINE) 5 MG tablet Take 1 tablet (5 mg) by mouth 2 times daily 60 tablet 0     mycophenolate (GENERIC EQUIVALENT) 250 MG capsule Take 250 mg by mouth 2 times daily       REVIEW OF SYSTEMS:  Limited secondary to cognitive impairment but today pt reports ok    Objective:  BP (!) 153/74   Pulse 74   Temp 97.6  F (36.4  C)   Resp 16   Ht 1.575 m (5' 2\")   Wt 56.4 kg (124 lb 6.4 oz)   SpO2 95%   BMI 22.75 kg/m    Exam:  GENERAL APPEARANCE:  Alert, calm and pleasant, confused   ENT:  Mouth and posterior oropharynx normal, moist mucous membranes  EYES:  EOM, conjunctivae, lids, pupils and irises normal, glasses  RESP:  lungs clear to auscultation   CV:  Palpation and auscultation of heart done , regular rate and rhythm, no murmur, doughy edema, compression on   ABDOMEN:  no guarding or rebound, bowel sounds normal  M/S:   ambulates with 2WW but leaves at times  SKIN: limited, pale and intact to visualized areas  NEURO:   Cranial nerves 2-12 are normal tested and grossly at patient's baseline  PSYCH:  insight and judgement impaired, memory impaired SLUMS 17/30    Labs:   CBC RESULTS:   Recent Labs   Lab Test 03/05/24  0540 02/29/24  0532   WBC 6.8 10.8   RBC 4.15 3.83   HGB 12.0 11.0*   HCT 39.2 35.8   MCV 95 94   MCH 28.9 28.7   MCHC 30.6* 30.7*   RDW 14.4 15.0    226       Last Basic Metabolic Panel:  Recent Labs   Lab Test " "03/05/24  0540 02/29/24  0532    138   POTASSIUM 4.6 3.9   CHLORIDE 103 103   DANIELITO 10.0 9.2   CO2 25 24   BUN 16.8 33.7*   CR 1.12* 1.16*   GLC 79 99       Liver Function Studies -   Recent Labs   Lab Test 02/27/24  2331 02/16/24  1941   PROTTOTAL 5.7* 6.4   ALBUMIN 2.9* 3.5   BILITOTAL 0.9 1.1   ALKPHOS 101 128   AST 12 30   ALT 7 21       TSH   Date Value Ref Range Status   02/16/2024 2.43 0.30 - 4.20 uIU/mL Final   05/03/2019 1.34 0.40 - 4.00 mU/L Final   11/12/2017 1.59 0.40 - 4.00 mU/L Final       No results found for: \"A1C\"    ASSESSMENT/PLAN:  (I50.42) Chronic combined systolic and diastolic congestive heart failure (H)  (primary encounter diagnosis)  (I48.91) Atrial fibrillation with rapid ventricular response (H)  (F03.B4) Moderate dementia with anxiety, unspecified dementia type (H)  (K75.4) Autoimmune hepatitis (H)  (I95.9) Hypotension, unspecified hypotension type  (R60.0) Bilateral leg edema  Comment: stable on chronic conditions   Plan:   -under anticoagulated at 2.5 mg po BID versus 5 mg po BID. Will review with her daughter for dose increase recommendations  -weights moved to weekly versus daily   -future lab orders in Epic       Electronically signed by:  CATY Thakkar CNP             Sincerely,        CATY Thakkar CNP    Electronically signed  "

## 2025-01-22 ENCOUNTER — DOCUMENTATION ONLY (OUTPATIENT)
Dept: ANTICOAGULATION | Facility: CLINIC | Age: 80
End: 2025-01-22
Payer: COMMERCIAL

## 2025-01-22 VITALS
DIASTOLIC BLOOD PRESSURE: 74 MMHG | WEIGHT: 124.4 LBS | HEIGHT: 62 IN | TEMPERATURE: 97.6 F | OXYGEN SATURATION: 95 % | RESPIRATION RATE: 16 BRPM | SYSTOLIC BLOOD PRESSURE: 153 MMHG | BODY MASS INDEX: 22.89 KG/M2 | HEART RATE: 74 BPM

## 2025-01-22 PROBLEM — I50.43 ACUTE ON CHRONIC COMBINED SYSTOLIC AND DIASTOLIC CONGESTIVE HEART FAILURE (H): Status: RESOLVED | Noted: 2025-01-22 | Resolved: 2025-01-22

## 2025-01-22 PROBLEM — J44.9 CHRONIC OBSTRUCTIVE PULMONARY DISEASE, UNSPECIFIED COPD TYPE (H): Status: RESOLVED | Noted: 2024-03-18 | Resolved: 2025-01-22

## 2025-01-22 PROBLEM — I50.43 ACUTE ON CHRONIC COMBINED SYSTOLIC AND DIASTOLIC CONGESTIVE HEART FAILURE (H): Status: ACTIVE | Noted: 2025-01-22

## 2025-01-22 NOTE — PROGRESS NOTES
ANTICOAGULATION DIRECT ORAL ANTICOAGULANT MONITORING    SUBJECTIVE     The [facility] is evaluating Elaina Winn's Apixaban (Eliquis) as part of its Anticoagulation Monitoring Program.    Indication:Atrial Fibrillation w/ RVR  Current dose per medication list: Apixaban 2.5 mg TWICE daily  Recent hospitalizations/ED/Office Visits for bleeding/clotting concerns: No  Other bleeding or side effect concerns: No  Additional findings: she has autoimmune hepatitis with normal LFTs , see 3/12/24 OV notes     OBJECTIVE     Age: 79 year old    Wt Readings from Last 2 Encounters:   01/20/25 64 kg (141 lb)   10/07/24 54.4 kg (120 lb)      Lab Results   Component Value Date    CR 1.12 (H) 03/05/2024    CR 1.16 (H) 02/29/2024    CR 1.31 (H) 02/28/2024     Creatinine Clearance (using actual bodyweight, mL/min):     Lab Results   Component Value Date    HGB 12.0 03/05/2024    HGB 12.7 03/21/2018     03/05/2024    PLT 93 03/21/2018     Liver Function Studies -   Recent Labs   Lab Test 02/27/24  2331   PROTTOTAL 5.7*   ALBUMIN 2.9*   BILITOTAL 0.9   ALKPHOS 101   AST 12   ALT 7        ASSESSMENT/PLAN     A chart review for Direct Oral Anticoagulant (DOAC) Stewardship has been completed for:     Dosing: recommend adjustment to Apixaban 5mg TWICE daily due to not meeting 2 of 3 criteria as defined in the package insert. For patients with Atrial Fibrillation, nonevidence-based doses of DOACs should be avoided to minimize risks of preventable thromboembolism or major bleeding and to improve survival (Circulation. 2024;149:e1-e156.) package insert states no dosage adjustment necessary. for mild to moderate hepatic impairment     Plan made with [facility] Pharmacist Nadine Dumont, RN  Anticoagulation Clinic

## 2025-02-24 VITALS
DIASTOLIC BLOOD PRESSURE: 66 MMHG | TEMPERATURE: 97.3 F | SYSTOLIC BLOOD PRESSURE: 140 MMHG | OXYGEN SATURATION: 98 % | WEIGHT: 126.6 LBS | BODY MASS INDEX: 23.3 KG/M2 | RESPIRATION RATE: 16 BRPM | HEART RATE: 78 BPM | HEIGHT: 62 IN

## 2025-02-25 ENCOUNTER — ASSISTED LIVING VISIT (OUTPATIENT)
Dept: GERIATRICS | Facility: CLINIC | Age: 80
End: 2025-02-25
Payer: COMMERCIAL

## 2025-02-25 DIAGNOSIS — F03.B4 MODERATE DEMENTIA WITH ANXIETY, UNSPECIFIED DEMENTIA TYPE (H): ICD-10-CM

## 2025-02-25 DIAGNOSIS — I50.42 CHRONIC COMBINED SYSTOLIC AND DIASTOLIC CONGESTIVE HEART FAILURE (H): Primary | ICD-10-CM

## 2025-02-25 DIAGNOSIS — I95.9 HYPOTENSION, UNSPECIFIED HYPOTENSION TYPE: ICD-10-CM

## 2025-02-25 DIAGNOSIS — R60.0 BILATERAL LEG EDEMA: ICD-10-CM

## 2025-02-25 DIAGNOSIS — I48.91 ATRIAL FIBRILLATION WITH RAPID VENTRICULAR RESPONSE (H): ICD-10-CM

## 2025-02-25 DIAGNOSIS — K75.4 AUTOIMMUNE HEPATITIS (H): ICD-10-CM

## 2025-02-25 PROCEDURE — 99349 HOME/RES VST EST MOD MDM 40: CPT | Performed by: NURSE PRACTITIONER

## 2025-02-25 NOTE — LETTER
2/25/2025      Elaina Winn  C/o Tresa Stanton  [address]  [facility] [address]        [place] [facility]  [place] Medical Record Number:  [id number]  Place of Service where encounter took place:  [facility] [[id number]]  Chief Complaint   Patient presents with     RECHECK     HTN       HPI:    Elaina Winn  is a 79 year old (1945), who is being seen today for an episodic care visit.  HPI information obtained from: facility chart records, facility staff, patient report, and [facility] chart review. Today's concern is:    Seen today for follow up to chronic disease management. No acute concerns from staff. She spends much time in her apartment but does walk the halls daily. She reports to feeling cold, family gave her heating pad to use (help with arthritis) but not allowed in facility and on [facility] unit. Daughter and grand-daughter here from out of town. Paperwork for POA completed. Facility chart reviewed, no use of prn Lasix, VSS, weight stable      PMH includes paroxysmal atrial fibrillation, autoimmune hepatitis, hyperlipidemia, hypertension, chronic renal insufficiency, dementia, and anxiety. Per cardiology she was not anticoagulated due to her liver function. In 2024 after hospitalization for a-fib RVR s/p ablation and pacemaker she was initiated on low dose Eliquis for stoke prevention and will continue on current dose. She had not returned to cardiology but discussed today and plans to follow up when next able along with [facility].        Past Medical and Surgical History reviewed in Epic today.    MEDICATIONS:    Current Outpatient Medications   Medication Sig Dispense Refill     apixaban ANTICOAGULANT (ELIQUIS) 2.5 MG tablet Take 1 tablet (2.5 mg) by mouth 2 times daily 60 tablet 0     Apoaequorin (PREVAGEN) 10 MG CAPS Take 1 capsule by mouth daily 30 capsule 11     bimatoprost (LUMIGAN) 0.01 % SOLN Place 1 drop into both eyes at bedtime       BLINK TEARS 0.25 %  "SOLN ophthalmic solution INSTILL 2 DROPS IN EACH EYE EVERY 4 HOURS AS NEEDED FOR DRY EYES 15 mL 11     brimonidine-timolol (COMBIGAN) 0.2-0.5 % ophthalmic solution Place 1 drop into both eyes 2 times daily       citalopram (CELEXA) 10 MG tablet Take 1 tablet (10 mg) by mouth daily 30 tablet 11     furosemide (LASIX) 20 MG tablet Take 1 tablet (20 mg) by mouth daily as needed (shortness of breath) 20 tablet 0     ipratropium (ATROVENT) 0.02 % neb solution Take 2.5 mLs (0.5 mg) by nebulization 3 times daily as needed for wheezing       levalbuterol (XOPENEX) 1.25 MG/3ML neb solution Take 3 mLs (1.25 mg) by nebulization every 4 hours as needed for wheezing 90 mL 0     miconazole with skin protectant (MARLON ANTIFUNGAL) 2 % CREA cream Apply topically 2 times daily And as needed       midodrine (PROAMATINE) 5 MG tablet Take 1 tablet (5 mg) by mouth 2 times daily 60 tablet 0     mycophenolate (GENERIC EQUIVALENT) 250 MG capsule Take 250 mg by mouth 2 times daily       REVIEW OF SYSTEMS:  Limited secondary to cognitive impairment but today pt reports ok    Objective:  BP (!) 140/66   Pulse 78   Temp 97.3  F (36.3  C)   Resp 16   Ht 1.575 m (5' 2\")   Wt 57.4 kg (126 lb 9.6 oz)   SpO2 98%   BMI 23.16 kg/m    Exam:  GENERAL APPEARANCE:  Alert, calm and pleasant, confused   ENT:  Mouth and posterior oropharynx normal, moist mucous membranes  EYES:  EOM, conjunctivae, lids, pupils and irises normal, glasses  RESP:  lungs clear to auscultation   CV:  Palpation and auscultation of heart done , regular rate and rhythm, no murmur, doughy edema 1+,  compression on   ABDOMEN:  no guarding or rebound, bowel sounds normal  M/S:   ambulates with 2WW but leaves at times  SKIN: limited, pale and intact to visualized areas  NEURO:   Cranial nerves 2-12 are normal tested and grossly at patient's baseline  PSYCH:  insight and judgement impaired, memory impaired SLUMS 17/30    Labs:   CBC RESULTS:   Recent Labs   Lab Test 03/05/24  0540 " "02/29/24  0532   WBC 6.8 10.8   RBC 4.15 3.83   HGB 12.0 11.0*   HCT 39.2 35.8   MCV 95 94   MCH 28.9 28.7   MCHC 30.6* 30.7*   RDW 14.4 15.0    226       Last Basic Metabolic Panel:  Recent Labs   Lab Test 03/05/24  0540 02/29/24  0532    138   POTASSIUM 4.6 3.9   CHLORIDE 103 103   DANIELITO 10.0 9.2   CO2 25 24   BUN 16.8 33.7*   CR 1.12* 1.16*   GLC 79 99       Liver Function Studies -   Recent Labs   Lab Test 02/27/24  2331 02/16/24  1941   PROTTOTAL 5.7* 6.4   ALBUMIN 2.9* 3.5   BILITOTAL 0.9 1.1   ALKPHOS 101 128   AST 12 30   ALT 7 21       TSH   Date Value Ref Range Status   02/16/2024 2.43 0.30 - 4.20 uIU/mL Final   05/03/2019 1.34 0.40 - 4.00 mU/L Final   11/12/2017 1.59 0.40 - 4.00 mU/L Final       No results found for: \"A1C\"    ASSESSMENT/PLAN:  (I50.42) Chronic combined systolic and diastolic congestive heart failure (H)  (primary encounter diagnosis)  (I48.91) Atrial fibrillation with rapid ventricular response (H)  (F03.B4) Moderate dementia with anxiety, unspecified dementia type (H)  (K75.4) Autoimmune hepatitis (H)  (I95.9) Hypotension, unspecified hypotension type  (R60.0) Bilateral leg edema  Comment: stable on chronic conditions   Plan:   -under anticoagulated at 2.5 mg po BID versus 5 mg po BID per coagulation clinic. Rationale noted below.   -weights moved to weekly versus daily   -future lab orders in Epic     (I50.42) Chronic combined systolic and diastolic congestive heart failure (H)  (primary encounter diagnosis)  (I48.91) Atrial fibrillation with rapid ventricular response (H)  (F03.B4) Moderate dementia with anxiety, unspecified dementia type (H)  (K75.4) Autoimmune hepatitis (H)  (I95.9) Hypotension, unspecified hypotension type  (R60.0) Bilateral leg edema    -no new orders  -family to schedule [facility] and Cardiology   -per insert: Oral: 5 mg twice daily unless patient has any 2 of the following: Age >=80 years, body weight <=60 kg, or serum creatinine >=1.5 mg/dL (133 " micromole/L), then reduce dose to 2.5 mg twice daily.     -seen in conjunction with NP student     Electronically signed by:  CATY Thakkar CNP             Sincerely,        CATY Thakkar CNP    Electronically signed

## 2025-02-27 ENCOUNTER — TELEPHONE (OUTPATIENT)
Dept: CARDIOLOGY | Facility: CLINIC | Age: 80
End: 2025-02-27

## 2025-02-27 ENCOUNTER — ANCILLARY PROCEDURE (OUTPATIENT)
Dept: CARDIOLOGY | Facility: CLINIC | Age: 80
End: 2025-02-27
Attending: INTERNAL MEDICINE
Payer: COMMERCIAL

## 2025-02-27 DIAGNOSIS — Z95.0 CARDIAC PACEMAKER IN SITU: Primary | ICD-10-CM

## 2025-02-27 DIAGNOSIS — I49.5 SICK SINUS SYNDROME (H): ICD-10-CM

## 2025-02-27 DIAGNOSIS — Z95.0 CARDIAC PACEMAKER IN SITU: ICD-10-CM

## 2025-02-27 LAB
MDC_IDC_LEAD_CONNECTION_STATUS: NORMAL
MDC_IDC_LEAD_IMPLANT_DT: NORMAL
MDC_IDC_LEAD_LOCATION: NORMAL
MDC_IDC_LEAD_LOCATION_DETAIL_1: NORMAL
MDC_IDC_LEAD_MFG: NORMAL
MDC_IDC_LEAD_MODEL: NORMAL
MDC_IDC_LEAD_POLARITY_TYPE: NORMAL
MDC_IDC_LEAD_SERIAL: NORMAL
MDC_IDC_MSMT_BATTERY_DTM: NORMAL
MDC_IDC_MSMT_BATTERY_REMAINING_LONGEVITY: 146 MO
MDC_IDC_MSMT_BATTERY_RRT_TRIGGER: 2.62
MDC_IDC_MSMT_BATTERY_STATUS: NORMAL
MDC_IDC_MSMT_BATTERY_VOLTAGE: 3.08 V
MDC_IDC_MSMT_LEADCHNL_RV_IMPEDANCE_VALUE: 399 OHM
MDC_IDC_MSMT_LEADCHNL_RV_IMPEDANCE_VALUE: 551 OHM
MDC_IDC_MSMT_LEADCHNL_RV_PACING_THRESHOLD_AMPLITUDE: 1.12 V
MDC_IDC_MSMT_LEADCHNL_RV_PACING_THRESHOLD_PULSEWIDTH: 0.4 MS
MDC_IDC_MSMT_LEADCHNL_RV_SENSING_INTR_AMPL: 8.75 MV
MDC_IDC_PG_IMPLANT_DTM: NORMAL
MDC_IDC_PG_MFG: NORMAL
MDC_IDC_PG_MODEL: NORMAL
MDC_IDC_PG_SERIAL: NORMAL
MDC_IDC_PG_TYPE: NORMAL
MDC_IDC_SESS_CLINIC_NAME: NORMAL
MDC_IDC_SESS_DTM: NORMAL
MDC_IDC_SESS_TYPE: NORMAL
MDC_IDC_SET_BRADY_HYSTRATE: NORMAL
MDC_IDC_SET_BRADY_LOWRATE: 60 {BEATS}/MIN
MDC_IDC_SET_BRADY_MAX_SENSOR_RATE: 120 {BEATS}/MIN
MDC_IDC_SET_BRADY_MODE: NORMAL
MDC_IDC_SET_LEADCHNL_RV_PACING_AMPLITUDE: 2.25 V
MDC_IDC_SET_LEADCHNL_RV_PACING_ANODE_ELECTRODE_1: NORMAL
MDC_IDC_SET_LEADCHNL_RV_PACING_ANODE_LOCATION_1: NORMAL
MDC_IDC_SET_LEADCHNL_RV_PACING_CAPTURE_MODE: NORMAL
MDC_IDC_SET_LEADCHNL_RV_PACING_CATHODE_ELECTRODE_1: NORMAL
MDC_IDC_SET_LEADCHNL_RV_PACING_CATHODE_LOCATION_1: NORMAL
MDC_IDC_SET_LEADCHNL_RV_PACING_POLARITY: NORMAL
MDC_IDC_SET_LEADCHNL_RV_PACING_PULSEWIDTH: 0.4 MS
MDC_IDC_SET_LEADCHNL_RV_SENSING_ANODE_ELECTRODE_1: NORMAL
MDC_IDC_SET_LEADCHNL_RV_SENSING_ANODE_LOCATION_1: NORMAL
MDC_IDC_SET_LEADCHNL_RV_SENSING_CATHODE_ELECTRODE_1: NORMAL
MDC_IDC_SET_LEADCHNL_RV_SENSING_CATHODE_LOCATION_1: NORMAL
MDC_IDC_SET_LEADCHNL_RV_SENSING_POLARITY: NORMAL
MDC_IDC_SET_LEADCHNL_RV_SENSING_SENSITIVITY: 0.9 MV
MDC_IDC_SET_ZONE_DETECTION_INTERVAL: 400 MS
MDC_IDC_SET_ZONE_STATUS: NORMAL
MDC_IDC_SET_ZONE_TYPE: NORMAL
MDC_IDC_SET_ZONE_VENDOR_TYPE: NORMAL
MDC_IDC_STAT_BRADY_DTM_END: NORMAL
MDC_IDC_STAT_BRADY_DTM_START: NORMAL
MDC_IDC_STAT_BRADY_RV_PERCENT_PACED: 99.92 %
MDC_IDC_STAT_EPISODE_RECENT_COUNT: 0
MDC_IDC_STAT_EPISODE_RECENT_COUNT_DTM_END: NORMAL
MDC_IDC_STAT_EPISODE_RECENT_COUNT_DTM_START: NORMAL
MDC_IDC_STAT_EPISODE_TOTAL_COUNT: 0
MDC_IDC_STAT_EPISODE_TOTAL_COUNT_DTM_END: NORMAL
MDC_IDC_STAT_EPISODE_TOTAL_COUNT_DTM_START: NORMAL
MDC_IDC_STAT_EPISODE_TYPE: NORMAL

## 2025-02-27 PROCEDURE — 93294 REM INTERROG EVL PM/LDLS PM: CPT | Performed by: INTERNAL MEDICINE

## 2025-02-27 PROCEDURE — 93296 REM INTERROG EVL PM/IDS: CPT | Performed by: INTERNAL MEDICINE

## 2025-02-27 NOTE — TELEPHONE ENCOUNTER
1st attempt- Left voicemail for the patient to call back and schedule the following:    Appointment type:  Return EP  Provider:  ANY EP RONALDO  Return date:  May/June 2025  Additional appointment(s) needed:  Yes  Additonal Notes:  Device Check Needed Prior  Specialty phone number: [phone]

## 2025-03-22 ENCOUNTER — HEALTH MAINTENANCE LETTER (OUTPATIENT)
Age: 80
End: 2025-03-22

## 2025-04-01 LAB
ALBUMIN SERPL BCG-MCNC: 3.8 G/DL (ref 3.5–5.2)
ALP SERPL-CCNC: 124 U/L (ref 40–150)
ALT SERPL W P-5'-P-CCNC: 10 U/L (ref 0–50)
ANION GAP SERPL CALCULATED.3IONS-SCNC: 8 MMOL/L (ref 7–15)
AST SERPL W P-5'-P-CCNC: 17 U/L (ref 0–45)
BILIRUB DIRECT SERPL-MCNC: 0.15 MG/DL (ref 0–0.3)
BILIRUB SERPL-MCNC: 0.5 MG/DL
BUN SERPL-MCNC: 38.7 MG/DL (ref 8–23)
CALCIUM SERPL-MCNC: 10.2 MG/DL (ref 8.8–10.4)
CHLORIDE SERPL-SCNC: 105 MMOL/L (ref 98–107)
CREAT SERPL-MCNC: 1.36 MG/DL (ref 0.51–0.95)
EGFRCR SERPLBLD CKD-EPI 2021: 39 ML/MIN/1.73M2
ERYTHROCYTE [DISTWIDTH] IN BLOOD BY AUTOMATED COUNT: 13.2 % (ref 10–15)
GLUCOSE SERPL-MCNC: 85 MG/DL (ref 70–99)
HCO3 SERPL-SCNC: 26 MMOL/L (ref 22–29)
HCT VFR BLD AUTO: 41.1 % (ref 35–47)
HGB BLD-MCNC: 12.9 G/DL (ref 11.7–15.7)
MCH RBC QN AUTO: 28.9 PG (ref 26.5–33)
MCHC RBC AUTO-ENTMCNC: 31.4 G/DL (ref 31.5–36.5)
MCV RBC AUTO: 92 FL (ref 78–100)
PLATELET # BLD AUTO: 210 10E3/UL (ref 150–450)
POTASSIUM SERPL-SCNC: 5.3 MMOL/L (ref 3.4–5.3)
PROT SERPL-MCNC: 6.1 G/DL (ref 6.4–8.3)
RBC # BLD AUTO: 4.47 10E6/UL (ref 3.8–5.2)
SODIUM SERPL-SCNC: 139 MMOL/L (ref 135–145)
WBC # BLD AUTO: 5.3 10E3/UL (ref 4–11)

## 2025-04-01 PROCEDURE — 36415 COLL VENOUS BLD VENIPUNCTURE: CPT | Mod: ORL | Performed by: NURSE PRACTITIONER

## 2025-04-01 PROCEDURE — 80053 COMPREHEN METABOLIC PANEL: CPT | Mod: ORL | Performed by: NURSE PRACTITIONER

## 2025-04-01 PROCEDURE — 82248 BILIRUBIN DIRECT: CPT | Mod: ORL | Performed by: NURSE PRACTITIONER

## 2025-04-01 PROCEDURE — 85027 COMPLETE CBC AUTOMATED: CPT | Mod: ORL | Performed by: NURSE PRACTITIONER

## 2025-04-01 PROCEDURE — P9604 ONE-WAY ALLOW PRORATED TRIP: HCPCS | Mod: ORL | Performed by: NURSE PRACTITIONER

## 2025-04-01 NOTE — PROGRESS NOTES
Resident with worsening CKD noted on labs    Plan:   -discontinue midodrine as possible contributor, contraindicated in acute renal disease, and good BP/HR in facility chart review   -follow BP/HR daily x 4 days at various times once midodrine is stopped  -encourage oral intake of water/fluids  -repeat BMP next week on lab day for CKD      Electronically signed by:  CATY Thakkar CNP

## 2025-04-02 ENCOUNTER — LAB REQUISITION (OUTPATIENT)
Dept: LAB | Facility: CLINIC | Age: 80
End: 2025-04-02
Payer: COMMERCIAL

## 2025-04-02 DIAGNOSIS — N18.30 CHRONIC KIDNEY DISEASE, STAGE 3 UNSPECIFIED (H): ICD-10-CM

## 2025-04-07 NOTE — PROGRESS NOTES
[place] [facility]  [place] Medical Record Number:  [id number]  Place of Service where encounter took place:  [facility] [[id number]]  Chief Complaint   Patient presents with    RECHECK       HPI:    Elaina Winn  is a 79 year old (1945), who is being seen today for an episodic care visit.  HPI information obtained from: facility chart records, facility staff, and [facility] chart review. Today's concern is:    Seen today for follow up to worsening CKD. Discontinued midodrine as possible contributor 4/1/25 contraindicated in acute renal disease, and good BP/HR in facility chart review, on lower side but stable. Denies dizziness, lightheadedness. Up ambulating around her apartment.     Past Medical and Surgical History reviewed in Epic today.    MEDICATIONS:    Current Outpatient Medications   Medication Sig Dispense Refill    apixaban ANTICOAGULANT (ELIQUIS) 2.5 MG tablet Take 1 tablet (2.5 mg) by mouth 2 times daily 60 tablet 0    Apoaequorin (PREVAGEN) 10 MG CAPS Take 1 capsule by mouth daily 30 capsule 11    bimatoprost (LUMIGAN) 0.01 % SOLN Place 1 drop into both eyes at bedtime      BLINK TEARS 0.25 % SOLN ophthalmic solution INSTILL 2 DROPS IN EACH EYE EVERY 4 HOURS AS NEEDED FOR DRY EYES 15 mL 11    brimonidine-timolol (COMBIGAN) 0.2-0.5 % ophthalmic solution Place 1 drop into both eyes 2 times daily      citalopram (CELEXA) 10 MG tablet Take 1 tablet (10 mg) by mouth daily 30 tablet 11    furosemide (LASIX) 20 MG tablet Take 1 tablet (20 mg) by mouth daily as needed (shortness of breath) 20 tablet 0    ipratropium (ATROVENT) 0.02 % neb solution Take 2.5 mLs (0.5 mg) by nebulization 3 times daily as needed for wheezing      levalbuterol (XOPENEX) 1.25 MG/3ML neb solution Take 3 mLs (1.25 mg) by nebulization every 4 hours as needed for wheezing 90 mL 0    miconazole with skin protectant (MARLON ANTIFUNGAL) 2 % CREA cream Apply topically 2 times daily And as needed       "mycophenolate (GENERIC EQUIVALENT) 250 MG capsule Take 250 mg by mouth 2 times daily       REVIEW OF SYSTEMS:  Limited secondary to cognitive impairment but today pt reports ok    Objective:  /70   Pulse 70   Temp 97.4  F (36.3  C)   Resp 18   Ht 1.575 m (5' 2\")   Wt 56.4 kg (124 lb 6.4 oz)   SpO2 94%   BMI 22.75 kg/m    Exam:  GENERAL APPEARANCE:  Alert, calm and pleasant  ENT:  Mouth and posterior oropharynx normal, moist mucous membranes  EYES:  EOM, conjunctivae, lids, pupils and irises normal, glasses  RESP:  lungs clear to auscultation   CV:  Palpation and auscultation of heart done , regular rate and rhythm, no murmur, doughy edema 1+,  compression on   ABDOMEN:  no guarding or rebound, bowel sounds normal  M/S:   ambulates with 2WW but leaves at times  SKIN: limited, pale and intact to visualized areas  NEURO:   Cranial nerves 2-12 are normal tested and grossly at patient's baseline  PSYCH:  insight and judgement impaired, memory impaired SLUMS 17/30    Labs:   CBC RESULTS:   Recent Labs   Lab Test 04/01/25  0905 03/05/24  0540   WBC 5.3 6.8   RBC 4.47 4.15   HGB 12.9 12.0   HCT 41.1 39.2   MCV 92 95   MCH 28.9 28.9   MCHC 31.4* 30.6*   RDW 13.2 14.4    305       Last Basic Metabolic Panel:  Recent Labs   Lab Test 04/08/25  0734 04/01/25  0905    139   POTASSIUM 4.7 5.3   CHLORIDE 105 105   DANIELITO 10.6* 10.2   CO2 25 26   BUN 40.2* 38.7*   CR 1.25* 1.36*   GLC 90 85       Liver Function Studies -   Recent Labs   Lab Test 04/01/25  0905 02/27/24  2331   PROTTOTAL 6.1* 5.7*   ALBUMIN 3.8 2.9*   BILITOTAL 0.5 0.9   ALKPHOS 124 101   AST 17 12   ALT 10 7       TSH   Date Value Ref Range Status   02/16/2024 2.43 0.30 - 4.20 uIU/mL Final   05/03/2019 1.34 0.40 - 4.00 mU/L Final   11/12/2017 1.59 0.40 - 4.00 mU/L Final       No results found for: \"A1C\"    ASSESSMENT/PLAN:  (N18.32) Chronic kidney disease, stage 3b (H)  (primary encounter diagnosis)  Comment: GFR slight improved from 39-->43. " Creatinine from 1.36 to 1.25  Plan:   -continue current plan of care      Electronically signed by:  CATY Thakkar CNP

## 2025-04-08 ENCOUNTER — ASSISTED LIVING VISIT (OUTPATIENT)
Dept: GERIATRICS | Facility: CLINIC | Age: 80
End: 2025-04-08
Payer: COMMERCIAL

## 2025-04-08 VITALS
HEART RATE: 70 BPM | DIASTOLIC BLOOD PRESSURE: 70 MMHG | BODY MASS INDEX: 22.89 KG/M2 | TEMPERATURE: 97.4 F | OXYGEN SATURATION: 94 % | SYSTOLIC BLOOD PRESSURE: 105 MMHG | HEIGHT: 62 IN | RESPIRATION RATE: 18 BRPM | WEIGHT: 124.4 LBS

## 2025-04-08 DIAGNOSIS — N18.32 CHRONIC KIDNEY DISEASE, STAGE 3B (H): Primary | ICD-10-CM

## 2025-04-08 LAB
ANION GAP SERPL CALCULATED.3IONS-SCNC: 12 MMOL/L (ref 7–15)
BUN SERPL-MCNC: 40.2 MG/DL (ref 8–23)
CALCIUM SERPL-MCNC: 10.6 MG/DL (ref 8.8–10.4)
CHLORIDE SERPL-SCNC: 105 MMOL/L (ref 98–107)
CREAT SERPL-MCNC: 1.25 MG/DL (ref 0.51–0.95)
EGFRCR SERPLBLD CKD-EPI 2021: 43 ML/MIN/1.73M2
GLUCOSE SERPL-MCNC: 90 MG/DL (ref 70–99)
HCO3 SERPL-SCNC: 25 MMOL/L (ref 22–29)
POTASSIUM SERPL-SCNC: 4.7 MMOL/L (ref 3.4–5.3)
SODIUM SERPL-SCNC: 142 MMOL/L (ref 135–145)

## 2025-04-08 PROCEDURE — 80048 BASIC METABOLIC PNL TOTAL CA: CPT | Mod: ORL | Performed by: NURSE PRACTITIONER

## 2025-04-08 PROCEDURE — P9604 ONE-WAY ALLOW PRORATED TRIP: HCPCS | Mod: ORL | Performed by: NURSE PRACTITIONER

## 2025-04-08 PROCEDURE — 36415 COLL VENOUS BLD VENIPUNCTURE: CPT | Mod: ORL | Performed by: NURSE PRACTITIONER

## 2025-04-08 PROCEDURE — 99349 HOME/RES VST EST MOD MDM 40: CPT | Performed by: NURSE PRACTITIONER

## 2025-04-24 ENCOUNTER — PATIENT OUTREACH (OUTPATIENT)
Dept: CARE COORDINATION | Facility: CLINIC | Age: 80
End: 2025-04-24
Payer: COMMERCIAL

## 2025-05-28 ENCOUNTER — ASSISTED LIVING VISIT (OUTPATIENT)
Dept: GERIATRICS | Facility: CLINIC | Age: 80
End: 2025-05-28
Payer: COMMERCIAL

## 2025-05-28 ENCOUNTER — DOCUMENTATION ONLY (OUTPATIENT)
Dept: OTHER | Facility: CLINIC | Age: 80
End: 2025-05-28
Payer: COMMERCIAL

## 2025-05-28 VITALS
OXYGEN SATURATION: 97 % | DIASTOLIC BLOOD PRESSURE: 65 MMHG | BODY MASS INDEX: 22.19 KG/M2 | TEMPERATURE: 97.4 F | SYSTOLIC BLOOD PRESSURE: 137 MMHG | WEIGHT: 120.6 LBS | RESPIRATION RATE: 18 BRPM | HEIGHT: 62 IN | HEART RATE: 74 BPM

## 2025-05-28 DIAGNOSIS — N18.31 STAGE 3A CHRONIC KIDNEY DISEASE (H): ICD-10-CM

## 2025-05-28 DIAGNOSIS — F03.B4 MODERATE DEMENTIA WITH ANXIETY, UNSPECIFIED DEMENTIA TYPE (H): Primary | ICD-10-CM

## 2025-05-28 DIAGNOSIS — J44.9 CHRONIC OBSTRUCTIVE PULMONARY DISEASE, UNSPECIFIED COPD TYPE (H): ICD-10-CM

## 2025-05-28 DIAGNOSIS — I10 HTN (HYPERTENSION), BENIGN: ICD-10-CM

## 2025-05-28 DIAGNOSIS — I25.2 STATUS POST NON-ST ELEVATION MYOCARDIAL INFARCTION (NSTEMI): ICD-10-CM

## 2025-05-28 DIAGNOSIS — I50.42 CHRONIC COMBINED SYSTOLIC AND DIASTOLIC CONGESTIVE HEART FAILURE (H): ICD-10-CM

## 2025-05-28 DIAGNOSIS — Z98.890 HX OF ATRIOVENTRICULAR NODE ABLATION: ICD-10-CM

## 2025-05-28 DIAGNOSIS — K75.4 AUTOIMMUNE HEPATITIS (H): ICD-10-CM

## 2025-05-28 DIAGNOSIS — I48.0 PAROXYSMAL A-FIB (H): ICD-10-CM

## 2025-05-28 DIAGNOSIS — Z95.0 CARDIAC PACEMAKER IN SITU: ICD-10-CM

## 2025-05-28 DIAGNOSIS — I95.9 HYPOTENSION, UNSPECIFIED HYPOTENSION TYPE: ICD-10-CM

## 2025-05-28 PROCEDURE — 99350 HOME/RES VST EST HIGH MDM 60: CPT | Performed by: NURSE PRACTITIONER

## 2025-05-28 NOTE — LETTER
5/28/2025      Elaina Winn  C/o Tresa Stanton  [address]  [facility] [place] [address]        No notes on file      Sincerely,        CATY Gomes CNP    Electronically signed   escitalopram daily.  Suggested possibly changing to duloxetine and consider adding aripiprazole later.  Currently, we'll make no changes before obtaining labs.  Patient says she is amenable to trying anything.    Autoimmune hepatitis  -- On mycophenolate    Chronic combined systolic and diastolic congestive heart failure  Paroxysmal atrial fibrillation  Cardiac pacemaker in situ  History of AV node ablation  S/p NSTEMI  HTN  Hypotension unspecified  CKD stage IIIb  -- Receiving Eliquis.  -- She has been receiving midodrine for her hypotension; however, it was discontinued by Jefferson Calderon CNP on  as a possible contributor and/or contraindicated in acute renal disease.  Blood pressures were a bit soft but stable.  May consider reinstituting midodrine depending on lab results.  -- GFR was noted to improve 1 week of following discontinuation of midodrine on , from the 39 to 43, creatinine improving from 1.36 to 1.25.      ROS: 10 point ROS of systems including Constitutional, Eyes, Respiratory, Cardiovascular, Gastroenterology, Genitourinary, Integumentary, Musculoskeletal, and Psychiatric were all negative except for pertinent positives noted in my HPI.      Past Medical History:   Diagnosis Date     Anxiety      Atrial fibrillation (H)      Chronic renal insufficiency      Dementia (H)      Glaucoma      Hepatitis     autoimmune     HTN (hypertension), benign      Hyperlipidemia LDL goal < 130      Paroxysmal A-fib (H)               Family History   Problem Relation Age of Onset     Hypertension Mother          age 95     Alzheimer Disease Father 75        also CHF     Cancer Brother          of lung ca age 69     Lipids Brother      Alzheimer Disease Paternal Uncle      Alzheimer Disease Paternal Uncle      Social History     Socioeconomic History     Marital status:      Spouse name: None     Number of children: None     Years of education: None     Highest education level: None   Tobacco  Use     Smoking status: Former     Current packs/day: 0.00     Types: Cigarettes     Quit date: 1990     Years since quittin.4     Smokeless tobacco: Never     Tobacco comments:     quit    Vaping Use     Vaping status: Never Used   Substance and Sexual Activity     Alcohol use: No     Alcohol/week: 0.0 standard drinks of alcohol     Drug use: No     Sexual activity: Not Currently     Partners: Male   Social History Narrative    , 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  Walks 3miles per day        dtr (33) from [place] and lives in [facility]    Son from [place] and has mental health issues     Social Drivers of Health     Financial Resource Strain: Low Risk  (2024)    Financial Resource Strain      Within the past 12 months, have you or your family members you live with been unable to get utilities (heat, electricity) when it was really needed?: No   Food Insecurity: Low Risk  (2024)    Food Insecurity      Within the past 12 months, did you worry that your food would run out before you got money to buy more?: No      Within the past 12 months, did the food you bought just not last and you didn t have money to get more?: No   Transportation Needs: Low Risk  (2024)    Transportation Needs      Within the past 12 months, has lack of transportation kept you from medical appointments, getting your medicines, non-medical meetings or appointments, work, or from getting things that you need?: No   Housing Stability: Low Risk  (2024)    Housing Stability      Do you have housing? : Yes      Are you worried about losing your housing?: No       MEDICATIONS: Reviewed from the MAR, physician orders, and/or earlier progress notes.    Current Outpatient Medications   Medication Sig Dispense Refill     apixaban ANTICOAGULANT (ELIQUIS) 2.5 MG tablet Take 1 tablet (2.5 mg) by mouth 2 times daily 60 tablet 0     Apoaequorin (PREVAGEN) 10 MG CAPS Take 1 capsule by mouth daily 30 capsule 11      "bimatoprost (LUMIGAN) 0.01 % SOLN Place 1 drop into both eyes at bedtime       BLINK TEARS 0.25 % SOLN ophthalmic solution INSTILL 2 DROPS IN EACH EYE EVERY 4 HOURS AS NEEDED FOR DRY EYES 15 mL 11     brimonidine-timolol (COMBIGAN) 0.2-0.5 % ophthalmic solution Place 1 drop into both eyes 2 times daily       citalopram (CELEXA) 10 MG tablet Take 1 tablet (10 mg) by mouth daily 30 tablet 11     furosemide (LASIX) 20 MG tablet Take 1 tablet (20 mg) by mouth daily as needed (shortness of breath) 20 tablet 0     ipratropium (ATROVENT) 0.02 % neb solution Take 2.5 mLs (0.5 mg) by nebulization 3 times daily as needed for wheezing       levalbuterol (XOPENEX) 1.25 MG/3ML neb solution Take 3 mLs (1.25 mg) by nebulization every 4 hours as needed for wheezing 90 mL 0     miconazole with skin protectant (MARLON ANTIFUNGAL) 2 % CREA cream Apply topically 2 times daily And as needed       mycophenolate (GENERIC EQUIVALENT) 250 MG capsule Take 250 mg by mouth 2 times daily       No current facility-administered medications for this visit.     ALLERGIES:   Allergies   Allergen Reactions     Adhesive Tape      Zocor [Simvastatin - High Dose]      Elevated LFTs       DIET: Regular, regular texture, thin liquids.    Vitals:    05/28/25 1056   BP: 137/65   Pulse: 74   Resp: 18   Temp: 97.4  F (36.3  C)   SpO2: 97%   Weight: 54.7 kg (120 lb 9.6 oz)   Height: 1.575 m (5' 2\")     Wt Readings from Last 4 Encounters:   05/28/25 54.7 kg (120 lb 9.6 oz)   04/07/25 56.4 kg (124 lb 6.4 oz)   02/24/25 57.4 kg (126 lb 9.6 oz)   02/10/25 56.6 kg (124 lb 12.8 oz)     Body mass index is 22.06 kg/m .  Body surface area is 1.55 meters squared.    EXAMINATION:   General: Pleasant, alert, and conversant elderly female, sitting on the edge of her bed, in no apparent distress.  Head: Normocephalic and atraumatic.   Eyes: PERRLA, sclerae clear.   ENT: Moist oral mucosa.  She has her own teeth.  No nasal discharge.  Hearing is adequate for conversation in " the quiet room.  Cardiovascular: Distant heart sounds with a regular rate and rhythm and no appreciable murmur.  No peripheral edema.  Respiratory: Lungs clear to auscultation bilaterally.   Abdomen: Nondistended.   Musculoskeletal/Extremities: Age-related degenerative joint disease.  Pacemaker palpable in the upper left chest wall.  Thoracic kyphosis.  Right hallux valgus deformity.  Integument: No visible rashes, clinically significant lesions, or skin breakdown.   Cognitive/Psychiatric: Alert and oriented with euthymic affect.    DIAGNOSTICS:   No results found for this or any previous visit (from the past 240 hours).  Last Comprehensive Metabolic Panel:  Sodium   Date Value Ref Range Status   04/08/2025 142 135 - 145 mmol/L Final   01/02/2018 143 133 - 144 mmol/L Final     Potassium   Date Value Ref Range Status   04/08/2025 4.7 3.4 - 5.3 mmol/L Final   01/02/2018 3.7 3.4 - 5.3 mmol/L Final     Chloride   Date Value Ref Range Status   04/08/2025 105 98 - 107 mmol/L Final   01/02/2018 112 (H) 94 - 109 mmol/L Final     Carbon Dioxide   Date Value Ref Range Status   01/02/2018 26 20 - 32 mmol/L Final     Carbon Dioxide (CO2)   Date Value Ref Range Status   04/08/2025 25 22 - 29 mmol/L Final     Anion Gap   Date Value Ref Range Status   04/08/2025 12 7 - 15 mmol/L Final   01/02/2018 5 3 - 14 mmol/L Final     Glucose   Date Value Ref Range Status   04/08/2025 90 70 - 99 mg/dL Final   01/02/2018 88 70 - 99 mg/dL Final     GLUCOSE BY METER POCT   Date Value Ref Range Status   02/18/2024 139 (H) 70 - 99 mg/dL Final     Urea Nitrogen   Date Value Ref Range Status   04/08/2025 40.2 (H) 8.0 - 23.0 mg/dL Final   01/02/2018 31 (H) 7 - 30 mg/dL Final     Creatinine   Date Value Ref Range Status   04/08/2025 1.25 (H) 0.51 - 0.95 mg/dL Final   01/02/2018 0.98 0.52 - 1.04 mg/dL Final     GFR Estimate   Date Value Ref Range Status   04/08/2025 43 (L) >60 mL/min/1.73m2 Final   01/02/2018 55 (L) >60 mL/min/1.7m2 Final     Comment:      Non  GFR Calc     Calcium   Date Value Ref Range Status   04/08/2025 10.6 (H) 8.8 - 10.4 mg/dL Final   01/02/2018 8.7 8.5 - 10.1 mg/dL Final     Bilirubin Total   Date Value Ref Range Status   04/01/2025 0.5 <=1.2 mg/dL Final   02/19/2018 0.6 0.2 - 1.3 mg/dL Final     Alkaline Phosphatase   Date Value Ref Range Status   04/01/2025 124 40 - 150 U/L Final   02/19/2018 106 40 - 150 U/L Final     ALT   Date Value Ref Range Status   04/01/2025 10 0 - 50 U/L Final   02/19/2018 33 0 - 50 U/L Final     AST   Date Value Ref Range Status   04/01/2025 17 0 - 45 U/L Final   02/19/2018 33 0 - 45 U/L Final     Lab Results   Component Value Date    WBC 5.3 04/01/2025    WBC 2.9 03/21/2018     Lab Results   Component Value Date    RBC 4.47 04/01/2025    RBC 4.16 03/21/2018     Lab Results   Component Value Date    HGB 12.9 04/01/2025    HGB 12.7 03/21/2018     Lab Results   Component Value Date    HCT 41.1 04/01/2025    HCT 40.3 03/21/2018     Lab Results   Component Value Date    MCV 92 04/01/2025    MCV 97 03/21/2018     Lab Results   Component Value Date    MCH 28.9 04/01/2025    MCH 30.5 03/21/2018     Lab Results   Component Value Date    MCHC 31.4 04/01/2025    MCHC 31.5 03/21/2018     Lab Results   Component Value Date    RDW 13.2 04/01/2025    RDW 16.2 03/21/2018     Lab Results   Component Value Date     04/01/2025    PLT 93 03/21/2018       ASSESSMENT/Plan:      ICD-10-CM    1. Moderate dementia with anxiety, unspecified dementia type (H)  F03.B4       2. Autoimmune hepatitis (H)  K75.4       3. Chronic combined systolic and diastolic congestive heart failure (H)  I50.42       4. Paroxysmal A-fib (H)  I48.0       5. Cardiac pacemaker in situ  Z95.0       6. Hx of atrioventricular node ablation  Z98.890       7. HTN (hypertension), benign  I10       8. Hypotension, unspecified hypotension type  I95.9       9. Status post non-ST elevation myocardial infarction (NSTEMI)  I25.2       10. Stage 3a  chronic kidney disease (H)  N18.31       11. Chronic obstructive pulmonary disease, unspecified COPD type (H)  J44.9           CHANGES:    CMP next lab day.    CARE PLAN:    The care plan, medications, vital signs, orders, and nursing notes have been reviewed, and all orders signed. Changes to care plan, if any, as noted. Otherwise, continue current plan of care. Total time for visit was 60 minutes including, but not limited to, non-face-to-face time spent reviewing records, counseling, and coordination of care.    The above has been created using voice recognition software. Please be aware that this may unintentionally  produce inaccuracies and/or nonsensical sentences.      Electronically signed by: CATY Gomes CNP      Sincerely,        CATY Gomes CNP    Electronically signed

## 2025-05-29 ENCOUNTER — LAB REQUISITION (OUTPATIENT)
Dept: LAB | Facility: CLINIC | Age: 80
End: 2025-05-29
Payer: COMMERCIAL

## 2025-05-29 DIAGNOSIS — R53.1 WEAKNESS: ICD-10-CM

## 2025-05-29 DIAGNOSIS — I50.32 CHRONIC DIASTOLIC (CONGESTIVE) HEART FAILURE (H): ICD-10-CM

## 2025-05-30 NOTE — PROGRESS NOTES
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  She is being seen for an episodic visit.      CURRENT/RECENT TCU ISSUES    Disposition  -- According to nursing staff, she has been feeling weak and lightheaded.  Wondering about reviewing her mood medications.  Pacemaker next check on 2025.  -- Her POLST was recently changed to DNR.  -- Asked about her weakness, she tells me, \"it's basically a lot of things have happened.\"  She says she tried to stand and felt dizzy, but is gotten better.  \"I've never experienced that,\" she said, referring to these recent feelings.  She says she was moving slower and admits to not having \"a lot of strength.\"  -- Asked if she is feeling depressed or sad, she responds, \"I am depressed sometimes.  Well, I lost my father.\"  -- Questioned orthostatic hypotension, though she is not sure.  She has started using a walker.  -- She talks a bit about the problems of aging.  -- She says she is not a good patient, stating, \"I'd rather be a nurse.\"  -- Ambulates with a 2 wheeled walker but occasionally leaves it behind.  Less so lately given her weakness/dizziness.    Moderate dementia  Depression  -- Taking Prevagen.  -- Receiving 10 mg of escitalopram daily.  Suggested possibly changing to duloxetine and consider adding aripiprazole later.  " Currently, we'll make no changes before obtaining labs.  Patient says she is amenable to trying anything.    Autoimmune hepatitis  -- On mycophenolate    Chronic combined systolic and diastolic congestive heart failure  Paroxysmal atrial fibrillation  Cardiac pacemaker in situ  History of AV node ablation  S/p NSTEMI  HTN  Hypotension unspecified  CKD stage IIIb  -- Receiving Eliquis.  -- She has been receiving midodrine for her hypotension; however, it was discontinued by Jefferson Calderon CNP on  as a possible contributor and/or contraindicated in acute renal disease.  Blood pressures were a bit soft but stable.  May consider reinstituting midodrine depending on lab results.  -- GFR was noted to improve 1 week of following discontinuation of midodrine on , from the 39 to 43, creatinine improving from 1.36 to 1.25.      ROS: 10 point ROS of systems including Constitutional, Eyes, Respiratory, Cardiovascular, Gastroenterology, Genitourinary, Integumentary, Musculoskeletal, and Psychiatric were all negative except for pertinent positives noted in my HPI.      Past Medical History:   Diagnosis Date    Anxiety     Atrial fibrillation (H)     Chronic renal insufficiency     Dementia (H)     Glaucoma     Hepatitis     autoimmune    HTN (hypertension), benign     Hyperlipidemia LDL goal < 130     Paroxysmal A-fib (H)               Family History   Problem Relation Age of Onset    Hypertension Mother          age 95    Alzheimer Disease Father 75        also CHF    Cancer Brother          of lung ca age 69    Lipids Brother     Alzheimer Disease Paternal Uncle     Alzheimer Disease Paternal Uncle      Social History     Socioeconomic History    Marital status:      Spouse name: None    Number of children: None    Years of education: None    Highest education level: None   Tobacco Use    Smoking status: Former     Current packs/day: 0.00     Types: Cigarettes     Quit date: 1990     Years since  quittin.4    Smokeless tobacco: Never    Tobacco comments:     quit    Vaping Use    Vaping status: Never Used   Substance and Sexual Activity    Alcohol use: No     Alcohol/week: 0.0 standard drinks of alcohol    Drug use: No    Sexual activity: Not Currently     Partners: Male   Social History Narrative    , 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  Walks 3miles per day        dtr (33) from [place] and lives in [place]    Son from [place] and has mental health issues     Social Drivers of Health     Financial Resource Strain: Low Risk  (2024)    Financial Resource Strain     Within the past 12 months, have you or your family members you live with been unable to get utilities (heat, electricity) when it was really needed?: No   Food Insecurity: Low Risk  (2024)    Food Insecurity     Within the past 12 months, did you worry that your food would run out before you got money to buy more?: No     Within the past 12 months, did the food you bought just not last and you didn t have money to get more?: No   Transportation Needs: Low Risk  (2024)    Transportation Needs     Within the past 12 months, has lack of transportation kept you from medical appointments, getting your medicines, non-medical meetings or appointments, work, or from getting things that you need?: No   Housing Stability: Low Risk  (2024)    Housing Stability     Do you have housing? : Yes     Are you worried about losing your housing?: No       MEDICATIONS: Reviewed from the MAR, physician orders, and/or earlier progress notes.    Current Outpatient Medications   Medication Sig Dispense Refill    apixaban ANTICOAGULANT (ELIQUIS) 2.5 MG tablet Take 1 tablet (2.5 mg) by mouth 2 times daily 60 tablet 0    Apoaequorin (PREVAGEN) 10 MG CAPS Take 1 capsule by mouth daily 30 capsule 11    bimatoprost (LUMIGAN) 0.01 % SOLN Place 1 drop into both eyes at bedtime      BLINK TEARS 0.25 % SOLN ophthalmic solution INSTILL 2 DROPS IN EACH  "EYE EVERY 4 HOURS AS NEEDED FOR DRY EYES 15 mL 11    brimonidine-timolol (COMBIGAN) 0.2-0.5 % ophthalmic solution Place 1 drop into both eyes 2 times daily      citalopram (CELEXA) 10 MG tablet Take 1 tablet (10 mg) by mouth daily 30 tablet 11    furosemide (LASIX) 20 MG tablet Take 1 tablet (20 mg) by mouth daily as needed (shortness of breath) 20 tablet 0    ipratropium (ATROVENT) 0.02 % neb solution Take 2.5 mLs (0.5 mg) by nebulization 3 times daily as needed for wheezing      levalbuterol (XOPENEX) 1.25 MG/3ML neb solution Take 3 mLs (1.25 mg) by nebulization every 4 hours as needed for wheezing 90 mL 0    miconazole with skin protectant (MARLON ANTIFUNGAL) 2 % CREA cream Apply topically 2 times daily And as needed      mycophenolate (GENERIC EQUIVALENT) 250 MG capsule Take 250 mg by mouth 2 times daily       No current facility-administered medications for this visit.     ALLERGIES:   Allergies   Allergen Reactions    Adhesive Tape     Zocor [Simvastatin - High Dose]      Elevated LFTs       DIET: Regular, regular texture, thin liquids.    Vitals:    05/28/25 1056   BP: 137/65   Pulse: 74   Resp: 18   Temp: 97.4  F (36.3  C)   SpO2: 97%   Weight: 54.7 kg (120 lb 9.6 oz)   Height: 1.575 m (5' 2\")     Wt Readings from Last 4 Encounters:   05/28/25 54.7 kg (120 lb 9.6 oz)   04/07/25 56.4 kg (124 lb 6.4 oz)   02/24/25 57.4 kg (126 lb 9.6 oz)   02/10/25 56.6 kg (124 lb 12.8 oz)     Body mass index is 22.06 kg/m .  Body surface area is 1.55 meters squared.    EXAMINATION:   General: Pleasant, alert, and conversant elderly female, sitting on the edge of her bed, in no apparent distress.  Head: Normocephalic and atraumatic.   Eyes: PERRLA, sclerae clear.   ENT: Moist oral mucosa.  She has her own teeth.  No nasal discharge.  Hearing is adequate for conversation in the quiet room.  Cardiovascular: Distant heart sounds with a regular rate and rhythm and no appreciable murmur.  No peripheral edema.  Respiratory: Lungs " clear to auscultation bilaterally.   Abdomen: Nondistended.   Musculoskeletal/Extremities: Age-related degenerative joint disease.  Pacemaker palpable in the upper left chest wall.  Thoracic kyphosis.  Right hallux valgus deformity.  Integument: No visible rashes, clinically significant lesions, or skin breakdown.   Cognitive/Psychiatric: Alert and oriented with euthymic affect.    DIAGNOSTICS:   No results found for this or any previous visit (from the past 240 hours).  Last Comprehensive Metabolic Panel:  Sodium   Date Value Ref Range Status   04/08/2025 142 135 - 145 mmol/L Final   01/02/2018 143 133 - 144 mmol/L Final     Potassium   Date Value Ref Range Status   04/08/2025 4.7 3.4 - 5.3 mmol/L Final   01/02/2018 3.7 3.4 - 5.3 mmol/L Final     Chloride   Date Value Ref Range Status   04/08/2025 105 98 - 107 mmol/L Final   01/02/2018 112 (H) 94 - 109 mmol/L Final     Carbon Dioxide   Date Value Ref Range Status   01/02/2018 26 20 - 32 mmol/L Final     Carbon Dioxide (CO2)   Date Value Ref Range Status   04/08/2025 25 22 - 29 mmol/L Final     Anion Gap   Date Value Ref Range Status   04/08/2025 12 7 - 15 mmol/L Final   01/02/2018 5 3 - 14 mmol/L Final     Glucose   Date Value Ref Range Status   04/08/2025 90 70 - 99 mg/dL Final   01/02/2018 88 70 - 99 mg/dL Final     GLUCOSE BY METER POCT   Date Value Ref Range Status   02/18/2024 139 (H) 70 - 99 mg/dL Final     Urea Nitrogen   Date Value Ref Range Status   04/08/2025 40.2 (H) 8.0 - 23.0 mg/dL Final   01/02/2018 31 (H) 7 - 30 mg/dL Final     Creatinine   Date Value Ref Range Status   04/08/2025 1.25 (H) 0.51 - 0.95 mg/dL Final   01/02/2018 0.98 0.52 - 1.04 mg/dL Final     GFR Estimate   Date Value Ref Range Status   04/08/2025 43 (L) >60 mL/min/1.73m2 Final   01/02/2018 55 (L) >60 mL/min/1.7m2 Final     Comment:     Non  GFR Calc     Calcium   Date Value Ref Range Status   04/08/2025 10.6 (H) 8.8 - 10.4 mg/dL Final   01/02/2018 8.7 8.5 - 10.1 mg/dL  Final     Bilirubin Total   Date Value Ref Range Status   04/01/2025 0.5 <=1.2 mg/dL Final   02/19/2018 0.6 0.2 - 1.3 mg/dL Final     Alkaline Phosphatase   Date Value Ref Range Status   04/01/2025 124 40 - 150 U/L Final   02/19/2018 106 40 - 150 U/L Final     ALT   Date Value Ref Range Status   04/01/2025 10 0 - 50 U/L Final   02/19/2018 33 0 - 50 U/L Final     AST   Date Value Ref Range Status   04/01/2025 17 0 - 45 U/L Final   02/19/2018 33 0 - 45 U/L Final     Lab Results   Component Value Date    WBC 5.3 04/01/2025    WBC 2.9 03/21/2018     Lab Results   Component Value Date    RBC 4.47 04/01/2025    RBC 4.16 03/21/2018     Lab Results   Component Value Date    HGB 12.9 04/01/2025    HGB 12.7 03/21/2018     Lab Results   Component Value Date    HCT 41.1 04/01/2025    HCT 40.3 03/21/2018     Lab Results   Component Value Date    MCV 92 04/01/2025    MCV 97 03/21/2018     Lab Results   Component Value Date    MCH 28.9 04/01/2025    MCH 30.5 03/21/2018     Lab Results   Component Value Date    MCHC 31.4 04/01/2025    MCHC 31.5 03/21/2018     Lab Results   Component Value Date    RDW 13.2 04/01/2025    RDW 16.2 03/21/2018     Lab Results   Component Value Date     04/01/2025    PLT 93 03/21/2018       ASSESSMENT/Plan:      ICD-10-CM    1. Moderate dementia with anxiety, unspecified dementia type (H)  F03.B4       2. Autoimmune hepatitis (H)  K75.4       3. Chronic combined systolic and diastolic congestive heart failure (H)  I50.42       4. Paroxysmal A-fib (H)  I48.0       5. Cardiac pacemaker in situ  Z95.0       6. Hx of atrioventricular node ablation  Z98.890       7. HTN (hypertension), benign  I10       8. Hypotension, unspecified hypotension type  I95.9       9. Status post non-ST elevation myocardial infarction (NSTEMI)  I25.2       10. Stage 3a chronic kidney disease (H)  N18.31       11. Chronic obstructive pulmonary disease, unspecified COPD type (H)  J44.9           CHANGES:    CMP next lab  day.    CARE PLAN:    The care plan, medications, vital signs, orders, and nursing notes have been reviewed, and all orders signed. Changes to care plan, if any, as noted. Otherwise, continue current plan of care. Total time for visit was 60 minutes including, but not limited to, non-face-to-face time spent reviewing records, counseling, and coordination of care.    The above has been created using voice recognition software. Please be aware that this may unintentionally  produce inaccuracies and/or nonsensical sentences.      Electronically signed by: CATY Gomes CNP

## 2025-06-03 LAB
ALBUMIN SERPL BCG-MCNC: 3.5 G/DL (ref 3.5–5.2)
ALP SERPL-CCNC: 121 U/L (ref 40–150)
ALT SERPL W P-5'-P-CCNC: 8 U/L (ref 0–50)
ANION GAP SERPL CALCULATED.3IONS-SCNC: 11 MMOL/L (ref 7–15)
AST SERPL W P-5'-P-CCNC: 15 U/L (ref 0–45)
BILIRUB SERPL-MCNC: 0.5 MG/DL
BUN SERPL-MCNC: 37.6 MG/DL (ref 8–23)
CALCIUM SERPL-MCNC: 10.1 MG/DL (ref 8.8–10.4)
CHLORIDE SERPL-SCNC: 106 MMOL/L (ref 98–107)
CREAT SERPL-MCNC: 1.33 MG/DL (ref 0.51–0.95)
EGFRCR SERPLBLD CKD-EPI 2021: 40 ML/MIN/1.73M2
GLUCOSE SERPL-MCNC: 81 MG/DL (ref 70–99)
HCO3 SERPL-SCNC: 24 MMOL/L (ref 22–29)
POTASSIUM SERPL-SCNC: 4.5 MMOL/L (ref 3.4–5.3)
PROT SERPL-MCNC: 5.8 G/DL (ref 6.4–8.3)
SODIUM SERPL-SCNC: 141 MMOL/L (ref 135–145)

## 2025-07-22 ENCOUNTER — TELEPHONE (OUTPATIENT)
Dept: GERIATRICS | Facility: CLINIC | Age: 80
End: 2025-07-22
Payer: COMMERCIAL

## 2025-07-22 NOTE — TELEPHONE ENCOUNTER
Due to low BP, would not do lasix at this time. Monitor for resp. Changes, edema, update NP Thursday.

## 2025-07-22 NOTE — TELEPHONE ENCOUNTER
Please provide a few more recent wts for comparison. Has there been any prn lasix given recently? Any dizziness or lightheadedness? Please recheck a manual BP and update.     Thanks!

## 2025-07-22 NOTE — TELEPHONE ENCOUNTER
No current orders for prn lasix, therefore, not used. No dizziness or lightheadedness. Repeat BP was 93/49.

## 2025-07-22 NOTE — TELEPHONE ENCOUNTER
[facility] Communication      Epic messages are checked 8 AM to 5:30 PM business days.   If need is urgent or if orders are needed by end of business day, call [phone]    Facility: [facility]  Geriatrics Provider:  CATY Tamayo CNP   Caller: Shanell  Call Back Number: [phone]    Allergies:    Allergies   Allergen Reactions    Adhesive Tape     Zocor [Simvastatin - High Dose]      Elevated LFTs         Reason for Call:     Interventions Initiated:     Related Medications:    Assessment:   S: weekly review of vitals completed.   B:  client  A: Weight is 129.4 lbs. Per provider orders, update provider for weight gain of 5 lbs or more in 1 week. Resident is per her baseline today. No sob or edema noted. Lung sounds are clear. Weight continues to trend up.   R: HERMILO Hughes NP updated.    Vitals: BP:  78/44 (resident encouraged to drink fluids, apartment warm and humid)  P:: 69  R:: 16  SPO2: 94% R/A Temp.:  97.7  Wt: 129.4 lb      Nursing/Patient Requests: review with rounds         Please send response/orders to [facility] malini Palma RN

## 2025-07-24 ENCOUNTER — PATIENT OUTREACH (OUTPATIENT)
Dept: CARE COORDINATION | Facility: CLINIC | Age: 80
End: 2025-07-24
Payer: COMMERCIAL

## 2025-08-14 PROBLEM — Z79.624 ON MYCOPHENOLATE MOFETIL THERAPY: Status: ACTIVE | Noted: 2025-08-14

## 2025-08-27 ENCOUNTER — TELEPHONE (OUTPATIENT)
Dept: GERIATRICS | Facility: CLINIC | Age: 80
End: 2025-08-27
Payer: COMMERCIAL

## (undated) DEVICE — DEFIB PRO-PADZ LVP LQD GEL ADULT 8900-2105-01

## (undated) DEVICE — SLITTER ADJSTBL 6232ADJ

## (undated) DEVICE — CATH ABLTN 7FR 1-7-4MM SPACE 115 CML 4MML STRL LF BD7TCFJ4L

## (undated) DEVICE — CABLE PACING ALLIGATOR CLIP 12FT 5833SL

## (undated) DEVICE — PACK EP SRG PROC LF DISP SAN32EPFSR

## (undated) DEVICE — INTRO SHEALTH 8.5FRX63CM SRO 406853

## (undated) DEVICE — GUIDEWIRE VASC 0.035INX150CM INQWIRE J TIP IQ35F150J3F/A

## (undated) DEVICE — SHEATH PRELUDE SNAP 13CM 9FR

## (undated) DEVICE — INTRO CATH 12CM 8.5FR FST-CATH

## (undated) DEVICE — CATH 5.7FRID/8.4FROD 9FR X 43CM INTRO SELECTSITE DEFLECT

## (undated) DEVICE — ADAPTER SAFE SHEATH SEALING 9FR SS-SA-09

## (undated) DEVICE — RAD INTRODUCER KIT MICRO 5FRX10CM .018 NITINOL G/W

## (undated) RX ORDER — FLUMAZENIL 0.1 MG/ML
INJECTION, SOLUTION INTRAVENOUS
Status: DISPENSED
Start: 2024-02-20

## (undated) RX ORDER — FENTANYL CITRATE 50 UG/ML
INJECTION, SOLUTION INTRAMUSCULAR; INTRAVENOUS
Status: DISPENSED
Start: 2017-12-21

## (undated) RX ORDER — FENTANYL CITRATE 50 UG/ML
INJECTION, SOLUTION INTRAMUSCULAR; INTRAVENOUS
Status: DISPENSED
Start: 2024-02-23

## (undated) RX ORDER — NALOXONE HYDROCHLORIDE 0.4 MG/ML
INJECTION, SOLUTION INTRAMUSCULAR; INTRAVENOUS; SUBCUTANEOUS
Status: DISPENSED
Start: 2024-02-20

## (undated) RX ORDER — NALOXONE HYDROCHLORIDE 0.4 MG/ML
INJECTION, SOLUTION INTRAMUSCULAR; INTRAVENOUS; SUBCUTANEOUS
Status: DISPENSED
Start: 2017-12-21

## (undated) RX ORDER — LIDOCAINE HYDROCHLORIDE 10 MG/ML
INJECTION, SOLUTION EPIDURAL; INFILTRATION; INTRACAUDAL; PERINEURAL
Status: DISPENSED
Start: 2024-02-23

## (undated) RX ORDER — FENTANYL CITRATE 50 UG/ML
INJECTION, SOLUTION INTRAMUSCULAR; INTRAVENOUS
Status: DISPENSED
Start: 2024-02-20

## (undated) RX ORDER — LIDOCAINE HYDROCHLORIDE 40 MG/ML
SOLUTION TOPICAL
Status: DISPENSED
Start: 2024-02-20

## (undated) RX ORDER — FLUMAZENIL 0.1 MG/ML
INJECTION, SOLUTION INTRAVENOUS
Status: DISPENSED
Start: 2017-12-21

## (undated) RX ORDER — BUPIVACAINE HYDROCHLORIDE 2.5 MG/ML
INJECTION, SOLUTION EPIDURAL; INFILTRATION; INTRACAUDAL
Status: DISPENSED
Start: 2024-02-23

## (undated) RX ORDER — GLYCOPYRROLATE 0.2 MG/ML
INJECTION, SOLUTION INTRAMUSCULAR; INTRAVENOUS
Status: DISPENSED
Start: 2024-02-20